# Patient Record
Sex: MALE | Race: WHITE | Employment: OTHER | URBAN - METROPOLITAN AREA
[De-identification: names, ages, dates, MRNs, and addresses within clinical notes are randomized per-mention and may not be internally consistent; named-entity substitution may affect disease eponyms.]

---

## 2017-01-16 ENCOUNTER — ALLIED HEALTH/NURSE VISIT (OUTPATIENT)
Dept: NEUROLOGY | Facility: CLINIC | Age: 42
End: 2017-01-16

## 2017-01-16 VITALS
BODY MASS INDEX: 24.01 KG/M2 | WEIGHT: 153 LBS | HEIGHT: 67 IN | DIASTOLIC BLOOD PRESSURE: 73 MMHG | SYSTOLIC BLOOD PRESSURE: 121 MMHG | HEART RATE: 85 BPM

## 2017-01-16 DIAGNOSIS — G40.219 PARTIAL EPILEPSY WITH LOSS OF CONSCIOUSNESS, INTRACTABLE (H): Primary | ICD-10-CM

## 2017-01-16 NOTE — PROGRESS NOTES
Patient comes in today for follow-up on changes to his VNS and his response to it.    Patient reports that there have been no negative effects from increased duty cycle and he is tolerating it well.  Wife reports that he seems to be more tired lately and that there is a decrease in seizure activity, which none-the-less, continue to cluster every couple of days with 4-6 Seizures clusters on days when he has them.  Wife also reports that once asleep he sleeps deeply and undisturbed, but seem to be more tired.    VNS was interrogated and all settings found to be as set at last encounter. Everything working correctly and there is no indication of battery running down.    VNS setting charted in flow sheet.    Vic Cummnis RN Care Coordinator  Neurology / Epilepsy

## 2017-01-16 NOTE — MR AVS SNAPSHOT
"              After Visit Summary   1/16/2017    Ammon Cronin    MRN: [id number]           Patient Information     Date Of Birth          1975        Visit Information        Provider Department      1/16/2017 11:30 AM Izabela, Michael Urrutia Nurse [facility]         Follow-ups after your visit        Who to contact     Please call your clinic at [phone] to:    Ask questions about your health    Make or cancel appointments    Discuss your medicines    Learn about your test results    Speak to your doctor   If you have compliments or concerns about an experience at your clinic, or if you wish to file a complaint, please contact [facility] Patient Relations at [phone] or email us at [email or web address]         Additional Information About Your Visit        MyChart Information     Clicktivatedt gives you secure access to your electronic health record. If you see a primary care provider, you can also send messages to your care team and make appointments. If you have questions, please call your primary care clinic.  If you do not have a primary care provider, please call [phone] and they will assist you.      Huiyuan is an electronic gateway that provides easy, online access to your medical records. With Huiyuan, you can request a clinic appointment, read your test results, renew a prescription or communicate with your care team.     To access your existing account, please contact your [facility] or call [phone] for assistance.        Care EveryWhere ID     This is your Care EveryWhere ID. This could be used by other organizations to access your [place] medical records  [id number]        Your Vitals Were     Pulse Height BMI (Body Mass Index)             85 5' 7.01\" (170.2 cm) 23.96 kg/m2          Blood Pressure from Last 3 Encounters:   01/16/17 121/73   11/16/16 108/65   10/05/16 124/71    Weight from Last 3 Encounters:   01/16/17 " 153 lb (69.4 kg)   11/16/16 149 lb 6.4 oz (67.767 kg)   10/05/16 150 lb 12.8 oz (68.402 kg)              Today, you had the following     No orders found for display       Primary Care Provider    None Specified       No primary provider on file.        Thank you!     Thank you for choosing [facility]  for your care. Our goal is always to provide you with excellent care. Hearing back from our patients is one way we can continue to improve our services. Please take a few minutes to complete the written survey that you may receive in the mail after your visit with us. Thank you!             Your Updated Medication List - Protect others around you: Learn how to safely use, store and throw away your medicines at www.disposemymeds.org.          This list is accurate as of: 1/16/17  1:37 PM.  Always use your most recent med list.                   Brand Name Dispense Instructions for use    * diazepam 5 MG/ML (HIGH CONC) solution    DIAZEPAM INTENSOL    30 mL    Seizures greater than 5 minutes or cluster of more than 3 seizures in 8 hour period. May repeat dose once, no more than 6 mg in 24 hour period.       * diazepam 5 MG tablet    VALIUM    30 tablet    TAKE 1 TAB AS NEEDED FOR SEIZURES >5MIN OR >3 SEIZURES/8HRS, MAY REPEAT ONCE, MAX 10MG/24HR, CALL 911 IF BREATHING PROBLEMS       eslicarbazepine acetate 600 MG tablet    APTIOM    270 tablet    Titrate as instructed to 600 mg morning and 1200 mg pm       * gabapentin 300 MG capsule    NEURONTIN    360 capsule    Titrate as instructed to 4 tablet (1200 mg) three times a day       * gabapentin 300 MG capsule    NEURONTIN    1080 capsule    Take 4 cap po tid       klonoPIN 0.5 MG tablet   Generic drug:  clonazePAM      Take 0.5 mg by mouth Taking 1 tab in am, taking 2 tabs afternoon       levETIRAcetam 750 MG tablet    KEPPRA    450 tablet    Generic.  Take 1 tablet in am, 2 tablets at 4 pm and  2 tablet at 11 pm.       MULTIVITAMIN & MINERAL PO      Take 1  tablet by mouth daily.       OMEGA-3 FATTY ACIDS PO      Take 1 tablet by mouth daily.       OXcarbazepine 300 MG tablet    TRILEPTAL    810 tablet    TAKE 3 TABLETS THREE TIMES DAILY       PROZAC 40 MG capsule   Generic drug:  FLUoxetine      Take 40 mg by mouth daily Started March 2013       risperiDONE 1 MG tablet    risperDAL     Take 1 mg by mouth 2 times daily       TOPAMAX 50 MG tablet   Generic drug:  topiramate      Take 50 mg by mouth 2 times daily       * Notice:  This list has 4 medication(s) that are the same as other medications prescribed for you. Read the directions carefully, and ask your doctor or other care provider to review them with you.

## 2017-05-04 DIAGNOSIS — G40.219 PARTIAL EPILEPSY WITH LOSS OF CONSCIOUSNESS, INTRACTABLE (H): ICD-10-CM

## 2017-05-04 RX ORDER — OXCARBAZEPINE 300 MG/1
TABLET, FILM COATED ORAL
Qty: 810 TABLET | Refills: 0 | Status: SHIPPED | OUTPATIENT
Start: 2017-05-04 | End: 2017-07-12

## 2017-05-31 ENCOUNTER — OFFICE VISIT (OUTPATIENT)
Dept: NEUROLOGY | Facility: CLINIC | Age: 42
End: 2017-05-31

## 2017-05-31 VITALS
HEIGHT: 67 IN | WEIGHT: 146 LBS | BODY MASS INDEX: 22.91 KG/M2 | HEART RATE: 62 BPM | DIASTOLIC BLOOD PRESSURE: 65 MMHG | SYSTOLIC BLOOD PRESSURE: 109 MMHG

## 2017-05-31 DIAGNOSIS — G40.219 PARTIAL EPILEPSY WITH IMPAIRMENT OF CONSCIOUSNESS, INTRACTABLE (H): Primary | ICD-10-CM

## 2017-05-31 DIAGNOSIS — R25.1 TREMOR: ICD-10-CM

## 2017-05-31 RX ORDER — PHENYTOIN SODIUM 100 MG/1
CAPSULE, EXTENDED RELEASE ORAL
Qty: 60 CAPSULE | Refills: 3 | Status: SHIPPED | OUTPATIENT
Start: 2017-05-31 | End: 2017-08-07

## 2017-05-31 RX ORDER — CLONAZEPAM 2 MG/1
2 TABLET ORAL 2 TIMES DAILY PRN
COMMUNITY
End: 2018-02-20

## 2017-05-31 NOTE — MR AVS SNAPSHOT
After Visit Summary   5/31/2017    Ammon Cronin    MRN: [id number]           Patient Information     Date Of Birth          1975        Visit Information        Provider Department      5/31/2017 11:30 AM Rosenda Claros MD [facility]        Today's Diagnoses     Partial epilepsy with impairment of consciousness, intractable (H)    -  1    Tremor          Care Instructions               Medication Tablet Size         AM  (morning)   Noon  PM (Night)       Week 1   phenytoin   mg    1 tablet       Week 2  phenytoin   mg  1 tablet   1 tablet       Week 3 Check phenytoin  Level         Week 4  See Harlan               CT of head, schedule phone call after CT to review with Harlan     If you are interested in CBD (marijuana)  trial call Dr. Cedeno to determine if you can participate     Think about Responsive Neurostimulation (Neuropace), [employer] ([employer]ena RESPONSIVE NEUROSTIMULATION (NEUROPACE) story)     Talk to psychiatrist about PRN (as needed) agitation medication that are not benzodiazepine/barbituates.     Check with insurance company about Brivaracetam, fycompa, banzel, onfi    See Dr. Bloom for the tremor     Rosenda ADAMS. MD Harlan             Follow-ups after your visit        Additional Services     NEUROLOGY ADULT REFERRAL       Your provider has referred you to:Dr. Bloom for head tremor     Reason for Referral: [facility] Dr. Bloom     Please be aware that coverage of these services is subject to the terms and limitations of your health insurance plan.  Call member services at your health plan with any benefit or coverage questions.      Please bring the following with you to your appointment:    (1) Any X-Rays, CTs or MRIs which have been performed.  Contact the facility where they were done to arrange for  prior to your scheduled appointment.    (2) List of current medications  (3) This referral request   (4) Any documents/labs given to you for this referral                   Follow-up notes from your care team     Return in about 4 weeks (around 6/28/2017).      Your next 10 appointments already scheduled     Jun 28, 2017 10:30 AM CDT   [facility]    [address], [address]  [facility] [address] [phone]              Future tests that were ordered for you today     Open Future Orders        Priority Expected Expires Ordered    Oxcarbazepine level Routine 6/1/2017 7/30/2017 5/31/2017    Comprehensive metabolic panel Routine 6/1/2017 7/30/2017 5/31/2017    CBC with platelets differential Routine 6/1/2017 7/30/2017 5/31/2017    Keppra (Levetiracetam) Level Routine 6/1/2017 7/30/2017 5/31/2017    Gabapentin level Routine 6/1/2017 7/30/2017 5/31/2017    CT Head w/o Contrast Routine  5/31/2018 5/31/2017            Who to contact     Please call your clinic at [phone] to:    Ask questions about your health    Make or cancel appointments    Discuss your medicines    Learn about your test results    Speak to your doctor   If you have compliments or concerns about an experience at your clinic, or if you wish to file a complaint, please contact [facility] Patient Relations at [phone] or email us at [email or web address]         Additional Information About Your Visit        Everypointhart Information     Jobfox gives you secure access to your electronic health record. If you see a primary care provider, you can also send messages to your care team and make appointments. If you have questions, please call your primary care clinic.  If you do not have a primary care provider, please call [phone] and they will assist you.      Jobfox is an electronic gateway that provides easy, online access to your medical records. With Jobfox, you can request a clinic appointment, read your test results, renew a prescription or communicate with your care team.    "  To access your existing account, please contact your [facility] or call [phone] for assistance.        Care EveryWhere ID     This is your Care EveryWhere ID. This could be used by other organizations to access your [place] medical records  [id number]        Your Vitals Were     Pulse Height BMI (Body Mass Index)             62 5' 7.01\" (170.2 cm) 22.86 kg/m2          Blood Pressure from Last 3 Encounters:   05/31/17 109/65   01/16/17 121/73   11/16/16 108/65    Weight from Last 3 Encounters:   05/31/17 146 lb (66.2 kg)   01/16/17 153 lb (69.4 kg)   11/16/16 149 lb 6.4 oz (67.8 kg)              We Performed the Following     CBC with platelets differential     Comprehensive metabolic panel     Gabapentin level     Keppra (Levetiracetam) Level     NEUROLOGY ADULT REFERRAL     Oxcarbazepine level          Today's Medication Changes          These changes are accurate as of: 5/31/17 12:52 PM.  If you have any questions, ask your nurse or doctor.               Start taking these medicines.        Dose/Directions    phenytoin 100 MG CR capsule   Commonly known as:  DILANTIN   Used for:  Partial epilepsy with impairment of consciousness, intractable (H), Tremor   Started by:  Rosenda Claros MD        Titrate to 100 mg twice a day   Quantity:  60 capsule   Refills:  3            Where to get your medicines      These medications were sent to [facility] - [place], [place] - [address]  [address], [facility] [address]     Phone:  [phone]     phenytoin 100 MG CR capsule                Primary Care Provider    None Specified       No primary provider on file.        Thank you!     Thank you for choosing [facility]  for your care. Our goal is always to provide you with excellent care. Hearing back from our patients is one way we can continue to improve our services. Please take a few minutes to complete the written survey that you may " receive in the mail after your visit with us. Thank you!             Your Updated Medication List - Protect others around you: Learn how to safely use, store and throw away your medicines at www.disposemymeds.org.          This list is accurate as of: 5/31/17 12:52 PM.  Always use your most recent med list.                   Brand Name Dispense Instructions for use    clonazePAM 2 MG tablet    klonoPIN     Take 2 mg by mouth 2 times daily as needed for anxiety       * diazepam 5 MG/ML (HIGH CONC) solution    DIAZEPAM INTENSOL    30 mL    Seizures greater than 5 minutes or cluster of more than 3 seizures in 8 hour period. May repeat dose once, no more than 6 mg in 24 hour period.       * diazepam 5 MG tablet    VALIUM    30 tablet    TAKE 1 TAB AS NEEDED FOR SEIZURES >5MIN OR >3 SEIZURES/8HRS, MAY REPEAT ONCE, MAX 10MG/24HR, CALL 911 IF BREATHING PROBLEMS       gabapentin 300 MG capsule    NEURONTIN    1080 capsule    Take 4 cap po tid       levETIRAcetam 750 MG tablet    KEPPRA    450 tablet    Generic.  Take 1 tablet in am, 2 tablets at 4 pm and  2 tablet at 11 pm.       MULTIVITAMIN & MINERAL PO      Take 1 tablet by mouth daily.       OMEGA-3 FATTY ACIDS PO      Take 1 tablet by mouth daily.       OXcarbazepine 300 MG tablet    TRILEPTAL    810 tablet    TAKE 3 TABLETS THREE TIMES DAILY       phenytoin 100 MG CR capsule    DILANTIN    60 capsule    Titrate to 100 mg twice a day       PROZAC 40 MG capsule   Generic drug:  FLUoxetine      Take 40 mg by mouth daily Started March 2013       risperiDONE 1 MG tablet    risperDAL     Take 1 mg by mouth 2 times daily       * Notice:  This list has 2 medication(s) that are the same as other medications prescribed for you. Read the directions carefully, and ask your doctor or other care provider to review them with you.

## 2017-05-31 NOTE — PATIENT INSTRUCTIONS
Medication Tablet Size         AM  (morning)   Noon  PM (Night)       Week 1   phenytoin   mg    1 tablet       Week 2  phenytoin   mg  1 tablet   1 tablet       Week 3 Check phenytoin  Level         Week 4  See Harlan               CT of head, schedule phone call after CT to review with Harlan     If you are interested in CBD (marijuana)  trial call Dr. Cedeno to determine if you can participate     Think about Responsive Neurostimulation (Neuropace), [employer] ([employer]ena RESPONSIVE NEUROSTIMULATION (NEUROPACE) story)     Talk to psychiatrist about PRN (as needed) agitation medication that are not benzodiazepine/barbituates.     Check with insurance company about Brivaracetam, fycompa, banzel, onfi    See Dr. Bloom for the tremor     Rosenda Claros MD

## 2017-05-31 NOTE — LETTER
2017       RE: Ammon Cronin  : 1975   MRN: [id number]      Dear Colleague,    Thank you for referring your patient, Ammon Cronin, to the [facility] EPILEPSY CARE at [facility]. Please see a copy of my visit note below.    [facility] Epilepsy Care Progress Note    Patient:  Ammon Cronin  :  1975   Age:  41 year old   Today's Office Visit:  2017    Epilepsy Data:  Patient History  Primary Epileptologist/Provider: Rosenda Claros M.D.  Epilepsy Syndrome: Localization-related epilepsy unspecified  Epilepsy Syndrome Status: Final  Age of Onset: 16  Etiology  : Tumor  Other Relevant Dx/ Issues: Etiology likely cavernous hemangioma; acute hemorrhage 2005.  Born 5 weeks premature. Left temporal AVM with resection age 11.  Cardiac pacemaker  (bradycardia).  Hx of migraines.  Inpatient evaluations 3/02, ,  & .     Tests/Surgery History  Last EE2013  Last Neuropsych Testin2009  Epilepsy Surgery #1 Date: 11  Epilepsy Related Surgery #1 : Type: VNS implantation on RIGHT.  Seizure Record  Current Visit Date: 17  Previous Visit Date: 17  Months since last visit: 4.44         SEIZURE HISTORY:   Copied forward:  First seizure was at the age of 16 (loss of awarenes). 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   Presurgically workup in  revealed bilateral independent seizure foci in the temporal lobes. Ictal asystole (, he developed chest pain, pallor, diaphoresis and nausea, was found to have a 22 second period of asystole.  Emergent cardiac pacemaker placed). MRI of the brain shows a cavernoma in the right frontal and a second one in the left temporal lobe and encephalomalacia.      INTERVAL HISTORY:  Came with Jasmyne. He continues to have  daily complex partial seizures, per Alejandrina seizure frequency has doubled, he appears more forgetful, increased agitation/agression/mood changes. He continues to average 5 seizure per day and his seizure are more intense per titus. Higher vagus nerve stimulator setting (duty cycle) worsening seizure intensity and slightly more seizures and he had side effects. Currently, on antiepileptic drug there is fatigue, no double vision, no mood changes, no nausea, no vomiting, no abdominal pain, no rashes. No recent ER visits and no hospitalizations since last visit.    Prior to Admission medications    Medication Sig Start Date End Date Taking? Authorizing Provider   clonazePAM (KLONOPIN) 2 MG tablet Take 2 mg by mouth 2 times daily as needed for anxiety   Yes Reported, Patient   OXcarbazepine (TRILEPTAL) 300 MG tablet TAKE 3 TABLETS THREE TIMES DAILY 5/4/17  Yes Rosenda Claros MD   gabapentin (NEURONTIN) 300 MG capsule Take 4 cap po tid 8/2/16  Yes Rosenda Claros MD   levETIRAcetam (KEPPRA) 750 MG tablet Generic.  Take 1 tablet in am, 2 tablets at 4 pm and  2 tablet at 11 pm. 8/2/16  Yes Rosenda Claros MD   diazepam (VALIUM) 5 MG tablet TAKE 1 TAB AS NEEDED FOR SEIZURES >5MIN OR >3 SEIZURES/8HRS, MAY REPEAT ONCE, MAX 10MG/24HR, CALL 911 IF BREATHING PROBLEMS 8/2/16  Yes Rosenda Claros MD   risperiDONE (RISPERDAL) 1 MG tablet Take 1 mg by mouth 2 times daily   Yes Reported, Patient   diazepam (DIAZEPAM INTENSOL) 5 MG/ML solution Seizures greater than 5 minutes or cluster of more than 3 seizures in 8 hour period. May repeat dose once, no more than 6 mg in 24 hour period. 2/3/14  Yes Eric Cruz MD   FLUoxetine (PROZAC) 40 MG capsule Take 40 mg by mouth daily Started March 2013   Yes Reported, Patient   Multiple Vitamins-Minerals (MULTIVITAMIN & MINERAL PO) Take 1 tablet by mouth daily.   Yes Reported, Patient   OMEGA-3 FATTY ACIDS PO Take 1 tablet by mouth daily.   Yes Reported, Patient         AED MEDICATIONS:     1. Levetiracetam, 750 mg in the morning, 1500 mg at 4 p.m., and 1500 mg at 11 p.m.   2. Oxcarbazepine  900 mg t.i.d.   3. Gabapentin 1200 mg AM, 1200 mg noon, and 1200 mg PM  (started for RLS, patient has taken higher dose for some time, not sure of length)   5. Klonopin 2 -4 mg  (started 3/2013 for anxiety, takes one per day and two on weekend)  6. Diazepam PRN for CPS lasting greater than 5 minutes or more then 2 in one hour.     Component      Latest Ref Rng 7/1/2013 11/5/2013 3/25/2014 11/4/2014   Levetiracetam Level       23.2 33.3  37.5   10 Hydroxy Metabolite Level        29.0 35.7 (H) 43.2 (H)   Valproic Acid Level      50 - 100 mg/L  60 82 46 (L)   Gabapentin Level         0.6 (L) 6.7   Sodium      133 - 144 mmol/L    138     Component      Latest Ref Rng 5/19/2015 6/2/2016   Levetiracetam Level       32.6 42.3 (H)   10 Hydroxy Metabolite Level       31.6 24.0   Valproic Acid Level      50 - 100 mg/L 39 (L)    Gabapentin Level       8.6 15.2   Sodium      133 - 144 mmol/L  143         MEDICATION NOTE:   1. Oxcarbazepine: increased oxcarbazepine from 2,400mg -> 2700 mg on 4/2013.   2. Depakote:  depakote was started 3/9/2010. Higher dose of VPA worsened tremor. Depakote stopped 5/2016 and tremors went down.   3. Gabapentin  increased 1200 mg three times a day 5/2016 with no change in seizure, however, RLS symptoms decreased  4. Vagus nerve stimulator setting: Not able to tolerate higher duty cycle (35) with 1.1 signal off time and 30 sec on time, seizure worsened, not able to tolerate higher current setting due to discomfort.     PRIOR AED:   1.Lyrica (ineffective for seizure, max dose 1100mg per day)   2.Topamax: Two trials from 01/2008-05/2008 and the second trial on 05/19/2015.  Max dose was 200 mg per day.  CP max was 3.5 mg/L.  There was no change in seizure frequency.  The patient was compliant.  During the second trial, they tried it for mood stabilization; 50 mg was after 2 months discontinued  because side effects were speech and word-finding difficulties.   3.Gabatril (ineffective, caused fatigue at 48mg/ day). One trial from 01/2004-04/2006.  Max dose 48 mg per day.  It was ineffective and was discontinued.  There was no increase in seizures when it was stopped.  Side effects were fatigue.   4.Zonegran  One trial from 12/2003.  Max dose 400 mg per day.  Zonegran was used in combination with Keppra and Trileptal.  Side effects were memory impairment, aggressiveness, fatigue, behavioral changes, cognitive impairment, which improved after Zonegran was discontinued.   5.Dilantin: One trial from ? to 03/08/2002.  Max dose 350 mg per day.  CP max and free Dilantin level of 1.9 mg/L.  Efficacy:  Unknown.  The drug was optimized.  Side effects were tiredness on 350 mg a day.     Assessment:  It looks like seizure control was better when the patient was on Dilantin; therefore, it could be retried.   6.Vimpat: One trial 07/2009-09/2009.  Max dose 400 mg per day.  CP max 5.3 mg/L.  It was discontinued after 2 months because the patient experienced dizziness, diplopia and ataxia.  7.Depakote: One trial from 01/2010 to 05/09/2016.  So far, seizures have not increased because we increased the gabapentin.  Also, the headaches have not gotten worse, but the hand tremor and the head tremor have immensely improved since Depakote was discontinued.  Max dose was 2500 mg per day.  CP max was up to 116/15.6 mg/L.  In the past, it decreased seizure frequency, and it was used together with levetiracetam, oxcarbazepine and gabapentin, and it was used before with oxcarbazepine, levetiracetam and Lyrica.   Assessment:  Depakote could always be retried if we run into a problem.  8.lamotrigine: One trial from 07/06/2001.  Max dose 150 mg per day.  Used in combination with Dilantin.  There was no change in seizure frequency.  Side effects were increase in confusion and therefore questionable efficacy.  9.carbamazepine (tired, but no  "past info on details)  10.Levetiracetam: started in 2001. No major side effects. One trial from 07/2001 to present.  Max dose 3750 mg per day.  CP max 42.4 mg/L.   11. Oxcarbazepine:  One trial from 03/2002 to present.  Max dose 2700 mg per day.  CP max 43.2 mg/L.  It decreases seizures, and it looks like it is one of the best drugs the patient has tried.    12.  Gabapentin:  One trial from 07/2012 to present.  Max dose is 3600 mg per day.  This drug was used more for pain and restless legs, but when Depakote was tapered, we increased the gabapentin, and it looks like the only problem we have is some weight gain with the gabapentin.  It helps for the restless legs.  Helping for seizures is questionable.    13.  Klonopin:  One trial started 03/2013.  Is used for anxiety, not for seizures.  It was started at 0.5 mg and optimized so far to 3 mg per day.     Assessment:  This drug is used by a psychiatrist.  If we one day want to try ONFI in this patient, maybe then the Klonopin could be decreased again.          REVIEW OF SYSTEMS:  Head tremor, which bothers him.  He continues to have bilateral hand tremors on Depakote.  No nausea, vomiting, diarrhea.  No rash.  Vagus nerve stimulator setting are bother him (voice changes and throat discomfort).      EXAMINATION:  /65  Pulse 62  Ht 5' 7.01\" (170.2 cm)  Wt 146 lb (66.2 kg)  BMI 22.86 kg/m2  Alert, orientated, speech is fluent, face is symmetric, No head/ hand tremor, no focal deficits noted.Gait is stable. He had a seizure in clinic during which he stares off, lip smacking, not able to follow commands, not able to name objects, or remember cue word. He had subtle mouth movements.     ASSESSMENT:    1.     2.  Left-sided cavernous hemangioma 07/2002.   3.  Multilobulated intraaxillary mass involving the right frontal lobe, removed 08/19/2003.     4.  Status post pacemaker placement 12/2003.     5.  Left temporal AVM resection at age 11.     6.  History of " migraines.   7.   and decrease in seizures after implantation.        1. Refractory multifocal epilepsy.  Etiology multiple cavernomas.  The patient is status left temporal resection of AVM in 1985 (age 11), Right frontal hematoma 04/13/2006, treated with surgery.  Left-sided cavernous hemangioma 07/2002. Status post epilepsy surgery 05/29/2012.  Vagus nerve stimulator implanted on right and he has pacemaker on left.  Electrographically, he has multifocal epileptiform discharges and seizures arising from left and right temporal regions.     Antiepileptic drug discussion: Insurance coverage for new antiepileptic drug is poor. We will retry phenytoin . Other antiepileptic drug to consider are banzel, Brivaracetam, fycompa. OR retrial with phenytoin. Patient and wife declined Responsive Neurostimulation (Neuropace) strongly or any brain surgery today.     2. Restless legs syndrome. Stable on gabapentin .       3. Anxiety and depression.  Stable. Managed by psychiatrist (Dr. López)    4. Tremor in head continues to be a problems. His hand tremor stopped once we stopped depakote. I will refer him to neurology, I do not think his current antiepileptic drug will cause head tremor or seizures. Consutt Dr. Bloom for diagnosis/treatment.        PLAN: Start phenytoin               Medication Tablet Size         AM  (morning)   Noon  PM (Night)       Week 1   phenytoin   mg    1 tablet       Week 2  phenytoin   mg  1 tablet   1 tablet       Week 3 Check phenytoin  Level         Week 4  See Harlan               CT of head, schedule phone call after CT to review with Harlan     If you are interested in CBD (marijuana)  trial call Dr. Cedeno to determine if you can participate     Think about Responsive Neurostimulation (Neuropace), REPUCOM (Sena RESPONSIVE NEUROSTIMULATION (NEUROPACE) story)     Talk to psychiatrist about PRN (as needed) agitation medication that are not benzodiazepine/barbituates.      Check with insurance company about Brivaracetam, fycompa, banzel, onfi    See Dr. Bloom for the tremor     Follow up 1 month        I spent 45 minutes with the patient. During this time counseling and coordination of care exceeded 50% of the visit time. I addressed all questions and concerns the patient raised in regards to his care.     REBEKAH CHRISTOPHER MD

## 2017-05-31 NOTE — PROGRESS NOTES
[facility]    Patient:  Ammon Cronin  :  1975   Age:  41 year old   Today's Office Visit:  2017    Epilepsy Data:  Patient History  Primary Epileptologist/Provider: Rosenda Claros M.D.  Epilepsy Syndrome: Localization-related epilepsy unspecified  Epilepsy Syndrome Status: Final  Age of Onset: 16  Etiology  : Tumor  Other Relevant Dx/ Issues: Etiology likely cavernous hemangioma; acute hemorrhage 2005.  Born 5 weeks premature. Left temporal AVM with resection age 11.  Cardiac pacemaker  (bradycardia).  Hx of migraines.  Inpatient evaluations 3/02, ,  & .     Tests/Surgery History  Last EE2013  Last Neuropsych Testin2009  Epilepsy Surgery #1 Date: 11  Epilepsy Related Surgery #1 : Type: VNS implantation on RIGHT.  Seizure Record  Current Visit Date: 17  Previous Visit Date: 17  Months since last visit: 4.44         SEIZURE HISTORY:   Copied forward:  First seizure was at the age of 16 (loss of awarenes). 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   Presurgically workup in  revealed bilateral independent seizure foci in the temporal lobes. Ictal asystole (, he developed chest pain, pallor, diaphoresis and nausea, was found to have a 22 second period of asystole.  Emergent cardiac pacemaker placed). MRI of the brain shows a cavernoma in the right frontal and a second one in the left temporal lobe and encephalomalacia.      INTERVAL HISTORY:  Came with Jasmyne. He continues to have daily complex partial seizures, per Alejandrina seizure frequency has doubled, he appears more forgetful, increased agitation/agression/mood changes. He continues to average 5 seizure per day and his seizure are more intense per jasmyne. Higher vagus nerve stimulator setting (duty  cycle) worsening seizure intensity and slightly more seizures and he had side effects. Currently, on antiepileptic drug there is fatigue, no double vision, no mood changes, no nausea, no vomiting, no abdominal pain, no rashes. No recent ER visits and no hospitalizations since last visit.    Prior to Admission medications    Medication Sig Start Date End Date Taking? Authorizing Provider   clonazePAM (KLONOPIN) 2 MG tablet Take 2 mg by mouth 2 times daily as needed for anxiety   Yes Reported, Patient   OXcarbazepine (TRILEPTAL) 300 MG tablet TAKE 3 TABLETS THREE TIMES DAILY 5/4/17  Yes Rosenda Claros MD   gabapentin (NEURONTIN) 300 MG capsule Take 4 cap po tid 8/2/16  Yes Rosenda Claros MD   levETIRAcetam (KEPPRA) 750 MG tablet Generic.  Take 1 tablet in am, 2 tablets at 4 pm and  2 tablet at 11 pm. 8/2/16  Yes Rosenda Claros MD   diazepam (VALIUM) 5 MG tablet TAKE 1 TAB AS NEEDED FOR SEIZURES >5MIN OR >3 SEIZURES/8HRS, MAY REPEAT ONCE, MAX 10MG/24HR, CALL 911 IF BREATHING PROBLEMS 8/2/16  Yes Rosenda Claros MD   risperiDONE (RISPERDAL) 1 MG tablet Take 1 mg by mouth 2 times daily   Yes Reported, Patient   diazepam (DIAZEPAM INTENSOL) 5 MG/ML solution Seizures greater than 5 minutes or cluster of more than 3 seizures in 8 hour period. May repeat dose once, no more than 6 mg in 24 hour period. 2/3/14  Yes Eric Cruz MD   FLUoxetine (PROZAC) 40 MG capsule Take 40 mg by mouth daily Started March 2013   Yes Reported, Patient   Multiple Vitamins-Minerals (MULTIVITAMIN & MINERAL PO) Take 1 tablet by mouth daily.   Yes Reported, Patient   OMEGA-3 FATTY ACIDS PO Take 1 tablet by mouth daily.   Yes Reported, Patient         AED MEDICATIONS:    1. Levetiracetam, 750 mg in the morning, 1500 mg at 4 p.m., and 1500 mg at 11 p.m.   2. Oxcarbazepine  900 mg t.i.d.   3. Gabapentin 1200 mg AM, 1200 mg noon, and 1200 mg PM  (started for RLS, patient has taken higher dose for some time, not sure of length)   5. Klonopin  2 -4 mg  (started 3/2013 for anxiety, takes one per day and two on weekend)  6. Diazepam PRN for CPS lasting greater than 5 minutes or more then 2 in one hour.     Component      Latest Ref Rng 7/1/2013 11/5/2013 3/25/2014 11/4/2014   Levetiracetam Level       23.2 33.3  37.5   10 Hydroxy Metabolite Level        29.0 35.7 (H) 43.2 (H)   Valproic Acid Level      50 - 100 mg/L  60 82 46 (L)   Gabapentin Level         0.6 (L) 6.7   Sodium      133 - 144 mmol/L    138     Component      Latest Ref Rng 5/19/2015 6/2/2016   Levetiracetam Level       32.6 42.3 (H)   10 Hydroxy Metabolite Level       31.6 24.0   Valproic Acid Level      50 - 100 mg/L 39 (L)    Gabapentin Level       8.6 15.2   Sodium      133 - 144 mmol/L  143         MEDICATION NOTE:   1. Oxcarbazepine: increased oxcarbazepine from 2,400mg -> 2700 mg on 4/2013.   2. Depakote:  depakote was started 3/9/2010. Higher dose of VPA worsened tremor. Depakote stopped 5/2016 and tremors went down.   3. Gabapentin  increased 1200 mg three times a day 5/2016 with no change in seizure, however, RLS symptoms decreased  4. Vagus nerve stimulator setting: Not able to tolerate higher duty cycle (35) with 1.1 signal off time and 30 sec on time, seizure worsened, not able to tolerate higher current setting due to discomfort.     PRIOR AED:   1.Lyrica (ineffective for seizure, max dose 1100mg per day)   2.Topamax: Two trials from 01/2008-05/2008 and the second trial on 05/19/2015.  Max dose was 200 mg per day.  CP max was 3.5 mg/L.  There was no change in seizure frequency.  The patient was compliant.  During the second trial, they tried it for mood stabilization; 50 mg was after 2 months discontinued because side effects were speech and word-finding difficulties.   3.Gabatril (ineffective, caused fatigue at 48mg/ day). One trial from 01/2004-04/2006.  Max dose 48 mg per day.  It was ineffective and was discontinued.  There was no increase in seizures when it was stopped.   Side effects were fatigue.   4.Zonegran  One trial from 12/2003.  Max dose 400 mg per day.  Zonegran was used in combination with Keppra and Trileptal.  Side effects were memory impairment, aggressiveness, fatigue, behavioral changes, cognitive impairment, which improved after Zonegran was discontinued.   5.Dilantin: One trial from ? to 03/08/2002.  Max dose 350 mg per day.  CP max and free Dilantin level of 1.9 mg/L.  Efficacy:  Unknown.  The drug was optimized.  Side effects were tiredness on 350 mg a day.     Assessment:  It looks like seizure control was better when the patient was on Dilantin; therefore, it could be retried.   6.Vimpat: One trial 07/2009-09/2009.  Max dose 400 mg per day.  CP max 5.3 mg/L.  It was discontinued after 2 months because the patient experienced dizziness, diplopia and ataxia.  7.Depakote: One trial from 01/2010 to 05/09/2016.  So far, seizures have not increased because we increased the gabapentin.  Also, the headaches have not gotten worse, but the hand tremor and the head tremor have immensely improved since Depakote was discontinued.  Max dose was 2500 mg per day.  CP max was up to 116/15.6 mg/L.  In the past, it decreased seizure frequency, and it was used together with levetiracetam, oxcarbazepine and gabapentin, and it was used before with oxcarbazepine, levetiracetam and Lyrica.   Assessment:  Depakote could always be retried if we run into a problem.  8.lamotrigine: One trial from 07/06/2001.  Max dose 150 mg per day.  Used in combination with Dilantin.  There was no change in seizure frequency.  Side effects were increase in confusion and therefore questionable efficacy.  9.carbamazepine (tired, but no past info on details)  10.Levetiracetam: started in 2001. No major side effects. One trial from 07/2001 to present.  Max dose 3750 mg per day.  CP max 42.4 mg/L.   11. Oxcarbazepine:  One trial from 03/2002 to present.  Max dose 2700 mg per day.  CP max 43.2 mg/L.  It  "decreases seizures, and it looks like it is one of the best drugs the patient has tried.    12.  Gabapentin:  One trial from 07/2012 to present.  Max dose is 3600 mg per day.  This drug was used more for pain and restless legs, but when Depakote was tapered, we increased the gabapentin, and it looks like the only problem we have is some weight gain with the gabapentin.  It helps for the restless legs.  Helping for seizures is questionable.    13.  Klonopin:  One trial started 03/2013.  Is used for anxiety, not for seizures.  It was started at 0.5 mg and optimized so far to 3 mg per day.     Assessment:  This drug is used by a psychiatrist.  If we one day want to try ONFI in this patient, maybe then the Klonopin could be decreased again.          REVIEW OF SYSTEMS:  Head tremor, which bothers him.  He continues to have bilateral hand tremors on Depakote.  No nausea, vomiting, diarrhea.  No rash.  Vagus nerve stimulator setting are bother him (voice changes and throat discomfort).      EXAMINATION:  /65  Pulse 62  Ht 5' 7.01\" (170.2 cm)  Wt 146 lb (66.2 kg)  BMI 22.86 kg/m2  Alert, orientated, speech is fluent, face is symmetric, No head/ hand tremor, no focal deficits noted.Gait is stable. He had a seizure in clinic during which he stares off, lip smacking, not able to follow commands, not able to name objects, or remember cue word. He had subtle mouth movements.     ASSESSMENT:    1.     2.  Left-sided cavernous hemangioma 07/2002.   3.  Multilobulated intraaxillary mass involving the right frontal lobe, removed 08/19/2003.     4.  Status post pacemaker placement 12/2003.     5.  Left temporal AVM resection at age 11.     6.  History of migraines.   7.   and decrease in seizures after implantation.        1. Refractory multifocal epilepsy.  Etiology multiple cavernomas.  The patient is status left temporal resection of AVM in 1985 (age 11), Right frontal hematoma 04/13/2006, treated with surgery.  " Left-sided cavernous hemangioma 07/2002. Status post epilepsy surgery 05/29/2012.  Vagus nerve stimulator implanted on right and he has pacemaker on left.  Electrographically, he has multifocal epileptiform discharges and seizures arising from left and right temporal regions.     Antiepileptic drug discussion: Insurance coverage for new antiepileptic drug is poor. We will retry phenytoin . Other antiepileptic drug to consider are banzel, Brivaracetam, fycompa. OR retrial with phenytoin. Patient and wife declined Responsive Neurostimulation (Neuropace) strongly or any brain surgery today.     2. Restless legs syndrome. Stable on gabapentin .       3. Anxiety and depression.  Stable. Managed by psychiatrist (Dr. López)    4. Tremor in head continues to be a problems. His hand tremor stopped once we stopped depakote. I will refer him to neurology, I do not think his current antiepileptic drug will cause head tremor or seizures. Consutt Dr. Bloom for diagnosis/treatment.        PLAN: Start phenytoin               Medication Tablet Size         AM  (morning)   Noon  PM (Night)       Week 1   phenytoin   mg    1 tablet       Week 2  phenytoin   mg  1 tablet   1 tablet       Week 3 Check phenytoin  Level         Week 4  See Harlan               CT of head, schedule phone call after CT to review with Harlan     If you are interested in CBD (marijuana)  trial call Dr. Cedeno to determine if you can participate     Think about Responsive Neurostimulation (Neuropace), Astech (Sena RESPONSIVE NEUROSTIMULATION (NEUROPACE) story)     Talk to psychiatrist about PRN (as needed) agitation medication that are not benzodiazepine/barbituates.     Check with insurance company about Brivaracetam, fycompa, banzel, onfi    See Dr. Bloom for the tremor     Follow up 1 month        I spent 45 minutes with the patient. During this time counseling and coordination of care exceeded 50% of the visit time. I addressed all  questions and concerns the patient raised in regards to his care.     REBEKAH CHRISTOPHER MD

## 2017-06-01 ENCOUNTER — TRANSFERRED RECORDS (OUTPATIENT)
Dept: HEALTH INFORMATION MANAGEMENT | Facility: CLINIC | Age: 42
End: 2017-06-01

## 2017-06-04 LAB
ALBUMIN SERPL-MCNC: 3.9 G/DL (ref 3.4–5)
ALP SERPL-CCNC: 76 U/L (ref 40–150)
ALT SERPL-CCNC: 12 U/L (ref 9–55)
AST SERPL-CCNC: 21 U/L (ref 10–40)
BASOPHILS # BLD AUTO: 0 K/CMM (ref 0–0.2)
BILIRUB SERPL-MCNC: 0.4 MG/DL (ref 0.2–1.2)
BUN SERPL-MCNC: 13 MG/DL (ref 9–26)
CALCIUM SERPL-MCNC: 9.1 MG/DL (ref 8.4–10.2)
CHLORIDE SERPLBLD-SCNC: 104 MMOL/L (ref 98–109)
CO2 SERPL-SCNC: 29 MMOL/L (ref 22–31)
CREAT SERPL-MCNC: 0.8 MG/DL (ref 0.73–1.18)
EOSINOPHIL # BLD AUTO: 0.1 K/CMM (ref 0–0.5)
ERYTHROCYTE [DISTWIDTH] IN BLOOD BY AUTOMATED COUNT: 12.9 % (ref 11–15)
GABAPENTIN: 6.7 UG/ML (ref 2–20)
GLUCOSE SERPL-MCNC: 100 MG/DL (ref 70–100)
HCT VFR BLD AUTO: 45.3 % (ref 39–51)
HEMOGLOBIN: 16.1 G/DL (ref 13.4–17.5)
IMMATURE GRANULOCYTES: 0 % (ref 0–0.5)
KEPPRA (LEVETIRACETAM) LEVEL: 29.4 MCG/ML (ref 6–46)
LYMPHOCYTES # BLD AUTO: 1.2 K/CMM (ref 1.1–4)
MCV RBC AUTO: 88.6 FL (ref 80–100)
MONOCYTES # BLD AUTO: 0.4 K/CMM (ref 0.2–0.8)
NEUTROPHILS # BLD AUTO: 2.5 K/CMM (ref 1.8–8)
OXCARBAZEPINE METABOLITE (MHC) S: 30.6 UG/ML (ref 10–35)
PLATELET # BLD AUTO: 239 K/CMM (ref 140–450)
POTASSIUM SERPL-SCNC: 4.1 MMOL/L (ref 3.5–5.2)
PROT SERPL-MCNC: 7 G/DL (ref 6.4–8.3)
RBC # BLD AUTO: 5.11 M/CMM (ref 4.2–5.9)
SODIUM SERPL-SCNC: 141 MMOL/L (ref 136–145)
WBC # BLD AUTO: 4.2 K/CMM (ref 3.8–11)

## 2017-06-19 DIAGNOSIS — R25.1 TREMOR: ICD-10-CM

## 2017-06-19 DIAGNOSIS — Q28.2 AVM (ARTERIOVENOUS MALFORMATION) BRAIN: Primary | ICD-10-CM

## 2017-06-19 DIAGNOSIS — G40.219 PARTIAL EPILEPSY WITH IMPAIRMENT OF CONSCIOUSNESS, INTRACTABLE (H): ICD-10-CM

## 2017-06-21 ENCOUNTER — TELEPHONE (OUTPATIENT)
Dept: NEUROLOGY | Facility: CLINIC | Age: 42
End: 2017-06-21

## 2017-06-21 NOTE — TELEPHONE ENCOUNTER
As discussed with Dr. Claros and conveyed to Luz (spouse), a calcification was found on imaging/no intracranial hemorrage.  No further action is needed.

## 2017-06-28 ENCOUNTER — OFFICE VISIT (OUTPATIENT)
Dept: NEUROLOGY | Facility: CLINIC | Age: 42
End: 2017-06-28

## 2017-06-28 DIAGNOSIS — G40.219 PARTIAL EPILEPSY WITH IMPAIRMENT OF CONSCIOUSNESS, INTRACTABLE (H): Primary | ICD-10-CM

## 2017-06-28 DIAGNOSIS — G25.81 RESTLESS LEG SYNDROME: ICD-10-CM

## 2017-06-28 LAB — PHENYTOIN SERPL-MCNC: 8.4 MG/L (ref 10–20)

## 2017-06-28 RX ORDER — DIAZEPAM 5 MG
TABLET ORAL
Qty: 30 TABLET | Refills: 5 | Status: SHIPPED | OUTPATIENT
Start: 2017-06-28 | End: 2017-10-23

## 2017-06-28 NOTE — LETTER
2017       RE: Ammon Cronin  : 1975   MRN: [id number]      Dear Colleague,    Thank you for referring your patient, Ammon Cronin, to the [facility] EPILE[facility]ARE at [facility]. Please see a copy of my visit note below.    [facility]psy [facility] Progress Note    Patient:  Ammon Cronin  :  1975   Age:  41 year old   Today's Office Visit:  2017    SEIZURE HISTORY:   Copied forward:  First seizure was at the age of 16 (loss of awarenes). 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   Presurgically workup in  revealed bilateral independent seizure foci in the temporal lobes. Ictal asystole (, he developed chest pain, pallor, diaphoresis and nausea, was found to have a 22 second period of asystole.  Emergent cardiac pacemaker placed). MRI of the brain shows a cavernoma in the right frontal and a second one in the left temporal lobe and encephalomalacia.      INTERVAL HISTORY:  Came with Jasmyne. He continues to have daily complex partial seizures, per Jasmyne seizure frequency is the same. He had CT of head which showed calification and no new bleed. Overall, he continues to have high seizure burden, poor memory, agitation/agression/mood changes. He continues to average 5 seizure per day and his seizure.  Currently, on antiepileptic drug there is fatigue, no double vision, no mood changes, no nausea, no vomiting, no abdominal pain, no rashes. No recent ER visits and no hospitalizations since last visit. He is tolerating phenytoin with no major side effects.     Prior to Admission medications    Medication Sig Start Date End Date Taking? Authorizing Provider   diazepam (VALIUM) 5 MG tablet TAKE 1 TAB AS NEEDED FOR SEIZURES >5MIN OR >3 SEIZURES/8HRS, MAY REPEAT ONCE, MAX 10MG/24HR,  CALL 911 IF BREATHING PROBLEMS 6/28/17  Yes Rosenda Claros MD           clonazePAM (KLONOPIN) 2 MG tablet Take 2 mg by mouth 2 times daily as needed for anxiety    Reported, Patient   phenytoin (DILANTIN) 100 MG CR capsule Titrate to 100 mg twice a day 5/31/17   Rosenda Claros MD   OXcarbazepine (TRILEPTAL) 300 MG tablet TAKE 3 TABLETS THREE TIMES DAILY 5/4/17   Rosenda Claros MD   gabapentin (NEURONTIN) 300 MG capsule Take 4 cap po tid 8/2/16   Rosenda Claros MD   levETIRAcetam (KEPPRA) 750 MG tablet Generic.  Take 1 tablet in am, 2 tablets at 4 pm and  2 tablet at 11 pm. 8/2/16   Rosenda Claros MD   risperiDONE (RISPERDAL) 1 MG tablet Take 1 mg by mouth 2 times daily    Reported, Patient   diazepam (DIAZEPAM INTENSOL) 5 MG/ML solution Seizures greater than 5 minutes or cluster of more than 3 seizures in 8 hour period. May repeat dose once, no more than 6 mg in 24 hour period. 2/3/14   Eric Cruz MD   FLUoxetine (PROZAC) 40 MG capsule Take 40 mg by mouth daily Started March 2013    Reported, Patient   Multiple Vitamins-Minerals (MULTIVITAMIN & MINERAL PO) Take 1 tablet by mouth daily.    Reported, Patient   OMEGA-3 FATTY ACIDS PO Take 1 tablet by mouth daily.    Reported, Patient         AED MEDICATIONS:    1. Levetiracetam, 750 mg in the morning, 1500 mg at 4 p.m., and 1500 mg at 11 p.m.   2. Oxcarbazepine  900 mg t.i.d.   3. Gabapentin 1200 mg AM, 1200 mg noon, and 1200 mg PM  (started for RLS, patient has taken higher dose for some time, not sure of length)   5. Klonopin 2 -4 mg  (started 3/2013 for anxiety, takes one per day and two on weekend)  6. Diazepam PRN for CPS lasting greater than 5 minutes or more then 2 in one hour.   7. Phenytoin ER  100-100      MEDICATION NOTES/PRIOR ANTIEPILEPTIC DRUGS:    1.Lyrica (ineffective for seizure, max dose 1100mg per day)   2.Topamax: Two trials from 01/2008-05/2008 and the second trial on 05/19/2015.  Max dose was 200 mg per day.  CP max was 3.5 mg/L.   There was no change in seizure frequency.  The patient was compliant.  During the second trial, they tried it for mood stabilization; 50 mg was after 2 months discontinued because side effects were speech and word-finding difficulties.   3.Gabatril (ineffective, caused fatigue at 48mg/ day). One trial from 01/2004-04/2006.  Max dose 48 mg per day.  It was ineffective and was discontinued.  There was no increase in seizures when it was stopped.  Side effects were fatigue.   4.Zonegran  One trial from 12/2003.  Max dose 400 mg per day.  Zonegran was used in combination with Keppra and Trileptal.  Side effects were memory impairment, aggressiveness, fatigue, behavioral changes, cognitive impairment, which improved after Zonegran was discontinued.   5.Dilantin: One trial from ? to 03/08/2002.  Max dose 350 mg per day.  CP max and free Dilantin level of 1.9 mg/L.  Efficacy:  Unknown.  The drug was optimized.  Side effects were tiredness on 350 mg a day.     Assessment:  It looks like seizure control was better when the patient was on Dilantin; therefore, it could be retried.   6.Vimpat: One trial 07/2009-09/2009.  Max dose 400 mg per day.  CP max 5.3 mg/L.  It was discontinued after 2 months because the patient experienced dizziness, diplopia and ataxia.  7.Depakote: One trial from 01/2010 to 05/09/2016.  So far, seizures have not increased because we increased the gabapentin.  Also, the headaches have not gotten worse, but the hand tremor and the head tremor have immensely improved since Depakote was discontinued.  Max dose was 2500 mg per day.  CP max was up to 116/15.6 mg/L.  In the past, it decreased seizure frequency, and it was used together with levetiracetam, oxcarbazepine and gabapentin, and it was used before with oxcarbazepine, levetiracetam and Lyrica.   Assessment:  Depakote could always be retried if we run into a problem.  8.lamotrigine: One trial from 07/06/2001.  Max dose 150 mg per day.  Used in  combination with Dilantin.  There was no change in seizure frequency.  Side effects were increase in confusion and therefore questionable efficacy.  9.carbamazepine (tired, but no past info on details)  10.Levetiracetam: started in 2001. No major side effects. One trial from 07/2001 to present.  Max dose 3750 mg per day.  CP max 42.4 mg/L.   11. Oxcarbazepine:  One trial from 03/2002 to present.  Max dose 2700 mg per day.  CP max 43.2 mg/L.  It decreases seizures, and it looks like it is one of the best drugs the patient has tried.  increased oxcarbazepine from 2,400mg -> 2700 mg on 4/2013.   12.  Gabapentin:  One trial from 07/2012 to present.  Max dose is 3600 mg per day.  This drug was used more for pain and restless legs, but when Depakote was tapered, we increased the gabapentin, and it looks like the only problem we have is some weight gain with the gabapentin.  Gabapentin  increased 1200 mg three times a day 5/2016 with no change in seizure, however, RLS symptoms decreased  13.  Klonopin:  One trial started 03/2013.  Is used for anxiety, not for seizures.  It was started at 0.5 mg and optimized so far to 3 mg per day.     Assessment:  This drug is used by a psychiatrist.  If we one day want to try ONFI in this patient, maybe then the Klonopin could be decreased again.   14. Vagus nerve stimulator setting: Not able to tolerate higher duty cycle (35) with 1.1 signal off time and 30 sec on time, seizure worsened, not able to tolerate higher current setting due to discomfort.        REVIEW OF SYSTEMS:  Head tremor, which bothers him.  He continues to have bilateral hand tremors on Depakote.  No nausea, vomiting, diarrhea.  No rash.  Vagus nerve stimulator setting are bother him (voice changes and throat discomfort).      EXAMINATION:  There were no vitals taken for this visit.  Alert, orientated, speech is fluent, face is symmetric, No head/ hand tremor, no focal deficits noted.Gait is stable.     ASSESSMENT:     1.    2.  Left-sided cavernous hemangioma 07/2002.   3.  Multilobulated intraaxillary mass involving the right frontal lobe, removed 08/19/2003.     4.  Status post pacemaker placement 12/2003.     5.  Left temporal AVM resection at age 11.     6.  History of migraines.   7.   and decrease in seizures after implantation.        1. Refractory multifocal epilepsy.  Etiology multiple cavernomas.  The patient is status left temporal resection of AVM in 1985 (age 11), Right frontal hematoma 04/13/2006, treated with surgery.  Left-sided cavernous hemangioma 07/2002. Status post epilepsy surgery 05/29/2012.  Vagus nerve stimulator implanted on right and he has pacemaker on left.  Electrographically, he has multifocal epileptiform discharges and seizures arising from left and right temporal regions.     Antiepileptic drug discussion: Insurance coverage for new antiepileptic drug is poor. We started phenytoin and will optimize it . Other antiepileptic drug to consider are banzel, Brivaracetam, fycompa. OR retrial with phenytoin. Patient and wife declined Responsive Neurostimulation (Neuropace) or any brain surgery today.     2. Restless legs syndrome. Stable on gabapentin .       3. Anxiety and depression.  Stable. Managed by psychiatrist (Dr. López)    4. Tremor in head continues to be a problems. His hand tremor stopped once we stopped depakote. I do not think his current antiepileptic drug will cause head tremor or seizures. Consutt Dr. Bloom for diagnosis/treatment.        PLAN:              Medication Tablet Size         AM  (morning)   Noon  PM (Night)       Week 1   phenytoin   mg  1 tablet   1 tablet       Week 1  phenytoin  ER 30 mg    1 tablet                   Medication Tablet Size         AM  (morning)   Noon  PM (Night)       Week 2   phenytoin   mg  1 tablet   1 tablet       Week 2  phenytoin  ER 30 mg  1 tablet   1 tablet          CBD (marijuana) study declined.   Think about Responsive  Neurostimulation (Neuropace) - declined   Check with insurance company about Brivaracetam, fycompa, banzel, onfi will check on this in 10/2017 with open enrollment   See Dr. Bloom for the tremor pending  Follow up 3 month      I spent 25 minutes with the patient. During this time counseling and coordination of care exceeded 50% of the visit time. I addressed all questions and concerns the patient raised in regards to his care.   REBEKAH CHRISTOPHER MD

## 2017-06-28 NOTE — LETTER
Patient:  Ammon Cronin  :   1975  MRN:     [id number]        Mr.Chad STEFANIE Cronin  [address] [place] [address]        2017    Dear ,    We are writing to inform you of your test results.    Your test results fall within the expected range(s) or remain unchanged from previous results.  Your phenytoin is low and the increase in phenytoin we reviewed in clinic will help. Please continue with current treatment plan.    Resulted Orders   PHENYTOIN LEVEL   Result Value Ref Range    Phenytoin Level 8.4 (L) 10 - 20 mg/L   PHENYTOIN FREE   Result Value Ref Range    Phenytoin Free 0.57 (L)       Comment:      Analysis performed by [employer] [place] [address]  Reference range: 1.00  to  2.00  Unit: ug/ml         Enjoy the summer, Rosenda Claros MD              [id number]  1975

## 2017-06-28 NOTE — MR AVS SNAPSHOT
After Visit Summary   6/28/2017    Ammon Cronin    MRN: [id number]           Patient Information     Date Of Birth          1975        Visit Information        Provider Department      6/28/2017 10:30 AM Rosenda [facility]        Today's Diagnoses     Partial epilepsy with impairment of consciousness, intractable (H)    -  1    Restless leg syndrome          Care Instructions               Medication Tablet Size         AM  (morning)   Noon  PM (Night)       Week 1   phenytoin   mg  1 tablet   1 tablet       Week 1  phenytoin  ER 30 mg    1 tablet                   Medication Tablet Size         AM  (morning)   Noon  PM (Night)       Week 2   phenytoin   mg  1 tablet   1 tablet       Week 2  phenytoin  ER 30 mg  1 tablet   1 tablet          See how you feel, if you have increase unstable gait, falls, dizziness go back down to prior week.     Call [facility] to send out valium tablet     Check with insurance company about Brivaracetam, fycompa, banzel, onfi will check on this in 10/2017 with open enrollment     See Dr. Bloom for the tremor pending          Rosenda ADAMS. MD [place]           Follow-ups after your visit        Follow-up notes from your care team     Return in about 3 months (around 9/28/2017).      Your next 10 appointments already scheduled     Oct 18, 2017 11:30 AM CDT   Return Visit with MD [facility] ([facility])    [address] [place] [address], [address]  [facility] [address] [phone]              Who to contact     Please call your clinic at [phone] to:    Ask questions about your health    Make or cancel appointments    Discuss your medicines    Learn about your test results    Speak to your doctor   If you have compliments or concerns about an experience at your clinic, or if you wish to file a complaint, please contact [facility] Patient Relations at [phone] or email us at  [email or web address]         Additional Information About Your Visit        [employer] Information     [employer] gives you secure access to your electronic health record. If you see a primary care provider, you can also send messages to your care team and make appointments. If you have questions, please call your primary care clinic.  If you do not have a primary care provider, please call [phone] and they will assist you.      [employer] is an electronic gateway that provides easy, online access to your medical records. With [employer], you can request a clinic appointment, read your test results, renew a prescription or communicate with your care team.     To access your existing account, please contact your [facility] or call [phone] for assistance.        Care EveryWhere ID     This is your Care EveryWhere ID. This could be used by other organizations to access your [place] medical records  [id number]         Blood Pressure from Last 3 Encounters:   05/31/17 109/65   01/16/17 121/73   11/16/16 108/65    Weight from Last 3 Encounters:   05/31/17 146 lb (66.2 kg)   01/16/17 153 lb (69.4 kg)   11/16/16 149 lb 6.4 oz (67.8 kg)              We Performed the Following     PHENYTOIN FREE     PHENYTOIN LEVEL          Today's Medication Changes          These changes are accurate as of: 6/28/17 11:12 AM.  If you have any questions, ask your nurse or doctor.               These medicines have changed or have updated prescriptions.        Dose/Directions    * phenytoin 100 MG CR capsule   Commonly known as:  DILANTIN   This may have changed:  Another medication with the same name was added. Make sure you understand how and when to take each.   Used for:  Partial epilepsy with impairment of consciousness, intractable (H), Tremor        Titrate to 100 mg twice a day   Quantity:  60 capsule   Refills:  3       * phenytoin 30 MG CR capsule   Commonly known as:  DILANTIN   This may  have changed:  You were already taking a medication with the same name, and this prescription was added. Make sure you understand how and when to take each.   Used for:  Restless leg syndrome, Partial epilepsy with impairment of consciousness, intractable (H)        Titrate to 30 mg twice a day (take along with 100 mg twice a day)   Quantity:  60 capsule   Refills:  11       * Notice:  This list has 2 medication(s) that are the same as other medications prescribed for you. Read the directions carefully, and ask your doctor or other care provider to review them with you.         Where to get your medicines      These medications were sent to [facility] - [place], [place] - [address]  [address], [facility] [address]     Phone:  [phone]     phenytoin 30 MG CR capsule         Some of these will need a paper prescription and others can be bought over the counter.  Ask your nurse if you have questions.     Bring a paper prescription for each of these medications     diazepam 5 MG tablet                Primary Care Provider    None Specified       No primary provider on file.        Equal Access to Services     EMPERATRIZ LITTLE : Madina Noriega, wajuan josé laureano, qaybnathaniel kaalterri serrano, terra palacios . So [facility] [phone].    ATENCIÓN: Si habla español, tiene a correa disposición servicios gratuitos de asistencia lingüística. Llame al [phone].    We comply with applicable federal civil rights laws and [place] laws. We do not discriminate on the basis of race, color, national origin, age, disability sex, sexual orientation or gender identity.            Thank you!     Thank you for choosing [facility]  for your care. Our goal is always to provide you with excellent care. Hearing back from our patients is one way we can continue to improve our services. Please take a few minutes to complete the written survey that you may receive in  the mail after your visit with us. Thank you!             Your Updated Medication List - Protect others around you: Learn how to safely use, store and throw away your medicines at www.disposemymeds.org.          This list is accurate as of: 6/28/17 11:12 AM.  Always use your most recent med list.                   Brand Name Dispense Instructions for use Diagnosis    clonazePAM 2 MG tablet    klonoPIN     Take 2 mg by mouth 2 times daily as needed for anxiety    Partial epilepsy with impairment of consciousness, intractable (H), Tremor       * diazepam 5 MG/ML (HIGH CONC) solution    DIAZEPAM INTENSOL    30 mL    Seizures greater than 5 minutes or cluster of more than 3 seizures in 8 hour period. May repeat dose once, no more than 6 mg in 24 hour period.    Unspecified epilepsy with intractable epilepsy       * diazepam 5 MG tablet    VALIUM    30 tablet    TAKE 1 TAB AS NEEDED FOR SEIZURES >5MIN OR >3 SEIZURES/8HRS, MAY REPEAT ONCE, MAX 10MG/24HR, CALL 911 IF BREATHING PROBLEMS    Restless leg syndrome, Partial epilepsy with impairment of consciousness, intractable (H)       gabapentin 300 MG capsule    NEURONTIN    1080 capsule    Take 4 cap po tid    Restless legs syndrome, Partial epilepsy with impairment of consciousness, intractable (H), Long term use of drug, Restless leg syndrome       levETIRAcetam 750 MG tablet    KEPPRA    450 tablet    Generic.  Take 1 tablet in am, 2 tablets at 4 pm and  2 tablet at 11 pm.    Restless legs syndrome, Partial epilepsy with impairment of consciousness, intractable (H), Long term use of drug, Restless leg syndrome       MULTIVITAMIN & MINERAL PO      Take 1 tablet by mouth daily.        OMEGA-3 FATTY ACIDS PO      Take 1 tablet by mouth daily.        OXcarbazepine 300 MG tablet    TRILEPTAL    810 tablet    TAKE 3 TABLETS THREE TIMES DAILY    Partial epilepsy with loss of consciousness, intractable (H)       * phenytoin 100 MG CR capsule    DILANTIN    60 capsule    Titrate  to 100 mg twice a day    Partial epilepsy with impairment of consciousness, intractable (H), Tremor       * phenytoin 30 MG CR capsule    DILANTIN    60 capsule    Titrate to 30 mg twice a day (take along with 100 mg twice a day)    Restless leg syndrome, Partial epilepsy with impairment of consciousness, intractable (H)       PROZAC 40 MG capsule   Generic drug:  FLUoxetine      Take 40 mg by mouth daily Started March 2013        risperiDONE 1 MG tablet    risperDAL     Take 1 mg by mouth 2 times daily    Partial epilepsy with impairment of consciousness, intractable (H), Restless legs syndrome, Long term use of drug       * Notice:  This list has 4 medication(s) that are the same as other medications prescribed for you. Read the directions carefully, and ask your doctor or other care provider to review them with you.

## 2017-06-28 NOTE — PROGRESS NOTES
[facility]    Patient:  Ammon Cronin  :  1975   Age:  41 year old   Today's Office Visit:  2017    SEIZURE HISTORY:   Copied forward:  First seizure was at the age of 16 (loss of awarenes). 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   Presurgically workup in  revealed bilateral independent seizure foci in the temporal lobes. Ictal asystole (, he developed chest pain, pallor, diaphoresis and nausea, was found to have a 22 second period of asystole.  Emergent cardiac pacemaker placed). MRI of the brain shows a cavernoma in the right frontal and a second one in the left temporal lobe and encephalomalacia.      INTERVAL HISTORY:  Came with Jasmyne. He continues to have daily complex partial seizures, per Jasmyne seizure frequency is the same. He had CT of head which showed calification and no new bleed. Overall, he continues to have high seizure burden, poor memory, agitation/agression/mood changes. He continues to average 5 seizure per day and his seizure.  Currently, on antiepileptic drug there is fatigue, no double vision, no mood changes, no nausea, no vomiting, no abdominal pain, no rashes. No recent ER visits and no hospitalizations since last visit. He is tolerating phenytoin with no major side effects.     Prior to Admission medications    Medication Sig Start Date End Date Taking? Authorizing Provider   diazepam (VALIUM) 5 MG tablet TAKE 1 TAB AS NEEDED FOR SEIZURES >5MIN OR >3 SEIZURES/8HRS, MAY REPEAT ONCE, MAX 10MG/24HR, CALL 911 IF BREATHING PROBLEMS 16  Yes Rosenda Claros MD           clonazePAM (KLONOPIN) 2 MG tablet Take 2 mg by mouth 2 times daily as needed for anxiety    Reported, Patient   phenytoin (DILANTIN) 100 MG CR capsule Titrate to 100 mg twice a day 16   Rosenda Claros  MD BRYAN   OXcarbazepine (TRILEPTAL) 300 MG tablet TAKE 3 TABLETS THREE TIMES DAILY 5/4/17   Rosenda Claros MD   gabapentin (NEURONTIN) 300 MG capsule Take 4 cap po tid 8/2/16   Rosenda Claros MD   levETIRAcetam (KEPPRA) 750 MG tablet Generic.  Take 1 tablet in am, 2 tablets at 4 pm and  2 tablet at 11 pm. 8/2/16   Rosenda Claros MD   risperiDONE (RISPERDAL) 1 MG tablet Take 1 mg by mouth 2 times daily    Reported, Patient   diazepam (DIAZEPAM INTENSOL) 5 MG/ML solution Seizures greater than 5 minutes or cluster of more than 3 seizures in 8 hour period. May repeat dose once, no more than 6 mg in 24 hour period. 2/3/14   Eric Cruz MD   FLUoxetine (PROZAC) 40 MG capsule Take 40 mg by mouth daily Started March 2013    Reported, Patient   Multiple Vitamins-Minerals (MULTIVITAMIN & MINERAL PO) Take 1 tablet by mouth daily.    Reported, Patient   OMEGA-3 FATTY ACIDS PO Take 1 tablet by mouth daily.    Reported, Patient         AED MEDICATIONS:    1. Levetiracetam, 750 mg in the morning, 1500 mg at 4 p.m., and 1500 mg at 11 p.m.   2. Oxcarbazepine  900 mg t.i.d.   3. Gabapentin 1200 mg AM, 1200 mg noon, and 1200 mg PM  (started for RLS, patient has taken higher dose for some time, not sure of length)   5. Klonopin 2 -4 mg  (started 3/2013 for anxiety, takes one per day and two on weekend)  6. Diazepam PRN for CPS lasting greater than 5 minutes or more then 2 in one hour.   7. Phenytoin ER  100-100      MEDICATION NOTES/PRIOR ANTIEPILEPTIC DRUGS:    1.Lyrica (ineffective for seizure, max dose 1100mg per day)   2.Topamax: Two trials from 01/2008-05/2008 and the second trial on 05/19/2015.  Max dose was 200 mg per day.  CP max was 3.5 mg/L.  There was no change in seizure frequency.  The patient was compliant.  During the second trial, they tried it for mood stabilization; 50 mg was after 2 months discontinued because side effects were speech and word-finding difficulties.   3.Gabatril (ineffective, caused  fatigue at 48mg/ day). One trial from 01/2004-04/2006.  Max dose 48 mg per day.  It was ineffective and was discontinued.  There was no increase in seizures when it was stopped.  Side effects were fatigue.   4.Zonegran  One trial from 12/2003.  Max dose 400 mg per day.  Zonegran was used in combination with Keppra and Trileptal.  Side effects were memory impairment, aggressiveness, fatigue, behavioral changes, cognitive impairment, which improved after Zonegran was discontinued.   5.Dilantin: One trial from ? to 03/08/2002.  Max dose 350 mg per day.  CP max and free Dilantin level of 1.9 mg/L.  Efficacy:  Unknown.  The drug was optimized.  Side effects were tiredness on 350 mg a day.     Assessment:  It looks like seizure control was better when the patient was on Dilantin; therefore, it could be retried.   6.Vimpat: One trial 07/2009-09/2009.  Max dose 400 mg per day.  CP max 5.3 mg/L.  It was discontinued after 2 months because the patient experienced dizziness, diplopia and ataxia.  7.Depakote: One trial from 01/2010 to 05/09/2016.  So far, seizures have not increased because we increased the gabapentin.  Also, the headaches have not gotten worse, but the hand tremor and the head tremor have immensely improved since Depakote was discontinued.  Max dose was 2500 mg per day.  CP max was up to 116/15.6 mg/L.  In the past, it decreased seizure frequency, and it was used together with levetiracetam, oxcarbazepine and gabapentin, and it was used before with oxcarbazepine, levetiracetam and Lyrica.   Assessment:  Depakote could always be retried if we run into a problem.  8.lamotrigine: One trial from 07/06/2001.  Max dose 150 mg per day.  Used in combination with Dilantin.  There was no change in seizure frequency.  Side effects were increase in confusion and therefore questionable efficacy.  9.carbamazepine (tired, but no past info on details)  10.Levetiracetam: started in 2001. No major side effects. One trial from  07/2001 to present.  Max dose 3750 mg per day.  CP max 42.4 mg/L.   11. Oxcarbazepine:  One trial from 03/2002 to present.  Max dose 2700 mg per day.  CP max 43.2 mg/L.  It decreases seizures, and it looks like it is one of the best drugs the patient has tried.  increased oxcarbazepine from 2,400mg -> 2700 mg on 4/2013.   12.  Gabapentin:  One trial from 07/2012 to present.  Max dose is 3600 mg per day.  This drug was used more for pain and restless legs, but when Depakote was tapered, we increased the gabapentin, and it looks like the only problem we have is some weight gain with the gabapentin.  Gabapentin  increased 1200 mg three times a day 5/2016 with no change in seizure, however, RLS symptoms decreased  13.  Klonopin:  One trial started 03/2013.  Is used for anxiety, not for seizures.  It was started at 0.5 mg and optimized so far to 3 mg per day.     Assessment:  This drug is used by a psychiatrist.  If we one day want to try ONFI in this patient, maybe then the Klonopin could be decreased again.   14. Vagus nerve stimulator setting: Not able to tolerate higher duty cycle (35) with 1.1 signal off time and 30 sec on time, seizure worsened, not able to tolerate higher current setting due to discomfort.        REVIEW OF SYSTEMS:  Head tremor, which bothers him.  He continues to have bilateral hand tremors on Depakote.  No nausea, vomiting, diarrhea.  No rash.  Vagus nerve stimulator setting are bother him (voice changes and throat discomfort).      EXAMINATION:  There were no vitals taken for this visit.  Alert, orientated, speech is fluent, face is symmetric, No head/ hand tremor, no focal deficits noted.Gait is stable.     ASSESSMENT:    1.    2.  Left-sided cavernous hemangioma 07/2002.   3.  Multilobulated intraaxillary mass involving the right frontal lobe, removed 08/19/2003.     4.  Status post pacemaker placement 12/2003.     5.  Left temporal AVM resection at age 11.     6.  History of migraines.   7.    and decrease in seizures after implantation.        1. Refractory multifocal epilepsy.  Etiology multiple cavernomas.  The patient is status left temporal resection of AVM in 1985 (age 11), Right frontal hematoma 04/13/2006, treated with surgery.  Left-sided cavernous hemangioma 07/2002. Status post epilepsy surgery 05/29/2012.  Vagus nerve stimulator implanted on right and he has pacemaker on left.  Electrographically, he has multifocal epileptiform discharges and seizures arising from left and right temporal regions.     Antiepileptic drug discussion: Insurance coverage for new antiepileptic drug is poor. We started phenytoin and will optimize it . Other antiepileptic drug to consider are banzel, Brivaracetam, fycompa. OR retrial with phenytoin. Patient and wife declined Responsive Neurostimulation (Neuropace) or any brain surgery today.     2. Restless legs syndrome. Stable on gabapentin .       3. Anxiety and depression.  Stable. Managed by psychiatrist (Dr. López)    4. Tremor in head continues to be a problems. His hand tremor stopped once we stopped depakote. I do not think his current antiepileptic drug will cause head tremor or seizures. Consutt Dr. Bloom for diagnosis/treatment.        PLAN:              Medication Tablet Size         AM  (morning)   Noon  PM (Night)       Week 1   phenytoin   mg  1 tablet   1 tablet       Week 1  phenytoin  ER 30 mg    1 tablet                   Medication Tablet Size         AM  (morning)   Noon  PM (Night)       Week 2   phenytoin   mg  1 tablet   1 tablet       Week 2  phenytoin  ER 30 mg  1 tablet   1 tablet          CBD (marijuana) study declined.   Think about Responsive Neurostimulation (Neuropace) - declined   Check with insurance company about Brivaracetam, fycompa, banzel, onfi will check on this in 10/2017 with open enrollment   See Dr. Bloom for the tremor pending  Follow up 3 month        I spent 25 minutes with the patient. During this time  counseling and coordination of care exceeded 50% of the visit time. I addressed all questions and concerns the patient raised in regards to his care.     REBEKAH CHRISTOPHER MD

## 2017-06-28 NOTE — PATIENT INSTRUCTIONS
Medication Tablet Size         AM  (morning)   Noon  PM (Night)       Week 1   phenytoin   mg  1 tablet   1 tablet       Week 1  phenytoin  ER 30 mg    1 tablet                   Medication Tablet Size         AM  (morning)   Noon  PM (Night)       Week 2   phenytoin   mg  1 tablet   1 tablet       Week 2  phenytoin  ER 30 mg  1 tablet   1 tablet          See how you feel, if you have increase unstable gait, falls, dizziness go back down to prior week.     Call [facility] to send out valium tablet     Check with insurance company about Brivaracetam, fycompa, banzel, onfi will check on this in 10/2017 with open enrollment     See Dr. Bloom for the tremor pending          Rosenda Claros MD

## 2017-06-30 LAB — PHENYTOIN FREE SERPL-MCNC: 0.57 UG/ML

## 2017-07-12 DIAGNOSIS — G40.219 PARTIAL EPILEPSY WITH LOSS OF CONSCIOUSNESS, INTRACTABLE (H): ICD-10-CM

## 2017-07-12 RX ORDER — OXCARBAZEPINE 300 MG/1
TABLET, FILM COATED ORAL
Qty: 810 TABLET | Refills: 1 | Status: SHIPPED | OUTPATIENT
Start: 2017-07-12 | End: 2018-02-20

## 2017-08-07 DIAGNOSIS — G40.219 PARTIAL EPILEPSY WITH IMPAIRMENT OF CONSCIOUSNESS, INTRACTABLE (H): ICD-10-CM

## 2017-08-07 DIAGNOSIS — R25.1 TREMOR: ICD-10-CM

## 2017-08-08 RX ORDER — PHENYTOIN SODIUM 100 MG/1
CAPSULE, EXTENDED RELEASE ORAL
Qty: 180 CAPSULE | Refills: 1 | Status: SHIPPED | OUTPATIENT
Start: 2017-08-08 | End: 2017-12-20

## 2017-09-27 ENCOUNTER — PRE VISIT (OUTPATIENT)
Dept: NEUROLOGY | Facility: CLINIC | Age: 42
End: 2017-09-27

## 2017-09-27 NOTE — TELEPHONE ENCOUNTER
1.  Date/reason for appt:  10/06/17   Head Tremor    2.  Referring provider:  Dr Claros, Internal    3.  Call to patient (Yes / No - short description):  No referred    Records reviewed.  All records are in [facility] and imaging is in PACS.

## 2017-10-02 NOTE — PROGRESS NOTES
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   Vagal nerve stimulator  Prn valium  Keppra/levetiracetam  Oxcarbazepine/trileptal  Phenytoin/dilantin    Restless legs syndrome. On gabapentin .        Anxiety and depression.  Managed by psychiatrist (Dr. López)  clonazepam  Fluoxetine    Consideration for genetics consultation   Cavernoma/avM  Epilepsy - presumably from above  Incomplete extension of the arms  History of hearing loss  Cardiac abnormality - has pacemaker  Daughter has elbow joint deformities  Consider connective tissue disorder vs mt disorders, etc.     Review of his risperidone with psychiatry as this medication may be playing a role in his parkinsonian features, albeit subtle with reduced right arm swing but he also has tremor that may be aggravated by this medication. Psychiatry could consider other medications such as lamotrigine or topiramate which are not likely to cause tremor and may help mood but may want to get epilepsy's input on this. His head tremor may precede the use of risperidone    At this time not sure botox will be covered or/and help his head tremor.    I dont have an easy answer for tremor medication at this time, ie not sure would go down the propranolol, primidone pathway unless indicated by epilepsy service and if psychiatry is on board for these medications and their associated mood related side effects. topiramate could be considered but carries a risk of cognitive changes and renal stones    Return as needed.       Ruben Bloom MD  _____________________________________________________________________  Cc: 42 year old male   Consult requested by Dr. Claros    Outside  records reviewed and revealed  - inserted below      History obtained from patient      History of Present Illness  41 yo man with partial epilepsy/RLS etc  Here for tremor evaluation  Medication regimen includes  Clonazepam  Diazepam  Fluoxetine  Gabapentin  keppra  mvi  Fish oil  Oxcarbazepine  Phenytoin  Risperidone.    Head > hand tremor - present for 2.5 yrs  Initially thought due to depakote which was stopped but still on risperidone  He has a no no head movement.  There are no clear aggravating or ameliorative factor  He has had periods of time that make it worse  On review he admits that he may be able to settle it down at times  He denies anxiety or nervousness  He wears glasses  Head tremor can affect his vision at times  Hearing is okay - per report but his chart states he has hearing loss   He is on disability  He sleeps at night =- he stays up too late  Has a 8.5 yr old son and 13 yr old daughter  Skin - denies  Denies endo problems  Heme: negative  Cat allergies  Id: negative  Neuro: has rls and is on gabapentin and this is managed with medication. There is no family history of RLS  He had a cardiac pacemaker placed in 2003 - he had collapsed when he was getting an eeg hooked up.   He has had two brain surgeries  He has had a vagal nerve stimulator   Denies lung problems - nonsmoker  Bladder function is okay  Gi: denies constipation. Denies swallowing problems.  Psych: he is on geodon - but denies anxiety. Sees a psychiatrist Dr. López at [facility]   almost 17 yrs. Wife Jasmyne Cronin - she is head of a  in [facility] - 4 blocks away from their house.       14 Review of systems  are negative except for   Patient Active Problem List   Diagnosis     Partial epilepsy with impairment of consciousness, intractable (H)     Restless legs syndrome     Long term use of drug     Tremor     AVM (arteriovenous malformation) brain     Asystole (H)     Congenital anomaly of  cerebrovascular system     Seizure (H)     Hemangioma of intracranial structure (H)     Presence of cardiac pacemaker     Sensorineural hearing loss (SNHL)     Syncope        Allergies   Allergen Reactions     Cats      Eyes get itchy and watery     Past Surgical History:   Procedure Laterality Date     CARDIAC SURGERY  12/03    pacemaker     IMPLANT STIMULATOR VAGUS NERVE  5/29/12    implanted on RIGHT side.     Past Medical History:   Diagnosis Date     AVM (arteriovenous malformation) brain     left temporal, with resection at age 11     Cavernous hemangioma of cerebellum (H)      Localization-related epilepsy (H)      Migraines      Social History     Social History     Marital status:      Spouse name: N/A     Number of children: N/A     Years of education: N/A     Occupational History     Not on file.     Social History Main Topics     Smoking status: Never Smoker     Smokeless tobacco: Never Used     Alcohol use Not on file     Drug use: Not on file     Sexual activity: Not on file     Other Topics Concern     Not on file     Social History Narrative     No family history on file.  Current Outpatient Prescriptions   Medication Sig Dispense Refill     phenytoin (DILANTIN) 100 MG CR capsule TITRATE TO 1 CAPSULE TWICE A  capsule 1     OXcarbazepine (TRILEPTAL) 300 MG tablet TAKE 3 TABLETS THREE TIMES DAILY (KEEP APPT 5/31/17) 810 tablet 1     diazepam (VALIUM) 5 MG tablet TAKE 1 TAB AS NEEDED FOR SEIZURES >5MIN OR >3 SEIZURES/8HRS, MAY REPEAT ONCE, MAX 10MG/24HR, CALL 911 IF BREATHING PROBLEMS 30 tablet 5     phenytoin (DILANTIN) 30 MG CR capsule Titrate to 30 mg twice a day (take along with 100 mg twice a day) 60 capsule 11     clonazePAM (KLONOPIN) 2 MG tablet Take 2 mg by mouth 2 times daily as needed for anxiety       gabapentin (NEURONTIN) 300 MG capsule Take 4 cap po tid 1080 capsule 3     levETIRAcetam (KEPPRA) 750 MG tablet Generic.  Take 1 tablet in am, 2 tablets at 4 pm and  2 tablet at  11 pm. 450 tablet 3     risperiDONE (RISPERDAL) 1 MG tablet Take 1 mg by mouth 2 times daily       diazepam (DIAZEPAM INTENSOL) 5 MG/ML solution Seizures greater than 5 minutes or cluster of more than 3 seizures in 8 hour period. May repeat dose once, no more than 6 mg in 24 hour period. 30 mL 0     FLUoxetine (PROZAC) 40 MG capsule Take 40 mg by mouth daily Started March 2013       Multiple Vitamins-Minerals (MULTIVITAMIN & MINERAL PO) Take 1 tablet by mouth daily.       OMEGA-3 FATTY ACIDS PO Take 1 tablet by mouth daily.          .MDSPDTABLE    Examination  B/P: Data Unavailable, T: Data Unavailable, P: Data Unavailable, R: Data Unavailable 0 lbs 0 oz  There were no vitals taken for this visit., There is no height or weight on file to calculate BMI.    General examination: well developed, nourished and normal affect  Carotid: No bruits. Chest CTA, Heart regular without gallops or murmurs. Abdomen soft nontender, no masses, bowel sounds intact. Periphery: normal pulses without edema. No skin lesions. MENTAL STATUS:  Alert, oriented x3.  Speech fluent with normal naming, repetition, comprehension.  Good right-left orientation, Can remember 3/3 objects. Spells world backwards: DLROW 5/5   CRANIAL NERVES:  Disks flat. Pupils are equal, round, reactive to light.  Normal vascularity and fields. Extraocular movements full.  Facial sensation and movement normal.  Hearing intact. Palate moves symmetrically.  Tongue midline.  Sternocleidomastoid and trapezius strength intact.  Neck strength was normal.  NEUROLOGIC:  Tone: slight increase tone and slight slowness of his finger apping. Motor in upper and lower extremities. 5/5.  Reflexes 2/4.  Toe signs downgoing.  Good finger-nose-finger, fine finger movement, heel-shin maneuver, sensation to light touch, position sense and vibration and temperature was normal. Gait normal except for reduced right arm swing. Romberg and postural stability intact. Tremor present  Has mild  postural and mild =mod action tremor of UEs. He had no prominent head tremor when seen today.     He also has joint deformities of the UE with incomplete extension of the arms  He has a bit of a facial stare/gaping mouth.   He has reportedly hearing loss.     [facility] Progress Note     Patient:  Ammon Cronin  :  1975   Age:  41 year old   Today's Office Visit:  2017     SEIZURE HISTORY:   Copied forward:  First seizure was at the age of 16 (loss of awarenes). 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   Presurgically workup in  revealed bilateral independent seizure foci in the temporal lobes. Ictal asystole (, he developed chest pain, pallor, diaphoresis and nausea, was found to have a 22 second period of asystole.  Emergent cardiac pacemaker placed). MRI of the brain shows a cavernoma in the right frontal and a second one in the left temporal lobe and encephalomalacia.       INTERVAL HISTORY:  Came with Jasmyne. He continues to have daily complex partial seizures, per Jasmyne seizure frequency is the same. He had CT of head which showed calification and no new bleed. Overall, he continues to have high seizure burden, poor memory, agitation/agression/mood changes. He continues to average 5 seizure per day and his seizure.  Currently, on antiepileptic drug there is fatigue, no double vision, no mood changes, no nausea, no vomiting, no abdominal pain, no rashes. No recent ER visits and no hospitalizations since last visit. He is tolerating phenytoin with no major side effects.              Prior to Admission medications    Medication Sig Start Date End Date Taking? Authorizing Provider   diazepam (VALIUM) 5 MG tablet TAKE 1 TAB AS NEEDED FOR SEIZURES >5MIN OR >3 SEIZURES/8HRS, MAY REPEAT ONCE, MAX 10MG/24HR,  CALL 911 IF BREATHING PROBLEMS 6/28/17   Yes Rosenda Claros MD                 clonazePAM (KLONOPIN) 2 MG tablet Take 2 mg by mouth 2 times daily as needed for anxiety       Reported, Patient   phenytoin (DILANTIN) 100 MG CR capsule Titrate to 100 mg twice a day 5/31/17     Rosenda Claros MD   OXcarbazepine (TRILEPTAL) 300 MG tablet TAKE 3 TABLETS THREE TIMES DAILY 5/4/17     Rosenda Claros MD   gabapentin (NEURONTIN) 300 MG capsule Take 4 cap po tid 8/2/16     Rosenda Claros MD   levETIRAcetam (KEPPRA) 750 MG tablet Generic.  Take 1 tablet in am, 2 tablets at 4 pm and  2 tablet at 11 pm. 8/2/16     Rosenda Claros MD   risperiDONE (RISPERDAL) 1 MG tablet Take 1 mg by mouth 2 times daily       Reported, Patient   diazepam (DIAZEPAM INTENSOL) 5 MG/ML solution Seizures greater than 5 minutes or cluster of more than 3 seizures in 8 hour period. May repeat dose once, no more than 6 mg in 24 hour period. 2/3/14     Eric Cruz MD   FLUoxetine (PROZAC) 40 MG capsule Take 40 mg by mouth daily Started March 2013       Reported, Patient   Multiple Vitamins-Minerals (MULTIVITAMIN & MINERAL PO) Take 1 tablet by mouth daily.       Reported, Patient   OMEGA-3 FATTY ACIDS PO Take 1 tablet by mouth daily.       Reported, Patient            AED MEDICATIONS:    1. Levetiracetam, 750 mg in the morning, 1500 mg at 4 p.m., and 1500 mg at 11 p.m.   2. Oxcarbazepine  900 mg t.i.d.   3. Gabapentin 1200 mg AM, 1200 mg noon, and 1200 mg PM  (started for RLS, patient has taken higher dose for some time, not sure of length)   5. Klonopin 2 -4 mg  (started 3/2013 for anxiety, takes one per day and two on weekend)  6. Diazepam PRN for CPS lasting greater than 5 minutes or more then 2 in one hour.   7. Phenytoin ER  100-100        MEDICATION NOTES/PRIOR ANTIEPILEPTIC DRUGS:    1.Lyrica (ineffective for seizure, max dose 1100mg per day)   2.Topamax: Two trials from 01/2008-05/2008 and the second trial on 05/19/2015.  Max dose was  200 mg per day.  CP max was 3.5 mg/L.  There was no change in seizure frequency.  The patient was compliant.  During the second trial, they tried it for mood stabilization; 50 mg was after 2 months discontinued because side effects were speech and word-finding difficulties.   3.Gabatril (ineffective, caused fatigue at 48mg/ day). One trial from 01/2004-04/2006.  Max dose 48 mg per day.  It was ineffective and was discontinued.  There was no increase in seizures when it was stopped.  Side effects were fatigue.   4.Zonegran  One trial from 12/2003.  Max dose 400 mg per day.  Zonegran was used in combination with Keppra and Trileptal.  Side effects were memory impairment, aggressiveness, fatigue, behavioral changes, cognitive impairment, which improved after Zonegran was discontinued.   5.Dilantin: One trial from ? to 03/08/2002.  Max dose 350 mg per day.  CP max and free Dilantin level of 1.9 mg/L.  Efficacy:  Unknown.  The drug was optimized.  Side effects were tiredness on 350 mg a day.     Assessment:  It looks like seizure control was better when the patient was on Dilantin; therefore, it could be retried.   6.Vimpat: One trial 07/2009-09/2009.  Max dose 400 mg per day.  CP max 5.3 mg/L.  It was discontinued after 2 months because the patient experienced dizziness, diplopia and ataxia.  7.Depakote: One trial from 01/2010 to 05/09/2016.  So far, seizures have not increased because we increased the gabapentin.  Also, the headaches have not gotten worse, but the hand tremor and the head tremor have immensely improved since Depakote was discontinued.  Max dose was 2500 mg per day.  CP max was up to 116/15.6 mg/L.  In the past, it decreased seizure frequency, and it was used together with levetiracetam, oxcarbazepine and gabapentin, and it was used before with oxcarbazepine, levetiracetam and Lyrica.   Assessment:  Depakote could always be retried if we run into a problem.  8.lamotrigine: One trial from 07/06/2001.   Max dose 150 mg per day.  Used in combination with Dilantin.  There was no change in seizure frequency.  Side effects were increase in confusion and therefore questionable efficacy.  9.carbamazepine (tired, but no past info on details)  10.Levetiracetam: started in 2001. No major side effects. One trial from 07/2001 to present.  Max dose 3750 mg per day.  CP max 42.4 mg/L.   11. Oxcarbazepine:  One trial from 03/2002 to present.  Max dose 2700 mg per day.  CP max 43.2 mg/L.  It decreases seizures, and it looks like it is one of the best drugs the patient has tried.  increased oxcarbazepine from 2,400mg -> 2700 mg on 4/2013.   12.  Gabapentin:  One trial from 07/2012 to present.  Max dose is 3600 mg per day.  This drug was used more for pain and restless legs, but when Depakote was tapered, we increased the gabapentin, and it looks like the only problem we have is some weight gain with the gabapentin.  Gabapentin  increased 1200 mg three times a day 5/2016 with no change in seizure, however, RLS symptoms decreased  13.  Klonopin:  One trial started 03/2013.  Is used for anxiety, not for seizures.  It was started at 0.5 mg and optimized so far to 3 mg per day.     Assessment:  This drug is used by a psychiatrist.  If we one day want to try ONFI in this patient, maybe then the Klonopin could be decreased again.   14. Vagus nerve stimulator setting: Not able to tolerate higher duty cycle (35) with 1.1 signal off time and 30 sec on time, seizure worsened, not able to tolerate higher current setting due to discomfort.          REVIEW OF SYSTEMS:  Head tremor, which bothers him.  He continues to have bilateral hand tremors on Depakote.  No nausea, vomiting, diarrhea.  No rash.  Vagus nerve stimulator setting are bother him (voice changes and throat discomfort).       EXAMINATION:  There were no vitals taken for this visit.  Alert, orientated, speech is fluent, face is symmetric, No head/ hand tremor, no focal deficits  noted.Gait is stable.      ASSESSMENT:    1.    2.  Left-sided cavernous hemangioma 07/2002.   3.  Multilobulated intraaxillary mass involving the right frontal lobe, removed 08/19/2003.     4.  Status post pacemaker placement 12/2003.     5.  Left temporal AVM resection at age 11.     6.  History of migraines.   7.   and decrease in seizures after implantation.         1. Refractory multifocal epilepsy.  Etiology multiple cavernomas.  The patient is status left temporal resection of AVM in 1985 (age 11), Right frontal hematoma 04/13/2006, treated with surgery.  Left-sided cavernous hemangioma 07/2002. Status post epilepsy surgery 05/29/2012.  Vagus nerve stimulator implanted on right and he has pacemaker on left.  Electrographically, he has multifocal epileptiform discharges and seizures arising from left and right temporal regions.      Antiepileptic drug discussion: Insurance coverage for new antiepileptic drug is poor. We started phenytoin and will optimize it . Other antiepileptic drug to consider are banzel, Brivaracetam, fycompa. OR retrial with phenytoin. Patient and wife declined Responsive Neurostimulation (Neuropace) or any brain surgery today.      2. Restless legs syndrome. Stable on gabapentin .        3. Anxiety and depression.  Stable. Managed by psychiatrist (Dr. López)     4. Tremor in head continues to be a problems. His hand tremor stopped once we stopped depakote. I do not think his current antiepileptic drug will cause head tremor or seizures. Consutt Dr. Bloom for diagnosis/treatment.         PLAN:                   Medication Tablet Size             AM  (morning)   Noon  PM (Night)       Week 1   phenytoin   mg  1 tablet    1 tablet       Week 1  phenytoin  ER 30 mg      1 tablet                         Medication Tablet Size             AM  (morning)   Noon  PM (Night)       Week 2   phenytoin   mg  1 tablet    1 tablet       Week 2  phenytoin  ER 30 mg  1 tablet    1 tablet             CBD (marijuana) study declined.   Think about Responsive Neurostimulation (Neuropace) - declined   Check with insurance company about Brivaracetam, fycompa, banzel, onfi will check on this in 10/2017 with open enrollment   See Dr. Bloom for the tremor pending  Follow up 3 month           I spent 25 minutes with the patient. During this time counseling and coordination of care exceeded 50% of the visit time. I addressed all questions and concerns the patient raised in regards to his care.      REBEKAH CHRISTOPHER MD      [facility]     Patient:  Ammon Cronin  :  1975   Age:  37 year old      Patient was accompanied by his wife today.       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  First seizure was at the age of 16.  It was thought to be related to left temporal AVM which was resected at age 11.  He was born 5 weeks premature and has a right frontal cavernous hemangioma.  He has had multiple inpatient evaluations.  During his  admission, he developed chest pain, pallor, diaphoresis and nausea, was found to have a 22 second period of asystole.  Cardiac pacemaker was implanted the next day.  In retrospect, he had at least 2 other episodes of symptomatic cardiac arrhythmia prior to one that was recorded and now these symptoms have resolved with a pacemaker.  He had a VNS implanted on 2012 by Dr. Nate Carolina.  His EEGs have been notable for multifocal sharp waves in the left temporal bifrontal and right frontal regions.  He has had seizures arising from the right hemisphere and seizures arising from the left temporal lobe also, so he does have multifocal abnormalities.  His MRI of the brain shows a cavernoma in the right frontal and a second one in the left temporal lobe and encephalomalacia.       INTERVAL HISTORY:  On 2013, I started increasing his Trileptal because we have room to optimize it.  His  seizure frequency, he states, has gone down.  He has a couple days in a week where he has no seizures and this is pretty good for him.  He typically averages anywhere from  1-8 seizures per day.  In the past, I've tried to increase his Depakote, but he would have worsening tremor, so that is not a possibility.  He has been doing well on the higher dose of the Trileptal and he is tolerating this well with no significant side effects.  He has brief complex partial seizures which are described as follows.  Type 1 are complex partial seizures.  He usually mumbles and he is not responsive at that time.  These usually last 5-10 seconds.  They may last sometimes more than 5 minutes.  Sometimes he may have clusters up to 8-10 per day.  Type 2 are where he is unresponsive, he stares, he is confused, he has lip smacking movements and he may pick at objects or sort things out.  These are repetitive behaviors and these usually last minutes.  Type 3 are generalized tonic-clonic seizures.  His last one was in 3/2012.         MEDICATIONS:       1. Levetiracetam, 750 mg in the morning, 1500 mg at 4 p.m., and 1500 mg at 11 p.m.   2. Trileptal, 900 mg t.i.d.   3. Depakote 500 mg b.i.d.   4. Gabapentin 300 mg at night    5. Depakote, Trileptal 900 mg 3 times a day.   6. Prozac 40 mg per day.       MEDICATION NOTES:   Since starting Prozac, he has been less irritable, according to his wife, and he is doing significantly better in regards to his mood.  He is more active and more alert.       REVIEW OF SYSTEMS:  Does not have any side effects that they are aware of currently to his medications aside from the tremor from his Depakote.  He continues to have bilateral hand tremors on Depakote.  A dose above 4000 mg of Depakote does exacerbate his tremors and he is not able to tolerate this, so this is the optimal dose of Depakote.  No nausea, vomiting, diarrhea.  No rash.         Vagus nerve stimulator:  Model 103/serial #[id number].  It on the  right side.  The VNS was not interrogated today.  It was interrogated in 02/2013.       EXAMINATION:  /64  Pulse 67  Wt 125 lb 9.6 oz (56.972 kg)  Cardiovascular regular rate and rhythm.  Positive S1 S2.  Lungs clear to auscultation bilaterally.  Abdomen nondistended and nontender.  Normal active bowel sounds.         Mental Status and Higher Cortical Functions:  Alert and oriented to person, place, and time.  Speech fluent, with intact naming and repetition.         Cranial Nerves (II-XII):  Pupils equal, round, and reactive to light.  Extraocular movements full with no nystagmus.  Visual fields full to confrontation.  Facial sensation intact to light touch, temperature, and pin prick.  Face symmetric at rest and with activation.  Hearing intact to finger rub bilaterally.  Tongue midline and palate elevation symmetric.  Sternocleidomastoid and trapezius 5/5 bilaterally.  No dysarthria.       Motor:  Normal tone, normal bulk, and no pronator drift.  No tremors or fasciculations.  Motor strength 5/5 in upper and lower extremities.       Sensation:  Intact to light touch and temperature.  Did not extinguish on double simultaneous stimulation.       Coordination:  Normal finger-nose-finger, fine finger movements, and rapid alternating movements.  No ataxia or dysmetria.         Reflexes:  Deep tendon reflexes 2+ and symmetric throughout.  Plantar responses flexor bilaterally.         Gait:  Casual gait and stance normal.        ASSESSMENT:        1. Intractable epilepsy.  Etiology are multiple cavernomas.  The patient is status post right frontal lobe resection in 1985.  He does have a VNS.  Electrographically, he has multifocal epilepsy and seizures arising from left and right temporal regions.  The patient continues to have brief seizures and has responded favorably to an increased dose and Trileptal and is tolerating this medication.  At this point we will optimize his Trileptal and then consider a gradual  increase in gabapentin to see if this helps him.   Depakote cannot be increased anymore because he cannot tolerate it.  Higher doses have caused increased tremors. We will check his AED levels today as well as his sodium.       2. Restless legs syndrome.  He is on gabapentin for this, 300 mg at night, and we may optimize this medication if need be for additive benefits of anti-seizure effects.       3. Anxiety and depression.  He is on fluoxetine and clonazepam for this and he is doing significantly better with his fluoxetine.         4. Rescue protocol.  Patient was given a prescription for valium for seizures lasting greater than 5 minutes, or clusters of more than 3 seizures in an 8-hour period.  May repeat dose once with no more than 6 mg in a 24-hour period.       PLAN:   1.  Continue AEDs as noted above.       2.  Rescue protocol. Valium for seizure clusters.        3. Return to clinic in 2 months.         4.  Check AED levels and comprehensive metabolic panel.           I spent 40 minutes with the patient and family. During this time counseling and coordination of care exceeded 50% of the visit time. I addressed all questions and concerns the family raised in regards to the patients care.      REBEKAH CHRISTOPHER MD                D: 2013 20:00   T: 2013 08:49   MT: PIERCE       Name:     YUE OLSEN   MRN:      [id number]        Account:      [id number]   :      1975           Service Date: 2013

## 2017-10-06 ENCOUNTER — OFFICE VISIT (OUTPATIENT)
Dept: NEUROLOGY | Facility: CLINIC | Age: 42
End: 2017-10-06

## 2017-10-06 VITALS
HEART RATE: 65 BPM | BODY MASS INDEX: 23.17 KG/M2 | OXYGEN SATURATION: 96 % | DIASTOLIC BLOOD PRESSURE: 73 MMHG | WEIGHT: 147.6 LBS | HEIGHT: 67 IN | SYSTOLIC BLOOD PRESSURE: 119 MMHG

## 2017-10-06 VITALS
HEIGHT: 67 IN | SYSTOLIC BLOOD PRESSURE: 119 MMHG | OXYGEN SATURATION: 96 % | WEIGHT: 147 LBS | HEART RATE: 65 BPM | DIASTOLIC BLOOD PRESSURE: 73 MMHG | BODY MASS INDEX: 23.07 KG/M2

## 2017-10-06 DIAGNOSIS — R56.9 SEIZURES (H): ICD-10-CM

## 2017-10-06 DIAGNOSIS — R25.1 TREMOR: Primary | ICD-10-CM

## 2017-10-06 DIAGNOSIS — R29.91 MUSCULOSKELETAL SYMPTOMS REFERABLE TO LIMBS: ICD-10-CM

## 2017-10-06 DIAGNOSIS — D18.00 CAVERNOMA: ICD-10-CM

## 2017-10-06 DIAGNOSIS — H91.90 HEARING LOSS, UNSPECIFIED HEARING LOSS TYPE, UNSPECIFIED LATERALITY: ICD-10-CM

## 2017-10-06 DIAGNOSIS — R25.1 TREMOR: ICD-10-CM

## 2017-10-06 PROBLEM — R29.898: Status: ACTIVE | Noted: 2017-10-06

## 2017-10-06 PROBLEM — Z82.69 FAMILY HISTORY OF MUSCULOSKELETAL DISORDER: Status: ACTIVE | Noted: 2017-10-06

## 2017-10-06 ASSESSMENT — PAIN SCALES - GENERAL
PAINLEVEL: NO PAIN (0)
PAINLEVEL: NO PAIN (0)

## 2017-10-06 NOTE — LETTER
10/6/2017       RE: Ammon Cronin  [address]  [place] [address]     Dear Colleague,    Thank you for referring your patient, Ammon Cronin, to the [facility] at [facility]. Please see a copy of my visit note below.    GENETIC COUNSELING-Neurology  Dr. Bloom asked that I meet with Ammon to review family history and coordinate possible referral to genetics. 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  Dr. Bloom noted some unusual flexure of the elbows and was questioning the possibility of an underlying connective tissue disorder. At the time I met with Ammon, he was confused as to how this was related to his neurologic diagnosis and he got his wife on the phone and she explained that she had similar confusion. I explained that if there was any reason that they wanted to pursue a diagnosis of an underlying vascular or connective tissue abnormality, I would likely recommend that they see one of our pediatric geneticists. I gave my card with my contact information.    Fabio Freitas MS, [facility]  Certified Genetic Counselor

## 2017-10-06 NOTE — LETTER
10/6/2017        RE: Ammon Cronin  [address]  [place] [address]     Dear Colleague,    Thank you for referring your patient, Ammon Cronin, to the [facility] NEUROLOGY at [facility]. Please see a copy of my visit note below.      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   Vagal nerve stimulator  Prn valium  Keppra/levetiracetam  Oxcarbazepine/trileptal  Phenytoin/dilantin    Restless legs syndrome. On gabapentin .        Anxiety and depression.  Managed by psychiatrist (Dr. López)  clonazepam  Fluoxetine    Consideration for genetics consultation   Cavernoma/avM  Epilepsy - presumably from above  Incomplete extension of the arms  History of hearing loss  Cardiac abnormality - has pacemaker  Daughter has elbow joint deformities  Consider connective tissue disorder vs mt disorders, etc.     Review of his risperidone with psychiatry as this medication may be playing a role in his parkinsonian features, albeit subtle with reduced right arm swing but he also has tremor that may be aggravated by this medication. Psychiatry could consider other medications such as lamotrigine or topiramate which are not likely to cause tremor and may help mood but may want to get epilepsy's input on this. His head tremor may precede the use of risperidone    At this time not sure botox will be covered or/and help his head tremor.    I dont have an easy answer for tremor medication at this time, ie not sure would go down the propranolol, primidone pathway unless indicated by epilepsy service and if psychiatry is on board for these medications and their associated mood related  side effects. topiramate could be considered but carries a risk of cognitive changes and renal stones    Return as needed.       Ruben Bloom MD  _____________________________________________________________________  Cc: 42 year old male   Consult requested by Dr. Claros    Outside records reviewed and revealed  - inserted below      History obtained from patient      History of Present Illness  41 yo man with partial epilepsy/RLS etc  Here for tremor evaluation  Medication regimen includes  Clonazepam  Diazepam  Fluoxetine  Gabapentin  keppra  mvi  Fish oil  Oxcarbazepine  Phenytoin  Risperidone.    Head > hand tremor - present for 2.5 yrs  Initially thought due to depakote which was stopped but still on risperidone  He has a no no head movement.  There are no clear aggravating or ameliorative factor  He has had periods of time that make it worse  On review he admits that he may be able to settle it down at times  He denies anxiety or nervousness  He wears glasses  Head tremor can affect his vision at times  Hearing is okay - per report but his chart states he has hearing loss   He is on disability  He sleeps at night =- he stays up too late  Has a 8.5 yr old son and 13 yr old daughter  Skin - denies  Denies endo problems  Heme: negative  Cat allergies  Id: negative  Neuro: has rls and is on gabapentin and this is managed with medication. There is no family history of RLS  He had a cardiac pacemaker placed in 2003 - he had collapsed when he was getting an eeg hooked up.   He has had two brain surgeries  He has had a vagal nerve stimulator   Denies lung problems - nonsmoker  Bladder function is okay  Gi: denies constipation. Denies swallowing problems.  Psych: he is on geodon - but denies anxiety. Sees a psychiatrist Dr. López at [facility]   almost 17 yrs. Wife Jasmyne Cronin - she is head of a  in [employer] - 4 blocks away from their house.       14 Review of systems  are negative  except for   Patient Active Problem List   Diagnosis     Partial epilepsy with impairment of consciousness, intractable (H)     Restless legs syndrome     Long term use of drug     Tremor     AVM (arteriovenous malformation) brain     Asystole (H)     Congenital anomaly of cerebrovascular system     Seizure (H)     Hemangioma of intracranial structure (H)     Presence of cardiac pacemaker     Sensorineural hearing loss (SNHL)     Syncope        Allergies   Allergen Reactions     Cats      Eyes get itchy and watery     Past Surgical History:   Procedure Laterality Date     CARDIAC SURGERY  12/03    pacemaker     IMPLANT STIMULATOR VAGUS NERVE  5/29/12    implanted on RIGHT side.     Past Medical History:   Diagnosis Date     AVM (arteriovenous malformation) brain     left temporal, with resection at age 11     Cavernous hemangioma of cerebellum (H)      Localization-related epilepsy (H)      Migraines      Social History     Social History     Marital status:      Spouse name: N/A     Number of children: N/A     Years of education: N/A     Occupational History     Not on file.     Social History Main Topics     Smoking status: Never Smoker     Smokeless tobacco: Never Used     Alcohol use Not on file     Drug use: Not on file     Sexual activity: Not on file     Other Topics Concern     Not on file     Social History Narrative     No family history on file.  Current Outpatient Prescriptions   Medication Sig Dispense Refill     phenytoin (DILANTIN) 100 MG CR capsule TITRATE TO 1 CAPSULE TWICE A  capsule 1     OXcarbazepine (TRILEPTAL) 300 MG tablet TAKE 3 TABLETS THREE TIMES DAILY (KEEP APPT 5/31/17) 810 tablet 1     diazepam (VALIUM) 5 MG tablet TAKE 1 TAB AS NEEDED FOR SEIZURES >5MIN OR >3 SEIZURES/8HRS, MAY REPEAT ONCE, MAX 10MG/24HR, CALL 911 IF BREATHING PROBLEMS 30 tablet 5     phenytoin (DILANTIN) 30 MG CR capsule Titrate to 30 mg twice a day (take along with 100 mg twice a day) 60 capsule 11      clonazePAM (KLONOPIN) 2 MG tablet Take 2 mg by mouth 2 times daily as needed for anxiety       gabapentin (NEURONTIN) 300 MG capsule Take 4 cap po tid 1080 capsule 3     levETIRAcetam (KEPPRA) 750 MG tablet Generic.  Take 1 tablet in am, 2 tablets at 4 pm and  2 tablet at 11 pm. 450 tablet 3     risperiDONE (RISPERDAL) 1 MG tablet Take 1 mg by mouth 2 times daily       diazepam (DIAZEPAM INTENSOL) 5 MG/ML solution Seizures greater than 5 minutes or cluster of more than 3 seizures in 8 hour period. May repeat dose once, no more than 6 mg in 24 hour period. 30 mL 0     FLUoxetine (PROZAC) 40 MG capsule Take 40 mg by mouth daily Started March 2013       Multiple Vitamins-Minerals (MULTIVITAMIN & MINERAL PO) Take 1 tablet by mouth daily.       OMEGA-3 FATTY ACIDS PO Take 1 tablet by mouth daily.          .MDSPDTABLE    Examination  B/P: Data Unavailable, T: Data Unavailable, P: Data Unavailable, R: Data Unavailable 0 lbs 0 oz  There were no vitals taken for this visit., There is no height or weight on file to calculate BMI.    General examination: well developed, nourished and normal affect  Carotid: No bruits. Chest CTA, Heart regular without gallops or murmurs. Abdomen soft nontender, no masses, bowel sounds intact. Periphery: normal pulses without edema. No skin lesions. MENTAL STATUS:  Alert, oriented x3.  Speech fluent with normal naming, repetition, comprehension.  Good right-left orientation, Can remember 3/3 objects. Spells world backwards: DLROW 5/5   CRANIAL NERVES:  Disks flat. Pupils are equal, round, reactive to light.  Normal vascularity and fields. Extraocular movements full.  Facial sensation and movement normal.  Hearing intact. Palate moves symmetrically.  Tongue midline.  Sternocleidomastoid and trapezius strength intact.  Neck strength was normal.  NEUROLOGIC:  Tone: slight increase tone and slight slowness of his finger apping. Motor in upper and lower extremities. 5/5.  Reflexes 2/4.  Toe signs  downgoing.  Good finger-nose-finger, fine finger movement, heel-shin maneuver, sensation to light touch, position sense and vibration and temperature was normal. Gait normal except for reduced right arm swing. Romberg and postural stability intact. Tremor present  Has mild postural and mild =mod action tremor of UEs. He had no prominent head tremor when seen today.     He also has joint deformities of the UE with incomplete extension of the arms  He has a bit of a facial stare/gaping mouth.   He has reportedly hearing loss.     [facility] Epilepsy Care Progress Note     Patient:  Ammon Cronin  :  1975   Age:  41 year old   Today's Office Visit:  2017     SEIZURE HISTORY:   Copied forward:  First seizure was at the age of 16 (loss of awarenes). 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   Presurgically workup in  revealed bilateral independent seizure foci in the temporal lobes. Ictal asystole (, he developed chest pain, pallor, diaphoresis and nausea, was found to have a 22 second period of asystole.  Emergent cardiac pacemaker placed). MRI of the brain shows a cavernoma in the right frontal and a second one in the left temporal lobe and encephalomalacia.       INTERVAL HISTORY:  Came with Jasmyne. He continues to have daily complex partial seizures, per Jasmyne seizure frequency is the same. He had CT of head which showed calification and no new bleed. Overall, he continues to have high seizure burden, poor memory, agitation/agression/mood changes. He continues to average 5 seizure per day and his seizure.  Currently, on antiepileptic drug there is fatigue, no double vision, no mood changes, no nausea, no vomiting, no abdominal pain, no rashes. No recent ER visits and no hospitalizations since last visit. He is tolerating  phenytoin with no major side effects.              Prior to Admission medications    Medication Sig Start Date End Date Taking? Authorizing Provider   diazepam (VALIUM) 5 MG tablet TAKE 1 TAB AS NEEDED FOR SEIZURES >5MIN OR >3 SEIZURES/8HRS, MAY REPEAT ONCE, MAX 10MG/24HR, CALL 911 IF BREATHING PROBLEMS 6/28/17   Yes Rosenda Claros MD                 clonazePAM (KLONOPIN) 2 MG tablet Take 2 mg by mouth 2 times daily as needed for anxiety       Reported, Patient   phenytoin (DILANTIN) 100 MG CR capsule Titrate to 100 mg twice a day 5/31/17     Rosenda Claros MD   OXcarbazepine (TRILEPTAL) 300 MG tablet TAKE 3 TABLETS THREE TIMES DAILY 5/4/17     Rosenda Claros MD   gabapentin (NEURONTIN) 300 MG capsule Take 4 cap po tid 8/2/16     Rosenda Claros MD   levETIRAcetam (KEPPRA) 750 MG tablet Generic.  Take 1 tablet in am, 2 tablets at 4 pm and  2 tablet at 11 pm. 8/2/16     Rosenda Claros MD   risperiDONE (RISPERDAL) 1 MG tablet Take 1 mg by mouth 2 times daily       Reported, Patient   diazepam (DIAZEPAM INTENSOL) 5 MG/ML solution Seizures greater than 5 minutes or cluster of more than 3 seizures in 8 hour period. May repeat dose once, no more than 6 mg in 24 hour period. 2/3/14     Eric Cruz MD   FLUoxetine (PROZAC) 40 MG capsule Take 40 mg by mouth daily Started March 2013       Reported, Patient   Multiple Vitamins-Minerals (MULTIVITAMIN & MINERAL PO) Take 1 tablet by mouth daily.       Reported, Patient   OMEGA-3 FATTY ACIDS PO Take 1 tablet by mouth daily.       Reported, Patient            AED MEDICATIONS:    1. Levetiracetam, 750 mg in the morning, 1500 mg at 4 p.m., and 1500 mg at 11 p.m.   2. Oxcarbazepine  900 mg t.i.d.   3. Gabapentin 1200 mg AM, 1200 mg noon, and 1200 mg PM  (started for RLS, patient has taken higher dose for some time, not sure of length)   5. Klonopin 2 -4 mg  (started 3/2013 for anxiety, takes one per day and two on weekend)  6. Diazepam PRN for CPS lasting greater than 5  minutes or more then 2 in one hour.   7. Phenytoin ER  100-100        MEDICATION NOTES/PRIOR ANTIEPILEPTIC DRUGS:    1.Lyrica (ineffective for seizure, max dose 1100mg per day)   2.Topamax: Two trials from 01/2008-05/2008 and the second trial on 05/19/2015.  Max dose was 200 mg per day.  CP max was 3.5 mg/L.  There was no change in seizure frequency.  The patient was compliant.  During the second trial, they tried it for mood stabilization; 50 mg was after 2 months discontinued because side effects were speech and word-finding difficulties.   3.Gabatril (ineffective, caused fatigue at 48mg/ day). One trial from 01/2004-04/2006.  Max dose 48 mg per day.  It was ineffective and was discontinued.  There was no increase in seizures when it was stopped.  Side effects were fatigue.   4.Zonegran  One trial from 12/2003.  Max dose 400 mg per day.  Zonegran was used in combination with Keppra and Trileptal.  Side effects were memory impairment, aggressiveness, fatigue, behavioral changes, cognitive impairment, which improved after Zonegran was discontinued.   5.Dilantin: One trial from ? to 03/08/2002.  Max dose 350 mg per day.  CP max and free Dilantin level of 1.9 mg/L.  Efficacy:  Unknown.  The drug was optimized.  Side effects were tiredness on 350 mg a day.     Assessment:  It looks like seizure control was better when the patient was on Dilantin; therefore, it could be retried.   6.Vimpat: One trial 07/2009-09/2009.  Max dose 400 mg per day.  CP max 5.3 mg/L.  It was discontinued after 2 months because the patient experienced dizziness, diplopia and ataxia.  7.Depakote: One trial from 01/2010 to 05/09/2016.  So far, seizures have not increased because we increased the gabapentin.  Also, the headaches have not gotten worse, but the hand tremor and the head tremor have immensely improved since Depakote was discontinued.  Max dose was 2500 mg per day.  CP max was up to 116/15.6 mg/L.  In the past, it decreased seizure  frequency, and it was used together with levetiracetam, oxcarbazepine and gabapentin, and it was used before with oxcarbazepine, levetiracetam and Lyrica.   Assessment:  Depakote could always be retried if we run into a problem.  8.lamotrigine: One trial from 07/06/2001.  Max dose 150 mg per day.  Used in combination with Dilantin.  There was no change in seizure frequency.  Side effects were increase in confusion and therefore questionable efficacy.  9.carbamazepine (tired, but no past info on details)  10.Levetiracetam: started in 2001. No major side effects. One trial from 07/2001 to present.  Max dose 3750 mg per day.  CP max 42.4 mg/L.   11. Oxcarbazepine:  One trial from 03/2002 to present.  Max dose 2700 mg per day.  CP max 43.2 mg/L.  It decreases seizures, and it looks like it is one of the best drugs the patient has tried.  increased oxcarbazepine from 2,400mg -> 2700 mg on 4/2013.   12.  Gabapentin:  One trial from 07/2012 to present.  Max dose is 3600 mg per day.  This drug was used more for pain and restless legs, but when Depakote was tapered, we increased the gabapentin, and it looks like the only problem we have is some weight gain with the gabapentin.  Gabapentin  increased 1200 mg three times a day 5/2016 with no change in seizure, however, RLS symptoms decreased  13.  Klonopin:  One trial started 03/2013.  Is used for anxiety, not for seizures.  It was started at 0.5 mg and optimized so far to 3 mg per day.     Assessment:  This drug is used by a psychiatrist.  If we one day want to try ONFI in this patient, maybe then the Klonopin could be decreased again.   14. Vagus nerve stimulator setting: Not able to tolerate higher duty cycle (35) with 1.1 signal off time and 30 sec on time, seizure worsened, not able to tolerate higher current setting due to discomfort.          REVIEW OF SYSTEMS:  Head tremor, which bothers him.  He continues to have bilateral hand tremors on Depakote.  No nausea,  vomiting, diarrhea.  No rash.  Vagus nerve stimulator setting are bother him (voice changes and throat discomfort).       EXAMINATION:  There were no vitals taken for this visit.  Alert, orientated, speech is fluent, face is symmetric, No head/ hand tremor, no focal deficits noted.Gait is stable.      ASSESSMENT:    1.    2.  Left-sided cavernous hemangioma 07/2002.   3.  Multilobulated intraaxillary mass involving the right frontal lobe, removed 08/19/2003.     4.  Status post pacemaker placement 12/2003.     5.  Left temporal AVM resection at age 11.     6.  History of migraines.   7.   and decrease in seizures after implantation.         1. Refractory multifocal epilepsy.  Etiology multiple cavernomas.  The patient is status left temporal resection of AVM in 1985 (age 11), Right frontal hematoma 04/13/2006, treated with surgery.  Left-sided cavernous hemangioma 07/2002. Status post epilepsy surgery 05/29/2012.  Vagus nerve stimulator implanted on right and he has pacemaker on left.  Electrographically, he has multifocal epileptiform discharges and seizures arising from left and right temporal regions.      Antiepileptic drug discussion: Insurance coverage for new antiepileptic drug is poor. We started phenytoin and will optimize it . Other antiepileptic drug to consider are banzel, Brivaracetam, fycompa. OR retrial with phenytoin. Patient and wife declined Responsive Neurostimulation (Neuropace) or any brain surgery today.      2. Restless legs syndrome. Stable on gabapentin .        3. Anxiety and depression.  Stable. Managed by psychiatrist (Dr. López)     4. Tremor in head continues to be a problems. His hand tremor stopped once we stopped depakote. I do not think his current antiepileptic drug will cause head tremor or seizures. Consutt Dr. Bloom for diagnosis/treatment.         PLAN:                   Medication Tablet Size             AM  (morning)   Noon  PM (Night)       Week 1   phenytoin   mg   1 tablet    1 tablet       Week 1  phenytoin  ER 30 mg      1 tablet                         Medication Tablet Size             AM  (morning)   Noon  PM (Night)       Week 2   phenytoin   mg  1 tablet    1 tablet       Week 2  phenytoin  ER 30 mg  1 tablet    1 tablet            CBD (marijuana) study declined.   Think about Responsive Neurostimulation (Neuropace) - declined   Check with insurance company about Brivaracetam, fycompa, banzel, onfi will check on this in 10/2017 with open enrollment   See Dr. Bloom for the tremor pending  Follow up 3 month           I spent 25 minutes with the patient. During this time counseling and coordination of care exceeded 50% of the visit time. I addressed all questions and concerns the patient raised in regards to his care.      REBEKAH CHRISTOPHER MD      [facility]     Patient:  Ammon Cronin  :  1975   Age:  37 year old      Patient was accompanied by his wife today.       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  First seizure was at the age of 16.  It was thought to be related to left temporal AVM which was resected at age 11.  He was born 5 weeks premature and has a right frontal cavernous hemangioma.  He has had multiple inpatient evaluations.  During his  admission, he developed chest pain, pallor, diaphoresis and nausea, was found to have a 22 second period of asystole.  Cardiac pacemaker was implanted the next day.  In retrospect, he had at least 2 other episodes of symptomatic cardiac arrhythmia prior to one that was recorded and now these symptoms have resolved with a pacemaker.  He had a VNS implanted on 2012 by Dr. Nate Carolina.  His EEGs have been notable for multifocal sharp waves in the left temporal bifrontal and right frontal regions.  He has had seizures arising from the right hemisphere and seizures arising from the left temporal lobe also, so he  does have multifocal abnormalities.  His MRI of the brain shows a cavernoma in the right frontal and a second one in the left temporal lobe and encephalomalacia.       INTERVAL HISTORY:  On 4/25/2013, I started increasing his Trileptal because we have room to optimize it.  His seizure frequency, he states, has gone down.  He has a couple days in a week where he has no seizures and this is pretty good for him.  He typically averages anywhere from  1-8 seizures per day.  In the past, I've tried to increase his Depakote, but he would have worsening tremor, so that is not a possibility.  He has been doing well on the higher dose of the Trileptal and he is tolerating this well with no significant side effects.  He has brief complex partial seizures which are described as follows.  Type 1 are complex partial seizures.  He usually mumbles and he is not responsive at that time.  These usually last 5-10 seconds.  They may last sometimes more than 5 minutes.  Sometimes he may have clusters up to 8-10 per day.  Type 2 are where he is unresponsive, he stares, he is confused, he has lip smacking movements and he may pick at objects or sort things out.  These are repetitive behaviors and these usually last minutes.  Type 3 are generalized tonic-clonic seizures.  His last one was in 3/2012.         MEDICATIONS:       1. Levetiracetam, 750 mg in the morning, 1500 mg at 4 p.m., and 1500 mg at 11 p.m.   2. Trileptal, 900 mg t.i.d.   3. Depakote 500 mg b.i.d.   4. Gabapentin 300 mg at night    5. Depakote, Trileptal 900 mg 3 times a day.   6. Prozac 40 mg per day.       MEDICATION NOTES:   Since starting Prozac, he has been less irritable, according to his wife, and he is doing significantly better in regards to his mood.  He is more active and more alert.       REVIEW OF SYSTEMS:  Does not have any side effects that they are aware of currently to his medications aside from the tremor from his Depakote.  He continues to have bilateral  hand tremors on Depakote.  A dose above 4000 mg of Depakote does exacerbate his tremors and he is not able to tolerate this, so this is the optimal dose of Depakote.  No nausea, vomiting, diarrhea.  No rash.         Vagus nerve stimulator:  Model 103/serial #[id number].  It on the right side.  The VNS was not interrogated today.  It was interrogated in 02/2013.       EXAMINATION:  /64  Pulse 67  Wt 125 lb 9.6 oz (56.972 kg)  Cardiovascular regular rate and rhythm.  Positive S1 S2.  Lungs clear to auscultation bilaterally.  Abdomen nondistended and nontender.  Normal active bowel sounds.         Mental Status and Higher Cortical Functions:  Alert and oriented to person, place, and time.  Speech fluent, with intact naming and repetition.         Cranial Nerves (II-XII):  Pupils equal, round, and reactive to light.  Extraocular movements full with no nystagmus.  Visual fields full to confrontation.  Facial sensation intact to light touch, temperature, and pin prick.  Face symmetric at rest and with activation.  Hearing intact to finger rub bilaterally.  Tongue midline and palate elevation symmetric.  Sternocleidomastoid and trapezius 5/5 bilaterally.  No dysarthria.       Motor:  Normal tone, normal bulk, and no pronator drift.  No tremors or fasciculations.  Motor strength 5/5 in upper and lower extremities.       Sensation:  Intact to light touch and temperature.  Did not extinguish on double simultaneous stimulation.       Coordination:  Normal finger-nose-finger, fine finger movements, and rapid alternating movements.  No ataxia or dysmetria.         Reflexes:  Deep tendon reflexes 2+ and symmetric throughout.  Plantar responses flexor bilaterally.         Gait:  Casual gait and stance normal.        ASSESSMENT:        1. Intractable epilepsy.  Etiology are multiple cavernomas.  The patient is status post right frontal lobe resection in 1985.  He does have a VNS.  Electrographically, he has multifocal epilepsy  and seizures arising from left and right temporal regions.  The patient continues to have brief seizures and has responded favorably to an increased dose and Trileptal and is tolerating this medication.  At this point we will optimize his Trileptal and then consider a gradual increase in gabapentin to see if this helps him.   Depakote cannot be increased anymore because he cannot tolerate it.  Higher doses have caused increased tremors. We will check his AED levels today as well as his sodium.       2. Restless legs syndrome.  He is on gabapentin for this, 300 mg at night, and we may optimize this medication if need be for additive benefits of anti-seizure effects.       3. Anxiety and depression.  He is on fluoxetine and clonazepam for this and he is doing significantly better with his fluoxetine.         4. Rescue protocol.  Patient was given a prescription for valium for seizures lasting greater than 5 minutes, or clusters of more than 3 seizures in an 8-hour period.  May repeat dose once with no more than 6 mg in a 24-hour period.       PLAN:   1.  Continue AEDs as noted above.       2.  Rescue protocol. Valium for seizure clusters.        3. Return to clinic in 2 months.         4.  Check AED levels and comprehensive metabolic panel.           I spent 40 minutes with the patient and family. During this time counseling and coordination of care exceeded 50% of the visit time. I addressed all questions and concerns the family raised in regards to the patients care.      REBEKAH CHRISTOPHER MD                D: 2013 20:00   T: 2013 08:49   MT: PIERCE       Name:     YUE OLSEN   MRN:      [id number]        Account:      [id number]   :      1975           Service Date: 2013

## 2017-10-06 NOTE — NURSING NOTE
Chief Complaint   Patient presents with     Consult     Referral for head tremor     Vitals were taken and medications were reconciled.    Babs Hickman CMA     10:26 AM

## 2017-10-06 NOTE — MR AVS SNAPSHOT
After Visit Summary   10/6/2017    Ammon Cronin    MRN: [id number]           Patient Information     Date Of Birth          1975        Visit Information        Provider Department      10/6/2017 10:30 AM Ruben Bloom MD [facility] Neurology        Today's Diagnoses     Tremor    -  1    Hearing loss, unspecified hearing loss type, unspecified laterality        Cavernoma        Musculoskeletal symptoms referable to limbs        Seizures (H)          Care Instructions    Summary and Recommendations:     Tremor - hand tremor improved with stopping depakote  Head tremor  Taking other medications that can cause tremor, eg risperidone 1mg 2/day    Left temporal AVM that was resected at age 11 and 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   Vagal nerve stimulator  Prn valium  Keppra/levetiracetam  Oxcarbazepine/trileptal  Phenytoin/dilantin    Restless legs syndrome. On gabapentin .        Anxiety and depression.  Managed by psychiatrist (Dr. López)  clonazepam  Fluoxetine    Consideration for genetics consultation   Cavernoma/avM  Epilepsy - presumably from above  Incomplete extension of the arms  History of hearing loss  Cardiac abnormality - has pacemaker      Review of his risperidone with psychiatry as this medication may be playing a role in his parkinsonian features, albeit subtle with reduced right arm swing but he also has tremor that may be aggravated by this medication. Psychiatry could consider other medications such as lamotrigine or topiramate which are not likely to cause tremor and may help mood but may want to get epilepsy's input on this.     At this time not sure botox will be covered or/and help his head tremor.    I dont have an easy answer for tremor medication at this time, ie not sure would go down the propranolol, primidone pathway unless indicated by epilepsy service and if psychiatry  is on board for these medications and their associated mood related side effects. topiramate could be considered but carries a risk of cognitive changes and renal stones    Return as needed.           Follow-ups after your visit        Additional Services     GENETICS REFERRAL       Your provider has referred you to: medical Genetics    Epilepsy. Cardiac issue. Incomplete extension of arm. Has history of AVM/cavernoma  Consider connective tissue disorder or jerri ? Disorder  Hearing loss    Please be aware that coverage of these services is subject to the terms and limitations of your health insurance plan.  Call member services at your health plan with any benefit or coverage questions.      Please bring the following with you to your appointment:    (1) Any X-Rays, CTs or MRIs which have been performed.  Contact the facility where they were done to arrange for  prior to your scheduled appointment.   (2) List of current medications   (3) This referral request   (4) Any documents/labs given to you for this referral                  Follow-up notes from your care team     Return if symptoms worsen or fail to improve.      Your next 10 appointments already scheduled     Oct 18, 2017 11:30 AM CDT   Return Visit with Rosenda Claros MD   [facility] ([facility])    [address] [place] [address], [address]  [facility] [address] [phone]              Who to contact     Please call your clinic at [phone] to:    Ask questions about your health    Make or cancel appointments    Discuss your medicines    Learn about your test results    Speak to your doctor   If you have compliments or concerns about an experience at your clinic, or if you wish to file a complaint, please contact [facility] Patient Relations at [phone] or email us at [email or web address]         Additional Information About Your Visit        MyChart Information     [employer] gives you  "secure access to your electronic health record. If you see a primary care provider, you can also send messages to your care team and make appointments. If you have questions, please call your primary care clinic.  If you do not have a primary care provider, please call [phone] and they will assist you.      [employer] is an electronic gateway that provides easy, online access to your medical records. With [employer], you can request a clinic appointment, read your test results, renew a prescription or communicate with your care team.     To access your existing account, please contact your [facility] or call [phone] for assistance.        Care EveryWhere ID     This is your Care EveryWhere ID. This could be used by other organizations to access your [place] medical records  [id number]        Your Vitals Were     Pulse Height Pulse Oximetry BMI (Body Mass Index)          65 1.69 m (5' 6.53\") 96% 23.44 kg/m2         Blood Pressure from Last 3 Encounters:   10/06/17 119/73   05/31/17 109/65   01/16/17 121/73    Weight from Last 3 Encounters:   10/06/17 67 kg (147 lb 9.6 oz)   05/31/17 66.2 kg (146 lb)   01/16/17 69.4 kg (153 lb)              We Performed the Following     GENETICS REFERRAL        Primary Care Provider    None Specified       No primary provider on file.        Equal Access to Services     [facility]: Hadii yaa Noriega, waaxda luqadaha, qaybta kaalmada adeegyada, terra palacios . So [facility] [phone].    ATENCIÓN: Si habla español, tiene a correa disposición servicios gratuitos de asistencia lingüística. Llame al [phone].    We comply with applicable federal civil rights laws and [place] laws. We do not discriminate on the basis of race, color, national origin, age, disability, sex, sexual orientation, or gender identity.            Thank you!     Thank you for choosing [facility]  for your care. Our goal is always " to provide you with excellent care. Hearing back from our patients is one way we can continue to improve our services. Please take a few minutes to complete the written survey that you may receive in the mail after your visit with us. Thank you!             Your Updated Medication List - Protect others around you: Learn how to safely use, store and throw away your medicines at www.disposemymeds.org.          This list is accurate as of: 10/6/17 11:00 AM.  Always use your most recent med list.                   Brand Name Dispense Instructions for use Diagnosis    clonazePAM 2 MG tablet    klonoPIN     Take 2 mg by mouth 2 times daily as needed for anxiety    Partial epilepsy with impairment of consciousness, intractable (H), Tremor       * diazepam 5 MG/ML (HIGH CONC) solution    DIAZEPAM INTENSOL    30 mL    Seizures greater than 5 minutes or cluster of more than 3 seizures in 8 hour period. May repeat dose once, no more than 6 mg in 24 hour period.    Unspecified epilepsy with intractable epilepsy       * diazepam 5 MG tablet    VALIUM    30 tablet    TAKE 1 TAB AS NEEDED FOR SEIZURES >5MIN OR >3 SEIZURES/8HRS, MAY REPEAT ONCE, MAX 10MG/24HR, CALL 911 IF BREATHING PROBLEMS    Restless leg syndrome, Partial epilepsy with impairment of consciousness, intractable (H)       gabapentin 300 MG capsule    NEURONTIN    1080 capsule    Take 4 cap po tid    Restless legs syndrome, Partial epilepsy with impairment of consciousness, intractable (H), Long term use of drug, Restless leg syndrome       levETIRAcetam 750 MG tablet    KEPPRA    450 tablet    Generic.  Take 1 tablet in am, 2 tablets at 4 pm and  2 tablet at 11 pm.    Restless legs syndrome, Partial epilepsy with impairment of consciousness, intractable (H), Long term use of drug, Restless leg syndrome       MULTIVITAMIN & MINERAL PO      Take 1 tablet by mouth daily.        OMEGA-3 FATTY ACIDS PO      Take 1 tablet by mouth daily.        OXcarbazepine 300 MG tablet     TRILEPTAL    810 tablet    TAKE 3 TABLETS THREE TIMES DAILY (KEEP APPT 5/31/17)    Partial epilepsy with loss of consciousness, intractable (H)       * phenytoin 30 MG CR capsule    DILANTIN    60 capsule    Titrate to 30 mg twice a day (take along with 100 mg twice a day)    Restless leg syndrome, Partial epilepsy with impairment of consciousness, intractable (H)       * phenytoin 100 MG CR capsule    DILANTIN    180 capsule    TITRATE TO 1 CAPSULE TWICE A DAY    Partial epilepsy with impairment of consciousness, intractable (H), Tremor       PROZAC 40 MG capsule   Generic drug:  FLUoxetine      Take 40 mg by mouth daily Started March 2013        risperiDONE 1 MG tablet    risperDAL     Take 1 mg by mouth 2 times daily    Partial epilepsy with impairment of consciousness, intractable (H), Restless legs syndrome, Long term use of drug       * Notice:  This list has 4 medication(s) that are the same as other medications prescribed for you. Read the directions carefully, and ask your doctor or other care provider to review them with you.

## 2017-10-06 NOTE — PROGRESS NOTES
GENETIC COUNSELING-Neurology  Dr. Bloom asked that I meet with Ammon to review family history and coordinate possible referral to genetics. 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  Dr. Bloom noted some unusual flexure of the elbows and was questioning the possibility of an underlying connective tissue disorder. At the time I met with Ammon, he was confused as to how this was related to his neurologic diagnosis and he got his wife on the phone and she explained that she had similar confusion. I explained that if there was any reason that they wanted to pursue a diagnosis of an underlying vascular or connective tissue abnormality, I would likely recommend that they see one of our pediatric geneticists. I gave my card with my contact information.    Fabio Freitas MS, [facility]  Certified Genetic Counselor

## 2017-10-06 NOTE — LETTER
10/6/2017       RE: Ammon Cronin  [address]  [place] [address]     Dear Colleague,    Thank you for referring your patient, Ammon Cronin, to the [facility] NEUROLOGY at [facility]. Please see a copy of my visit note below.      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   Vagal nerve stimulator  Prn valium  Keppra/levetiracetam  Oxcarbazepine/trileptal  Phenytoin/dilantin    Restless legs syndrome. On gabapentin .        Anxiety and depression.  Managed by psychiatrist (Dr. López)  clonazepam  Fluoxetine    Consideration for genetics consultation   Cavernoma/avM  Epilepsy - presumably from above  Incomplete extension of the arms  History of hearing loss  Cardiac abnormality - has pacemaker      Review of his risperidone with psychiatry as this medication may be playing a role in his parkinsonian features, albeit subtle with reduced right arm swing but he also has tremor that may be aggravated by this medication. Psychiatry could consider other medications such as lamotrigine or topiramate which are not likely to cause tremor and may help mood but may want to get epilepsy's input on this.     At this time not sure botox will be covered or/and help his head tremor.    I dont have an easy answer for tremor medication at this time, ie not sure would go down the propranolol, primidone pathway unless indicated by epilepsy service and if psychiatry is on board for these medications and their associated mood related side effects. topiramate could be considered but carries a risk of cognitive changes and renal stones    Return as needed.       Ruben Bloom  MD  _____________________________________________________________________  Cc: 42 year old male   Consult requested by Dr. Claros    Outside records reviewed and revealed  - inserted below      History obtained from patient      History of Present Illness  43 yo man with partial epilepsy/RLS etc  Here for tremor evaluation  Medication regimen includes  Clonazepam  Diazepam  Fluoxetine  Gabapentin  keppra  mvi  Fish oil  Oxcarbazepine  Phenytoin  Risperidone.    Head > hand tremor - present for 2.5 yrs  Initially thought due to depakote which was stopped but still on risperidone  He has a no no head movement.  There are no clear aggravating or ameliorative factor  He has had periods of time that make it worse  On review he admits that he may be able to settle it down at times  He denies anxiety or nervousness  He wears glasses  Head tremor can affect his vision at times  Hearing is okay - per report but his chart states he has hearing loss   He is on disability  He sleeps at night =- he stays up too late  Has a 8.5 yr old son and 13 yr old daughter  Skin - denies  Denies endo problems  Heme: negative  Cat allergies  Id: negative  Neuro: has rls and is on gabapentin and this is managed with medication. There is no family history of RLS  He had a cardiac pacemaker placed in 2003 - he had collapsed when he was getting an eeg hooked up.   He has had two brain surgeries  He has had a vagal nerve stimulator   Denies lung problems - nonsmoker  Bladder function is okay  Gi: denies constipation. Denies swallowing problems.  Psych: he is on geodon - but denies anxiety. Sees a psychiatrist Dr. López at [facility]   almost 17 yrs. Wife Jasmyne Cronin - she is head of a  in [facility] - 4 blocks away from their house.       14 Review of systems  are negative except for   Patient Active Problem List   Diagnosis     Partial epilepsy with impairment of consciousness, intractable (H)     Restless legs  syndrome     Long term use of drug     Tremor     AVM (arteriovenous malformation) brain     Asystole (H)     Congenital anomaly of cerebrovascular system     Seizure (H)     Hemangioma of intracranial structure (H)     Presence of cardiac pacemaker     Sensorineural hearing loss (SNHL)     Syncope        Allergies   Allergen Reactions     Cats      Eyes get itchy and watery     Past Surgical History:   Procedure Laterality Date     CARDIAC SURGERY  12/03    pacemaker     IMPLANT STIMULATOR VAGUS NERVE  5/29/12    implanted on RIGHT side.     Past Medical History:   Diagnosis Date     AVM (arteriovenous malformation) brain     left temporal, with resection at age 11     Cavernous hemangioma of cerebellum (H)      Localization-related epilepsy (H)      Migraines      Social History     Social History     Marital status:      Spouse name: N/A     Number of children: N/A     Years of education: N/A     Occupational History     Not on file.     Social History Main Topics     Smoking status: Never Smoker     Smokeless tobacco: Never Used     Alcohol use Not on file     Drug use: Not on file     Sexual activity: Not on file     Other Topics Concern     Not on file     Social History Narrative     No family history on file.  Current Outpatient Prescriptions   Medication Sig Dispense Refill     phenytoin (DILANTIN) 100 MG CR capsule TITRATE TO 1 CAPSULE TWICE A  capsule 1     OXcarbazepine (TRILEPTAL) 300 MG tablet TAKE 3 TABLETS THREE TIMES DAILY (KEEP APPT 5/31/17) 810 tablet 1     diazepam (VALIUM) 5 MG tablet TAKE 1 TAB AS NEEDED FOR SEIZURES >5MIN OR >3 SEIZURES/8HRS, MAY REPEAT ONCE, MAX 10MG/24HR, CALL 911 IF BREATHING PROBLEMS 30 tablet 5     phenytoin (DILANTIN) 30 MG CR capsule Titrate to 30 mg twice a day (take along with 100 mg twice a day) 60 capsule 11     clonazePAM (KLONOPIN) 2 MG tablet Take 2 mg by mouth 2 times daily as needed for anxiety       gabapentin (NEURONTIN) 300 MG capsule Take 4  cap po tid 1080 capsule 3     levETIRAcetam (KEPPRA) 750 MG tablet Generic.  Take 1 tablet in am, 2 tablets at 4 pm and  2 tablet at 11 pm. 450 tablet 3     risperiDONE (RISPERDAL) 1 MG tablet Take 1 mg by mouth 2 times daily       diazepam (DIAZEPAM INTENSOL) 5 MG/ML solution Seizures greater than 5 minutes or cluster of more than 3 seizures in 8 hour period. May repeat dose once, no more than 6 mg in 24 hour period. 30 mL 0     FLUoxetine (PROZAC) 40 MG capsule Take 40 mg by mouth daily Started March 2013       Multiple Vitamins-Minerals (MULTIVITAMIN & MINERAL PO) Take 1 tablet by mouth daily.       OMEGA-3 FATTY ACIDS PO Take 1 tablet by mouth daily.          .MDSPDTABLE    Examination  B/P: Data Unavailable, T: Data Unavailable, P: Data Unavailable, R: Data Unavailable 0 lbs 0 oz  There were no vitals taken for this visit., There is no height or weight on file to calculate BMI.    General examination: well developed, nourished and normal affect  Carotid: No bruits. Chest CTA, Heart regular without gallops or murmurs. Abdomen soft nontender, no masses, bowel sounds intact. Periphery: normal pulses without edema. No skin lesions. MENTAL STATUS:  Alert, oriented x3.  Speech fluent with normal naming, repetition, comprehension.  Good right-left orientation, Can remember 3/3 objects. Spells world backwards: DLROW 5/5   CRANIAL NERVES:  Disks flat. Pupils are equal, round, reactive to light.  Normal vascularity and fields. Extraocular movements full.  Facial sensation and movement normal.  Hearing intact. Palate moves symmetrically.  Tongue midline.  Sternocleidomastoid and trapezius strength intact.  Neck strength was normal.  NEUROLOGIC:  Tone: slight increase tone and slight slowness of his finger apping. Motor in upper and lower extremities. 5/5.  Reflexes 2/4.  Toe signs downgoing.  Good finger-nose-finger, fine finger movement, heel-shin maneuver, sensation to light touch, position sense and vibration and  temperature was normal. Gait normal except for reduced right arm swing. Romberg and postural stability intact. Tremor present  Has mild postural and mild =mod action tremor of UEs. He had no prominent head tremor when seen today.     He also has joint deformities of the UE with incomplete extension of the arms  He has a bit of a facial stare/gaping mouth.   He has reportedly hearing loss.     [facility] Epilepsy Care Progress Note     Patient:  Ammon Cronin  :  1975   Age:  41 year old   Today's Office Visit:  2017     SEIZURE HISTORY:   Copied forward:  First seizure was at the age of 16 (loss of awarenes). 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   Presurgically workup in  revealed bilateral independent seizure foci in the temporal lobes. Ictal asystole (, he developed chest pain, pallor, diaphoresis and nausea, was found to have a 22 second period of asystole.  Emergent cardiac pacemaker placed). MRI of the brain shows a cavernoma in the right frontal and a second one in the left temporal lobe and encephalomalacia.       INTERVAL HISTORY:  Came with Jasmyne. He continues to have daily complex partial seizures, per Jasmyne seizure frequency is the same. He had CT of head which showed calification and no new bleed. Overall, he continues to have high seizure burden, poor memory, agitation/agression/mood changes. He continues to average 5 seizure per day and his seizure.  Currently, on antiepileptic drug there is fatigue, no double vision, no mood changes, no nausea, no vomiting, no abdominal pain, no rashes. No recent ER visits and no hospitalizations since last visit. He is tolerating phenytoin with no major side effects.              Prior to Admission medications    Medication Sig Start Date End Date Taking? Authorizing  Provider   diazepam (VALIUM) 5 MG tablet TAKE 1 TAB AS NEEDED FOR SEIZURES >5MIN OR >3 SEIZURES/8HRS, MAY REPEAT ONCE, MAX 10MG/24HR, CALL 911 IF BREATHING PROBLEMS 6/28/17   Yes Rosenda Claros MD                 clonazePAM (KLONOPIN) 2 MG tablet Take 2 mg by mouth 2 times daily as needed for anxiety       Reported, Patient   phenytoin (DILANTIN) 100 MG CR capsule Titrate to 100 mg twice a day 5/31/17     Rosenda Claros MD   OXcarbazepine (TRILEPTAL) 300 MG tablet TAKE 3 TABLETS THREE TIMES DAILY 5/4/17     Rosenda Claros MD   gabapentin (NEURONTIN) 300 MG capsule Take 4 cap po tid 8/2/16     Rosenda Claros MD   levETIRAcetam (KEPPRA) 750 MG tablet Generic.  Take 1 tablet in am, 2 tablets at 4 pm and  2 tablet at 11 pm. 8/2/16     Rosenda Claros MD   risperiDONE (RISPERDAL) 1 MG tablet Take 1 mg by mouth 2 times daily       Reported, Patient   diazepam (DIAZEPAM INTENSOL) 5 MG/ML solution Seizures greater than 5 minutes or cluster of more than 3 seizures in 8 hour period. May repeat dose once, no more than 6 mg in 24 hour period. 2/3/14     Eric Cruz MD   FLUoxetine (PROZAC) 40 MG capsule Take 40 mg by mouth daily Started March 2013       Reported, Patient   Multiple Vitamins-Minerals (MULTIVITAMIN & MINERAL PO) Take 1 tablet by mouth daily.       Reported, Patient   OMEGA-3 FATTY ACIDS PO Take 1 tablet by mouth daily.       Reported, Patient            AED MEDICATIONS:    1. Levetiracetam, 750 mg in the morning, 1500 mg at 4 p.m., and 1500 mg at 11 p.m.   2. Oxcarbazepine  900 mg t.i.d.   3. Gabapentin 1200 mg AM, 1200 mg noon, and 1200 mg PM  (started for RLS, patient has taken higher dose for some time, not sure of length)   5. Klonopin 2 -4 mg  (started 3/2013 for anxiety, takes one per day and two on weekend)  6. Diazepam PRN for CPS lasting greater than 5 minutes or more then 2 in one hour.   7. Phenytoin ER  100-100        MEDICATION NOTES/PRIOR ANTIEPILEPTIC DRUGS:    1.Lyrica (ineffective  for seizure, max dose 1100mg per day)   2.Topamax: Two trials from 01/2008-05/2008 and the second trial on 05/19/2015.  Max dose was 200 mg per day.  CP max was 3.5 mg/L.  There was no change in seizure frequency.  The patient was compliant.  During the second trial, they tried it for mood stabilization; 50 mg was after 2 months discontinued because side effects were speech and word-finding difficulties.   3.Gabatril (ineffective, caused fatigue at 48mg/ day). One trial from 01/2004-04/2006.  Max dose 48 mg per day.  It was ineffective and was discontinued.  There was no increase in seizures when it was stopped.  Side effects were fatigue.   4.Zonegran  One trial from 12/2003.  Max dose 400 mg per day.  Zonegran was used in combination with Keppra and Trileptal.  Side effects were memory impairment, aggressiveness, fatigue, behavioral changes, cognitive impairment, which improved after Zonegran was discontinued.   5.Dilantin: One trial from ? to 03/08/2002.  Max dose 350 mg per day.  CP max and free Dilantin level of 1.9 mg/L.  Efficacy:  Unknown.  The drug was optimized.  Side effects were tiredness on 350 mg a day.     Assessment:  It looks like seizure control was better when the patient was on Dilantin; therefore, it could be retried.   6.Vimpat: One trial 07/2009-09/2009.  Max dose 400 mg per day.  CP max 5.3 mg/L.  It was discontinued after 2 months because the patient experienced dizziness, diplopia and ataxia.  7.Depakote: One trial from 01/2010 to 05/09/2016.  So far, seizures have not increased because we increased the gabapentin.  Also, the headaches have not gotten worse, but the hand tremor and the head tremor have immensely improved since Depakote was discontinued.  Max dose was 2500 mg per day.  CP max was up to 116/15.6 mg/L.  In the past, it decreased seizure frequency, and it was used together with levetiracetam, oxcarbazepine and gabapentin, and it was used before with oxcarbazepine, levetiracetam  and Lyrica.   Assessment:  Depakote could always be retried if we run into a problem.  8.lamotrigine: One trial from 07/06/2001.  Max dose 150 mg per day.  Used in combination with Dilantin.  There was no change in seizure frequency.  Side effects were increase in confusion and therefore questionable efficacy.  9.carbamazepine (tired, but no past info on details)  10.Levetiracetam: started in 2001. No major side effects. One trial from 07/2001 to present.  Max dose 3750 mg per day.  CP max 42.4 mg/L.   11. Oxcarbazepine:  One trial from 03/2002 to present.  Max dose 2700 mg per day.  CP max 43.2 mg/L.  It decreases seizures, and it looks like it is one of the best drugs the patient has tried.  increased oxcarbazepine from 2,400mg -> 2700 mg on 4/2013.   12.  Gabapentin:  One trial from 07/2012 to present.  Max dose is 3600 mg per day.  This drug was used more for pain and restless legs, but when Depakote was tapered, we increased the gabapentin, and it looks like the only problem we have is some weight gain with the gabapentin.  Gabapentin  increased 1200 mg three times a day 5/2016 with no change in seizure, however, RLS symptoms decreased  13.  Klonopin:  One trial started 03/2013.  Is used for anxiety, not for seizures.  It was started at 0.5 mg and optimized so far to 3 mg per day.     Assessment:  This drug is used by a psychiatrist.  If we one day want to try ONFI in this patient, maybe then the Klonopin could be decreased again.   14. Vagus nerve stimulator setting: Not able to tolerate higher duty cycle (35) with 1.1 signal off time and 30 sec on time, seizure worsened, not able to tolerate higher current setting due to discomfort.          REVIEW OF SYSTEMS:  Head tremor, which bothers him.  He continues to have bilateral hand tremors on Depakote.  No nausea, vomiting, diarrhea.  No rash.  Vagus nerve stimulator setting are bother him (voice changes and throat discomfort).       EXAMINATION:  There were no  vitals taken for this visit.  Alert, orientated, speech is fluent, face is symmetric, No head/ hand tremor, no focal deficits noted.Gait is stable.      ASSESSMENT:    1.    2.  Left-sided cavernous hemangioma 07/2002.   3.  Multilobulated intraaxillary mass involving the right frontal lobe, removed 08/19/2003.     4.  Status post pacemaker placement 12/2003.     5.  Left temporal AVM resection at age 11.     6.  History of migraines.   7.   and decrease in seizures after implantation.         1. Refractory multifocal epilepsy.  Etiology multiple cavernomas.  The patient is status left temporal resection of AVM in 1985 (age 11), Right frontal hematoma 04/13/2006, treated with surgery.  Left-sided cavernous hemangioma 07/2002. Status post epilepsy surgery 05/29/2012.  Vagus nerve stimulator implanted on right and he has pacemaker on left.  Electrographically, he has multifocal epileptiform discharges and seizures arising from left and right temporal regions.      Antiepileptic drug discussion: Insurance coverage for new antiepileptic drug is poor. We started phenytoin and will optimize it . Other antiepileptic drug to consider are banzel, Brivaracetam, fycompa. OR retrial with phenytoin. Patient and wife declined Responsive Neurostimulation (Neuropace) or any brain surgery today.      2. Restless legs syndrome. Stable on gabapentin .        3. Anxiety and depression.  Stable. Managed by psychiatrist (Dr. López)     4. Tremor in head continues to be a problems. His hand tremor stopped once we stopped depakote. I do not think his current antiepileptic drug will cause head tremor or seizures. Consutt Dr. Bloom for diagnosis/treatment.         PLAN:                   Medication Tablet Size             AM  (morning)   Noon  PM (Night)       Week 1   phenytoin   mg  1 tablet    1 tablet       Week 1  phenytoin  ER 30 mg      1 tablet                         Medication Tablet Size             AM  (morning)   Noon   PM (Night)       Week 2   phenytoin   mg  1 tablet    1 tablet       Week 2  phenytoin  ER 30 mg  1 tablet    1 tablet            CBD (marijuana) study declined.   Think about Responsive Neurostimulation (Neuropace) - declined   Check with insurance company about Brivaracetam, fycompa, banzel, onfi will check on this in 10/2017 with open enrollment   See Dr. Bloom for the tremor pending  Follow up 3 month           I spent 25 minutes with the patient. During this time counseling and coordination of care exceeded 50% of the visit time. I addressed all questions and concerns the patient raised in regards to his care.      REBEKAH CHRISTOPHER MD      [facility]     Patient:  Ammon Cronin  :  1975   Age:  37 year old      Patient was accompanied by his wife today.       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  First seizure was at the age of 16.  It was thought to be related to left temporal AVM which was resected at age 11.  He was born 5 weeks premature and has a right frontal cavernous hemangioma.  He has had multiple inpatient evaluations.  During his  admission, he developed chest pain, pallor, diaphoresis and nausea, was found to have a 22 second period of asystole.  Cardiac pacemaker was implanted the next day.  In retrospect, he had at least 2 other episodes of symptomatic cardiac arrhythmia prior to one that was recorded and now these symptoms have resolved with a pacemaker.  He had a VNS implanted on 2012 by Dr. Nate Carolina.  His EEGs have been notable for multifocal sharp waves in the left temporal bifrontal and right frontal regions.  He has had seizures arising from the right hemisphere and seizures arising from the left temporal lobe also, so he does have multifocal abnormalities.  His MRI of the brain shows a cavernoma in the right frontal and a second one in the left temporal lobe and  encephalomalacia.       INTERVAL HISTORY:  On 4/25/2013, I started increasing his Trileptal because we have room to optimize it.  His seizure frequency, he states, has gone down.  He has a couple days in a week where he has no seizures and this is pretty good for him.  He typically averages anywhere from  1-8 seizures per day.  In the past, I've tried to increase his Depakote, but he would have worsening tremor, so that is not a possibility.  He has been doing well on the higher dose of the Trileptal and he is tolerating this well with no significant side effects.  He has brief complex partial seizures which are described as follows.  Type 1 are complex partial seizures.  He usually mumbles and he is not responsive at that time.  These usually last 5-10 seconds.  They may last sometimes more than 5 minutes.  Sometimes he may have clusters up to 8-10 per day.  Type 2 are where he is unresponsive, he stares, he is confused, he has lip smacking movements and he may pick at objects or sort things out.  These are repetitive behaviors and these usually last minutes.  Type 3 are generalized tonic-clonic seizures.  His last one was in 3/2012.         MEDICATIONS:       1. Levetiracetam, 750 mg in the morning, 1500 mg at 4 p.m., and 1500 mg at 11 p.m.   2. Trileptal, 900 mg t.i.d.   3. Depakote 500 mg b.i.d.   4. Gabapentin 300 mg at night    5. Depakote, Trileptal 900 mg 3 times a day.   6. Prozac 40 mg per day.       MEDICATION NOTES:   Since starting Prozac, he has been less irritable, according to his wife, and he is doing significantly better in regards to his mood.  He is more active and more alert.       REVIEW OF SYSTEMS:  Does not have any side effects that they are aware of currently to his medications aside from the tremor from his Depakote.  He continues to have bilateral hand tremors on Depakote.  A dose above 4000 mg of Depakote does exacerbate his tremors and he is not able to tolerate this, so this is the  optimal dose of Depakote.  No nausea, vomiting, diarrhea.  No rash.         Vagus nerve stimulator:  Model 103/serial #[id number].  It on the right side.  The VNS was not interrogated today.  It was interrogated in 02/2013.       EXAMINATION:  /64  Pulse 67  Wt 125 lb 9.6 oz (56.972 kg)  Cardiovascular regular rate and rhythm.  Positive S1 S2.  Lungs clear to auscultation bilaterally.  Abdomen nondistended and nontender.  Normal active bowel sounds.         Mental Status and Higher Cortical Functions:  Alert and oriented to person, place, and time.  Speech fluent, with intact naming and repetition.         Cranial Nerves (II-XII):  Pupils equal, round, and reactive to light.  Extraocular movements full with no nystagmus.  Visual fields full to confrontation.  Facial sensation intact to light touch, temperature, and pin prick.  Face symmetric at rest and with activation.  Hearing intact to finger rub bilaterally.  Tongue midline and palate elevation symmetric.  Sternocleidomastoid and trapezius 5/5 bilaterally.  No dysarthria.       Motor:  Normal tone, normal bulk, and no pronator drift.  No tremors or fasciculations.  Motor strength 5/5 in upper and lower extremities.       Sensation:  Intact to light touch and temperature.  Did not extinguish on double simultaneous stimulation.       Coordination:  Normal finger-nose-finger, fine finger movements, and rapid alternating movements.  No ataxia or dysmetria.         Reflexes:  Deep tendon reflexes 2+ and symmetric throughout.  Plantar responses flexor bilaterally.         Gait:  Casual gait and stance normal.        ASSESSMENT:        1. Intractable epilepsy.  Etiology are multiple cavernomas.  The patient is status post right frontal lobe resection in 1985.  He does have a VNS.  Electrographically, he has multifocal epilepsy and seizures arising from left and right temporal regions.  The patient continues to have brief seizures and has responded favorably to an  increased dose and Trileptal and is tolerating this medication.  At this point we will optimize his Trileptal and then consider a gradual increase in gabapentin to see if this helps him.   Depakote cannot be increased anymore because he cannot tolerate it.  Higher doses have caused increased tremors. We will check his AED levels today as well as his sodium.       2. Restless legs syndrome.  He is on gabapentin for this, 300 mg at night, and we may optimize this medication if need be for additive benefits of anti-seizure effects.       3. Anxiety and depression.  He is on fluoxetine and clonazepam for this and he is doing significantly better with his fluoxetine.         4. Rescue protocol.  Patient was given a prescription for valium for seizures lasting greater than 5 minutes, or clusters of more than 3 seizures in an 8-hour period.  May repeat dose once with no more than 6 mg in a 24-hour period.       PLAN:   1.  Continue AEDs as noted above.       2.  Rescue protocol. Valium for seizure clusters.        3. Return to clinic in 2 months.         4.  Check AED levels and comprehensive metabolic panel.           I spent 40 minutes with the patient and family. During this time counseling and coordination of care exceeded 50% of the visit time. I addressed all questions and concerns the family raised in regards to the patients care.      REBEKAH CHRISTOPHER MD                D: 2013 20:00   T: 2013 08:49   MT: PIERCE       Name:     YUE OLSEN   MRN:      [id number]        Account:      [id number]   :      1975           Service Date: 2013     Again, thank you for allowing me to participate in the care of your patient.      Sincerely,    Ruben Bloom MD

## 2017-10-06 NOTE — MR AVS SNAPSHOT
After Visit Summary   10/6/2017    Ammon Cronin    MRN: [id number]           Patient Information     Date Of Birth          1975        Visit Information        Provider Department      10/6/2017 11:30 AM Robert Freitas GC [facility] Neurology        Today's Diagnoses     Tremor        Hearing loss, unspecified hearing loss type, unspecified laterality        Cavernoma        Musculoskeletal symptoms referable to limbs        Seizures (H)           Follow-ups after your visit        Your next 10 appointments already scheduled     Oct 18, 2017 11:30 AM CDT   Return Visit with Rosenda Claros MD   [facility] ([facility])    [address], [address]  [facility] [address] [phone]              Who to contact     Please call your clinic at [phone] to:    Ask questions about your health    Make or cancel appointments    Discuss your medicines    Learn about your test results    Speak to your doctor   If you have compliments or concerns about an experience at your clinic, or if you wish to file a complaint, please contact [facility] Patient Relations at [phone] or email us at [email or web address]         Additional Information About Your Visit        MyChart Information     AdexLinkt gives you secure access to your electronic health record. If you see a primary care provider, you can also send messages to your care team and make appointments. If you have questions, please call your primary care clinic.  If you do not have a primary care provider, please call [phone] and they will assist you.      Konokopia is an electronic gateway that provides easy, online access to your medical records. With Konokopia, you can request a clinic appointment, read your test results, renew a prescription or communicate with your care team.     To access your existing account, please contact your [facility]  "or call [phone] for assistance.        Care EveryWhere ID     This is your Care EveryWhere ID. This could be used by other organizations to access your [place] medical records  [id number]        Your Vitals Were     Pulse Height Pulse Oximetry BMI (Body Mass Index)          65 1.689 m (5' 6.5\") 96% 23.37 kg/m2         Blood Pressure from Last 3 Encounters:   10/06/17 119/73   10/06/17 119/73   05/31/17 109/65    Weight from Last 3 Encounters:   10/06/17 66.7 kg (147 lb)   10/06/17 67 kg (147 lb 9.6 oz)   05/31/17 66.2 kg (146 lb)              Today, you had the following     No orders found for display       Primary Care Provider    None Specified       No primary provider on file.        Equal Access to Services     [facility]: Hadii yaa Noriega, sandro laureano, feliberto colemanalterri serrano, terra palacios . So [facility] [phone].    ATENCIÓN: Si habla español, tiene a correa disposición servicios gratuitos de asistencia lingüística. Yolanda al [phone].    We comply with applicable federal civil rights laws and [place] laws. We do not discriminate on the basis of race, color, national origin, age, disability, sex, sexual orientation, or gender identity.            Thank you!     Thank you for choosing [facility]UROLOG[facility]  for your care. Our goal is always to provide you with excellent care. Hearing back from our patients is one way we can continue to improve our services. Please take a few minutes to complete the written survey that you may receive in the mail after your visit with us. Thank you!             Your Updated Medication List - Protect others around you: Learn how to safely use, store and throw away your medicines at www.disposemymeds.org.          This list is accurate as of: 10/6/17 12:40 PM.  Always use your most recent med list.                   Brand Name Dispense Instructions for use Diagnosis    clonazePAM 2 MG tablet    klonoPIN     Take 2 mg by " mouth 2 times daily as needed for anxiety    Partial epilepsy with impairment of consciousness, intractable (H), Tremor       * diazepam 5 MG/ML (HIGH CONC) solution    DIAZEPAM INTENSOL    30 mL    Seizures greater than 5 minutes or cluster of more than 3 seizures in 8 hour period. May repeat dose once, no more than 6 mg in 24 hour period.    Unspecified epilepsy with intractable epilepsy       * diazepam 5 MG tablet    VALIUM    30 tablet    TAKE 1 TAB AS NEEDED FOR SEIZURES >5MIN OR >3 SEIZURES/8HRS, MAY REPEAT ONCE, MAX 10MG/24HR, CALL 911 IF BREATHING PROBLEMS    Restless leg syndrome, Partial epilepsy with impairment of consciousness, intractable (H)       gabapentin 300 MG capsule    NEURONTIN    1080 capsule    Take 4 cap po tid    Restless legs syndrome, Partial epilepsy with impairment of consciousness, intractable (H), Long term use of drug, Restless leg syndrome       levETIRAcetam 750 MG tablet    KEPPRA    450 tablet    Generic.  Take 1 tablet in am, 2 tablets at 4 pm and  2 tablet at 11 pm.    Restless legs syndrome, Partial epilepsy with impairment of consciousness, intractable (H), Long term use of drug, Restless leg syndrome       MULTIVITAMIN & MINERAL PO      Take 1 tablet by mouth daily.        OMEGA-3 FATTY ACIDS PO      Take 1 tablet by mouth daily.        OXcarbazepine 300 MG tablet    TRILEPTAL    810 tablet    TAKE 3 TABLETS THREE TIMES DAILY (KEEP APPT 5/31/17)    Partial epilepsy with loss of consciousness, intractable (H)       * phenytoin 30 MG CR capsule    DILANTIN    60 capsule    Titrate to 30 mg twice a day (take along with 100 mg twice a day)    Restless leg syndrome, Partial epilepsy with impairment of consciousness, intractable (H)       * phenytoin 100 MG CR capsule    DILANTIN    180 capsule    TITRATE TO 1 CAPSULE TWICE A DAY    Partial epilepsy with impairment of consciousness, intractable (H), Tremor       PROZAC 40 MG capsule   Generic drug:  FLUoxetine      Take 40 mg by  mouth daily Started March 2013        risperiDONE 1 MG tablet    risperDAL     Take 1 mg by mouth 2 times daily    Partial epilepsy with impairment of consciousness, intractable (H), Restless legs syndrome, Long term use of drug       * Notice:  This list has 4 medication(s) that are the same as other medications prescribed for you. Read the directions carefully, and ask your doctor or other care provider to review them with you.

## 2017-10-06 NOTE — PATIENT INSTRUCTIONS
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   Vagal nerve stimulator  Prn valium  Keppra/levetiracetam  Oxcarbazepine/trileptal  Phenytoin/dilantin    Restless legs syndrome. On gabapentin .        Anxiety and depression.  Managed by psychiatrist (Dr. López)  clonazepam  Fluoxetine    Consideration for genetics consultation   Cavernoma/avM  Epilepsy - presumably from above  Incomplete extension of the arms  History of hearing loss  Cardiac abnormality - has pacemaker  Daughter has elbow joint deformities  Consider connective tissue disorder vs mt disorders, etc.     Review of his risperidone with psychiatry as this medication may be playing a role in his parkinsonian features, albeit subtle with reduced right arm swing but he also has tremor that may be aggravated by this medication. Psychiatry could consider other medications such as lamotrigine or topiramate which are not likely to cause tremor and may help mood but may want to get epilepsy's input on this. His head tremor may precede the use of risperidone    At this time not sure botox will be covered or/and help his head tremor.    I dont have an easy answer for tremor medication at this time, ie not sure would go down the propranolol, primidone pathway unless indicated by epilepsy service and if psychiatry is on board for these medications and their associated mood related side effects. topiramate could be considered but carries a risk of cognitive changes and renal stones    Return as needed.       Ruben Bloom MD

## 2017-10-13 ENCOUNTER — TELEPHONE (OUTPATIENT)
Dept: NEUROLOGY | Facility: CLINIC | Age: 42
End: 2017-10-13

## 2017-10-13 NOTE — TELEPHONE ENCOUNTER
Pt's wife calls to the clinic requesting historical information regarding patient's use of lamotrigine and topiramate. She states that another provider is looking to replace risperidone with a new medication and asked that she find out why pt did not tolerate lamictal and topiramate. Writer referenced Dr. Claros's note on 6/28/17 for information. Lamotrigine was discontinued due to speech difficulties and topiramate was discontinued due to confusion. Pt wife states she has no further questions.

## 2017-10-18 ENCOUNTER — OFFICE VISIT (OUTPATIENT)
Dept: NEUROLOGY | Facility: CLINIC | Age: 42
End: 2017-10-18

## 2017-10-18 VITALS
BODY MASS INDEX: 22.91 KG/M2 | SYSTOLIC BLOOD PRESSURE: 129 MMHG | WEIGHT: 146 LBS | DIASTOLIC BLOOD PRESSURE: 70 MMHG | HEART RATE: 62 BPM | HEIGHT: 67 IN

## 2017-10-18 DIAGNOSIS — G40.219 PARTIAL EPILEPSY WITH IMPAIRMENT OF CONSCIOUSNESS, INTRACTABLE (H): Primary | ICD-10-CM

## 2017-10-18 NOTE — MR AVS SNAPSHOT
After Visit Summary   10/18/2017    Ammon Cronin    MRN: [id number]           Patient Information     Date Of Birth          1975        Visit Information        Provider Department      10/18/2017 11:30 AM Rosenda Claros MD [facility]        Care Instructions        Evaluation needed for epilepsy brain surgery :  1. These will be separate hospital admission. First hospital admission is scalp EEG electrodes to localize seizure onset. Second hospital admission in the intensive care unit with intracranial EEG (brain surgery is completed to place EEG electrodes inside the brain). Possible third hospital admission for actual brain surgery or device implantation.   2. CT of Head as needed   3. PET of brain scan with video EEG (outpatient procedure)   4. Neuro psychiatry test (outpatient procedure)   5. WADA test (outpatient procedure)   6. Nurse education to review hospitalization, surgery process, and answer questions about the process  7. Doctor Case Management Conference to determine plan of care for epilepsy surgery   5. If we decide to proceed with invasive brain EEG monitoring, patient will need epilepsy surgery nurse education and visit with Dr. Mathis (neurosurgeon)    6. Schedule hospital admission with invasive EEG monitoring in the intensive care unit. At this time we need to record multiple seizure to define where your seizure onset zone.   7. A second Doctor Case Management Conference to determine plan of care for epilepsy surgery   8. Medical clearance and psychiatry clearance          You may review this website to learn about a patient's experience with Responsive Neurostimulation (Neuropace). Http://www.pacingmybrain.com and neuropace.com      CBD (marijuana) study will talk to Dr. Cedeno   Think about Responsive Neurostimulation (Neuropace) - declined at this time  Check with [facility] patient saving program for Brivaracetam    Rosenda ADAMS. MD [place]               Follow-ups after your  "visit        Follow-up notes from your care team     Return in about 4 months (around 2018).      Your next 10 appointments already scheduled     2018 10:30 AM CST   Return Visit with Rosenda Claros MD   [facility] ([facility])    [address], [address]  [facility] [id number] [phone]              Who to contact     Please call your clinic at [phone] to:    Ask questions about your health    Make or cancel appointments    Discuss your medicines    Learn about your test results    Speak to your doctor   If you have compliments or concerns about an experience at your clinic, or if you wish to file a complaint, please contact [facility] Patient Relations at [phone] or email us at [email or web address]         Additional Information About Your Visit        MyChart Information     [employer] is an electronic gateway that provides easy, online access to your medical records. With [employer], you can request a clinic appointment, read your test results, renew a prescription or communicate with your care team.     To sign up for Nez[employer] visit the website at [email or web address]   You will be asked to enter the access code listed below, as well as some personal information. Please follow the directions to create your username and password.     Your access code is: [email or web address]  Expires: 2018 12:22 PM     Your access code will  in 90 days. If you need help or a new code, please contact your [facility] or call [phone] for assistance.        Care EveryWhere ID     This is your Care EveryWhere ID. This could be used by other organizations to access your [place] medical records  [id number]        Your Vitals Were     Pulse Height BMI (Body Mass Index)             62 5' 6.5\" (168.9 cm) 23.21 kg/m2          Blood Pressure from Last 3 Encounters:   10/18/17 129/70   10/06/17 119/73 "   10/06/17 119/73    Weight from Last 3 Encounters:   10/18/17 146 lb (66.2 kg)   10/06/17 147 lb (66.7 kg)   10/06/17 147 lb 9.6 oz (67 kg)              Today, you had the following     No orders found for display       Primary Care Provider    None Specified       No primary provider on file.        Equal Access to Services     [facility]: Hadii yaa ku hadasho Soomaali, waaxda luqadaha, qaybta kaalmada nikkida, terra mcgeebrettsachin palacios . So [facility] [phone].    ATENCIÓN: Si habla español, tiene a correa disposición servicios gratuitos de asistencia lingüística. Llame al [phone].    We comply with applicable federal civil rights laws and [place] laws. We do not discriminate on the basis of race, color, national origin, age, disability, sex, sexual orientation, or gender identity.            Thank you!     Thank you for choosing [facility]  for your care. Our goal is always to provide you with excellent care. Hearing back from our patients is one way we can continue to improve our services. Please take a few minutes to complete the written survey that you may receive in the mail after your visit with us. Thank you!             Your Updated Medication List - Protect others around you: Learn how to safely use, store and throw away your medicines at www.disposemymeds.org.          This list is accurate as of: 10/18/17 12:22 PM.  Always use your most recent med list.                   Brand Name Dispense Instructions for use Diagnosis    clonazePAM 2 MG tablet    klonoPIN     Take 2 mg by mouth 2 times daily as needed for anxiety    Partial epilepsy with impairment of consciousness, intractable (H), Tremor       * diazepam 5 MG/ML (HIGH CONC) solution    DIAZEPAM INTENSOL    30 mL    Seizures greater than 5 minutes or cluster of more than 3 seizures in 8 hour period. May repeat dose once, no more than 6 mg in 24 hour period.    Unspecified epilepsy with intractable epilepsy       *  diazepam 5 MG tablet    VALIUM    30 tablet    TAKE 1 TAB AS NEEDED FOR SEIZURES >5MIN OR >3 SEIZURES/8HRS, MAY REPEAT ONCE, MAX 10MG/24HR, CALL 911 IF BREATHING PROBLEMS    Restless leg syndrome, Partial epilepsy with impairment of consciousness, intractable (H)       gabapentin 300 MG capsule    NEURONTIN    1080 capsule    Take 4 cap po tid    Restless legs syndrome, Partial epilepsy with impairment of consciousness, intractable (H), Long term use of drug, Restless leg syndrome       levETIRAcetam 750 MG tablet    KEPPRA    450 tablet    Generic.  Take 1 tablet in am, 2 tablets at 4 pm and  2 tablet at 11 pm.    Restless legs syndrome, Partial epilepsy with impairment of consciousness, intractable (H), Long term use of drug, Restless leg syndrome       MULTIVITAMIN & MINERAL PO      Take 1 tablet by mouth daily.        OMEGA-3 FATTY ACIDS PO      Take 1 tablet by mouth daily.        OXcarbazepine 300 MG tablet    TRILEPTAL    810 tablet    TAKE 3 TABLETS THREE TIMES DAILY (KEEP APPT 5/31/17)    Partial epilepsy with loss of consciousness, intractable (H)       phenytoin 100 MG CR capsule    DILANTIN    180 capsule    TITRATE TO 1 CAPSULE TWICE A DAY    Partial epilepsy with impairment of consciousness, intractable (H), Tremor       PROZAC 40 MG capsule   Generic drug:  FLUoxetine      Take 40 mg by mouth daily Started March 2013        risperiDONE 1 MG tablet    risperDAL     Take 1 mg by mouth 2 times daily Pt wife says they are Tapering off    Partial epilepsy with impairment of consciousness, intractable (H), Restless legs syndrome, Long term use of drug       * Notice:  This list has 2 medication(s) that are the same as other medications prescribed for you. Read the directions carefully, and ask your doctor or other care provider to review them with you.

## 2017-10-18 NOTE — LETTER
"10/18/2017       RE: Ammon Cronin  : 1975   MRN: [id number]      Dear Colleague,    Thank you for referring your patient, Ammon Cronin, to the [facility] EPILEPSY CARE at [facility]. Please see a copy of my visit note below.    [facility] Note    Patient:  Ammon Cronin  :  1975   Age:  42 year old   Today's Office Visit:  10/18/2017    Epilepsy Data:  Patient History  Primary Epileptologist/Provider: Rosenda Claros M.D.  Epilepsy Syndrome: Localization-related epilepsy unspecified  Epilepsy Syndrome Status: Final  Age of Onset: 16  Etiology  : Tumor  Other Relevant Dx/ Issues: Etiology likely cavernous hemangioma; acute hemorrhage 2005.  Born 5 weeks premature. Left temporal AVM with resection age 11.  Cardiac pacemaker  (bradycardia).  Hx of migraines.  Inpatient evaluations 3/02, ,  & .     Tests/Surgery History  Last EE2013  Last Neuropsych Testin2009  Last Case Management Conference: 3/21/2012  Epilepsy Surgery #1 Date: 11  Epilepsy Related Surgery #1 : Type: VNS implantation on RIGHT.  Seizure Record  Current Visit Date: 10/18/17  Previous Visit Date: 17  Months since last visit: 4.6  Seizure Type 1: Simple partial seizures unspecified  Description of Sz Type 1: \"hard to describe, feeling in the head, somthing is going to happen\"   Seizure Type 2: Complex partial seizures unspecified  Description of Sz Type 2: \"Brief\" ones consist of mumbling and not being very repsonsive at that time; duration 5-10 seconds.  \"Bigger\" ones consist of unresponsiveness, staring, confusion, lip smacking movements, may pick at objects or sort things, repetitive behavior.  Lasts few minutes.  # of Type 2 Seizure since last visit: 128  Freq. Type 2 / Month: 27.83  Seizure Type 3: Partial seizures with secondary generalization  -  with complex partial seizures evolving to generalized seizures  Description of Sz " "Type 3: 0    SEIZURE HISTORY:   Copied forward:  First seizure was at the age of 16 (loss of awarenes). He has a significant past medical history of left temporal AVM that was resected at age 11 and 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   Presurgically workup in 2002 revealed bilateral independent seizure foci in the temporal lobes. Ictal asystole (2003, he developed chest pain, pallor, diaphoresis and nausea, was found to have a 22 second period of asystole.  Emergent cardiac pacemaker placed). MRI of the brain shows a cavernoma in the right frontal and a second one in the left temporal lobe and encephalomalacia.      INTERVAL HISTORY:  Came with Jasmyne. Seizure frequency has worsened to 5-7 complex partial seizure per day with seizure lasting up to 15 minutes despite getting rescue medication. On our last visit we tired to increase phenytoin from 200 mg per day to 230 mg per day, he was not able to tolerate this. He had dizziness, unstable gait, and \"tripped on his feet\". They saw Dr. Bloom and were advised to decrease Risperdal because it is worsening tremor. Checked with insurance company about Brivaracetam, fycompa, banzel, onfi and these medication are too expensive.  Since seizure worsened a lot I asked them to get CT of head to rule out bleed and they declined. He has no headache, no new neuro deficit, no nausea.     Overall, he continues to have high seizure burden, poor memory, agitation/agression/mood changes. He continues to average 5 seizure per day and his seizure.  Currently, on antiepileptic drug there is fatigue, no double vision, no mood changes, no nausea, no vomiting, no abdominal pain, no rashes. No recent ER visits and no hospitalizations since last visit.     Prior to Admission medications    Medication Sig Start Date End Date Taking? Authorizing Provider   diazepam (VALIUM) 5 MG tablet TAKE 1 TAB AS NEEDED FOR " SEIZURES >5MIN OR >3 SEIZURES/8HRS, MAY REPEAT ONCE, MAX 10MG/24HR, CALL 911 IF BREATHING PROBLEMS 6/28/17  Yes Rosenda Claros MD           clonazePAM (KLONOPIN) 2 MG tablet Take 2 mg by mouth 2 times daily as needed for anxiety    Reported, Patient   phenytoin (DILANTIN) 100 MG CR capsule Titrate to 100 mg twice a day 5/31/17   Rosenda Claros MD   OXcarbazepine (TRILEPTAL) 300 MG tablet TAKE 3 TABLETS THREE TIMES DAILY 5/4/17   Rosenda Claros MD   gabapentin (NEURONTIN) 300 MG capsule Take 4 cap po tid 8/2/16   Rosenda Claros MD   levETIRAcetam (KEPPRA) 750 MG tablet Generic.  Take 1 tablet in am, 2 tablets at 4 pm and  2 tablet at 11 pm. 8/2/16   Rosenda Claros MD   risperiDONE (RISPERDAL) 1 MG tablet Take 1 mg by mouth 2 times daily    Reported, Patient   diazepam (DIAZEPAM INTENSOL) 5 MG/ML solution Seizures greater than 5 minutes or cluster of more than 3 seizures in 8 hour period. May repeat dose once, no more than 6 mg in 24 hour period. 2/3/14   Eric Cruz MD   FLUoxetine (PROZAC) 40 MG capsule Take 40 mg by mouth daily Started March 2013    Reported, Patient   Multiple Vitamins-Minerals (MULTIVITAMIN & MINERAL PO) Take 1 tablet by mouth daily.    Reported, Patient   OMEGA-3 FATTY ACIDS PO Take 1 tablet by mouth daily.    Reported, Patient         AED MEDICATIONS:    1. Levetiracetam, 750 mg in the morning, 1500 mg at 4 p.m., and 1500 mg at 11 p.m.   2. Oxcarbazepine  900 mg t.i.d.   3. Gabapentin 1200 mg AM, 1200 mg noon, and 1200 mg PM  (started for RLS, patient has taken higher dose for some time, not sure of length)   5. Klonopin 2 -4 mg  (started 3/2013 for anxiety, takes one per day and two on weekend)  6. Diazepam PRN for CPS lasting greater than 5 minutes or more then 2 in one hour.   7. Phenytoin ER  100-100      MEDICATION NOTES/PRIOR ANTIEPILEPTIC DRUGS:    1.Lyrica (ineffective for seizure, max dose 1100mg per day)   2.Topamax: Two trials from 01/2008-05/2008 and the second trial  on 05/19/2015.  Max dose was 200 mg per day.  CP max was 3.5 mg/L.  There was no change in seizure frequency.  The patient was compliant.  During the second trial, they tried it for mood stabilization; 50 mg was after 2 months discontinued because side effects were speech and word-finding difficulties.   3.Gabatril (ineffective, caused fatigue at 48mg/ day). One trial from 01/2004-04/2006.  Max dose 48 mg per day.  It was ineffective and was discontinued.  There was no increase in seizures when it was stopped.  Side effects were fatigue.   4.Zonegran  One trial from 12/2003.  Max dose 400 mg per day.  Zonegran was used in combination with Keppra and Trileptal.  Side effects were memory impairment, aggressiveness, fatigue, behavioral changes, cognitive impairment, which improved after Zonegran was discontinued.   5.Dilantin: One trial from ? to 03/08/2002.  Max dose 350 mg per day.  CP max and free Dilantin level of 1.9 mg/L.  Efficacy:  Unknown.  The drug was optimized.  Side effects were tiredness on 350 mg a day.     Assessment:  It looks like seizure control was better when the patient was on Dilantin; therefore, it could be retried.   6.Vimpat: One trial 07/2009-09/2009.  Max dose 400 mg per day.  CP max 5.3 mg/L.  It was discontinued after 2 months because the patient experienced dizziness, diplopia and ataxia.  7.Depakote: One trial from 01/2010 to 05/09/2016.  So far, seizures have not increased because we increased the gabapentin.  Also, the headaches have not gotten worse, but the hand tremor and the head tremor have immensely improved since Depakote was discontinued.  Max dose was 2500 mg per day.  CP max was up to 116/15.6 mg/L.  In the past, it decreased seizure frequency, and it was used together with levetiracetam, oxcarbazepine and gabapentin, and it was used before with oxcarbazepine, levetiracetam and Lyrica.   Assessment:  Depakote could always be retried if we run into a problem.  8.lamotrigine:  "One trial from 07/06/2001.  Max dose 150 mg per day.  Used in combination with Dilantin.  There was no change in seizure frequency.  Side effects were increase in confusion and therefore questionable efficacy.  9.carbamazepine (tired, but no past info on details)  10.Levetiracetam: started in 2001. No major side effects. One trial from 07/2001 to present.  Max dose 3750 mg per day.  CP max 42.4 mg/L.   11. Oxcarbazepine:  One trial from 03/2002 to present.  Max dose 2700 mg per day.  CP max 43.2 mg/L.  It decreases seizures, and it looks like it is one of the best drugs the patient has tried.  increased oxcarbazepine from 2,400mg -> 2700 mg on 4/2013.   12.  Gabapentin:  One trial from 07/2012 to present.  Max dose is 3600 mg per day.  This drug was used more for pain and restless legs, but when Depakote was tapered, we increased the gabapentin, and it looks like the only problem we have is some weight gain with the gabapentin.  Gabapentin  increased 1200 mg three times a day 5/2016 with no change in seizure, however, RLS symptoms decreased  13.  Klonopin:  One trial started 03/2013.  Is used for anxiety, not for seizures.  It was started at 0.5 mg and optimized so far to 3 mg per day.     Assessment:  This drug is used by a psychiatrist.  If we one day want to try ONFI in this patient, maybe then the Klonopin could be decreased again.   14. Vagus nerve stimulator setting: Not able to tolerate higher duty cycle (35) with 1.1 signal off time and 30 sec on time, seizure worsened, not able to tolerate higher current setting due to discomfort.        REVIEW OF SYSTEMS:  Head tremor, which bothers him.  He continues to have bilateral hand tremors on Depakote.  No nausea, vomiting, diarrhea.  No rash.  Vagus nerve stimulator setting are bother him (voice changes and throat discomfort).      EXAMINATION:  /70 (BP Location: Right arm, Patient Position: Chair, Cuff Size: Adult Regular)  Pulse 62  Ht 5' 6.5\" (168.9 " cm)  Wt 146 lb (66.2 kg)  BMI 23.21 kg/m2  Alert, orientated, speech is fluent, face is symmetric, No head/ hand tremor, no focal deficits noted.Gait is stable.     ASSESSMENT:    1. 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 Cavernoma intracranial   3. Memory deficits secondary to seizures and antiepileptic drugs    4. Anxiety/Depression  5.  Restless legs syndrome.  6. History of Migraines  7. Status post pacemaker placement 12/2003.       1. Refractory multifocal epilepsy.  Etiology multiple cavernomas.  The patient is status left temporal resection of AVM in 1985 (age 11), Right frontal hematoma 04/13/2006, treated with surgery.  Left-sided cavernous hemangioma 07/2002. Status post epilepsy surgery 05/29/2012.  Vagus nerve stimulator implanted on right and he has pacemaker on left.  Electrographically, he has multifocal epileptiform discharges and seizures arising from left and right temporal regions.     Antiepileptic drug discussion: Insurance coverage for new antiepileptic drug is not adequate. He is not able to tolerate higher doses of oxcarbazepine or phenytoin.  Levetiracetam is optimized. Other antiepileptic drug to consider are banzel, Brivaracetam, fycompa, but, insurance coverage is not good and they are not able to afford these medications. Retrial with lamotrigine (tremos will worsen), Patient and wife declined Responsive Neurostimulation (Neuropace) or any brain surgery today. At this point, we have unfortunately exhausted our treatment options.     2. Restless legs syndrome. Stable on gabapentin .       3. Anxiety and depression.  Managed by psychiatrist (Dr. López). Risperdal was decreased to decrease tremors. They will work with psychiatrist to consider new agents.     4. Tremor in head improved with lower dose of risperdal. His hand tremor stopped once we stopped depakote. I do not think his current antiepileptic drug will cause head tremor or seizures. They saw Dr. Bloom for  diagnosis/treatment.            PLAN:   CBD (marijuana) study will talk to Dr. Cedeno   Think about Responsive Neurostimulation (Neuropace) - declined at this time  Check with [employer] patient saving program for Brivaracetam  CT of Head recommended, but, they declined at this time  Follow up 3 month        I spent 35 minutes with the patient. During this time counseling and coordination of care exceeded 50% of the visit time. I addressed all questions and concerns the patient raised in regards to his care.     REBEKAH CHRISTOPHER MD

## 2017-10-18 NOTE — PATIENT INSTRUCTIONS
Evaluation needed for epilepsy brain surgery :  1. These will be separate hospital admission. First hospital admission is scalp EEG electrodes to localize seizure onset. Second hospital admission in the intensive care unit with intracranial EEG (brain surgery is completed to place EEG electrodes inside the brain). Possible third hospital admission for actual brain surgery or device implantation.   2. CT of Head as needed   3. PET of brain scan with video EEG (outpatient procedure)   4. Neuro psychiatry test (outpatient procedure)   5. WADA test (outpatient procedure)   6. Nurse education to review hospitalization, surgery process, and answer questions about the process  7. Doctor Case Management Conference to determine plan of care for epilepsy surgery   5. If we decide to proceed with invasive brain EEG monitoring, patient will need epilepsy surgery nurse education and visit with Dr. Mathis (neurosurgeon)    6. Schedule hospital admission with invasive EEG monitoring in the intensive care unit. At this time we need to record multiple seizure to define where your seizure onset zone.   7. A second Doctor Case Management Conference to determine plan of care for epilepsy surgery   8. Medical clearance and psychiatry clearance          You may review this website to learn about a patient's experience with Responsive Neurostimulation (Neuropace). [email or web address] and neuropace.com      CBD (marijuana) study will talk to Dr. Cedeno   Think about Responsive Neurostimulation (Neuropace) - declined at this time  Check with [employer] patient saving program for Brivaracetam    Rosenda Claros MD

## 2017-10-18 NOTE — PROGRESS NOTES
"[facility] Progress Note    Patient:  Ammon Cronin  :  1975   Age:  42 year old   Today's Office Visit:  10/18/2017    Epilepsy Data:  Patient History  Primary Epileptologist/Provider: Rosenda Claros M.D.  Epilepsy Syndrome: Localization-related epilepsy unspecified  Epilepsy Syndrome Status: Final  Age of Onset: 16  Etiology  : Tumor  Other Relevant Dx/ Issues: Etiology likely cavernous hemangioma; acute hemorrhage 2005.  Born 5 weeks premature. Left temporal AVM with resection age 11.  Cardiac pacemaker  (bradycardia).  Hx of migraines.  Inpatient evaluations 3/02, ,  & .     Tests/Surgery History  Last EE2013  Last Neuropsych Testin2009  Last Case Management Conference: 3/21/2012  Epilepsy Surgery #1 Date: 11  Epilepsy Related Surgery #1 : Type: VNS implantation on RIGHT.  Seizure Record  Current Visit Date: 10/18/17  Previous Visit Date: 17  Months since last visit: 4.6  Seizure Type 1: Simple partial seizures unspecified  Description of Sz Type 1: \"hard to describe, feeling in the head, somthing is going to happen\"   Seizure Type 2: Complex partial seizures unspecified  Description of Sz Type 2: \"Brief\" ones consist of mumbling and not being very repsonsive at that time; duration 5-10 seconds.  \"Bigger\" ones consist of unresponsiveness, staring, confusion, lip smacking movements, may pick at objects or sort things, repetitive behavior.  Lasts few minutes.  # of Type 2 Seizure since last visit: 128  Freq. Type 2 / Month: 27.83  Seizure Type 3: Partial seizures with secondary generalization  -  with complex partial seizures evolving to generalized seizures  Description of Sz Type 3: 0    SEIZURE HISTORY:   Copied forward:  First seizure was at the age of 16 (loss of awarenes). 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 Patient has intractable " "localization-related epilepsy with multiple seizure foci.   Presurgically workup in 2002 revealed bilateral independent seizure foci in the temporal lobes. Ictal asystole (2003, he developed chest pain, pallor, diaphoresis and nausea, was found to have a 22 second period of asystole.  Emergent cardiac pacemaker placed). MRI of the brain shows a cavernoma in the right frontal and a second one in the left temporal lobe and encephalomalacia.      INTERVAL HISTORY:  Came with Jasmyne. Seizure frequency has worsened to 5-7 complex partial seizure per day with seizure lasting up to 15 minutes despite getting rescue medication. On our last visit we tired to increase phenytoin from 200 mg per day to 230 mg per day, he was not able to tolerate this. He had dizziness, unstable gait, and \"tripped on his feet\". They saw Dr. Bloom and were advised to decrease Risperdal because it is worsening tremor. Checked with insurance company about Brivaracetam, fycompa, banzel, onfi and these medication are too expensive.  Since seizure worsened a lot I asked them to get CT of head to rule out bleed and they declined. He has no headache, no new neuro deficit, no nausea.     Overall, he continues to have high seizure burden, poor memory, agitation/agression/mood changes. He continues to average 5 seizure per day and his seizure.  Currently, on antiepileptic drug there is fatigue, no double vision, no mood changes, no nausea, no vomiting, no abdominal pain, no rashes. No recent ER visits and no hospitalizations since last visit.     Prior to Admission medications    Medication Sig Start Date End Date Taking? Authorizing Provider   diazepam (VALIUM) 5 MG tablet TAKE 1 TAB AS NEEDED FOR SEIZURES >5MIN OR >3 SEIZURES/8HRS, MAY REPEAT ONCE, MAX 10MG/24HR, CALL 911 IF BREATHING PROBLEMS 6/28/17  Yes Rosenda Claros MD           clonazePAM (KLONOPIN) 2 MG tablet Take 2 mg by mouth 2 times daily as needed for anxiety    Reported, Patient   phenytoin " (DILANTIN) 100 MG CR capsule Titrate to 100 mg twice a day 5/31/17   Rosenda Claros MD   OXcarbazepine (TRILEPTAL) 300 MG tablet TAKE 3 TABLETS THREE TIMES DAILY 5/4/17   Rosenda Claros MD   gabapentin (NEURONTIN) 300 MG capsule Take 4 cap po tid 8/2/16   Rosenda Claros MD   levETIRAcetam (KEPPRA) 750 MG tablet Generic.  Take 1 tablet in am, 2 tablets at 4 pm and  2 tablet at 11 pm. 8/2/16   Rosenda Claros MD   risperiDONE (RISPERDAL) 1 MG tablet Take 1 mg by mouth 2 times daily    Reported, Patient   diazepam (DIAZEPAM INTENSOL) 5 MG/ML solution Seizures greater than 5 minutes or cluster of more than 3 seizures in 8 hour period. May repeat dose once, no more than 6 mg in 24 hour period. 2/3/14   Eric Cruz MD   FLUoxetine (PROZAC) 40 MG capsule Take 40 mg by mouth daily Started March 2013    Reported, Patient   Multiple Vitamins-Minerals (MULTIVITAMIN & MINERAL PO) Take 1 tablet by mouth daily.    Reported, Patient   OMEGA-3 FATTY ACIDS PO Take 1 tablet by mouth daily.    Reported, Patient         AED MEDICATIONS:    1. Levetiracetam, 750 mg in the morning, 1500 mg at 4 p.m., and 1500 mg at 11 p.m.   2. Oxcarbazepine  900 mg t.i.d.   3. Gabapentin 1200 mg AM, 1200 mg noon, and 1200 mg PM  (started for RLS, patient has taken higher dose for some time, not sure of length)   5. Klonopin 2 -4 mg  (started 3/2013 for anxiety, takes one per day and two on weekend)  6. Diazepam PRN for CPS lasting greater than 5 minutes or more then 2 in one hour.   7. Phenytoin ER  100-100      MEDICATION NOTES/PRIOR ANTIEPILEPTIC DRUGS:    1.Lyrica (ineffective for seizure, max dose 1100mg per day)   2.Topamax: Two trials from 01/2008-05/2008 and the second trial on 05/19/2015.  Max dose was 200 mg per day.  CP max was 3.5 mg/L.  There was no change in seizure frequency.  The patient was compliant.  During the second trial, they tried it for mood stabilization; 50 mg was after 2 months discontinued because side effects  were speech and word-finding difficulties.   3.Gabatril (ineffective, caused fatigue at 48mg/ day). One trial from 01/2004-04/2006.  Max dose 48 mg per day.  It was ineffective and was discontinued.  There was no increase in seizures when it was stopped.  Side effects were fatigue.   4.Zonegran  One trial from 12/2003.  Max dose 400 mg per day.  Zonegran was used in combination with Keppra and Trileptal.  Side effects were memory impairment, aggressiveness, fatigue, behavioral changes, cognitive impairment, which improved after Zonegran was discontinued.   5.Dilantin: One trial from ? to 03/08/2002.  Max dose 350 mg per day.  CP max and free Dilantin level of 1.9 mg/L.  Efficacy:  Unknown.  The drug was optimized.  Side effects were tiredness on 350 mg a day.     Assessment:  It looks like seizure control was better when the patient was on Dilantin; therefore, it could be retried.   6.Vimpat: One trial 07/2009-09/2009.  Max dose 400 mg per day.  CP max 5.3 mg/L.  It was discontinued after 2 months because the patient experienced dizziness, diplopia and ataxia.  7.Depakote: One trial from 01/2010 to 05/09/2016.  So far, seizures have not increased because we increased the gabapentin.  Also, the headaches have not gotten worse, but the hand tremor and the head tremor have immensely improved since Depakote was discontinued.  Max dose was 2500 mg per day.  CP max was up to 116/15.6 mg/L.  In the past, it decreased seizure frequency, and it was used together with levetiracetam, oxcarbazepine and gabapentin, and it was used before with oxcarbazepine, levetiracetam and Lyrica.   Assessment:  Depakote could always be retried if we run into a problem.  8.lamotrigine: One trial from 07/06/2001.  Max dose 150 mg per day.  Used in combination with Dilantin.  There was no change in seizure frequency.  Side effects were increase in confusion and therefore questionable efficacy.  9.carbamazepine (tired, but no past info on  "details)  10.Levetiracetam: started in 2001. No major side effects. One trial from 07/2001 to present.  Max dose 3750 mg per day.  CP max 42.4 mg/L.   11. Oxcarbazepine:  One trial from 03/2002 to present.  Max dose 2700 mg per day.  CP max 43.2 mg/L.  It decreases seizures, and it looks like it is one of the best drugs the patient has tried.  increased oxcarbazepine from 2,400mg -> 2700 mg on 4/2013.   12.  Gabapentin:  One trial from 07/2012 to present.  Max dose is 3600 mg per day.  This drug was used more for pain and restless legs, but when Depakote was tapered, we increased the gabapentin, and it looks like the only problem we have is some weight gain with the gabapentin.  Gabapentin  increased 1200 mg three times a day 5/2016 with no change in seizure, however, RLS symptoms decreased  13.  Klonopin:  One trial started 03/2013.  Is used for anxiety, not for seizures.  It was started at 0.5 mg and optimized so far to 3 mg per day.     Assessment:  This drug is used by a psychiatrist.  If we one day want to try ONFI in this patient, maybe then the Klonopin could be decreased again.   14. Vagus nerve stimulator setting: Not able to tolerate higher duty cycle (35) with 1.1 signal off time and 30 sec on time, seizure worsened, not able to tolerate higher current setting due to discomfort.        REVIEW OF SYSTEMS:  Head tremor, which bothers him.  He continues to have bilateral hand tremors on Depakote.  No nausea, vomiting, diarrhea.  No rash.  Vagus nerve stimulator setting are bother him (voice changes and throat discomfort).      EXAMINATION:  /70 (BP Location: Right arm, Patient Position: Chair, Cuff Size: Adult Regular)  Pulse 62  Ht 5' 6.5\" (168.9 cm)  Wt 146 lb (66.2 kg)  BMI 23.21 kg/m2  Alert, orientated, speech is fluent, face is symmetric, No head/ hand tremor, no focal deficits noted.Gait is stable.     ASSESSMENT:    1. 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 Cavernoma intracranial   3. " Memory deficits secondary to seizures and antiepileptic drugs    4. Anxiety/Depression  5.  Restless legs syndrome.  6. History of Migraines  7. Status post pacemaker placement 12/2003.       1. Refractory multifocal epilepsy.  Etiology multiple cavernomas.  The patient is status left temporal resection of AVM in 1985 (age 11), Right frontal hematoma 04/13/2006, treated with surgery.  Left-sided cavernous hemangioma 07/2002. Status post epilepsy surgery 05/29/2012.  Vagus nerve stimulator implanted on right and he has pacemaker on left.  Electrographically, he has multifocal epileptiform discharges and seizures arising from left and right temporal regions.     Antiepileptic drug discussion: Insurance coverage for new antiepileptic drug is not adequate. He is not able to tolerate higher doses of oxcarbazepine or phenytoin.  Levetiracetam is optimized. Other antiepileptic drug to consider are banzel, Brivaracetam, fycompa, but, insurance coverage is not good and they are not able to afford these medications. Retrial with lamotrigine (tremos will worsen), Patient and wife declined Responsive Neurostimulation (Neuropace) or any brain surgery today. At this point, we have unfortunately exhausted our treatment options.     2. Restless legs syndrome. Stable on gabapentin .       3. Anxiety and depression.  Managed by psychiatrist (Dr. López). Risperdal was decreased to decrease tremors. They will work with psychiatrist to consider new agents.     4. Tremor in head improved with lower dose of risperdal. His hand tremor stopped once we stopped depakote. I do not think his current antiepileptic drug will cause head tremor or seizures. They saw Dr. Bloom for diagnosis/treatment.            PLAN:   CBD (marijuana) study will talk to Dr. Cedeno   Think about Responsive Neurostimulation (Neuropace) - declined at this time  Check with [facility] patient saving program for Brivaracetam  CT of Head recommended, but, they declined at  this time  Follow up 3 month        I spent 35 minutes with the patient. During this time counseling and coordination of care exceeded 50% of the visit time. I addressed all questions and concerns the patient raised in regards to his care.     REBEKAH CHRISTOPHER MD

## 2017-10-23 ENCOUNTER — TELEPHONE (OUTPATIENT)
Dept: NEUROLOGY | Facility: CLINIC | Age: 42
End: 2017-10-23

## 2017-10-23 DIAGNOSIS — G40.219 PARTIAL EPILEPSY WITH IMPAIRMENT OF CONSCIOUSNESS, INTRACTABLE (H): ICD-10-CM

## 2017-10-23 DIAGNOSIS — G25.81 RESTLESS LEG SYNDROME: ICD-10-CM

## 2017-10-23 RX ORDER — DIAZEPAM 5 MG
TABLET ORAL
Qty: 30 TABLET | Refills: 3 | Status: SHIPPED | OUTPATIENT
Start: 2017-10-23 | End: 2018-09-06

## 2017-10-23 NOTE — TELEPHONE ENCOUNTER
----- Message from [employer] sent at 10/23/2017  1:14 PM CDT -----  Regarding: Refill  Patient needs a refill on diazepam (VALIUM) 5 MG tablets, taking 1 TAB AS NEEDED FOR SEIZURES >5MIN OR >3 SEIZURES/8HRS, MAY REPEAT ONCE, MAX 10MG/24HR, CALL 911 IF BREATHING PROBLEMS. Needs 30 days supply with refills.    Pharmacy:    [facility] - [facility] [address]    RTC date: 2/20/17    Patient stated that [employer] called him telling him that he was out of refills and to have us call to authorize more. The number that they gave him to call back was [phone]. Thanks.    [employer] CMA

## 2017-11-14 ENCOUNTER — OFFICE VISIT (OUTPATIENT)
Dept: NEUROLOGY | Facility: CLINIC | Age: 42
End: 2017-11-14

## 2017-11-14 VITALS
HEIGHT: 67 IN | BODY MASS INDEX: 23.73 KG/M2 | DIASTOLIC BLOOD PRESSURE: 76 MMHG | HEART RATE: 54 BPM | WEIGHT: 151.2 LBS | SYSTOLIC BLOOD PRESSURE: 114 MMHG

## 2017-11-14 DIAGNOSIS — G40.219 PARTIAL EPILEPSY WITH IMPAIRMENT OF CONSCIOUSNESS, INTRACTABLE (H): Primary | ICD-10-CM

## 2017-11-14 NOTE — PROGRESS NOTES
Visit today to review [place] [facility]. He is eager to try it, but thet do not have resources to pay for it. He is elgible for our evaluation of food effect in a clinical setting. This has richelle IRB approved as it involves extra blood tests and visits.   He signed informed consent today.   Neruro exam shows memory issues, but CN, Cerebellar & motor and sensory ok.  Plan:  Certified for [place] [facility]

## 2017-11-14 NOTE — LETTER
2017       RE: Ammon Cronin  : 1975   MRN: [id number]      Dear Colleague,    Thank you for referring your patient, Ammon Cronin, to the [facility]PILE[facility]ARE at [facility]. Please see a copy of my visit note below.    Visit today to review [place] [facility]. He is eager to try it, but thet do not have resources to pay for it. He is elgible for our evaluation of food effect in a clinical setting. This has richelle IRB approved as it involves extra blood tests and visits.   He signed informed consent today.   Neruro exam shows memory issues, but CN, Cerebellar & motor and sensory ok.    Plan:  Certified for [place] [facility]      Sincerely,    Prema Cedeno MD

## 2017-11-14 NOTE — MR AVS SNAPSHOT
After Visit Summary   2017    Ammon Cronin    MRN: [id number]           Patient Information     Date Of Birth          1975        Visit Information        Provider Department      2017 12:30 PM Prema Cedeno MD [facility]         Follow-ups after your visit        Your next 10 appointments already scheduled     2018 10:30 AM CST   Return Visit with MD [facility]    [address], [address]  [facility] [address] [phone]              Who to contact     Please call your clinic at [phone] to:    Ask questions about your health    Make or cancel appointments    Discuss your medicines    Learn about your test results    Speak to your doctor   If you have compliments or concerns about an experience at your clinic, or if you wish to file a complaint, please contact [facility] Patient Relations at [phone] or email us at [email or web address]         Additional Information About Your Visit        MyChart Information     STARFACE is an electronic gateway that provides easy, online access to your medical records. With STARFACE, you can request a clinic appointment, read your test results, renew a prescription or communicate with your care team.     To sign up for STARFACE visit the website at [email or web address]   You will be asked to enter the access code listed below, as well as some personal information. Please follow the directions to create your username and password.     Your access code is: [email or web address]  Expires: 2018 11:22 AM     Your access code will  in 90 days. If you need help or a new code, please contact your [facility] Clinic or call [phone] for assistance.        Care EveryWhere ID     This is your Care EveryWhere ID. This could be used by other organizations to access your [facility]  "records  [id number]        Your Vitals Were     Pulse Height BMI (Body Mass Index)             54 5' 6.5\" (168.9 cm) 24.04 kg/m2          Blood Pressure from Last 3 Encounters:   11/14/17 114/76   10/18/17 129/70   10/06/17 119/73    Weight from Last 3 Encounters:   11/14/17 151 lb 3.2 oz (68.6 kg)   10/18/17 146 lb (66.2 kg)   10/06/17 147 lb (66.7 kg)              Today, you had the following     No orders found for display       Primary Care Provider    None Specified       No primary provider on file.        Equal Access to Services     [facility]: Hadmemo Noriega, sandro laureano, feliberto serrano, terra palacios . So [facility] [phone].    ATENCIÓN: Si habla español, tiene a correa disposición servicios gratuitos de asistencia lingüística. Llame al [phone].    We comply with applicable federal civil rights laws and [place] laws. We do not discriminate on the basis of race, color, national origin, age, disability, sex, sexual orientation, or gender identity.            Thank you!     Thank you for choosing [facility]  for your care. Our goal is always to provide you with excellent care. Hearing back from our patients is one way we can continue to improve our services. Please take a few minutes to complete the written survey that you may receive in the mail after your visit with us. Thank you!             Your Updated Medication List - Protect others around you: Learn how to safely use, store and throw away your medicines at www.disposemymeds.org.          This list is accurate as of: 11/14/17  1:25 PM.  Always use your most recent med list.                   Brand Name Dispense Instructions for use Diagnosis    clonazePAM 2 MG tablet    klonoPIN     Take 2 mg by mouth 2 times daily as needed for anxiety    Partial epilepsy with impairment of consciousness, intractable (H), Tremor       * diazepam 5 MG/ML (HIGH CONC) solution    DIAZEPAM INTENSOL "    30 mL    Seizures greater than 5 minutes or cluster of more than 3 seizures in 8 hour period. May repeat dose once, no more than 6 mg in 24 hour period.    Unspecified epilepsy with intractable epilepsy       * diazepam 5 MG tablet    VALIUM    30 tablet    TAKE 1 TAB AS NEEDED FOR SEIZURES >5MIN OR >3 SEIZURES/8HRS, MAY REPEAT ONCE, MAX 10MG/24HR, CALL 911 IF BREATHING PROBLEMS    Restless leg syndrome, Partial epilepsy with impairment of consciousness, intractable (H)       gabapentin 300 MG capsule    NEURONTIN    1080 capsule    Take 4 cap po tid    Restless legs syndrome, Partial epilepsy with impairment of consciousness, intractable (H), Long term use of drug, Restless leg syndrome       levETIRAcetam 750 MG tablet    KEPPRA    450 tablet    Generic.  Take 1 tablet in am, 2 tablets at 4 pm and  2 tablet at 11 pm.    Restless legs syndrome, Partial epilepsy with impairment of consciousness, intractable (H), Long term use of drug, Restless leg syndrome       MULTIVITAMIN & MINERAL PO      Take 1 tablet by mouth daily.        OXcarbazepine 300 MG tablet    TRILEPTAL    810 tablet    TAKE 3 TABLETS THREE TIMES DAILY (KEEP APPT 5/31/17)    Partial epilepsy with loss of consciousness, intractable (H)       phenytoin 100 MG CR capsule    DILANTIN    180 capsule    TITRATE TO 1 CAPSULE TWICE A DAY    Partial epilepsy with impairment of consciousness, intractable (H), Tremor       PROZAC 40 MG capsule   Generic drug:  FLUoxetine      Take 40 mg by mouth daily Started March 2013        risperiDONE 1 MG tablet    risperDAL     Take 1 mg by mouth 2 times daily Pt wife says they are Tapering off    Partial epilepsy with impairment of consciousness, intractable (H), Restless legs syndrome, Long term use of drug       * Notice:  This list has 2 medication(s) that are the same as other medications prescribed for you. Read the directions carefully, and ask your doctor or other care provider to review them with you.

## 2017-11-20 DIAGNOSIS — G25.81 RESTLESS LEGS SYNDROME: ICD-10-CM

## 2017-11-20 DIAGNOSIS — Z79.899 LONG TERM USE OF DRUG: ICD-10-CM

## 2017-11-20 DIAGNOSIS — G40.219 PARTIAL EPILEPSY WITH IMPAIRMENT OF CONSCIOUSNESS, INTRACTABLE (H): ICD-10-CM

## 2017-11-20 DIAGNOSIS — G25.81 RESTLESS LEG SYNDROME: ICD-10-CM

## 2017-11-21 RX ORDER — GABAPENTIN 300 MG/1
CAPSULE ORAL
Qty: 1080 CAPSULE | Refills: 3 | Status: SHIPPED | OUTPATIENT
Start: 2017-11-21 | End: 2018-02-20

## 2017-12-11 DIAGNOSIS — G25.81 RESTLESS LEGS SYNDROME: ICD-10-CM

## 2017-12-11 DIAGNOSIS — Z79.899 LONG TERM USE OF DRUG: ICD-10-CM

## 2017-12-11 DIAGNOSIS — G40.219 PARTIAL EPILEPSY WITH IMPAIRMENT OF CONSCIOUSNESS, INTRACTABLE (H): ICD-10-CM

## 2017-12-11 DIAGNOSIS — G25.81 RESTLESS LEG SYNDROME: ICD-10-CM

## 2017-12-11 RX ORDER — LEVETIRACETAM 750 MG/1
TABLET ORAL
Qty: 450 TABLET | Refills: 3 | Status: SHIPPED | OUTPATIENT
Start: 2017-12-11 | End: 2018-02-20

## 2017-12-20 DIAGNOSIS — R25.1 TREMOR: ICD-10-CM

## 2017-12-20 DIAGNOSIS — G40.219 PARTIAL EPILEPSY WITH IMPAIRMENT OF CONSCIOUSNESS, INTRACTABLE (H): ICD-10-CM

## 2017-12-21 RX ORDER — PHENYTOIN SODIUM 100 MG/1
CAPSULE, EXTENDED RELEASE ORAL
Qty: 180 CAPSULE | Refills: 1 | Status: SHIPPED | OUTPATIENT
Start: 2017-12-21 | End: 2018-02-20

## 2018-02-02 ENCOUNTER — OFFICE VISIT (OUTPATIENT)
Dept: NEUROLOGY | Facility: CLINIC | Age: 43
End: 2018-02-02
Payer: COMMERCIAL

## 2018-02-02 DIAGNOSIS — G40.219 PARTIAL EPILEPSY WITH IMPAIRMENT OF CONSCIOUSNESS, INTRACTABLE (H): Primary | ICD-10-CM

## 2018-02-02 NOTE — MR AVS SNAPSHOT
After Visit Summary   2018    Ammon Cronin    MRN: [id number]           Patient Information     Date Of Birth          1975        Visit Information        Provider Department      2018 11:30 AM Prema Cedeno MD [facility]         Follow-ups after your visit        Your next 10 appointments already scheduled     2018 10:30 AM CST   Return Visit with MD [facility] ([facility]    [address], [address]  [facility] [address] [phone]              Who to contact     Please call your clinic at [phone] to:    Ask questions about your health    Make or cancel appointments    Discuss your medicines    Learn about your test results    Speak to your doctor   If you have compliments or concerns about an experience at your clinic, or if you wish to file a complaint, please contact [facility] Patient Relations at [phone] or email us at [email or web address]         Additional Information About Your Visit        MyChart Information     [employer] is an electronic gateway that provides easy, online access to your medical records. With [employer], you can request a clinic appointment, read your test results, renew a prescription or communicate with your care team.     To sign up for [employer] visit the website at [email or web address]   You will be asked to enter the access code listed below, as well as some personal information. Please follow the directions to create your username and password.     Your access code is: [email or web address]  Expires: 5/3/2018  1:06 PM     Your access code will  in 90 days. If you need help or a new code, please contact your [facility] or call [phone] for assistance.        Care EveryWhere ID     This is your Care EveryWhere ID. This could be used by other organizations to access your [facility]  records  [id number]         Blood Pressure from Last 3 Encounters:   11/14/17 114/76   10/18/17 129/70   10/06/17 119/73    Weight from Last 3 Encounters:   11/14/17 151 lb 3.2 oz (68.6 kg)   10/18/17 146 lb (66.2 kg)   10/06/17 147 lb (66.7 kg)              Today, you had the following     No orders found for display       Primary Care Provider    None Specified       No primary provider on file.        Equal Access to Services     ESTER LITTLE : Hadii aad ku hadasho Soomaali, waaxda luqadaha, qaybta kaalmada adeegyada, waxay adityain jeysonn jimmy palacios . So [facility] [phone].    ATENCIÓN: Si habla español, tiene a correa disposición servicios gratuitos de asistencia lingüística. Llame al [phone].    We comply with applicable federal civil rights laws and [place] laws. We do not discriminate on the basis of race, color, national origin, age, disability, sex, sexual orientation, or gender identity.            Thank you!     Thank you for choosing [facility]  for your care. Our goal is always to provide you with excellent care. Hearing back from our patients is one way we can continue to improve our services. Please take a few minutes to complete the written survey that you may receive in the mail after your visit with us. Thank you!             Your Updated Medication List - Protect others around you: Learn how to safely use, store and throw away your medicines at www.disposemymeds.org.          This list is accurate as of 2/2/18  1:06 PM.  Always use your most recent med list.                   Brand Name Dispense Instructions for use Diagnosis    clonazePAM 2 MG tablet    klonoPIN     Take 2 mg by mouth 2 times daily as needed for anxiety    Partial epilepsy with impairment of consciousness, intractable (H), Tremor       * diazepam 5 MG/ML (HIGH CONC) solution    DIAZEPAM INTENSOL    30 mL    Seizures greater than 5 minutes or cluster of more than 3 seizures in 8 hour period. May repeat dose  once, no more than 6 mg in 24 hour period.    Unspecified epilepsy with intractable epilepsy       * diazepam 5 MG tablet    VALIUM    30 tablet    TAKE 1 TAB AS NEEDED FOR SEIZURES >5MIN OR >3 SEIZURES/8HRS, MAY REPEAT ONCE, MAX 10MG/24HR, CALL 911 IF BREATHING PROBLEMS    Restless leg syndrome, Partial epilepsy with impairment of consciousness, intractable (H)       gabapentin 300 MG capsule    NEURONTIN    1080 capsule    TAKE 4 CAPSULES THREE TIMES DAILY    Restless legs syndrome, Partial epilepsy with impairment of consciousness, intractable (H), Long term use of drug, Restless leg syndrome       levETIRAcetam 750 MG tablet    KEPPRA    450 tablet    TAKE 1 TABLET IN THE MORNING, 2 TABLETS AT 4PM, AND 2 TABLETS AT 11PM    Restless legs syndrome, Partial epilepsy with impairment of consciousness, intractable (H), Long term use of drug, Restless leg syndrome       MULTIVITAMIN & MINERAL PO      Take 1 tablet by mouth daily.        OXcarbazepine 300 MG tablet    TRILEPTAL    810 tablet    TAKE 3 TABLETS THREE TIMES DAILY (KEEP APPT 5/31/17)    Partial epilepsy with loss of consciousness, intractable (H)       phenytoin 100 MG CR capsule    DILANTIN    180 capsule    TITRATE TO 1 CAPSULE TWICE A DAY    Partial epilepsy with impairment of consciousness, intractable (H), Tremor       PROZAC 40 MG capsule   Generic drug:  FLUoxetine      Take 40 mg by mouth daily Started March 2013        risperiDONE 1 MG tablet    risperDAL     Take 1 mg by mouth 2 times daily Pt wife says they are Tapering off    Partial epilepsy with impairment of consciousness, intractable (H), Restless legs syndrome, Long term use of drug       * Notice:  This list has 2 medication(s) that are the same as other medications prescribed for you. Read the directions carefully, and ask your doctor or other care provider to review them with you.

## 2018-02-02 NOTE — PROGRESS NOTES
Visit today to  Conclude [place] CBD study.  He completed both fasting and fed areas under the curve.   Last week he was to start the 18 days of 100 mg tid. He has had  approximately 2 seizures per  day. He took 300mg tid and ran out of CBD today, He had no reduction in seizures and no side effects. His seizure frequency was high enough that if CBD had been effective, there should have been a noticabkle [id number]  ROS neg    Neruro exam shows memory issues, but CN, Cerebellar & motor and sensory ok.  Plan:  Continue all other ASDs  Return to Dr Claros for follow up care.  Blood for CBD level.  Prema Cedeno

## 2018-02-02 NOTE — LETTER
2018       RE: Ammon Cronin  : 1975   MRN: [id number]      Dear Colleague,    Thank you for referring your patient, Ammon Cronin, to the [facility]LE[facility] CARE at [facility]. Please see a copy of my visit note below.    Visit today to  Conclude [place] CBD study.  He completed both fasting and fed areas under the curve.  Last week he was to start the 18 days of 100 mg tid. He has had  approximately 2 seizures per  day. He took 300mg tid and ran out of CBD today, He had no reduction in seizures and no side effects. His seizure frequency was high enough that if CBD had been effective, there should have been a noticabkle [id number]    ROS neg     Neruro exam shows memory issues, but CN, Cerebellar & motor and sensory ok.    Plan:  Continue all other ASDs  Return to Dr Claros for follow up care.  Blood for CBD level.    Prema Cedeno

## 2018-02-17 DIAGNOSIS — G40.219 PARTIAL EPILEPSY WITH LOSS OF CONSCIOUSNESS, INTRACTABLE (H): ICD-10-CM

## 2018-02-17 RX ORDER — OXCARBAZEPINE 300 MG/1
TABLET, FILM COATED ORAL
Qty: 810 TABLET | Refills: 1 | Status: CANCELLED | OUTPATIENT
Start: 2018-02-17

## 2018-02-20 ENCOUNTER — OFFICE VISIT (OUTPATIENT)
Dept: NEUROLOGY | Facility: CLINIC | Age: 43
End: 2018-02-20
Payer: COMMERCIAL

## 2018-02-20 VITALS
TEMPERATURE: 97.2 F | DIASTOLIC BLOOD PRESSURE: 84 MMHG | HEIGHT: 67 IN | WEIGHT: 137 LBS | HEART RATE: 63 BPM | SYSTOLIC BLOOD PRESSURE: 154 MMHG | BODY MASS INDEX: 21.5 KG/M2

## 2018-02-20 DIAGNOSIS — Z79.899 LONG TERM USE OF DRUG: ICD-10-CM

## 2018-02-20 DIAGNOSIS — G40.219 PARTIAL EPILEPSY WITH IMPAIRMENT OF CONSCIOUSNESS, INTRACTABLE (H): ICD-10-CM

## 2018-02-20 DIAGNOSIS — R25.1 TREMOR: ICD-10-CM

## 2018-02-20 DIAGNOSIS — G25.81 RESTLESS LEG SYNDROME: ICD-10-CM

## 2018-02-20 DIAGNOSIS — G25.81 RESTLESS LEGS SYNDROME: ICD-10-CM

## 2018-02-20 DIAGNOSIS — G40.219 PARTIAL EPILEPSY WITH LOSS OF CONSCIOUSNESS, INTRACTABLE (H): ICD-10-CM

## 2018-02-20 RX ORDER — CLONAZEPAM 2 MG/1
2 TABLET ORAL 2 TIMES DAILY PRN
Qty: 90 TABLET | Refills: 1 | Status: SHIPPED | OUTPATIENT
Start: 2018-02-20 | End: 2018-06-04

## 2018-02-20 RX ORDER — PHENYTOIN SODIUM 100 MG/1
CAPSULE, EXTENDED RELEASE ORAL
Qty: 180 CAPSULE | Refills: 3 | Status: SHIPPED | OUTPATIENT
Start: 2018-02-20 | End: 2018-09-05

## 2018-02-20 RX ORDER — GABAPENTIN 300 MG/1
CAPSULE ORAL
Qty: 1080 CAPSULE | Refills: 3 | Status: SHIPPED | OUTPATIENT
Start: 2018-02-20 | End: 2018-06-26 | Stop reason: ALTCHOICE

## 2018-02-20 RX ORDER — OXCARBAZEPINE 300 MG/1
TABLET, FILM COATED ORAL
Qty: 810 TABLET | Refills: 3 | Status: SHIPPED | OUTPATIENT
Start: 2018-02-20 | End: 2018-09-05

## 2018-02-20 RX ORDER — LEVETIRACETAM 750 MG/1
TABLET ORAL
Qty: 450 TABLET | Refills: 3 | Status: SHIPPED | OUTPATIENT
Start: 2018-02-20 | End: 2018-05-30

## 2018-02-20 RX ORDER — LEVETIRACETAM 250 MG/1
TABLET ORAL
Qty: 90 TABLET | Refills: 3 | Status: SHIPPED | OUTPATIENT
Start: 2018-02-20 | End: 2018-09-05

## 2018-02-20 NOTE — LETTER
"2018       RE: Ammon Cronin  : 1975   MRN: [id number]      Dear Colleague,    Thank you for referring your patient, Ammon Cronin, to the [facility]LEPS[facility] CARE at [facility]. Please see a copy of my visit note below.    [facility]    Patient:  Ammon Cronin  :  1975   Age:  42 year old   Today's Office Visit:  2018    Epilepsy Data:  Patient History  Primary Epileptologist/Provider: Rosenda Claros M.D.  Epilepsy Syndrome: Localization-related epilepsy unspecified  Epilepsy Syndrome Status: Final  Age of Onset: 16  Etiology  : Tumor  Other Relevant Dx/ Issues: Etiology likely cavernous hemangioma; acute hemorrhage 2005.  Born 5 weeks premature. Left temporal AVM with resection age 11.  Cardiac pacemaker  (bradycardia).  Hx of migraines.  Inpatient evaluations 3/02, ,  & .     Tests/Surgery History  Last EE2013  Last Neuropsych Testin2009  Last Case Management Conference: 3/21/2012  Epilepsy Surgery #1 Date: 11  Epilepsy Related Surgery #1 : Type: VNS implantation on RIGHT.  Seizure Record  Current Visit Date: 18  Previous Visit Date: 10/18/17  Months since last visit: 4.11  Seizure Type 1: Simple partial seizures unspecified  Description of Sz Type 1: \"hard to describe, feeling in the head, somthing is going to happen\"   Seizure Type 2: Complex partial seizures unspecified  Description of Sz Type 2: \"Brief\" ones consist of mumbling and not being very repsonsive at that time; duration 5-10 seconds.  \"Bigger\" ones consist of unresponsiveness, staring, confusion, lip smacking movements, may pick at objects or sort things, repetitive behavior.  Lasts few minutes.  # of Type 2 Seizure since last visit: 200  Freq. Type 2 / Month: 48.66  Seizure Type 3: Partial seizures with secondary generalization  -  with complex partial seizures evolving to generalized seizures  Description of Sz " Type 3: 0    SEIZURE HISTORY:   Copied forward:  First seizure was at the age of 16 (loss of awarenes). He has a significant past medical history of left temporal AVM that was resected at age 11 and 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   Presurgically workup in 2002 revealed bilateral independent seizure foci in the temporal lobes. Ictal asystole (2003, he developed chest pain, pallor, diaphoresis and nausea, was found to have a 22 second period of asystole.  Emergent cardiac pacemaker placed). MRI of the brain shows a cavernoma in the right frontal and a second one in the left temporal lobe and encephalomalacia.      INTERVAL HISTORY:  Came with Jasmyne. Seizure frequency has decreased 1-2 per day, increased on weekends to 5-6 complex partial seizure per day, this happens on some weekends. CBD was not helpful, he participated in the study. We talked about increasing levetiracetam and trying felbamate. He has not tired felbamate per my notes and Dr. Leos. Checked with insurance company about Brivaracetam, fycompa, banzel, onfi and these medication are too expensive.      Overall, he continues to have high seizure burden, poor memory, agitation/agression/mood changes. He continues to average 1-3 seizure per day and his seizure.  Currently, on antiepileptic drug there is fatigue, no double vision, no mood changes, no nausea, no vomiting, no abdominal pain, no rashes. No recent ER visits and no hospitalizations since last visit.     Prior to Admission medications    Medication Sig Start Date End Date Taking? Authorizing Provider   diazepam (VALIUM) 5 MG tablet TAKE 1 TAB AS NEEDED FOR SEIZURES >5MIN OR >3 SEIZURES/8HRS, MAY REPEAT ONCE, MAX 10MG/24HR, CALL 911 IF BREATHING PROBLEMS 6/28/17  Yes Rosenda Claros MD           clonazePAM (KLONOPIN) 2 MG tablet Take 2 mg by mouth 2 times daily as needed for anxiety    Reported, Patient    phenytoin (DILANTIN) 100 MG CR capsule Titrate to 100 mg twice a day 5/31/17   Rosenda Claros MD   OXcarbazepine (TRILEPTAL) 300 MG tablet TAKE 3 TABLETS THREE TIMES DAILY 5/4/17   Rosenda Claros MD   gabapentin (NEURONTIN) 300 MG capsule Take 4 cap po tid 8/2/16   Rosenda Claros MD   levETIRAcetam (KEPPRA) 750 MG tablet Generic.  Take 1 tablet in am, 2 tablets at 4 pm and  2 tablet at 11 pm. 8/2/16   Rosenda Claros MD   risperiDONE (RISPERDAL) 1 MG tablet Take 1 mg by mouth 2 times daily    Reported, Patient   diazepam (DIAZEPAM INTENSOL) 5 MG/ML solution Seizures greater than 5 minutes or cluster of more than 3 seizures in 8 hour period. May repeat dose once, no more than 6 mg in 24 hour period. 2/3/14   Eric Cruz MD   FLUoxetine (PROZAC) 40 MG capsule Take 40 mg by mouth daily Started March 2013    Reported, Patient   Multiple Vitamins-Minerals (MULTIVITAMIN & MINERAL PO) Take 1 tablet by mouth daily.    Reported, Patient   OMEGA-3 FATTY ACIDS PO Take 1 tablet by mouth daily.    Reported, Patient         AED MEDICATIONS:    1. Levetiracetam, 750 mg in the morning, 1500 mg at 4 p.m., and 1500 mg at 11 p.m.   2. Oxcarbazepine  900 mg t.i.d.   3. Gabapentin 1200 mg AM, 1200 mg noon, and 1200 mg PM  (started for RLS, patient has taken higher dose for some time, not sure of length)   5. Klonopin 2 -4 mg  (started 3/2013 for anxiety, takes one per day and two on weekend)  6. Diazepam PRN for CPS lasting greater than 5 minutes or more then 2 in one hour.   7. Phenytoin ER  100-100      MEDICATION NOTES/PRIOR ANTIEPILEPTIC DRUGS:    1.Lyrica (ineffective for seizure, max dose 1100mg per day)   2.Topamax: Two trials from 01/2008-05/2008 and the second trial on 05/19/2015.  Max dose was 200 mg per day.  CP max was 3.5 mg/L.  There was no change in seizure frequency.  The patient was compliant.  During the second trial, they tried it for mood stabilization; 50 mg was after 2 months discontinued because  side effects were speech and word-finding difficulties.   3.Gabatril (ineffective, caused fatigue at 48mg/ day). One trial from 01/2004-04/2006.  Max dose 48 mg per day.  It was ineffective and was discontinued.  There was no increase in seizures when it was stopped.  Side effects were fatigue.   4.Zonegran  One trial from 12/2003.  Max dose 400 mg per day.  Zonegran was used in combination with Keppra and Trileptal.  Side effects were memory impairment, aggressiveness, fatigue, behavioral changes, cognitive impairment, which improved after Zonegran was discontinued.   5.Dilantin: One trial from ? to 03/08/2002.  Max dose 350 mg per day.  CP max and free Dilantin level of 1.9 mg/L.  Efficacy:  Unknown.  The drug was optimized.  Side effects were tiredness on 350 mg a day.     Assessment:  It looks like seizure control was better when the patient was on Dilantin; therefore, it could be retried.   6.Vimpat: One trial 07/2009-09/2009.  Max dose 400 mg per day.  CP max 5.3 mg/L.  It was discontinued after 2 months because the patient experienced dizziness, diplopia and ataxia.  7.Depakote: One trial from 01/2010 to 05/09/2016.  So far, seizures have not increased because we increased the gabapentin.  Also, the headaches have not gotten worse, but the hand tremor and the head tremor have immensely improved since Depakote was discontinued.  Max dose was 2500 mg per day.  CP max was up to 116/15.6 mg/L.  In the past, it decreased seizure frequency, and it was used together with levetiracetam, oxcarbazepine and gabapentin, and it was used before with oxcarbazepine, levetiracetam and Lyrica.   Assessment:  Depakote could always be retried if we run into a problem.  8.lamotrigine: One trial from 07/06/2001.  Max dose 150 mg per day.  Used in combination with Dilantin.  There was no change in seizure frequency.  Side effects were increase in confusion and therefore questionable efficacy.  9.carbamazepine (tired, but no past  "info on details)  10.Levetiracetam: started in 2001. No major side effects. One trial from 07/2001 to present.  Max dose 3750 mg per day.  CP max 42.4 mg/L.   11. Oxcarbazepine:  One trial from 03/2002 to present.  Max dose 2700 mg per day.  CP max 43.2 mg/L.  It decreases seizures, and it looks like it is one of the best drugs the patient has tried.  increased oxcarbazepine from 2,400mg -> 2700 mg on 4/2013.   12.  Gabapentin:  One trial from 07/2012 to present.  Max dose is 3600 mg per day.  This drug was used more for pain and restless legs, but when Depakote was tapered, we increased the gabapentin, and it looks like the only problem we have is some weight gain with the gabapentin.  Gabapentin  increased 1200 mg three times a day 5/2016 with no change in seizure, however, RLS symptoms decreased  13.  Klonopin:  One trial started 03/2013.  Is used for anxiety, not for seizures.  It was started at 0.5 mg and optimized so far to 3 mg per day.     Assessment:  This drug is used by a psychiatrist.  If we one day want to try ONFI in this patient, maybe then the Klonopin could be decreased again.   14. Vagus nerve stimulator setting: Not able to tolerate higher duty cycle (35) with 1.1 signal off time and 30 sec on time, seizure worsened, not able to tolerate higher current setting due to discomfort.        REVIEW OF SYSTEMS:  Head tremor, which bothers him.  He continues to have bilateral hand tremors on Depakote.  No nausea, vomiting, diarrhea.  No rash.  Vagus nerve stimulator setting are bother him (voice changes and throat discomfort).      EXAMINATION:  /84 (BP Location: Right arm, Patient Position: Chair, Cuff Size: Adult Regular)  Pulse 63  Temp 97.2  F (36.2  C) (Tympanic)  Ht 5' 6.5\" (168.9 cm)  Wt 137 lb (62.1 kg)  BMI 21.78 kg/m2  Alert, orientated, speech is fluent, face is symmetric, No head/ hand tremor, no focal deficits noted.Gait is stable.     ASSESSMENT:    1. Refractory Epilepsy " secondary to AVM  2. Cavernoma intracranial   3. Memory deficits secondary to seizures and antiepileptic drugs    4. Anxiety/Depression  5.  Restless legs syndrome.  6. History of Migraines  7. Status post pacemaker placement 12/2003.       1. Refractory multifocal epilepsy.  Etiology multiple cavernomas.  The patient is status left temporal resection of AVM in 1985 (age 11), Right frontal hematoma 04/13/2006, treated with surgery.  Left-sided cavernous hemangioma 07/2002. Status post epilepsy surgery 05/29/2012.  Vagus nerve stimulator implanted on right and he has pacemaker on left.  Electrographically, he has multifocal epileptiform discharges and seizures arising from left and right temporal regions.     Antiepileptic drug discussion: Insurance coverage for new antiepileptic drug is not adequate. He is not able to tolerate higher doses of oxcarbazepine or phenytoin. We will try to increase Levetiracetam. Then consider felbamate. Other antiepileptic drug to consider are banzel, Brivaracetam, fycompa, but, insurance coverage is not good and they are not able to afford these medications. Retrial with lamotrigine (tremos will worsen), Patient and wife declined Responsive Neurostimulation (Neuropace) or any brain surgery today.    2. Restless legs syndrome. Stable on gabapentin .       3. Anxiety and depression.  Managed by psychiatrist (Dr. López). Risperdal was decreased to decrease tremors. They will work with psychiatrist to consider new agents.     4. Tremor in head improved with lower dose of risperdal. His hand tremor stopped once we stopped depakote. I do not think his current antiepileptic drug will cause head tremor or seizures. They saw Dr. Bloom for diagnosis/treatment.            PLAN:   1. Increase levetiracetam. Then consider felbamate, felbamate may increase phenytoin  Levels.                Medication Name   Tablet Size       Week 1   8 AM  (morning)   4 pm 11 PM (Night)   Notes   Generic   Levetiracetam   1000 mg  1500 mg  1500 mg     Generic  Oxcarbazepine   900 mg  900 mg  900 mg     Generic  Gabapentin    1200 mg  1200 mg  1200 mg     Generic  Phenytoin   ER  100 mg    100 mg     Generic  Klonopin 2-4 mg per day     2 mg at 2:30pm and PRN                Medication Name   Tablet Size       Week 2   8 AM  (morning)   4 pm 11 PM (Night)   Notes   Generic  Levetiracetam   1250 mg  1500 mg  1500 mg     Generic  Oxcarbazepine   900 mg  900 mg  900 mg     Generic  Gabapentin    1200 mg  1200 mg  1200 mg     Generic  Phenytoin   ER  100 mg    100 mg     Generic  Klonopin 2-4 mg per day     2 mg at 2:30pm and PRN                Medication Name   Tablet Size       Week 3   8 AM  (morning)   4 pm 11 PM (Night)   Notes   Generic  Levetiracetam   1500 mg  1500 mg  1500 mg     Generic  Oxcarbazepine   900 mg  900 mg  900 mg     Generic  Gabapentin    1200 mg  1200 mg  1200 mg     Generic  Phenytoin   ER  100 mg    100 mg     Generic  Klonopin 2-4 mg per day     2 mg at 2:30pm and PRN     2. Follow up 3 month        I spent 25 minutes with the patient. Total time with patient was 45 minutes. Patient was seen by me and Dr. Schneider. During this time counseling and coordination of care exceeded 50% of the visit time. I addressed all questions and concerns the patient raised in regards to his care.     REBEKAH CHRISTOPHER MD

## 2018-02-20 NOTE — PATIENT INSTRUCTIONS
Medication Name   Tablet Size       Week 1   8 AM  (morning)   4 pm 11 PM (Night)   Notes   Generic  Levetiracetam   1000 mg  1500 mg  1500 mg     Generic  Oxcarbazepine   900 mg  900 mg  900 mg     Generic  Gabapentin    1200 mg  1200 mg  1200 mg     Generic  Phenytoin   ER  100 mg    100 mg     Generic  Klonopin 2-4 mg per day     2 mg at 2:30pm and PRN                Medication Name   Tablet Size       Week 2   8 AM  (morning)   4 pm 11 PM (Night)   Notes   Generic  Levetiracetam   1250 mg  1500 mg  1500 mg     Generic  Oxcarbazepine   900 mg  900 mg  900 mg     Generic  Gabapentin    1200 mg  1200 mg  1200 mg     Generic  Phenytoin   ER  100 mg    100 mg     Generic  Klonopin 2-4 mg per day     2 mg at 2:30pm and PRN                Medication Name   Tablet Size       Week 3   8 AM  (morning)   4 pm 11 PM (Night)   Notes   Generic  Levetiracetam   1500 mg  1500 mg  1500 mg     Generic  Oxcarbazepine   900 mg  900 mg  900 mg     Generic  Gabapentin    1200 mg  1200 mg  1200 mg     Generic  Phenytoin   ER  100 mg    100 mg     Generic  Klonopin 2-4 mg per day     2 mg at 2:30pm and PRN         CONTINUE TAKING YOUR OTHER MEDICATIONS AS PREVIOUSLY DIRECTED.  IF YOU  HAVE ANY SIDE EFFECTS OR CONCERNS ABOUT YOUR ANTIEPILEPTIC DRUG CALL [facility] OFFICE [phone]. PLEASE FOLLOW MEDICATION CHANGES AS ADVISED.     This titration schedule was revive wed with patient or caregiver. They expressed understanding of these antiepileptic drug changes.     REBEKAH CHRISTOPHER MD

## 2018-02-20 NOTE — PROGRESS NOTES
"[facility] Progress Note    Patient:  Ammon Cronin  :  1975   Age:  42 year old   Today's Office Visit:  2018    Epilepsy Data:  Patient History  Primary Epileptologist/Provider: Rosenda Claros M.D.  Epilepsy Syndrome: Localization-related epilepsy unspecified  Epilepsy Syndrome Status: Final  Age of Onset: 16  Etiology  : Tumor  Other Relevant Dx/ Issues: Etiology likely cavernous hemangioma; acute hemorrhage 2005.  Born 5 weeks premature. Left temporal AVM with resection age 11.  Cardiac pacemaker  (bradycardia).  Hx of migraines.  Inpatient evaluations 3/02, ,  & .     Tests/Surgery History  Last EE2013  Last Neuropsych Testin2009  Last Case Management Conference: 3/21/2012  Epilepsy Surgery #1 Date: 11  Epilepsy Related Surgery #1 : Type: VNS implantation on RIGHT.  Seizure Record  Current Visit Date: 18  Previous Visit Date: 10/18/17  Months since last visit: 4.11  Seizure Type 1: Simple partial seizures unspecified  Description of Sz Type 1: \"hard to describe, feeling in the head, somthing is going to happen\"   Seizure Type 2: Complex partial seizures unspecified  Description of Sz Type 2: \"Brief\" ones consist of mumbling and not being very repsonsive at that time; duration 5-10 seconds.  \"Bigger\" ones consist of unresponsiveness, staring, confusion, lip smacking movements, may pick at objects or sort things, repetitive behavior.  Lasts few minutes.  # of Type 2 Seizure since last visit: 200  Freq. Type 2 / Month: 48.66  Seizure Type 3: Partial seizures with secondary generalization  -  with complex partial seizures evolving to generalized seizures  Description of Sz Type 3: 0    SEIZURE HISTORY:   Copied forward:  First seizure was at the age of 16 (loss of awarenes). 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 Patient has intractable " localization-related epilepsy with multiple seizure foci.   Presurgically workup in 2002 revealed bilateral independent seizure foci in the temporal lobes. Ictal asystole (2003, he developed chest pain, pallor, diaphoresis and nausea, was found to have a 22 second period of asystole.  Emergent cardiac pacemaker placed). MRI of the brain shows a cavernoma in the right frontal and a second one in the left temporal lobe and encephalomalacia.      INTERVAL HISTORY:  Came with Jasmyne. Seizure frequency has decreased 1-2 per day, increased on weekends to 5-6 complex partial seizure per day, this happens on some weekends. CBD was not helpful, he participated in the study. We talked about increasing levetiracetam and trying felbamate. He has not tired felbamate per my notes and Dr. Leos. Checked with insurance company about Brivaracetam, fycompa, banzel, onfi and these medication are too expensive.      Overall, he continues to have high seizure burden, poor memory, agitation/agression/mood changes. He continues to average 1-3 seizure per day and his seizure.  Currently, on antiepileptic drug there is fatigue, no double vision, no mood changes, no nausea, no vomiting, no abdominal pain, no rashes. No recent ER visits and no hospitalizations since last visit.     Prior to Admission medications    Medication Sig Start Date End Date Taking? Authorizing Provider   diazepam (VALIUM) 5 MG tablet TAKE 1 TAB AS NEEDED FOR SEIZURES >5MIN OR >3 SEIZURES/8HRS, MAY REPEAT ONCE, MAX 10MG/24HR, CALL 911 IF BREATHING PROBLEMS 6/28/17  Yes Rosenda Claros MD           clonazePAM (KLONOPIN) 2 MG tablet Take 2 mg by mouth 2 times daily as needed for anxiety    Reported, Patient   phenytoin (DILANTIN) 100 MG CR capsule Titrate to 100 mg twice a day 5/31/17   Rosenda Claros MD   OXcarbazepine (TRILEPTAL) 300 MG tablet TAKE 3 TABLETS THREE TIMES DAILY 5/4/17   Rosenda Claros MD   gabapentin (NEURONTIN) 300 MG capsule Take 4 cap po tid 8/2/16    Rosenda Claros MD   levETIRAcetam (KEPPRA) 750 MG tablet Generic.  Take 1 tablet in am, 2 tablets at 4 pm and  2 tablet at 11 pm. 8/2/16   Rosenda Claros MD   risperiDONE (RISPERDAL) 1 MG tablet Take 1 mg by mouth 2 times daily    Reported, Patient   diazepam (DIAZEPAM INTENSOL) 5 MG/ML solution Seizures greater than 5 minutes or cluster of more than 3 seizures in 8 hour period. May repeat dose once, no more than 6 mg in 24 hour period. 2/3/14   Eric Cruz MD   FLUoxetine (PROZAC) 40 MG capsule Take 40 mg by mouth daily Started March 2013    Reported, Patient   Multiple Vitamins-Minerals (MULTIVITAMIN & MINERAL PO) Take 1 tablet by mouth daily.    Reported, Patient   OMEGA-3 FATTY ACIDS PO Take 1 tablet by mouth daily.    Reported, Patient         AED MEDICATIONS:    1. Levetiracetam, 750 mg in the morning, 1500 mg at 4 p.m., and 1500 mg at 11 p.m.   2. Oxcarbazepine  900 mg t.i.d.   3. Gabapentin 1200 mg AM, 1200 mg noon, and 1200 mg PM  (started for RLS, patient has taken higher dose for some time, not sure of length)   5. Klonopin 2 -4 mg  (started 3/2013 for anxiety, takes one per day and two on weekend)  6. Diazepam PRN for CPS lasting greater than 5 minutes or more then 2 in one hour.   7. Phenytoin ER  100-100      MEDICATION NOTES/PRIOR ANTIEPILEPTIC DRUGS:    1.Lyrica (ineffective for seizure, max dose 1100mg per day)   2.Topamax: Two trials from 01/2008-05/2008 and the second trial on 05/19/2015.  Max dose was 200 mg per day.  CP max was 3.5 mg/L.  There was no change in seizure frequency.  The patient was compliant.  During the second trial, they tried it for mood stabilization; 50 mg was after 2 months discontinued because side effects were speech and word-finding difficulties.   3.Gabatril (ineffective, caused fatigue at 48mg/ day). One trial from 01/2004-04/2006.  Max dose 48 mg per day.  It was ineffective and was discontinued.  There was no increase in seizures when it was stopped.   Side effects were fatigue.   4.Zonegran  One trial from 12/2003.  Max dose 400 mg per day.  Zonegran was used in combination with Keppra and Trileptal.  Side effects were memory impairment, aggressiveness, fatigue, behavioral changes, cognitive impairment, which improved after Zonegran was discontinued.   5.Dilantin: One trial from ? to 03/08/2002.  Max dose 350 mg per day.  CP max and free Dilantin level of 1.9 mg/L.  Efficacy:  Unknown.  The drug was optimized.  Side effects were tiredness on 350 mg a day.     Assessment:  It looks like seizure control was better when the patient was on Dilantin; therefore, it could be retried.   6.Vimpat: One trial 07/2009-09/2009.  Max dose 400 mg per day.  CP max 5.3 mg/L.  It was discontinued after 2 months because the patient experienced dizziness, diplopia and ataxia.  7.Depakote: One trial from 01/2010 to 05/09/2016.  So far, seizures have not increased because we increased the gabapentin.  Also, the headaches have not gotten worse, but the hand tremor and the head tremor have immensely improved since Depakote was discontinued.  Max dose was 2500 mg per day.  CP max was up to 116/15.6 mg/L.  In the past, it decreased seizure frequency, and it was used together with levetiracetam, oxcarbazepine and gabapentin, and it was used before with oxcarbazepine, levetiracetam and Lyrica.   Assessment:  Depakote could always be retried if we run into a problem.  8.lamotrigine: One trial from 07/06/2001.  Max dose 150 mg per day.  Used in combination with Dilantin.  There was no change in seizure frequency.  Side effects were increase in confusion and therefore questionable efficacy.  9.carbamazepine (tired, but no past info on details)  10.Levetiracetam: started in 2001. No major side effects. One trial from 07/2001 to present.  Max dose 3750 mg per day.  CP max 42.4 mg/L.   11. Oxcarbazepine:  One trial from 03/2002 to present.  Max dose 2700 mg per day.  CP max 43.2 mg/L.  It  "decreases seizures, and it looks like it is one of the best drugs the patient has tried.  increased oxcarbazepine from 2,400mg -> 2700 mg on 4/2013.   12.  Gabapentin:  One trial from 07/2012 to present.  Max dose is 3600 mg per day.  This drug was used more for pain and restless legs, but when Depakote was tapered, we increased the gabapentin, and it looks like the only problem we have is some weight gain with the gabapentin.  Gabapentin  increased 1200 mg three times a day 5/2016 with no change in seizure, however, RLS symptoms decreased  13.  Klonopin:  One trial started 03/2013.  Is used for anxiety, not for seizures.  It was started at 0.5 mg and optimized so far to 3 mg per day.     Assessment:  This drug is used by a psychiatrist.  If we one day want to try ONFI in this patient, maybe then the Klonopin could be decreased again.   14. Vagus nerve stimulator setting: Not able to tolerate higher duty cycle (35) with 1.1 signal off time and 30 sec on time, seizure worsened, not able to tolerate higher current setting due to discomfort.        REVIEW OF SYSTEMS:  Head tremor, which bothers him.  He continues to have bilateral hand tremors on Depakote.  No nausea, vomiting, diarrhea.  No rash.  Vagus nerve stimulator setting are bother him (voice changes and throat discomfort).      EXAMINATION:  /84 (BP Location: Right arm, Patient Position: Chair, Cuff Size: Adult Regular)  Pulse 63  Temp 97.2  F (36.2  C) (Tympanic)  Ht 5' 6.5\" (168.9 cm)  Wt 137 lb (62.1 kg)  BMI 21.78 kg/m2  Alert, orientated, speech is fluent, face is symmetric, No head/ hand tremor, no focal deficits noted.Gait is stable.     ASSESSMENT:    1. 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 Cavernoma intracranial   3. Memory deficits secondary to seizures and antiepileptic drugs    4. Anxiety/Depression  5.  Restless legs syndrome.  6. History of Migraines  7. Status post pacemaker placement 12/2003.       1. Refractory multifocal " epilepsy.  Etiology multiple cavernomas.  The patient is status left temporal resection of AVM in 1985 (age 11), Right frontal hematoma 04/13/2006, treated with surgery.  Left-sided cavernous hemangioma 07/2002. Status post epilepsy surgery 05/29/2012.  Vagus nerve stimulator implanted on right and he has pacemaker on left.  Electrographically, he has multifocal epileptiform discharges and seizures arising from left and right temporal regions.     Antiepileptic drug discussion: Insurance coverage for new antiepileptic drug is not adequate. He is not able to tolerate higher doses of oxcarbazepine or phenytoin. We will try to increase Levetiracetam. Then consider felbamate. Other antiepileptic drug to consider are banzel, Brivaracetam, fycompa, but, insurance coverage is not good and they are not able to afford these medications. Retrial with lamotrigine (tremos will worsen), Patient and wife declined Responsive Neurostimulation (Neuropace) or any brain surgery today.    2. Restless legs syndrome. Stable on gabapentin .       3. Anxiety and depression.  Managed by psychiatrist (Dr. López). Risperdal was decreased to decrease tremors. They will work with psychiatrist to consider new agents.     4. Tremor in head improved with lower dose of risperdal. His hand tremor stopped once we stopped depakote. I do not think his current antiepileptic drug will cause head tremor or seizures. They saw Dr. Bloom for diagnosis/treatment.            PLAN:   1. Increase levetiracetam. Then consider felbamate, felbamate may increase phenytoin  Levels.                Medication Name   Tablet Size       Week 1   8 AM  (morning)   4 pm 11 PM (Night)   Notes   Generic  Levetiracetam   1000 mg  1500 mg  1500 mg     Generic  Oxcarbazepine   900 mg  900 mg  900 mg     Generic  Gabapentin    1200 mg  1200 mg  1200 mg     Generic  Phenytoin   ER  100 mg    100 mg     Generic  Klonopin 2-4 mg per day     2 mg at 2:30pm and PRN                 Medication Name   Tablet Size       Week 2   8 AM  (morning)   4 pm 11 PM (Night)   Notes   Generic  Levetiracetam   1250 mg  1500 mg  1500 mg     Generic  Oxcarbazepine   900 mg  900 mg  900 mg     Generic  Gabapentin    1200 mg  1200 mg  1200 mg     Generic  Phenytoin   ER  100 mg    100 mg     Generic  Klonopin 2-4 mg per day     2 mg at 2:30pm and PRN                Medication Name   Tablet Size       Week 3   8 AM  (morning)   4 pm 11 PM (Night)   Notes   Generic  Levetiracetam   1500 mg  1500 mg  1500 mg     Generic  Oxcarbazepine   900 mg  900 mg  900 mg     Generic  Gabapentin    1200 mg  1200 mg  1200 mg     Generic  Phenytoin   ER  100 mg    100 mg     Generic  Klonopin 2-4 mg per day     2 mg at 2:30pm and PRN     2. Follow up 3 month        I spent 25 minutes with the patient. Total time with patient was 45 minutes. Patient was seen by me and Dr. Schneider. During this time counseling and coordination of care exceeded 50% of the visit time. I addressed all questions and concerns the patient raised in regards to his care.     REBEKAH CHRISTOPHER MD

## 2018-02-20 NOTE — MR AVS SNAPSHOT
After Visit Summary   2/20/2018    Ammon Cronin    MRN: [id number]           Patient Information     Date Of Birth          1975        Visit Information        Provider Department      2/20/2018 10:30 AM Rosenda Claros MD [facility]        Today's Diagnoses     Restless legs syndrome        Partial epilepsy with impairment of consciousness, intractable (H)        Long term use of drug        Restless leg syndrome        Partial epilepsy with loss of consciousness, intractable (H)        Tremor          Care Instructions               Medication Name   Tablet Size       Week 1   8 AM  (morning)   4 pm 11 PM (Night)   Notes   Generic  Levetiracetam   1000 mg  1500 mg  1500 mg     Generic  Oxcarbazepine   900 mg  900 mg  900 mg     Generic  Gabapentin    1200 mg  1200 mg  1200 mg     Generic  Phenytoin   ER  100 mg    100 mg     Generic  Klonopin 2-4 mg per day     2 mg at 2:30pm and PRN                Medication Name   Tablet Size       Week 2   8 AM  (morning)   4 pm 11 PM (Night)   Notes   Generic  Levetiracetam   1250 mg  1500 mg  1500 mg     Generic  Oxcarbazepine   900 mg  900 mg  900 mg     Generic  Gabapentin    1200 mg  1200 mg  1200 mg     Generic  Phenytoin   ER  100 mg    100 mg     Generic  Klonopin 2-4 mg per day     2 mg at 2:30pm and PRN                Medication Name   Tablet Size       Week 3   8 AM  (morning)   4 pm 11 PM (Night)   Notes   Generic  Levetiracetam   1500 mg  1500 mg  1500 mg     Generic  Oxcarbazepine   900 mg  900 mg  900 mg     Generic  Gabapentin    1200 mg  1200 mg  1200 mg     Generic  Phenytoin   ER  100 mg    100 mg     Generic  Klonopin 2-4 mg per day     2 mg at 2:30pm and PRN         CONTINUE TAKING YOUR OTHER MEDICATIONS AS PREVIOUSLY DIRECTED.  IF YOU  HAVE ANY SIDE EFFECTS OR CONCERNS ABOUT YOUR ANTIEPILEPTIC DRUG CALL [facility] OFFICE [phone]. PLEASE FOLLOW MEDICATION CHANGES AS ADVISED.     This titration schedule was revive wed with  "patient or caregiver. They expressed understanding of these antiepileptic drug changes.     ROSENDA CHRISTOPHER MD                     Follow-ups after your visit        Your next 10 appointments already scheduled     May 22, 2018 10:00 AM CDT   Return Visit with Rosenda Christopher MD   [facility] ([facility])    [address], [address]  [facility] [address] [phone]              Who to contact     Please call your clinic at [phone] to:    Ask questions about your health    Make or cancel appointments    Discuss your medicines    Learn about your test results    Speak to your doctor            Additional Information About Your Visit        MyChart Information     [employer] is an electronic gateway that provides easy, online access to your medical records. With "[employer] you can request a clinic appointment, read your test results, renew a prescription or communicate with your care team.     To sign up for [employer] visit the website at [email or web address]   You will be asked to enter the access code listed below, as well as some personal information. Please follow the directions to create your username and password.     Your access code is: [email or web address]  Expires: 5/3/2018  1:06 PM     Your access code will  in 90 days. If you need help or a new code, please contact your [facility] or call [phone] for assistance.        Care EveryWhere ID     This is your Care EveryWhere ID. This could be used by other organizations to access your [facility] medical records  [id number]        Your Vitals Were     Pulse Temperature Height BMI (Body Mass Index)          63 97.2  F (36.2  C) (Tympanic) 5' 6.5\" (168.9 cm) 21.78 kg/m2         Blood Pressure from Last 3 Encounters:   18 154/84   17 114/76   10/18/17 129/70    Weight from Last 3 Encounters:   18 137 lb (62.1 kg)   17 151 lb 3.2 oz (68.6 kg)   10/18/17 146 lb (66.2 kg) "              Today, you had the following     No orders found for display         Today's Medication Changes          These changes are accurate as of 2/20/18 11:30 AM.  If you have any questions, ask your nurse or doctor.               These medicines have changed or have updated prescriptions.        Dose/Directions    gabapentin 300 MG capsule   Commonly known as:  NEURONTIN   This may have changed:  See the new instructions.   Used for:  Restless legs syndrome, Partial epilepsy with impairment of consciousness, intractable (H), Long term use of drug, Restless leg syndrome, Partial epilepsy with loss of consciousness, intractable (H), Tremor   Changed by:  Rosenda Claros MD        TAKE 4 CAPSULES THREE TIMES DAILY   Quantity:  1080 capsule   Refills:  3       * levETIRAcetam 750 MG tablet   Commonly known as:  KEPPRA   This may have changed:  See the new instructions.   Used for:  Restless legs syndrome, Partial epilepsy with impairment of consciousness, intractable (H), Long term use of drug, Restless leg syndrome, Partial epilepsy with loss of consciousness, intractable (H), Tremor   Changed by:  Rosenda Claros MD        TAKE 1 TABLET IN THE MORNING, 2 TABLETS AT 4PM, AND 2 TABLETS AT 11PM   Quantity:  450 tablet   Refills:  3       * levETIRAcetam 250 MG tablet   Commonly known as:  KEPPRA   This may have changed:  You were already taking a medication with the same name, and this prescription was added. Make sure you understand how and when to take each.   Used for:  Restless legs syndrome, Partial epilepsy with impairment of consciousness, intractable (H), Long term use of drug, Restless leg syndrome, Partial epilepsy with loss of consciousness, intractable (H), Tremor   Changed by:  Rosenda Claros MD        Titrate as instructed to 1500 mg am   Quantity:  90 tablet   Refills:  3       OXcarbazepine 300 MG tablet   Commonly known as:  TRILEPTAL   This may have changed:  See the new instructions.   Used for:   Partial epilepsy with loss of consciousness, intractable (H), Restless legs syndrome, Partial epilepsy with impairment of consciousness, intractable (H), Long term use of drug, Restless leg syndrome, Tremor   Changed by:  Rosenda Claros MD        TAKE 3 TABLETS THREE TIMES DAILY   Quantity:  810 tablet   Refills:  3       phenytoin 100 MG CR capsule   Commonly known as:  DILANTIN   This may have changed:  See the new instructions.   Used for:  Partial epilepsy with impairment of consciousness, intractable (H), Tremor, Restless legs syndrome, Long term use of drug, Restless leg syndrome, Partial epilepsy with loss of consciousness, intractable (H)   Changed by:  Rosenda Claros MD        1 CAPSULE TWICE A DAY   Quantity:  180 capsule   Refills:  3       * Notice:  This list has 2 medication(s) that are the same as other medications prescribed for you. Read the directions carefully, and ask your doctor or other care provider to review them with you.         Where to get your medicines      These medications were sent to [facility] - [place], [place] - [address]  [address], [facility] [address]     Phone:  [phone]     gabapentin 300 MG capsule    levETIRAcetam 250 MG tablet    levETIRAcetam 750 MG tablet    OXcarbazepine 300 MG tablet    phenytoin 100 MG CR capsule         Some of these will need a paper prescription and others can be bought over the counter.  Ask your nurse if you have questions.     Bring a paper prescription for each of these medications     clonazePAM 2 MG tablet                Primary Care Provider    None Specified       No primary provider on file.        Equal Access to Services     [facility]: Madina Noriega, sandro laureano, qasalina kaalmada [address]. [facility] [phone].    ATENCIÓN: Si habla español, tiene a correa disposición servicios gratuitos de asistencia lingüística. Llame al  [phone].    We comply with applicable federal civil rights laws and [place] laws. We do not discriminate on the basis of race, color, national origin, age, disability, sex, sexual orientation, or gender identity.            Thank you!     Thank you for choosing [facility]  for your care. Our goal is always to provide you with excellent care. Hearing back from our patients is one way we can continue to improve our services. Please take a few minutes to complete the written survey that you may receive in the mail after your visit with us. Thank you!             Your Updated Medication List - Protect others around you: Learn how to safely use, store and throw away your medicines at www.disposemymeds.org.          This list is accurate as of 2/20/18 11:30 AM.  Always use your most recent med list.                   Brand Name Dispense Instructions for use Diagnosis    clonazePAM 2 MG tablet    klonoPIN    90 tablet    Take 1 tablet (2 mg) by mouth 2 times daily as needed for anxiety    Partial epilepsy with impairment of consciousness, intractable (H), Tremor, Restless legs syndrome, Long term use of drug, Restless leg syndrome, Partial epilepsy with loss of consciousness, intractable (H)       * diazepam 5 MG/ML (HIGH CONC) solution    DIAZEPAM INTENSOL    30 mL    Seizures greater than 5 minutes or cluster of more than 3 seizures in 8 hour period. May repeat dose once, no more than 6 mg in 24 hour period.    Unspecified epilepsy with intractable epilepsy       * diazepam 5 MG tablet    VALIUM    30 tablet    TAKE 1 TAB AS NEEDED FOR SEIZURES >5MIN OR >3 SEIZURES/8HRS, MAY REPEAT ONCE, MAX 10MG/24HR, CALL 911 IF BREATHING PROBLEMS    Restless leg syndrome, Partial epilepsy with impairment of consciousness, intractable (H)       gabapentin 300 MG capsule    NEURONTIN    1080 capsule    TAKE 4 CAPSULES THREE TIMES DAILY    Restless legs syndrome, Partial epilepsy with impairment of consciousness,  intractable (H), Long term use of drug, Restless leg syndrome, Partial epilepsy with loss of consciousness, intractable (H), Tremor       * levETIRAcetam 750 MG tablet    KEPPRA    450 tablet    TAKE 1 TABLET IN THE MORNING, 2 TABLETS AT 4PM, AND 2 TABLETS AT 11PM    Restless legs syndrome, Partial epilepsy with impairment of consciousness, intractable (H), Long term use of drug, Restless leg syndrome, Partial epilepsy with loss of consciousness, intractable (H), Tremor       * levETIRAcetam 250 MG tablet    KEPPRA    90 tablet    Titrate as instructed to 1500 mg am    Restless legs syndrome, Partial epilepsy with impairment of consciousness, intractable (H), Long term use of drug, Restless leg syndrome, Partial epilepsy with loss of consciousness, intractable (H), Tremor       MULTIVITAMIN & MINERAL PO      Take 1 tablet by mouth daily.        OXcarbazepine 300 MG tablet    TRILEPTAL    810 tablet    TAKE 3 TABLETS THREE TIMES DAILY    Partial epilepsy with loss of consciousness, intractable (H), Restless legs syndrome, Partial epilepsy with impairment of consciousness, intractable (H), Long term use of drug, Restless leg syndrome, Tremor       phenytoin 100 MG CR capsule    DILANTIN    180 capsule    1 CAPSULE TWICE A DAY    Partial epilepsy with impairment of consciousness, intractable (H), Tremor, Restless legs syndrome, Long term use of drug, Restless leg syndrome, Partial epilepsy with loss of consciousness, intractable (H)       PROZAC 40 MG capsule   Generic drug:  FLUoxetine      Take 60 mg by mouth daily Started March 2013        risperiDONE 1 MG tablet    risperDAL     Take 1 mg by mouth 2 times daily Pt wife says they are Tapering off    Partial epilepsy with impairment of consciousness, intractable (H), Restless legs syndrome, Long term use of drug       * Notice:  This list has 4 medication(s) that are the same as other medications prescribed for you. Read the directions carefully, and ask your doctor  or other care provider to review them with you.

## 2018-02-28 ENCOUNTER — TELEPHONE (OUTPATIENT)
Dept: NEUROLOGY | Facility: CLINIC | Age: 43
End: 2018-02-28

## 2018-02-28 NOTE — TELEPHONE ENCOUNTER
----- Message from Artis Bejarano CMA sent at 2/28/2018 11:50 AM CST -----  Regarding: med clarification  [facility] calling, needs a dose clarification on the levETIRAcetam (KEPPRA) 750 MG tablet.     Call [phone]

## 2018-02-28 NOTE — TELEPHONE ENCOUNTER
Patient Instructions  Encounter Date: 2/20/2018  Rosenda Claros MD   Neurology                      Medication Name   Tablet Size           Week 1   8 AM  (morning)   4 pm 11 PM (Night)   Notes   Generic  Levetiracetam   1000 mg  1500 mg  1500 mg     Generic  Oxcarbazepine   900 mg  900 mg  900 mg     Generic  Gabapentin    1200 mg  1200 mg  1200 mg     Generic  Phenytoin   ER  100 mg     100 mg     Generic  Klonopin 2-4 mg per day        2 mg at 2:30pm and PRN                      Medication Name   Tablet Size           Week 2   8 AM  (morning)   4 pm 11 PM (Night)   Notes   Generic  Levetiracetam   1250 mg  1500 mg  1500 mg     Generic  Oxcarbazepine   900 mg  900 mg  900 mg     Generic  Gabapentin    1200 mg  1200 mg  1200 mg     Generic  Phenytoin   ER  100 mg     100 mg     Generic  Klonopin 2-4 mg per day        2 mg at 2:30pm and PRN                      Medication Name   Tablet Size           Week 3   8 AM  (morning)   4 pm 11 PM (Night)   Notes   Generic  Levetiracetam   1500 mg  1500 mg  1500 mg     Generic  Oxcarbazepine   900 mg  900 mg  900 mg     Generic  Gabapentin    1200 mg  1200 mg  1200 mg     Generic  Phenytoin   ER  100 mg     100 mg     Generic  Klonopin 2-4 mg per day        2 mg at 2:30pm and PRN            CONTINUE TAKING YOUR OTHER MEDICATIONS AS PREVIOUSLY DIRECTED.  IF YOU  HAVE ANY SIDE EFFECTS OR CONCERNS ABOUT YOUR ANTIEPILEPTIC DRUG CALL [facility] OFFICE [phone]. PLEASE FOLLOW MEDICATION CHANGES AS ADVISED.      This titration schedule was revive wed with patient or         The 750 mg levetiracetam was already shipped 450 mg = 90 days and the 250 mg levetiracetam is in progress to be shipped.

## 2018-05-22 ENCOUNTER — OFFICE VISIT (OUTPATIENT)
Dept: NEUROLOGY | Facility: CLINIC | Age: 43
End: 2018-05-22
Payer: COMMERCIAL

## 2018-05-22 VITALS
RESPIRATION RATE: 16 BRPM | HEART RATE: 83 BPM | SYSTOLIC BLOOD PRESSURE: 121 MMHG | TEMPERATURE: 99 F | WEIGHT: 136 LBS | BODY MASS INDEX: 21.62 KG/M2 | DIASTOLIC BLOOD PRESSURE: 73 MMHG

## 2018-05-22 DIAGNOSIS — G40.219 PARTIAL EPILEPSY WITH IMPAIRMENT OF CONSCIOUSNESS, INTRACTABLE (H): Primary | ICD-10-CM

## 2018-05-22 ASSESSMENT — PAIN SCALES - GENERAL: PAINLEVEL: NO PAIN (0)

## 2018-05-22 NOTE — LETTER
"2018       RE: Ammon Cronin  : 1975   MRN: [id number]      Dear Colleague,    Thank you for referring your patient, Ammon Cronin, to the [facility] at [facility]. Please see a copy of my visit note below.    [facility]    Patient:  Ammon Cronin  :  1975   Age:  42 year old   Today's Office Visit:  2018    Epilepsy Data:  Patient History  Primary Epileptologist/Provider: Rosenda Claros M.D.  Epilepsy Syndrome: Localization-related epilepsy unspecified  Epilepsy Syndrome Status: Final  Age of Onset: 16  Etiology  : Tumor  Other Relevant Dx/ Issues: Etiology likely cavernous hemangioma; acute hemorrhage 2005.  Born 5 weeks premature. Left temporal AVM with resection age 11.  Cardiac pacemaker  (bradycardia).  Hx of migraines.  Inpatient evaluations 3/02, ,  & .     Tests/Surgery History  Last EE2013  Last Neuropsych Testin2009  Last Case Management Conference: 3/21/2012  Epilepsy Surgery #1 Date: 11  Epilepsy Related Surgery #1 : Type: VNS implantation on RIGHT.  Seizure Record  Current Visit Date: 18  Previous Visit Date: 18  Months since last visit: 2.99  Seizure Type 1: Simple partial seizures unspecified  Description of Sz Type 1: \"hard to describe, feeling in the head, somthing is going to happen\"   Seizure Type 2: Complex partial seizures unspecified  Description of Sz Type 2: \"Brief\" ones consist of mumbling and not being very repsonsive at that time; duration 5-10 seconds.  \"Bigger\" ones consist of unresponsiveness, staring, confusion, lip smacking movements, may pick at objects or sort things, repetitive behavior.  Lasts few minutes.  Seizure Type 3: Partial seizures with secondary generalization  -  with complex partial seizures evolving to generalized seizures  # of Type 3 Seizure since last visit: 0  Freq. Type 3 / Month: 0    SEIZURE HISTORY:   Copied " forward:  First seizure was at the age of 16 (loss of awarenes). He has a significant past medical history of left temporal AVM that was resected at age 11 and 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   Presurgically workup in 2002 revealed bilateral independent seizure foci in the temporal lobes. Ictal asystole (2003, he developed chest pain, pallor, diaphoresis and nausea, was found to have a 22 second period of asystole.  Emergent cardiac pacemaker placed). MRI of the brain shows a cavernoma in the right frontal and a second one in the left temporal lobe and encephalomalacia.      INTERVAL HISTORY:  Came with Jasmyne. Last two weeks he had less seizure, but, 2 weeks prior he had several seizure, 18 seizure per day. On average his seizure frequency has decreased 1-2 per day, increased on weekends to 5-6 complex partial seizure per day, this happens on some weekends. He did not tolerate levetiracetam higher dose, it caused slurred speech, double vision, unstable gait, severe side effects. He went back to previous dose. Today we spoke about Deep Brain Stimulation device.  I did tell family, we have used most antiepileptic drug and brand name medications are not affordable for them, therefore, we have limited options. They are concerned that vagus nerve stimulator is not helpful. They would like to decrease current and determine if seizure worsen. If seizure do not worsen then they do not want to change  Vagus nerve stimulator battery.   Prior to Admission medications    Medication Sig Start Date End Date Taking? Authorizing Provider   diazepam (VALIUM) 5 MG tablet TAKE 1 TAB AS NEEDED FOR SEIZURES >5MIN OR >3 SEIZURES/8HRS, MAY REPEAT ONCE, MAX 10MG/24HR, CALL 911 IF BREATHING PROBLEMS 6/28/17  Yes Rosenda Claros MD           clonazePAM (KLONOPIN) 2 MG tablet Take 2 mg by mouth 2 times daily as needed for anxiety    Reported, Patient    phenytoin (DILANTIN) 100 MG CR capsule Titrate to 100 mg twice a day 5/31/17   Rosenda Claros MD   OXcarbazepine (TRILEPTAL) 300 MG tablet TAKE 3 TABLETS THREE TIMES DAILY 5/4/17   Rosenda Claros MD   gabapentin (NEURONTIN) 300 MG capsule Take 4 cap po tid 8/2/16   Rosenda Claros MD   levETIRAcetam (KEPPRA) 750 MG tablet Generic.  Take 1 tablet in am, 2 tablets at 4 pm and  2 tablet at 11 pm. 8/2/16   Rosenda Claros MD   risperiDONE (RISPERDAL) 1 MG tablet Take 1 mg by mouth 2 times daily    Reported, Patient   diazepam (DIAZEPAM INTENSOL) 5 MG/ML solution Seizures greater than 5 minutes or cluster of more than 3 seizures in 8 hour period. May repeat dose once, no more than 6 mg in 24 hour period. 2/3/14   Eric Cruz MD   FLUoxetine (PROZAC) 40 MG capsule Take 40 mg by mouth daily Started March 2013    Reported, Patient   Multiple Vitamins-Minerals (MULTIVITAMIN & MINERAL PO) Take 1 tablet by mouth daily.    Reported, Patient   OMEGA-3 FATTY ACIDS PO Take 1 tablet by mouth daily.    Reported, Patient     AED MEDICATIONS:    1. Levetiracetam, 750 mg in the morning, 1500 mg at 4 p.m., and 1500 mg at 11 p.m.   2. Oxcarbazepine  900 mg t.i.d.   3. Gabapentin 1200 mg AM, 1200 mg noon, and 1200 mg PM  (started for RLS, patient has taken higher dose for some time, not sure of length)   5. Klonopin 2 -4 mg  (started 3/2013 for anxiety, takes one per day and two on weekend)  6. Phenytoin ER  100-100  7. Diazepam PRN for CPS lasting greater than 5 minutes or more then 2 in one hour.     MEDICATION NOTES/PRIOR ANTIEPILEPTIC DRUGS:    1.Lyrica (ineffective for seizure, max dose 1100mg per day)   2.Topamax: Two trials from 01/2008-05/2008 and the second trial on 05/19/2015.  Max dose was 200 mg per day.  CP max was 3.5 mg/L.  There was no change in seizure frequency.  The patient was compliant.  During the second trial, they tried it for mood stabilization; 50 mg was after 2 months discontinued because side  effects were speech and word-finding difficulties.   3.Gabatril (ineffective, caused fatigue at 48mg/ day). One trial from 01/2004-04/2006.  Max dose 48 mg per day.  It was ineffective and was discontinued.  There was no increase in seizures when it was stopped.  Side effects were fatigue.   4.Zonegran  One trial from 12/2003.  Max dose 400 mg per day.  Zonegran was used in combination with Keppra and Trileptal.  Side effects were memory impairment, aggressiveness, fatigue, behavioral changes, cognitive impairment, which improved after Zonegran was discontinued.   5.Dilantin: One trial from ? to 03/08/2002.  Max dose 350 mg per day.  CP max and free Dilantin level of 1.9 mg/L.  Efficacy:  Unknown.  The drug was optimized.  Side effects were tiredness on 350 mg a day.     Assessment:  It looks like seizure control was better when the patient was on Dilantin; therefore, it could be retried.   6.Vimpat: One trial 07/2009-09/2009.  Max dose 400 mg per day.  CP max 5.3 mg/L.  It was discontinued after 2 months because the patient experienced dizziness, diplopia and ataxia.  7.Depakote: One trial from 01/2010 to 05/09/2016.  So far, seizures have not increased because we increased the gabapentin.  Also, the headaches have not gotten worse, but the hand tremor and the head tremor have immensely improved since Depakote was discontinued.  Max dose was 2500 mg per day.  CP max was up to 116/15.6 mg/L.  In the past, it decreased seizure frequency, and it was used together with levetiracetam, oxcarbazepine and gabapentin, and it was used before with oxcarbazepine, levetiracetam and Lyrica.   Assessment:  Depakote could always be retried if we run into a problem.  8.lamotrigine: One trial from 07/06/2001.  Max dose 150 mg per day.  Used in combination with Dilantin.  There was no change in seizure frequency.  Side effects were increase in confusion and therefore questionable efficacy.  9.carbamazepine (tired, but no past info on  details)  10.Levetiracetam: started in 2001. No major side effects. One trial from 07/2001 to present.  Max dose 3750 mg per day.  CP max 42.4 mg/L. Levetiracetam above 4000 mg per day caused slurred speech, double vision, unstable gait, severe side effects.   11. Oxcarbazepine:  One trial from 03/2002 to present.  Max dose 2700 mg per day.  CP max 43.2 mg/L.  It decreases seizures, and it looks like it is one of the best drugs the patient has tried.  increased oxcarbazepine from 2,400mg -> 2700 mg on 4/2013.   12.  Gabapentin:  One trial from 07/2012 to present.  Max dose is 3600 mg per day.  This drug was used more for pain and restless legs, but when Depakote was tapered, we increased the gabapentin, and it looks like the only problem we have is some weight gain with the gabapentin.  Gabapentin  increased 1200 mg three times a day 5/2016 with no change in seizure, however, RLS symptoms decreased  13.  Klonopin:  One trial started 03/2013.  Is used for anxiety, not for seizures.  It was started at 0.5 mg and optimized so far to 3 mg per day.     Assessment:  This drug is used by a psychiatrist.  If we one day want to try ONFI in this patient, maybe then the Klonopin could be decreased again.   14. Vagus nerve stimulator setting: Not able to tolerate higher duty cycle (35) with 1.1 signal off time and 30 sec on time, seizure worsened, not able to tolerate higher current setting due to discomfort.     REVIEW OF SYSTEMS:  Head tremor, which bothers him.  He continues to have bilateral hand tremors on Depakote.  No nausea, vomiting, diarrhea.  No rash.  Vagus nerve stimulator setting are bother him (voice changes and throat discomfort).      EXAMINATION:  /73 (BP Location: Right arm, Patient Position: Chair, Cuff Size: Adult Regular)  Pulse 83  Temp 99  F (37.2  C)  Resp 16  Wt 136 lb (61.7 kg)  BMI 21.62 kg/m2  Alert, orientated, speech is fluent, face is symmetric, No head/ hand tremor, no focal deficits  noted.Gait is stable.     ASSESSMENT:    1. 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 Cavernoma intracranial   3. Memory deficits secondary to seizures and antiepileptic drugs    4. Anxiety/Depression  5.  Restless legs syndrome.  6. History of Migraines  7. Status post pacemaker placement 12/2003.       1. Refractory multifocal epilepsy.  Etiology multiple cavernomas.  The patient is status left temporal resection of AVM in 1985 (age 11), Right frontal hematoma 04/13/2006, treated with surgery.  Left-sided cavernous hemangioma 07/2002. Status post epilepsy surgery 05/29/2012.  Vagus nerve stimulator implanted on right and he has pacemaker on left.  Electrographically, he has multifocal epileptiform discharges and seizures arising from left and right temporal regions.     Antiepileptic drug discussion: Insurance coverage for new antiepileptic drug is not adequate. He is not able to tolerate higher doses of oxcarbazepine or phenytoin. We will try to increase Levetiracetam. Then consider felbamate. Other antiepileptic drug to consider are banzel, Brivaracetam, fycompa, but, insurance coverage is not good and they are not able to afford these medications. Retrial with lamotrigine (tremos will worsen), Patient and wife declined Responsive Neurostimulation (Neuropace) and DBS or any brain surgery today.     Vagus nerve stimulator: Interogatted today and decreased current to 1.0mA (from 2.0mA) to determine if it is helpful. He will need a vagus nerve stimulator battery replacement.     2. Restless legs syndrome. Stable on gabapentin .       3. Anxiety and depression.  Managed by psychiatrist (Dr. López). Risperdal was decreased to decrease tremors. They will work with psychiatrist to consider new agents.     4. Tremor in head improved with lower dose of risperdal. His hand tremor stopped once we stopped depakote. They saw Dr. Bloom for diagnosis/treatment.            PLAN:   1. Continue same antiepileptic drug. Then  consider felbamate, felbamate may increase phenytoin  Levels.     Levetiracetam, 750 mg in the morning, 1500 mg at 4 p.m., and 1500 mg at 11 p.m.   Oxcarbazepine  900 mg t.i.d.   Gabapentin 1200 mg AM, 1200 mg noon, and 1200 mg PM  (started for RLS, patient has taken higher dose for some time, not sure of length)   Klonopin 2 -4 mg  (started 3/2013 for anxiety, takes one per day and two on weekend)  Phenytoin ER  100-100  Diazepam PRN for CPS lasting greater than 5 minutes or more then 2 in one hour.      2. Decreased vagus nerve stimulator to 1.0 mA to determine is seizure worsen, if they do then he should come in and have vagus nerve stimulator increase back to 2.0 mA  3. Follow up 3 month    I spent 40 minutes with the patient. Total time with patient was 45 minutes. Patient was seen by me and Dr. Schneider. During this time counseling and coordination of care exceeded 50% of the visit time. I addressed all questions and concerns the patient raised in regards to his care.     REBEKAH CHRISTOPHER MD

## 2018-05-22 NOTE — PROGRESS NOTES
"[facility] Progress Note    Patient:  Ammon Cronin  :  1975   Age:  42 year old   Today's Office Visit:  2018    Epilepsy Data:  Patient History  Primary Epileptologist/Provider: Rosenda Claros M.D.  Epilepsy Syndrome: Localization-related epilepsy unspecified  Epilepsy Syndrome Status: Final  Age of Onset: 16  Etiology  : Tumor  Other Relevant Dx/ Issues: Etiology likely cavernous hemangioma; acute hemorrhage 2005.  Born 5 weeks premature. Left temporal AVM with resection age 11.  Cardiac pacemaker  (bradycardia).  Hx of migraines.  Inpatient evaluations 3/02, ,  & .     Tests/Surgery History  Last EE2013  Last Neuropsych Testin2009  Last Case Management Conference: 3/21/2012  Epilepsy Surgery #1 Date: 11  Epilepsy Related Surgery #1 : Type: VNS implantation on RIGHT.  Seizure Record  Current Visit Date: 18  Previous Visit Date: 18  Months since last visit: 2.99  Seizure Type 1: Simple partial seizures unspecified  Description of Sz Type 1: \"hard to describe, feeling in the head, somthing is going to happen\"   Seizure Type 2: Complex partial seizures unspecified  Description of Sz Type 2: \"Brief\" ones consist of mumbling and not being very repsonsive at that time; duration 5-10 seconds.  \"Bigger\" ones consist of unresponsiveness, staring, confusion, lip smacking movements, may pick at objects or sort things, repetitive behavior.  Lasts few minutes.  Seizure Type 3: Partial seizures with secondary generalization  -  with complex partial seizures evolving to generalized seizures  # of Type 3 Seizure since last visit: 0  Freq. Type 3 / Month: 0    SEIZURE HISTORY:   Copied forward:  First seizure was at the age of 16 (loss of awarenes). 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 Patient has intractable localization-related epilepsy with multiple " seizure foci.   Presurgically workup in 2002 revealed bilateral independent seizure foci in the temporal lobes. Ictal asystole (2003, he developed chest pain, pallor, diaphoresis and nausea, was found to have a 22 second period of asystole.  Emergent cardiac pacemaker placed). MRI of the brain shows a cavernoma in the right frontal and a second one in the left temporal lobe and encephalomalacia.      INTERVAL HISTORY:  Came with Jasmyne. Last two weeks he had less seizure, but, 2 weeks prior he had several seizure, 18 seizure per day. On average his seizure frequency has decreased 1-2 per day, increased on weekends to 5-6 complex partial seizure per day, this happens on some weekends. He did not tolerate levetiracetam higher dose, it caused slurred speech, double vision, unstable gait, severe side effects. He went back to previous dose. Today we spoke about Deep Brain Stimulation device.  I did tell family, we have used most antiepileptic drug and brand name medications are not affordable for them, therefore, we have limited options. They are concerned that vagus nerve stimulator is not helpful. They would like to decrease current and determine if seizure worsen. If seizure do not worsen then they do not want to change  Vagus nerve stimulator battery.       Prior to Admission medications    Medication Sig Start Date End Date Taking? Authorizing Provider   diazepam (VALIUM) 5 MG tablet TAKE 1 TAB AS NEEDED FOR SEIZURES >5MIN OR >3 SEIZURES/8HRS, MAY REPEAT ONCE, MAX 10MG/24HR, CALL 911 IF BREATHING PROBLEMS 6/28/17  Yes Rosenda Claros MD           clonazePAM (KLONOPIN) 2 MG tablet Take 2 mg by mouth 2 times daily as needed for anxiety    Reported, Patient   phenytoin (DILANTIN) 100 MG CR capsule Titrate to 100 mg twice a day 5/31/17   Rosenda Claros MD   OXcarbazepine (TRILEPTAL) 300 MG tablet TAKE 3 TABLETS THREE TIMES DAILY 5/4/17   Rosenda Claros MD   gabapentin (NEURONTIN) 300 MG capsule Take 4 cap po tid 8/2/16    Rosenda Claros MD   levETIRAcetam (KEPPRA) 750 MG tablet Generic.  Take 1 tablet in am, 2 tablets at 4 pm and  2 tablet at 11 pm. 8/2/16   Rosenda Claros MD   risperiDONE (RISPERDAL) 1 MG tablet Take 1 mg by mouth 2 times daily    Reported, Patient   diazepam (DIAZEPAM INTENSOL) 5 MG/ML solution Seizures greater than 5 minutes or cluster of more than 3 seizures in 8 hour period. May repeat dose once, no more than 6 mg in 24 hour period. 2/3/14   Eric Cruz MD   FLUoxetine (PROZAC) 40 MG capsule Take 40 mg by mouth daily Started March 2013    Reported, Patient   Multiple Vitamins-Minerals (MULTIVITAMIN & MINERAL PO) Take 1 tablet by mouth daily.    Reported, Patient   OMEGA-3 FATTY ACIDS PO Take 1 tablet by mouth daily.    Reported, Patient         AED MEDICATIONS:    1. Levetiracetam, 750 mg in the morning, 1500 mg at 4 p.m., and 1500 mg at 11 p.m.   2. Oxcarbazepine  900 mg t.i.d.   3. Gabapentin 1200 mg AM, 1200 mg noon, and 1200 mg PM  (started for RLS, patient has taken higher dose for some time, not sure of length)   5. Klonopin 2 -4 mg  (started 3/2013 for anxiety, takes one per day and two on weekend)  6. Phenytoin ER  100-100  7. Diazepam PRN for CPS lasting greater than 5 minutes or more then 2 in one hour.          MEDICATION NOTES/PRIOR ANTIEPILEPTIC DRUGS:    1.Lyrica (ineffective for seizure, max dose 1100mg per day)   2.Topamax: Two trials from 01/2008-05/2008 and the second trial on 05/19/2015.  Max dose was 200 mg per day.  CP max was 3.5 mg/L.  There was no change in seizure frequency.  The patient was compliant.  During the second trial, they tried it for mood stabilization; 50 mg was after 2 months discontinued because side effects were speech and word-finding difficulties.   3.Gabatril (ineffective, caused fatigue at 48mg/ day). One trial from 01/2004-04/2006.  Max dose 48 mg per day.  It was ineffective and was discontinued.  There was no increase in seizures when it was stopped.   Side effects were fatigue.   4.Zonegran  One trial from 12/2003.  Max dose 400 mg per day.  Zonegran was used in combination with Keppra and Trileptal.  Side effects were memory impairment, aggressiveness, fatigue, behavioral changes, cognitive impairment, which improved after Zonegran was discontinued.   5.Dilantin: One trial from ? to 03/08/2002.  Max dose 350 mg per day.  CP max and free Dilantin level of 1.9 mg/L.  Efficacy:  Unknown.  The drug was optimized.  Side effects were tiredness on 350 mg a day.     Assessment:  It looks like seizure control was better when the patient was on Dilantin; therefore, it could be retried.   6.Vimpat: One trial 07/2009-09/2009.  Max dose 400 mg per day.  CP max 5.3 mg/L.  It was discontinued after 2 months because the patient experienced dizziness, diplopia and ataxia.  7.Depakote: One trial from 01/2010 to 05/09/2016.  So far, seizures have not increased because we increased the gabapentin.  Also, the headaches have not gotten worse, but the hand tremor and the head tremor have immensely improved since Depakote was discontinued.  Max dose was 2500 mg per day.  CP max was up to 116/15.6 mg/L.  In the past, it decreased seizure frequency, and it was used together with levetiracetam, oxcarbazepine and gabapentin, and it was used before with oxcarbazepine, levetiracetam and Lyrica.   Assessment:  Depakote could always be retried if we run into a problem.  8.lamotrigine: One trial from 07/06/2001.  Max dose 150 mg per day.  Used in combination with Dilantin.  There was no change in seizure frequency.  Side effects were increase in confusion and therefore questionable efficacy.  9.carbamazepine (tired, but no past info on details)  10.Levetiracetam: started in 2001. No major side effects. One trial from 07/2001 to present.  Max dose 3750 mg per day.  CP max 42.4 mg/L. Levetiracetam above 4000 mg per day caused slurred speech, double vision, unstable gait, severe side effects.    11. Oxcarbazepine:  One trial from 03/2002 to present.  Max dose 2700 mg per day.  CP max 43.2 mg/L.  It decreases seizures, and it looks like it is one of the best drugs the patient has tried.  increased oxcarbazepine from 2,400mg -> 2700 mg on 4/2013.   12.  Gabapentin:  One trial from 07/2012 to present.  Max dose is 3600 mg per day.  This drug was used more for pain and restless legs, but when Depakote was tapered, we increased the gabapentin, and it looks like the only problem we have is some weight gain with the gabapentin.  Gabapentin  increased 1200 mg three times a day 5/2016 with no change in seizure, however, RLS symptoms decreased  13.  Klonopin:  One trial started 03/2013.  Is used for anxiety, not for seizures.  It was started at 0.5 mg and optimized so far to 3 mg per day.     Assessment:  This drug is used by a psychiatrist.  If we one day want to try ONFI in this patient, maybe then the Klonopin could be decreased again.   14. Vagus nerve stimulator setting: Not able to tolerate higher duty cycle (35) with 1.1 signal off time and 30 sec on time, seizure worsened, not able to tolerate higher current setting due to discomfort.        REVIEW OF SYSTEMS:  Head tremor, which bothers him.  He continues to have bilateral hand tremors on Depakote.  No nausea, vomiting, diarrhea.  No rash.  Vagus nerve stimulator setting are bother him (voice changes and throat discomfort).      EXAMINATION:  /73 (BP Location: Right arm, Patient Position: Chair, Cuff Size: Adult Regular)  Pulse 83  Temp 99  F (37.2  C)  Resp 16  Wt 136 lb (61.7 kg)  BMI 21.62 kg/m2  Alert, orientated, speech is fluent, face is symmetric, No head/ hand tremor, no focal deficits noted.Gait is stable.     ASSESSMENT:    1. 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 Cavernoma intracranial   3. Memory deficits secondary to seizures and antiepileptic drugs    4. Anxiety/Depression  5.  Restless legs syndrome.  6. History of  Migraines  7. Status post pacemaker placement 12/2003.       1. Refractory multifocal epilepsy.  Etiology multiple cavernomas.  The patient is status left temporal resection of AVM in 1985 (age 11), Right frontal hematoma 04/13/2006, treated with surgery.  Left-sided cavernous hemangioma 07/2002. Status post epilepsy surgery 05/29/2012.  Vagus nerve stimulator implanted on right and he has pacemaker on left.  Electrographically, he has multifocal epileptiform discharges and seizures arising from left and right temporal regions.     Antiepileptic drug discussion: Insurance coverage for new antiepileptic drug is not adequate. He is not able to tolerate higher doses of oxcarbazepine or phenytoin. We will try to increase Levetiracetam. Then consider felbamate. Other antiepileptic drug to consider are banzel, Brivaracetam, fycompa, but, insurance coverage is not good and they are not able to afford these medications. Retrial with lamotrigine (tremos will worsen), Patient and wife declined Responsive Neurostimulation (Neuropace) and DBS or any brain surgery today.     Vagus nerve stimulator: Interogatted today and decreased current to 1.0mA (from 2.0mA) to determine if it is helpful. He will need a vagus nerve stimulator battery replacement.     2. Restless legs syndrome. Stable on gabapentin .       3. Anxiety and depression.  Managed by psychiatrist (Dr. López). Risperdal was decreased to decrease tremors. They will work with psychiatrist to consider new agents.     4. Tremor in head improved with lower dose of risperdal. His hand tremor stopped once we stopped depakote. They saw Dr. Bloom for diagnosis/treatment.            PLAN:   1. Continue same antiepileptic drug. Then consider felbamate, felbamate may increase phenytoin  Levels.     Levetiracetam, 750 mg in the morning, 1500 mg at 4 p.m., and 1500 mg at 11 p.m.   Oxcarbazepine  900 mg t.i.d.   Gabapentin 1200 mg AM, 1200 mg noon, and 1200 mg PM  (started for  RLS, patient has taken higher dose for some time, not sure of length)   Klonopin 2 -4 mg  (started 3/2013 for anxiety, takes one per day and two on weekend)  Phenytoin ER  100-100  Diazepam PRN for CPS lasting greater than 5 minutes or more then 2 in one hour.      2. Decreased vagus nerve stimulator to 1.0 mA to determine is seizure worsen, if they do then he should come in and have vagus nerve stimulator increase back to 2.0 mA  3. Follow up 3 month        I spent 40 minutes with the patient. Total time with patient was 45 minutes. Patient was seen by me and Dr. Schneider. During this time counseling and coordination of care exceeded 50% of the visit time. I addressed all questions and concerns the patient raised in regards to his care.     REBEKAH CHRISTOPHER MD

## 2018-05-22 NOTE — MR AVS SNAPSHOT
After Visit Summary   2018    Ammon Cronin    MRN: [id number]           Patient Information     Date Of Birth          1975        Visit Information        Provider Department      2018 10:00 AM Rosenda Claros MD [facility]         Follow-ups after your visit        Follow-up notes from your care team     Return in about 3 months (around 2018).      Your next 10 appointments already scheduled     Sep 05, 2018 11:00 AM CDT   Vagus Nerve Stimulation with Rosenda Claros MD, [facility] Nurse 2   [facility] ([facility])    [address], [address]  [place] [facility] [id number] [phone]              Who to contact     Please call your clinic at [phone] to:    Ask questions about your health    Make or cancel appointments    Discuss your medicines    Learn about your test results    Speak to your doctor            Additional Information About Your Visit        MyChart Information     Zent[employer]at is an electronic gateway that provides easy, online access to your medical records. With [employer], you can request a clinic appointment, read your test results, renew a prescription or communicate with your care team.     To sign up for [employer]ent[employer] visit the website at [email or web address]   You will be asked to enter the access code listed below, as well as some personal information. Please follow the directions to create your username and password.     Your access code is: [email or web address]  Expires: 2018 11:01 AM     Your access code will  in 90 days. If you need help or a new code, please contact your [facility] or call [phone] for assistance.        Care EveryWhere ID     This is your Care EveryWhere ID. This could be used by other organizations to access your [facility] medical records  [id number]        Your Vitals Were     Pulse Temperature Respirations BMI (Body Mass Index)          83 99  F (37.2  C) 16  21.62 kg/m2         Blood Pressure from Last 3 Encounters:   05/22/18 121/73   02/20/18 154/84   11/14/17 114/76    Weight from Last 3 Encounters:   05/22/18 136 lb (61.7 kg)   02/20/18 137 lb (62.1 kg)   11/14/17 151 lb 3.2 oz (68.6 kg)              Today, you had the following     No orders found for display       Primary Care Provider    None Specified       No primary provider on file.        Equal Access to Services     ESTER LITTLE : Hadii aad ku hadasho Soomaali, waaxda luqadaha, qaybta kaalmada adeegyada, waxay adityain fadia palacios . So [facility] [phone].    ATENCIÓN: Si habla español, tiene a correa disposición servicios gratuitos de asistencia lingüística. Ll[facility] [phone].    We comply with applicable federal civil rights laws and [place] laws. We do not discriminate on the basis of race, color, national origin, age, disability, sex, sexual orientation, or gender identity.            Thank you!     Thank you for choosing [facility]  for your care. Our goal is always to provide you with excellent care. Hearing back from our patients is one way we can continue to improve our services. Please take a few minutes to complete the written survey that you may receive in the mail after your visit with us. Thank you!             Your Updated Medication List - Protect others around you: Learn how to safely use, store and throw away your medicines at www.disposemymeds.org.          This list is accurate as of 5/22/18 11:01 AM.  Always use your most recent med list.                   Brand Name Dispense Instructions for use Diagnosis    clonazePAM 2 MG tablet    klonoPIN    90 tablet    Take 1 tablet (2 mg) by mouth 2 times daily as needed for anxiety    Partial epilepsy with impairment of consciousness, intractable (H), Tremor, Restless legs syndrome, Long term use of drug, Restless leg syndrome, Partial epilepsy with loss of consciousness, intractable (H)       * diazepam 5 MG/ML (HIGH  CONC) solution    DIAZEPAM INTENSOL    30 mL    Seizures greater than 5 minutes or cluster of more than 3 seizures in 8 hour period. May repeat dose once, no more than 6 mg in 24 hour period.    Unspecified epilepsy with intractable epilepsy       * diazepam 5 MG tablet    VALIUM    30 tablet    TAKE 1 TAB AS NEEDED FOR SEIZURES >5MIN OR >3 SEIZURES/8HRS, MAY REPEAT ONCE, MAX 10MG/24HR, CALL 911 IF BREATHING PROBLEMS    Restless leg syndrome, Partial epilepsy with impairment of consciousness, intractable (H)       gabapentin 300 MG capsule    NEURONTIN    1080 capsule    TAKE 4 CAPSULES THREE TIMES DAILY    Restless legs syndrome, Partial epilepsy with impairment of consciousness, intractable (H), Long term use of drug, Restless leg syndrome, Partial epilepsy with loss of consciousness, intractable (H), Tremor       * levETIRAcetam 750 MG tablet    KEPPRA    450 tablet    TAKE 1 TABLET IN THE MORNING, 2 TABLETS AT 4PM, AND 2 TABLETS AT 11PM    Restless legs syndrome, Partial epilepsy with impairment of consciousness, intractable (H), Long term use of drug, Restless leg syndrome, Partial epilepsy with loss of consciousness, intractable (H), Tremor       * levETIRAcetam 250 MG tablet    KEPPRA    90 tablet    Titrate as instructed to 1500 mg am    Restless legs syndrome, Partial epilepsy with impairment of consciousness, intractable (H), Long term use of drug, Restless leg syndrome, Partial epilepsy with loss of consciousness, intractable (H), Tremor       MULTIVITAMIN & MINERAL PO      Take 1 tablet by mouth daily.        OXcarbazepine 300 MG tablet    TRILEPTAL    810 tablet    TAKE 3 TABLETS THREE TIMES DAILY    Partial epilepsy with loss of consciousness, intractable (H), Restless legs syndrome, Partial epilepsy with impairment of consciousness, intractable (H), Long term use of drug, Restless leg syndrome, Tremor       phenytoin 100 MG CR capsule    DILANTIN    180 capsule    1 CAPSULE TWICE A DAY    Partial  epilepsy with impairment of consciousness, intractable (H), Tremor, Restless legs syndrome, Long term use of drug, Restless leg syndrome, Partial epilepsy with loss of consciousness, intractable (H)       PROZAC 40 MG capsule   Generic drug:  FLUoxetine      Take 60 mg by mouth daily Started March 2013        risperiDONE 1 MG tablet    risperDAL     Take 1 mg by mouth 2 times daily Pt wife says they are Tapering off    Partial epilepsy with impairment of consciousness, intractable (H), Restless legs syndrome, Long term use of drug       * Notice:  This list has 4 medication(s) that are the same as other medications prescribed for you. Read the directions carefully, and ask your doctor or other care provider to review them with you.

## 2018-05-30 ENCOUNTER — TELEPHONE (OUTPATIENT)
Dept: NEUROLOGY | Facility: CLINIC | Age: 43
End: 2018-05-30

## 2018-05-30 DIAGNOSIS — G25.81 RESTLESS LEG SYNDROME: ICD-10-CM

## 2018-05-30 DIAGNOSIS — G40.219 PARTIAL EPILEPSY WITH IMPAIRMENT OF CONSCIOUSNESS, INTRACTABLE (H): ICD-10-CM

## 2018-05-30 DIAGNOSIS — G40.219 PARTIAL EPILEPSY WITH LOSS OF CONSCIOUSNESS, INTRACTABLE (H): ICD-10-CM

## 2018-05-30 DIAGNOSIS — R25.1 TREMOR: ICD-10-CM

## 2018-05-30 DIAGNOSIS — Z79.899 LONG TERM USE OF DRUG: ICD-10-CM

## 2018-05-30 DIAGNOSIS — G25.81 RESTLESS LEGS SYNDROME: ICD-10-CM

## 2018-05-30 RX ORDER — LEVETIRACETAM 750 MG/1
TABLET ORAL
Qty: 450 TABLET | Refills: 3 | Status: SHIPPED | OUTPATIENT
Start: 2018-05-30 | End: 2018-09-05

## 2018-05-30 NOTE — TELEPHONE ENCOUNTER
----- Message from Renu Licona LPN sent at 5/30/2018  9:33 AM CDT -----  Regarding: refill  RE: Levetiracetam 750mg    SIG: TAKE 1 TABLET IN THE MORNING, 2 TABLETS AT 4PM, AND 2 TABLETS AT 11PM    # of days supply: 90    Pharmacy:    [facility] - [facility] [address]    RTC date: 09/05/18    Request from: Patient calling

## 2018-06-04 ENCOUNTER — TELEPHONE (OUTPATIENT)
Dept: NEUROLOGY | Facility: CLINIC | Age: 43
End: 2018-06-04

## 2018-06-04 NOTE — TELEPHONE ENCOUNTER
Nurse received In-Basket message as follows:  RE: Oxcarbazepine     Pt's phone number: [phone]     # of days supply: 90     Pharmacy:     [facility] - [address]       RT date:     Request from: Patient calling, he states the pharmacy needs a refill of this but it appears a year supply was sent in February. Would someone please check with [employer] and call the patient to confirm. Thank you     Nurse placed call to pharmacy where they indicate that Oxcarbazepine has refills; however they were just calling about his clonazepam.  Per pharmacy tech, Patient has two orders for clonazepam on by Dr. Claros, and one by Dr. Lockhart which differe from one another.  Nurse placed call to Patient's wife to inform her of Oxcarbazepine refills being available and to inquire about clonazepam.  Per wife the Dr. Lockhart order is correct and she is uncertain why there is a order from Dr. Claros as that therapy was under her.  Nurse explained that it probably came to use as a refill request and that it is sometime ordered by our ., nurse indicated that he would d/c incorrect order.  She expressed understanding and agreement.

## 2018-06-05 ENCOUNTER — TELEPHONE (OUTPATIENT)
Dept: NEUROLOGY | Facility: CLINIC | Age: 43
End: 2018-06-05

## 2018-06-05 DIAGNOSIS — G40.219 PARTIAL EPILEPSY WITH LOSS OF CONSCIOUSNESS, INTRACTABLE (H): ICD-10-CM

## 2018-06-05 DIAGNOSIS — R25.1 TREMOR: ICD-10-CM

## 2018-06-05 DIAGNOSIS — G40.219 PARTIAL EPILEPSY WITH IMPAIRMENT OF CONSCIOUSNESS, INTRACTABLE (H): ICD-10-CM

## 2018-06-05 DIAGNOSIS — Z79.899 LONG TERM USE OF DRUG: ICD-10-CM

## 2018-06-05 DIAGNOSIS — G25.81 RESTLESS LEG SYNDROME: ICD-10-CM

## 2018-06-05 DIAGNOSIS — G25.81 RESTLESS LEGS SYNDROME: ICD-10-CM

## 2018-06-05 RX ORDER — GABAPENTIN 600 MG/1
TABLET ORAL
Qty: 180 TABLET | Refills: 0 | Status: SHIPPED | OUTPATIENT
Start: 2018-06-05 | End: 2018-09-05

## 2018-06-05 NOTE — TELEPHONE ENCOUNTER
"Renu Licona, LPN  [facility] Refill Pool; [facility] Rn Pool                   Patient states he needs an Urgent refill of Gabapentin or samples. It looks like this was filled for a year supply in Feb. Please call the patient to discuss. Thank you      Ammon calls today requesting refill of Gabapentin. He was provided with 1080 tabs (x 3 month supply) on 2/20/18. He reports that he \"is not sure what he did with is medication\".  He is going to [place] on 6/16/18 for one week.    This call was taken by Dr. Leos. Will close encounter.  "

## 2018-06-05 NOTE — TELEPHONE ENCOUNTER
Patient is out of medication and they do not think, they made a mistake. Patient can get refills of Gabapentin 300 mg cap only on 6/17/18. Will try to call in gabapentin 600 mg 2 tab po tid x 1 months [facility].

## 2018-06-05 NOTE — TELEPHONE ENCOUNTER
----- Message from Heidy Wilde MA sent at 6/5/2018  8:55 AM CDT -----  Regarding: Refill ASAP  Contact: [phone]  Pt needs refill ASAP, [place] leaving for [place] and the mailorder service cannot send out until 06/17/2018.  Seeing if he can speak with someone, and to see if we have any samples. Please call Pt and advise    RE: gabapentin (NEURONTIN) 300 MG capsule    SIG: TAKE 4 CAPSULES THREE TIMES DAILY    # of days supply: 30    Ph[facility]:    [facility] [place], [place] - [address]    RTC date: 09/05/2018    Request from: Patient calling

## 2018-06-26 ENCOUNTER — TELEPHONE (OUTPATIENT)
Dept: NEUROLOGY | Facility: CLINIC | Age: 43
End: 2018-06-26

## 2018-06-26 DIAGNOSIS — Z79.899 LONG TERM USE OF DRUG: ICD-10-CM

## 2018-06-26 DIAGNOSIS — R25.1 TREMOR: ICD-10-CM

## 2018-06-26 DIAGNOSIS — G25.81 RESTLESS LEG SYNDROME: ICD-10-CM

## 2018-06-26 DIAGNOSIS — G40.219 PARTIAL EPILEPSY WITH LOSS OF CONSCIOUSNESS, INTRACTABLE (H): ICD-10-CM

## 2018-06-26 DIAGNOSIS — G25.81 RESTLESS LEGS SYNDROME: ICD-10-CM

## 2018-06-26 DIAGNOSIS — G40.219 PARTIAL EPILEPSY WITH IMPAIRMENT OF CONSCIOUSNESS, INTRACTABLE (H): ICD-10-CM

## 2018-06-26 RX ORDER — GABAPENTIN 600 MG/1
TABLET ORAL
Qty: 186 TABLET | Refills: 3 | Status: SHIPPED | OUTPATIENT
Start: 2018-06-26 | End: 2018-09-05

## 2018-06-26 NOTE — TELEPHONE ENCOUNTER
Nurse received In-Basket message as follows:  Patient would like to change his Gabapentin from 300mg caps to 600mg caps. He takes them TID. Please call him to discuss     Nurse returned call to patient left detailed message indicating this was already done - but appear to be a temporary order to bridge while waiting for mail order.  Similar change made to mail order script.

## 2018-09-05 ENCOUNTER — OFFICE VISIT (OUTPATIENT)
Dept: NEUROLOGY | Facility: CLINIC | Age: 43
End: 2018-09-05
Payer: COMMERCIAL

## 2018-09-05 VITALS
SYSTOLIC BLOOD PRESSURE: 115 MMHG | BODY MASS INDEX: 22.45 KG/M2 | HEART RATE: 76 BPM | WEIGHT: 141.2 LBS | DIASTOLIC BLOOD PRESSURE: 71 MMHG | TEMPERATURE: 98.7 F

## 2018-09-05 DIAGNOSIS — G40.219 PARTIAL EPILEPSY WITH LOSS OF CONSCIOUSNESS, INTRACTABLE (H): ICD-10-CM

## 2018-09-05 DIAGNOSIS — R25.1 TREMOR: ICD-10-CM

## 2018-09-05 DIAGNOSIS — G25.81 RESTLESS LEGS SYNDROME: ICD-10-CM

## 2018-09-05 DIAGNOSIS — Z79.899 LONG TERM USE OF DRUG: ICD-10-CM

## 2018-09-05 DIAGNOSIS — G40.219 PARTIAL EPILEPSY WITH IMPAIRMENT OF CONSCIOUSNESS, INTRACTABLE (H): ICD-10-CM

## 2018-09-05 DIAGNOSIS — G25.81 RESTLESS LEG SYNDROME: ICD-10-CM

## 2018-09-05 RX ORDER — LEVETIRACETAM 750 MG/1
TABLET ORAL
Qty: 450 TABLET | Refills: 3 | Status: SHIPPED | OUTPATIENT
Start: 2018-09-05 | End: 2019-07-30

## 2018-09-05 RX ORDER — CLONIDINE HYDROCHLORIDE 0.1 MG/1
0.1 TABLET ORAL 2 TIMES DAILY
COMMUNITY
Start: 2018-07-10 | End: 2019-05-14

## 2018-09-05 RX ORDER — GABAPENTIN 600 MG/1
TABLET ORAL
Qty: 540 TABLET | Refills: 3 | Status: SHIPPED | OUTPATIENT
Start: 2018-09-05 | End: 2019-03-12

## 2018-09-05 RX ORDER — OXCARBAZEPINE 300 MG/1
TABLET, FILM COATED ORAL
Qty: 810 TABLET | Refills: 3 | Status: SHIPPED | OUTPATIENT
Start: 2018-09-05 | End: 2019-07-30

## 2018-09-05 RX ORDER — PHENYTOIN SODIUM 100 MG/1
CAPSULE, EXTENDED RELEASE ORAL
Qty: 180 CAPSULE | Refills: 3 | Status: SHIPPED | OUTPATIENT
Start: 2018-09-05 | End: 2019-03-12

## 2018-09-05 ASSESSMENT — PAIN SCALES - GENERAL: PAINLEVEL: NO PAIN (0)

## 2018-09-05 NOTE — PROGRESS NOTES
"[facility] Epilepsy Care Progress Note    Patient:  Ammon Cronin  :  1975   Age:  43 year old   Today's Office Visit:  2018    Epilepsy Data:  Patient History  Primary Epileptologist/Provider: Rosenda Claros M.D.  Epilepsy Syndrome: Localization-related epilepsy unspecified  Epilepsy Syndrome Status: Final  Age of Onset: 16  Etiology  : Tumor  Other Relevant Dx/ Issues: Etiology likely cavernous hemangioma; acute hemorrhage 2005.  Born 5 weeks premature. Left temporal AVM with resection age 11.  Cardiac pacemaker  (bradycardia).  Hx of migraines.  Inpatient evaluations 3/02, ,  & .     Tests/Surgery History  Last EE2013  Last Neuropsych Testin2009  Last Case Management Conference: 3/21/2012  Epilepsy Surgery #1 Date: 11  Epilepsy Related Surgery #1 : Type: VNS implantation on RIGHT.  Seizure Record  Current Visit Date: 18  Previous Visit Date: 18  Months since last visit: 3.48  Seizure Type 1: Simple partial seizures unspecified  Description of Sz Type 1: \"hard to describe, feeling in the head, somthing is going to happen\"   Seizure Type 2: Complex partial seizures unspecified  Description of Sz Type 2: \"Brief\" ones consist of mumbling and not being very repsonsive at that time; duration 5-10 seconds.  \"Bigger\" ones consist of unresponsiveness, staring, confusion, lip smacking movements, may pick at objects or sort things, repetitive behavior.  Lasts few minutes.  # of Type 2 Seizure since last visit: 210  Freq. Type 2 / Month: 60.34  Seizure Type 3: Partial seizures with secondary generalization  -  with complex partial seizures evolving to generalized seizures  Description of Sz Type 3: 0       SEIZURE HISTORY:   Copied forward:  First seizure was at the age of 16 (loss of awarenes). 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 Patient has intractable " localization-related epilepsy with multiple seizure foci.   Presurgically workup in 2002 revealed bilateral independent seizure foci in the temporal lobes. Ictal asystole (2003, he developed chest pain, pallor, diaphoresis and nausea, was found to have a 22 second period of asystole.  Emergent cardiac pacemaker placed). MRI of the brain shows a cavernoma in the right frontal and a second one in the left temporal lobe and encephalomalacia.      INTERVAL HISTORY:  Came without Jasmyne. We called her about seizure frequency. Continues to have on average 1-2 per day, increased on weekends to 5-6 complex partial seizure per day, this happens on some weekends. Started Clonidine for mood. He is seeing psychiatrist Dr. Sawyer.     Patient did not fall , is compliant with anti seizure medications , did not have emergency room visit  or did not have any hospitalization . Currently, on antiepileptic drugs there is there is fatigue during the day , there are symptoms of dizziness, double vision or there are more psychiatric issues such as behavioral outburst, irritablity, worsening depression, no suicidical thoughts. On this visit we spoke about patient's seizures, antiepileptic drug, and plan of epilepsy are. Patient/caregiver was agreeable with plan of care. In the last years we spoke about Deep Brain Stimulation device. They would like to set vagus nerve stimulator current to zero to determine if seizure worsen.   .  Prior to Admission medications    Medication Sig Start Date End Date Taking? Authorizing Provider   cloNIDine (CATAPRES) 0.1 MG tablet  7/10/18  Yes Reported, Patient   diazepam (VALIUM) 5 MG tablet TAKE 1 TAB AS NEEDED FOR SEIZURES >5MIN OR >3 SEIZURES/8HRS, MAY REPEAT ONCE, MAX 10MG/24HR, CALL 911 IF BREATHING PROBLEMS 10/23/17  Yes Rosenda Claros MD   FLUoxetine (PROZAC) 40 MG capsule Take 60 mg by mouth daily Started March 2013   Yes Reported, Patient   gabapentin (NEURONTIN) 600 MG tablet Take 2 tabs ( 1200  mg ) three times daily 6/26/18  Yes Rosenda Claros MD   gabapentin (NEURONTIN) 600 MG tablet Take 2 tab po TID 6/5/18  Yes Rosenda Claros MD   levETIRAcetam (KEPPRA) 750 MG tablet TAKE 1 TABLET IN THE MORNING, 2 TABLETS AT 4PM, AND 2 TABLETS AT 11PM 5/30/18  Yes Rosenda Claros MD   Multiple Vitamins-Minerals (MULTIVITAMIN & MINERAL PO) Take 1 tablet by mouth daily.   Yes Reported, Patient   OXcarbazepine (TRILEPTAL) 300 MG tablet TAKE 3 TABLETS THREE TIMES DAILY 2/20/18  Yes Rosenda Claros MD   phenytoin (DILANTIN) 100 MG CR capsule 1 CAPSULE TWICE A DAY 2/20/18  Yes Rosenda Claros MD   risperiDONE (RISPERDAL) 1 MG tablet Take 1 mg by mouth 2 times daily Pt wife says they are Tapering off   Yes Reported, Patient   diazepam (DIAZEPAM INTENSOL) 5 MG/ML solution Seizures greater than 5 minutes or cluster of more than 3 seizures in 8 hour period. May repeat dose once, no more than 6 mg in 24 hour period.  Patient not taking: Reported on 5/22/2018 2/3/14   Eric Cruz MD   levETIRAcetam (KEPPRA) 250 MG tablet Titrate as instructed to 1500 mg am  Patient not taking: Reported on 5/22/2018 2/20/18   Rosenda Claros MD       AED MEDICATIONS:    1. Levetiracetam, 750 mg in the morning, 1500 mg at 4 p.m., and 1500 mg at 11 p.m.   2. Oxcarbazepine  900 mg t.i.d.   3. Gabapentin 1200 mg AM, 1200 mg noon, and 1200 mg PM  (started for RLS, patient has taken higher dose for some time, not sure of length)   5. Klonopin 2 -4 mg  (started 3/2013 for anxiety, takes one per day and two on weekend)  6. Phenytoin ER  100-100  7. Diazepam PRN for CPS lasting greater than 5 minutes or more then 2 in one hour.          MEDICATION NOTES/PRIOR ANTIEPILEPTIC DRUGS:    1.Lyrica (ineffective for seizure, max dose 1100mg per day)   2.Topamax: Two trials from 01/2008-05/2008 and the second trial on 05/19/2015.  Max dose was 200 mg per day.  CP max was 3.5 mg/L.  There was no change in seizure frequency.  The patient was compliant.   During the second trial, they tried it for mood stabilization; 50 mg was after 2 months discontinued because side effects were speech and word-finding difficulties.   3.Gabatril (ineffective, caused fatigue at 48mg/ day). One trial from 01/2004-04/2006.  Max dose 48 mg per day.  It was ineffective and was discontinued.  There was no increase in seizures when it was stopped.  Side effects were fatigue.   4.Zonegran  One trial from 12/2003.  Max dose 400 mg per day.  Zonegran was used in combination with Keppra and Trileptal.  Side effects were memory impairment, aggressiveness, fatigue, behavioral changes, cognitive impairment, which improved after Zonegran was discontinued.   5.Dilantin: One trial from ? to 03/08/2002.  Max dose 350 mg per day.  CP max and free Dilantin level of 1.9 mg/L.  Efficacy:  Unknown.  The drug was optimized.  Side effects were tiredness on 350 mg a day.     Assessment:  It looks like seizure control was better when the patient was on Dilantin; therefore, it could be retried.   6.Vimpat: One trial 07/2009-09/2009.  Max dose 400 mg per day.  CP max 5.3 mg/L.  It was discontinued after 2 months because the patient experienced dizziness, diplopia and ataxia.  7.Depakote: One trial from 01/2010 to 05/09/2016.  So far, seizures have not increased because we increased the gabapentin.  Also, the headaches have not gotten worse, but the hand tremor and the head tremor have immensely improved since Depakote was discontinued.  Max dose was 2500 mg per day.  CP max was up to 116/15.6 mg/L.  In the past, it decreased seizure frequency, and it was used together with levetiracetam, oxcarbazepine and gabapentin, and it was used before with oxcarbazepine, levetiracetam and Lyrica.   Assessment:  Depakote could always be retried if we run into a problem.  8.lamotrigine: One trial from 07/06/2001.  Max dose 150 mg per day.  Used in combination with Dilantin.  There was no change in seizure frequency.  Side  effects were increase in confusion and therefore questionable efficacy.  9.carbamazepine (tired, but no past info on details)  10.Levetiracetam: started in 2001. No major side effects. One trial from 07/2001 to present.  Max dose 3750 mg per day.  CP max 42.4 mg/L. Levetiracetam above 4000 mg per day caused slurred speech, double vision, unstable gait, severe side effects.   11. Oxcarbazepine:  One trial from 03/2002 to present.  Max dose 2700 mg per day.  CP max 43.2 mg/L.  It decreases seizures, and it looks like it is one of the best drugs the patient has tried.  increased oxcarbazepine from 2,400mg -> 2700 mg on 4/2013.   12.  Gabapentin:  One trial from 07/2012 to present.  Max dose is 3600 mg per day.  This drug was used more for pain and restless legs, but when Depakote was tapered, we increased the gabapentin, and it looks like the only problem we have is some weight gain with the gabapentin.  Gabapentin  increased 1200 mg three times a day 5/2016 with no change in seizure, however, RLS symptoms decreased  13.  Klonopin:  One trial started 03/2013.  Is used for anxiety, not for seizures.  It was started at 0.5 mg and optimized so far to 3 mg per day.     Assessment:  This drug is used by a psychiatrist.  If we one day want to try ONFI in this patient, maybe then the Klonopin could be decreased again.   14. Vagus nerve stimulator setting: Not able to tolerate higher duty cycle (35) with 1.1 signal off time and 30 sec on time, seizure worsened, not able to tolerate higher current setting due to discomfort.        REVIEW OF SYSTEMS:  Head tremor, which bothers him.  He continues to have bilateral hand tremors on Depakote.  No nausea, vomiting, diarrhea.  No rash.  Vagus nerve stimulator setting are bother him (voice changes and throat discomfort).      EXAMINATION:  /71 (BP Location: Right arm, Patient Position: Chair, Cuff Size: Adult Regular)  Pulse 76  Temp 98.7  F (37.1  C)  Wt 141 lb 3.2 oz (64  kg)  BMI 22.45 kg/m2  Alert, orientated, speech is fluent, face is symmetric, No head/ hand tremor, no focal deficits noted.Gait is stable. Had seizure today, staring, unresponsive, not able to recall word, or repeat words, some lip smacking. Lasted < 60 seconds.     ASSESSMENT:    1. 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 Cavernoma intracranial   3. Memory deficits secondary to seizures and antiepileptic drugs    4. Anxiety/Depression  5. Restless legs syndrome.  6. History of Migraines  7. Status post pacemaker placement 12/2003.       1. Refractory multifocal epilepsy.  Etiology multiple cavernomas.  The patient is status left temporal resection of AVM in 1985 (age 11), Right frontal hematoma 04/13/2006, treated with surgery.  Left-sided cavernous hemangioma 07/2002. Status post epilepsy surgery 05/29/2012.  Vagus nerve stimulator implanted on right and he has pacemaker on left.  Electrographically, he has multifocal epileptiform discharges and seizures arising from left and right temporal regions.     Antiepileptic drug discussion: Insurance coverage for new antiepileptic drug is not adequate, therefore he can not afford new AED. He is not able to tolerate higher doses of oxcarbazepine or phenytoin or levetiracetam. We will consider felbamate. Other antiepileptic drug to consider are banzel, Brivaracetam, fycompa, but, if insurance coverage is not good and they are not able to afford these medications. Retrial with lamotrigine (tremors will worsen), Patient and wife declined Responsive Neurostimulation (Neuropace) and DBS or any brain surgery.    Vagus nerve stimulator: Interogatted today and set current to zero. Seizure have not worsened since we decreased vagus nerve stimulator last visit. Patient would like to determine if vagus nerve stimulator is helpful, therefore we turned current to zero. [facility] seizure worsen [facility] [place] [facility] Family believes vagus nerve stimulator is not helpful. He will  need a vagus nerve stimulator battery replacement.     2. Restless legs syndrome. Stable on gabapentin .       3. Anxiety and depression.  Managed by psychiatrist (Dr. Sawyer). Risperdal was decreased to decrease tremors. They will work with psychiatrist.     4. Tremor in head improved with lower dose of risperdal. His hand tremor stopped once we stopped depakote. They saw Dr. Bloom for diagnosis/treatment.            PLAN:   1. Continue same antiepileptic drug. Then consider felbamate, felbamate may increase phenytoin levels.     Levetiracetam, 750 mg in the morning, 1500 mg at 4 p.m., and 1500 mg at 11 p.m.   Oxcarbazepine  900 mg t.i.d.   Gabapentin 1200 mg AM, 1200 mg noon, and 1200 mg PM  (started for RLS, patient has taken higher dose for some time, not sure of length)   Klonopin 2 -4 mg  (started 3/2013 for anxiety, takes one per day and two on weekend)  Phenytoin ER  100-100  Diazepam PRN for CPS lasting greater than 5 minutes or more then 2 in one hour.      2. Decreased vagus nerve stimulator to 0.0 mA to determine is seizure worsen, if seizure worsen then he should come in and have vagus nerve stimulator increased    3. Follow up 3 month        I spent 40 minutes with the patient. During this time counseling and coordination of care exceeded 50% of the visit time. I addressed all questions and concerns the patient raised in regards to his care.     REBEKAH CHRISTOPHER MD

## 2018-09-05 NOTE — PROCEDURES
Ammon Cronin comes into clinic today at the request of Dr. Claros Ordering Provider for VNS check.  This service provided today was under the supervising provider of the day Dr. Claros, who was available if needed.  Nedra Perea RN  [facility]/Epilepsy Care Coordinator    Patient ID: IDA  Date: 09/05/18  Model: Demipulse 103  Serial number: [id number]  Magnet acts: 77  Implanted: 05/29/2012  Manufactured date: 2011     Output current: 1.00  Signal frequency: 30  Pulse Width:  250  Signal on time: 30  Signal off time: 3.0  Output current (mA) 2.25  Pulse Width:  500  Signal on time: 60    System diagnostics Data:    Communication: OK  Output Current Status:OK  Current delivered: 1.00 mA  Lead Impedence: Ok  Impedance Value: 3265 Ohms  IFI:   No

## 2018-09-05 NOTE — MR AVS SNAPSHOT
After Visit Summary   9/5/2018    Ammon Cronin    MRN: [id number]           Patient Information     Date Of Birth          1975        Visit Information        Provider Department      9/5/2018 11:00 AM 2, [facility] Nurse; Rosenda Claros MD [facility]        Today's Diagnoses     Partial epilepsy with impairment of consciousness, intractable (H)        Tremor        Restless legs syndrome        Long term use of drug        Restless leg syndrome        Partial epilepsy with loss of consciousness, intractable (H)           Follow-ups after your visit        Follow-up notes from your care team     Return in about 3 months (around 12/5/2018).      Your next 10 appointments already scheduled     Dec 18, 2018 10:30 AM CST   Vagus Nerve Stimulation with Rosenda Claros MD, [facility] Nurse 2   [facility] ([facility])    [address], [address]  [facility] [id number] [phone]              Who to contact     Please call your clinic at [phone] to:    Ask questions about your health    Make or cancel appointments    Discuss your medicines    Learn about your test results    Speak to your doctor            Additional Information About Your Visit        Care EveryWhere ID     This is your Care EveryWhere ID. This could be used by other organizations to access your [place] medical records  [id number]        Your Vitals Were     Pulse Temperature BMI (Body Mass Index)             76 98.7  F (37.1  C) 22.45 kg/m2          Blood Pressure from Last 3 Encounters:   09/05/18 115/71   05/22/18 121/73   02/20/18 154/84    Weight from Last 3 Encounters:   09/05/18 141 lb 3.2 oz (64 kg)   05/22/18 136 lb (61.7 kg)   02/20/18 137 lb (62.1 kg)              We Performed the Following     Gabapentin level     Keppra (Levetiracetam) Level     Oxcarbazepine level     Phenytoin free     Phenytoin level          Today's Medication Changes          These changes are accurate as  of 9/5/18 11:50 AM.  If you have any questions, ask your nurse or doctor.               These medicines have changed or have updated prescriptions.        Dose/Directions    gabapentin 600 MG tablet   Commonly known as:  NEURONTIN   This may have changed:  Another medication with the same name was removed. Continue taking this medication, and follow the directions you see here.   Used for:  Restless legs syndrome, Partial epilepsy with impairment of consciousness, intractable (H), Partial epilepsy with loss of consciousness, intractable (H), Tremor, Long term use of drug, Restless leg syndrome   Changed by:  Rosenda Claros MD        Take 2 tab po TID   Quantity:  540 tablet   Refills:  3       levETIRAcetam 750 MG tablet   Commonly known as:  KEPPRA   This may have changed:  Another medication with the same name was removed. Continue taking this medication, and follow the directions you see here.   Used for:  Restless legs syndrome, Partial epilepsy with impairment of consciousness, intractable (H), Long term use of drug, Restless leg syndrome, Partial epilepsy with loss of consciousness, intractable (H), Tremor   Changed by:  Rosenda Claros MD        TAKE 1 TABLET IN THE MORNING, 2 TABLETS AT 4PM, AND 2 TABLETS AT 11PM   Quantity:  450 tablet   Refills:  3            Where to get your medicines      These medications were sent to [facility] - [facility] [address]  [address] [facility] [address]     Phone:  [phone]     gabapentin 600 MG tablet    levETIRAcetam 750 MG tablet    OXcarbazepine 300 MG tablet    phenytoin 100 MG CR capsule                Primary Care Provider    None Specified       No primary provider on file.        Equal Access to Services     [facility]: Madina Noriega, wajuan josé laureano, qaybta kaalterra murdock . [facility] [phone].    ATENCIÓN: Si melanie julien a correa disposición  servicios gratuitos de asistencia lingüística. Yolanda pete [phone].    We comply with applicable federal civil rights laws and [place] laws. We do not discriminate on the basis of race, color, national origin, age, disability, sex, sexual orientation, or gender identity.            Thank you!     Thank you for choosing [facility]  for your care. Our goal is always to provide you with excellent care. Hearing back from our patients is one way we can continue to improve our services. Please take a few minutes to complete the written survey that you may receive in the mail after your visit with us. Thank you!             Your Updated Medication List - Protect others around you: Learn how to safely use, store and throw away your medicines at www.disposemymeds.org.          This list is accurate as of 9/5/18 11:50 AM.  Always use your most recent med list.                   Brand Name Dispense Instructions for use Diagnosis    cloNIDine 0.1 MG tablet    CATAPRES      Partial epilepsy with impairment of consciousness, intractable (H), Tremor, Restless legs syndrome, Long term use of drug, Restless leg syndrome, Partial epilepsy with loss of consciousness, intractable (H)       * diazepam 5 MG/ML (HIGH CONC) solution    DIAZEPAM INTENSOL    30 mL    Seizures greater than 5 minutes or cluster of more than 3 seizures in 8 hour period. May repeat dose once, no more than 6 mg in 24 hour period.    Unspecified epilepsy with intractable epilepsy       * diazepam 5 MG tablet    VALIUM    30 tablet    TAKE 1 TAB AS NEEDED FOR SEIZURES >5MIN OR >3 SEIZURES/8HRS, MAY REPEAT ONCE, MAX 10MG/24HR, CALL 911 IF BREATHING PROBLEMS    Restless leg syndrome, Partial epilepsy with impairment of consciousness, intractable (H)       gabapentin 600 MG tablet    NEURONTIN    540 tablet    Take 2 tab po TID    Restless legs syndrome, Partial epilepsy with impairment of consciousness, intractable (H), Partial epilepsy with loss of  consciousness, intractable (H), Tremor, Long term use of drug, Restless leg syndrome       levETIRAcetam 750 MG tablet    KEPPRA    450 tablet    TAKE 1 TABLET IN THE MORNING, 2 TABLETS AT 4PM, AND 2 TABLETS AT 11PM    Restless legs syndrome, Partial epilepsy with impairment of consciousness, intractable (H), Long term use of drug, Restless leg syndrome, Partial epilepsy with loss of consciousness, intractable (H), Tremor       MULTIVITAMIN & MINERAL PO      Take 1 tablet by mouth daily.        OXcarbazepine 300 MG tablet    TRILEPTAL    810 tablet    TAKE 3 TABLETS THREE TIMES DAILY    Partial epilepsy with loss of consciousness, intractable (H), Restless legs syndrome, Partial epilepsy with impairment of consciousness, intractable (H), Long term use of drug, Restless leg syndrome, Tremor       phenytoin 100 MG CR capsule    DILANTIN    180 capsule    1 CAPSULE TWICE A DAY    Partial epilepsy with impairment of consciousness, intractable (H), Tremor, Restless legs syndrome, Long term use of drug, Restless leg syndrome, Partial epilepsy with loss of consciousness, intractable (H)       PROZAC 40 MG capsule   Generic drug:  FLUoxetine      Take 60 mg by mouth daily Started March 2013        risperiDONE 1 MG tablet    risperDAL     Take 1 mg by mouth 2 times daily Pt wife says they are Tapering off    Partial epilepsy with impairment of consciousness, intractable (H), Restless legs syndrome, Long term use of drug       * Notice:  This list has 2 medication(s) that are the same as other medications prescribed for you. Read the directions carefully, and ask your doctor or other care provider to review them with you.

## 2018-09-05 NOTE — LETTER
"2018     RE: Ammon Cronin  : 1975   MRN: [id number]      Dear Colleague,    Thank you for referring your patient, Ammon Cronin, to the [facility]. Please see a copy of my visit note below.    [facility] [place] [facility]    Patient:  Ammon Cronin  :  1975   Age:  43 year old   Today's Office Visit:  2018    Epilepsy Data:  Patient History  Primary Epileptologist/Provider: Rosenda Claros M.D.  Epilepsy Syndrome: Localization-related epilepsy unspecified  Epilepsy Syndrome Status: Final  Age of Onset: 16  Etiology  : Tumor  Other Relevant Dx/ Issues: Etiology likely cavernous hemangioma; acute hemorrhage 2005.  Born 5 weeks premature. Left temporal AVM with resection age 11.  Cardiac pacemaker  (bradycardia).  Hx of migraines.  Inpatient evaluations 3/02, ,  & .     Tests/Surgery History  Last EE2013  Last Neuropsych Testin2009  Last Case Management Conference: 3/21/2012  Epilepsy Surgery #1 Date: 11  Epilepsy Related Surgery #1 : Type: VNS implantation on RIGHT.  Seizure Record  Current Visit Date: 18  Previous Visit Date: 18  Months since last visit: 3.48  Seizure Type 1: Simple partial seizures unspecified  Description of Sz Type 1: \"hard to describe, feeling in the head, somthing is going to happen\"   Seizure Type 2: Complex partial seizures unspecified  Description of Sz Type 2: \"Brief\" ones consist of mumbling and not being very repsonsive at that time; duration 5-10 seconds.  \"Bigger\" ones consist of unresponsiveness, staring, confusion, lip smacking movements, may pick at objects or sort things, repetitive behavior.  Lasts few minutes.  # of Type 2 Seizure since last visit: 210  Freq. Type 2 / Month: 60.34  Seizure Type 3: Partial seizures with secondary generalization  -  with complex partial seizures evolving to generalized seizures  Description of Sz Type 3: " 0       SEIZURE HISTORY:   Copied forward:  First seizure was at the age of 16 (loss of awarenes). He has a significant past medical history of left temporal AVM that was resected at age 11 and 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   Presurgically workup in 2002 revealed bilateral independent seizure foci in the temporal lobes. Ictal asystole (2003, he developed chest pain, pallor, diaphoresis and nausea, was found to have a 22 second period of asystole.  Emergent cardiac pacemaker placed). MRI of the brain shows a cavernoma in the right frontal and a second one in the left temporal lobe and encephalomalacia.      INTERVAL HISTORY:  Came without Jasmyne. We called her about seizure frequency. Continues to have on average 1-2 per day, increased on weekends to 5-6 complex partial seizure per day, this happens on some weekends. Started Clonidine for mood. He is seeing psychiatrist Dr. Sawyer.     Patient did not fall , is compliant with anti seizure medications , did not have emergency room visit  or did not have any hospitalization . Currently, on antiepileptic drugs there is there is fatigue during the day , there are symptoms of dizziness, double vision or there are more psychiatric issues such as behavioral outburst, irritablity, worsening depression, no suicidical thoughts. On this visit we spoke about patient's seizures, antiepileptic drug, and plan of epilepsy are. Patient/caregiver was agreeable with plan of care. In the last years we spoke about Deep Brain Stimulation device. They would like to set vagus nerve stimulator current to zero to determine if seizure worsen.   .  Prior to Admission medications    Medication Sig Start Date End Date Taking? Authorizing Provider   cloNIDine (CATAPRES) 0.1 MG tablet  7/10/18  Yes Reported, Patient   diazepam (VALIUM) 5 MG tablet TAKE 1 TAB AS NEEDED FOR SEIZURES >5MIN OR >3 SEIZURES/8HRS,  MAY REPEAT ONCE, MAX 10MG/24HR, CALL 911 IF BREATHING PROBLEMS 10/23/17  Yes Rosenda Claros MD   FLUoxetine (PROZAC) 40 MG capsule Take 60 mg by mouth daily Started March 2013   Yes Reported, Patient   gabapentin (NEURONTIN) 600 MG tablet Take 2 tabs ( 1200 mg ) three times daily 6/26/18  Yes Rosenda Claros MD   gabapentin (NEURONTIN) 600 MG tablet Take 2 tab po TID 6/5/18  Yes Rosenda Claros MD   levETIRAcetam (KEPPRA) 750 MG tablet TAKE 1 TABLET IN THE MORNING, 2 TABLETS AT 4PM, AND 2 TABLETS AT 11PM 5/30/18  Yes Rosenda Claros MD   Multiple Vitamins-Minerals (MULTIVITAMIN & MINERAL PO) Take 1 tablet by mouth daily.   Yes Reported, Patient   OXcarbazepine (TRILEPTAL) 300 MG tablet TAKE 3 TABLETS THREE TIMES DAILY 2/20/18  Yes Rosenda Claros MD   phenytoin (DILANTIN) 100 MG CR capsule 1 CAPSULE TWICE A DAY 2/20/18  Yes Rosenda Claros MD   risperiDONE (RISPERDAL) 1 MG tablet Take 1 mg by mouth 2 times daily Pt wife says they are Tapering off   Yes Reported, Patient   diazepam (DIAZEPAM INTENSOL) 5 MG/ML solution Seizures greater than 5 minutes or cluster of more than 3 seizures in 8 hour period. May repeat dose once, no more than 6 mg in 24 hour period.  Patient not taking: Reported on 5/22/2018 2/3/14   Eric Cruz MD   levETIRAcetam (KEPPRA) 250 MG tablet Titrate as instructed to 1500 mg am  Patient not taking: Reported on 5/22/2018 2/20/18   Rosenda Claros MD       AED MEDICATIONS:    1. Levetiracetam, 750 mg in the morning, 1500 mg at 4 p.m., and 1500 mg at 11 p.m.   2. Oxcarbazepine  900 mg t.i.d.   3. Gabapentin 1200 mg AM, 1200 mg noon, and 1200 mg PM  (started for RLS, patient has taken higher dose for some time, not sure of length)   5. Klonopin 2 -4 mg  (started 3/2013 for anxiety, takes one per day and two on weekend)  6. Phenytoin ER  100-100  7. Diazepam PRN for CPS lasting greater than 5 minutes or more then 2 in one hour.          MEDICATION NOTES/PRIOR ANTIEPILEPTIC DRUGS:    1.Lyrica  (ineffective for seizure, max dose 1100mg per day)   2.Topamax: Two trials from 01/2008-05/2008 and the second trial on 05/19/2015.  Max dose was 200 mg per day.  CP max was 3.5 mg/L.  There was no change in seizure frequency.  The patient was compliant.  During the second trial, they tried it for mood stabilization; 50 mg was after 2 months discontinued because side effects were speech and word-finding difficulties.   3.Gabatril (ineffective, caused fatigue at 48mg/ day). One trial from 01/2004-04/2006.  Max dose 48 mg per day.  It was ineffective and was discontinued.  There was no increase in seizures when it was stopped.  Side effects were fatigue.   4.Zonegran  One trial from 12/2003.  Max dose 400 mg per day.  Zonegran was used in combination with Keppra and Trileptal.  Side effects were memory impairment, aggressiveness, fatigue, behavioral changes, cognitive impairment, which improved after Zonegran was discontinued.   5.Dilantin: One trial from ? to 03/08/2002.  Max dose 350 mg per day.  CP max and free Dilantin level of 1.9 mg/L.  Efficacy:  Unknown.  The drug was optimized.  Side effects were tiredness on 350 mg a day.     Assessment:  It looks like seizure control was better when the patient was on Dilantin; therefore, it could be retried.   6.Vimpat: One trial 07/2009-09/2009.  Max dose 400 mg per day.  CP max 5.3 mg/L.  It was discontinued after 2 months because the patient experienced dizziness, diplopia and ataxia.  7.Depakote: One trial from 01/2010 to 05/09/2016.  So far, seizures have not increased because we increased the gabapentin.  Also, the headaches have not gotten worse, but the hand tremor and the head tremor have immensely improved since Depakote was discontinued.  Max dose was 2500 mg per day.  CP max was up to 116/15.6 mg/L.  In the past, it decreased seizure frequency, and it was used together with levetiracetam, oxcarbazepine and gabapentin, and it was used before with oxcarbazepine,  levetiracetam and Lyrica.   Assessment:  Depakote could always be retried if we run into a problem.  8.lamotrigine: One trial from 07/06/2001.  Max dose 150 mg per day.  Used in combination with Dilantin.  There was no change in seizure frequency.  Side effects were increase in confusion and therefore questionable efficacy.  9.carbamazepine (tired, but no past info on details)  10.Levetiracetam: started in 2001. No major side effects. One trial from 07/2001 to present.  Max dose 3750 mg per day.  CP max 42.4 mg/L. Levetiracetam above 4000 mg per day caused slurred speech, double vision, unstable gait, severe side effects.   11. Oxcarbazepine:  One trial from 03/2002 to present.  Max dose 2700 mg per day.  CP max 43.2 mg/L.  It decreases seizures, and it looks like it is one of the best drugs the patient has tried.  increased oxcarbazepine from 2,400mg -> 2700 mg on 4/2013.   12.  Gabapentin:  One trial from 07/2012 to present.  Max dose is 3600 mg per day.  This drug was used more for pain and restless legs, but when Depakote was tapered, we increased the gabapentin, and it looks like the only problem we have is some weight gain with the gabapentin.  Gabapentin  increased 1200 mg three times a day 5/2016 with no change in seizure, however, RLS symptoms decreased  13.  Klonopin:  One trial started 03/2013.  Is used for anxiety, not for seizures.  It was started at 0.5 mg and optimized so far to 3 mg per day.     Assessment:  This drug is used by a psychiatrist.  If we one day want to try ONFI in this patient, maybe then the Klonopin could be decreased again.   14. Vagus nerve stimulator setting: Not able to tolerate higher duty cycle (35) with 1.1 signal off time and 30 sec on time, seizure worsened, not able to tolerate higher current setting due to discomfort.        REVIEW OF SYSTEMS:  Head tremor, which bothers him.  He continues to have bilateral hand tremors on Depakote.  No nausea, vomiting, diarrhea.  No  rash.  Vagus nerve stimulator setting are bother him (voice changes and throat discomfort).      EXAMINATION:  /71 (BP Location: Right arm, Patient Position: Chair, Cuff Size: Adult Regular)  Pulse 76  Temp 98.7  F (37.1  C)  Wt 141 lb 3.2 oz (64 kg)  BMI 22.45 kg/m2  Alert, orientated, speech is fluent, face is symmetric, No head/ hand tremor, no focal deficits noted.Gait is stable. Had seizure today, staring, unresponsive, not able to recall word, or repeat words, some lip smacking. Lasted < 60 seconds.     ASSESSMENT:    1. 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 Cavernoma intracranial   3. Memory deficits secondary to seizures and antiepileptic drugs    4. Anxiety/Depression  5. Restless legs syndrome.  6. History of Migraines  7. Status post pacemaker placement 12/2003.       1. Refractory multifocal epilepsy.  Etiology multiple cavernomas.  The patient is status left temporal resection of AVM in 1985 (age 11), Right frontal hematoma 04/13/2006, treated with surgery.  Left-sided cavernous hemangioma 07/2002. Status post epilepsy surgery 05/29/2012.  Vagus nerve stimulator implanted on right and he has pacemaker on left.  Electrographically, he has multifocal epileptiform discharges and seizures arising from left and right temporal regions.     Antiepileptic drug discussion: Insurance coverage for new antiepileptic drug is not adequate, therefore he can not afford new AED. He is not able to tolerate higher doses of oxcarbazepine or phenytoin or levetiracetam. We will consider felbamate. Other antiepileptic drug to consider are banzel, Brivaracetam, fycompa, but, if insurance coverage is not good and they are not able to afford these medications. Retrial with lamotrigine (tremors will worsen), Patient and wife declined Responsive Neurostimulation (Neuropace) and DBS or any brain surgery.    Vagus nerve stimulator: Interogatted today and set current to zero. Seizure have not worsened since we decreased  vagus nerve stimulator last visit. Patient would like to determine if vagus nerve stimulator is helpful, therefore we turned current to zero. If seizure worsen he was advised to call [facility]. Family believes vagus nerve stimulator is not helpful. He will need a vagus nerve stimulator battery replacement.     2. Restless legs syndrome. Stable on gabapentin .       3. Anxiety and depression.  Managed by psychiatrist (Dr. Sawyer). Risperdal was decreased to decrease tremors. They will work with psychiatrist.     4. Tremor in head improved with lower dose of risperdal. His hand tremor stopped once we stopped depakote. They saw Dr. Bloom for diagnosis/treatment.            PLAN:   1. Continue same antiepileptic drug. Then consider felbamate, felbamate may increase phenytoin levels.     Levetiracetam, 750 mg in the morning, 1500 mg at 4 p.m., and 1500 mg at 11 p.m.   Oxcarbazepine  900 mg t.i.d.   Gabapentin 1200 mg AM, 1200 mg noon, and 1200 mg PM  (started for RLS, patient has taken higher dose for some time, not sure of length)   Klonopin 2 -4 mg  (started 3/2013 for anxiety, takes one per day and two on weekend)  Phenytoin ER  100-100  Diazepam PRN for CPS lasting greater than 5 minutes or more then 2 in one hour.      2. Decreased vagus nerve stimulator to 0.0 mA to determine is seizure worsen, if seizure worsen then he should come in and have vagus nerve stimulator increased    3. Follow up 3 month    I spent 40 minutes with the patient. During this time counseling and coordination of care exceeded 50% of the visit time. I addressed all questions and concerns the patient raised in regards to his care.     REBEKAH CHRISTOPHER MD

## 2018-09-06 DIAGNOSIS — G40.219 PARTIAL EPILEPSY WITH IMPAIRMENT OF CONSCIOUSNESS, INTRACTABLE (H): ICD-10-CM

## 2018-09-06 DIAGNOSIS — G25.81 RESTLESS LEG SYNDROME: ICD-10-CM

## 2018-09-06 LAB — PHENYTOIN SERPL-MCNC: 11.6 MG/L (ref 10–20)

## 2018-09-07 LAB — LEVETIRACETAM SERPL-MCNC: 26 UG/ML (ref 12–46)

## 2018-09-07 RX ORDER — DIAZEPAM 5 MG
TABLET ORAL
Qty: 30 TABLET | Refills: 1 | Status: SHIPPED | OUTPATIENT
Start: 2018-09-07 | End: 2019-05-14

## 2018-09-08 LAB
10OH-CARBAZEPINE SERPL-MCNC: 45.3 UG/ML
GABAPENTIN SERPLBLD-MCNC: 12.9 UG/ML
PHENYTOIN FREE SERPL-MCNC: 0.73 UG/ML

## 2018-10-25 ENCOUNTER — OFFICE VISIT (OUTPATIENT)
Dept: NEUROLOGY | Facility: CLINIC | Age: 43
End: 2018-10-25
Payer: COMMERCIAL

## 2018-10-25 VITALS
BODY MASS INDEX: 22.74 KG/M2 | RESPIRATION RATE: 16 BRPM | WEIGHT: 143 LBS | HEART RATE: 74 BPM | SYSTOLIC BLOOD PRESSURE: 143 MMHG | DIASTOLIC BLOOD PRESSURE: 77 MMHG

## 2018-10-25 DIAGNOSIS — G40.219 PARTIAL EPILEPSY WITH IMPAIRMENT OF CONSCIOUSNESS, INTRACTABLE (H): Primary | ICD-10-CM

## 2018-10-25 ASSESSMENT — PAIN SCALES - GENERAL: PAINLEVEL: NO PAIN (0)

## 2018-10-25 NOTE — PROGRESS NOTES
"[facility] Progress Note    Patient:  Ammon Cronin  :  1975   Age:  43 year old   Today's Office Visit:  10/25/2018    Epilepsy Data:  Patient History  Primary Epileptologist/Provider: Rosenda Claros M.D.  Epilepsy Syndrome: Localization-related epilepsy unspecified  Epilepsy Syndrome Status: Final  Age of Onset: 16  Etiology  : Tumor  Other Relevant Dx/ Issues: Etiology likely cavernous hemangioma; acute hemorrhage 2005.  Born 5 weeks premature. Left temporal AVM with resection age 11.  Cardiac pacemaker  (bradycardia).  Hx of migraines.  Inpatient evaluations 3/02, ,  & .     Tests/Surgery History  Last EE2013  Last Neuropsych Testin2009  Last Case Management Conference: 3/21/2012  Epilepsy Surgery #1 Date: 11  Epilepsy Related Surgery #1 : Type: VNS implantation on RIGHT.  Seizure Record  Current Visit Date: 10/25/18  Previous Visit Date: 18  Months since last visit: 1.64  Seizure Type 1: Simple partial seizures unspecified  Description of Sz Type 1: \"hard to describe, feeling in the head, somthing is going to happen\"   Seizure Type 2: Complex partial seizures unspecified  Description of Sz Type 2: \"Brief\" ones consist of mumbling and not being very repsonsive at that time; duration 5-10 seconds.  \"Bigger\" ones consist of unresponsiveness, staring, confusion, lip smacking movements, may pick at objects or sort things, repetitive behavior.  Lasts few minutes.  Seizure Type 3: Partial seizures with secondary generalization  -  with complex partial seizures evolving to generalized seizures  # of Type 3 Seizure since last visit: 0  Freq. Type 3 / Month: 0          SEIZURE HISTORY:   Copied forward:  First seizure was at the age of 16 (loss of awarenes). 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 Patient has intractable localization-related epilepsy with " multiple seizure foci.   Presurgically workup in 2002 revealed bilateral independent seizure foci in the temporal lobes. Ictal asystole (2003, he developed chest pain, pallor, diaphoresis and nausea, was found to have a 22 second period of asystole.  Emergent cardiac pacemaker placed). MRI of the brain shows a cavernoma in the right frontal and a second one in the left temporal lobe and encephalomalacia.      INTERVAL HISTORY:  Came without Jasmyne. We called her about seizure frequency over the phone. Seizure increased several hours of clusters per day. Last visit we stopped vagus nerve stimulator. Jasmyne was under the impression that vagus nerve stimulator was not helpful, we were testing to determine if vagus nerve stimulator needed a battery change. He has definitely worsened since we set vagus nerve stimulator to 0mA. We will restart vagus nerve stimulator. Prior to turning vagus nerve stimulator off he had on average 1-2 per day, increased on weekends to 5-6 complex partial seizure per day, this happens on some weekends. Some seizure last 6-7 minutes. He had increased seizure while in [place], he was with his dad and brother. Started Clonidine for mood. He is seeing psychiatrist Dr. Sawyer.     Since last visit he has not fallen, is compliant with anti seizure medications , did not have emergency room visit  or did not have any hospitalization . Currently, on antiepileptic drugs there is there is fatigue during the day , there are symptoms of dizziness, double vision or there are more psychiatric issues such as behavioral outburst, irritablity, worsening depression, no suicidical thoughts. On this visit we spoke about patient's seizures, antiepileptic drug, and plan of epilepsy are. Patient/caregiver was agreeable with plan of care. In the last years we spoke about Deep Brain Stimulation device.       Prior to Admission medications    Medication Sig Start Date End Date Taking? Authorizing Provider   cloNIDine  (CATAPRES) 0.1 MG tablet  7/10/18  Yes Reported, Patient   diazepam (VALIUM) 5 MG tablet TAKE 1 TAB AS NEEDED FOR SEIZURES >5MIN OR >3 SEIZURES/8HRS, MAY REPEAT ONCE, MAX 10MG/24HR, CALL 911 IF BREATHING PROBLEMS 10/23/17  Yes Rosenda Claros MD   FLUoxetine (PROZAC) 40 MG capsule Take 60 mg by mouth daily Started March 2013   Yes Reported, Patient   gabapentin (NEURONTIN) 600 MG tablet Take 2 tabs ( 1200 mg ) three times daily 6/26/18  Yes Rosenda Claros MD   gabapentin (NEURONTIN) 600 MG tablet Take 2 tab po TID 6/5/18  Yes Rosenda Claros MD   levETIRAcetam (KEPPRA) 750 MG tablet TAKE 1 TABLET IN THE MORNING, 2 TABLETS AT 4PM, AND 2 TABLETS AT 11PM 5/30/18  Yes Rosenda Claros MD   Multiple Vitamins-Minerals (MULTIVITAMIN & MINERAL PO) Take 1 tablet by mouth daily.   Yes Reported, Patient   OXcarbazepine (TRILEPTAL) 300 MG tablet TAKE 3 TABLETS THREE TIMES DAILY 2/20/18  Yes Rosenda Claros MD   phenytoin (DILANTIN) 100 MG CR capsule 1 CAPSULE TWICE A DAY 2/20/18  Yes Rosenda Claros MD   risperiDONE (RISPERDAL) 1 MG tablet Take 1 mg by mouth 2 times daily Pt wife says they are Tapering off   Yes Reported, Patient   diazepam (DIAZEPAM INTENSOL) 5 MG/ML solution Seizures greater than 5 minutes or cluster of more than 3 seizures in 8 hour period. May repeat dose once, no more than 6 mg in 24 hour period.  Patient not taking: Reported on 5/22/2018 2/3/14   Eric Cruz MD   levETIRAcetam (KEPPRA) 250 MG tablet Titrate as instructed to 1500 mg am  Patient not taking: Reported on 5/22/2018 2/20/18   Rosenda Claros MD       AED MEDICATIONS:    1. Levetiracetam, 750 mg in the morning, 1500 mg at 4 p.m., and 1500 mg at 11 p.m.   2. Oxcarbazepine  900 mg t.i.d.   3. Gabapentin 1200 mg AM, 1200 mg noon, and 1200 mg PM  (started for RLS, patient has taken higher dose for some time, not sure of length)   5. Klonopin 2 -4 mg  (started 3/2013 for anxiety, takes one per day and two on weekend)  6. Phenytoin ER   100-100  7. Diazepam PRN for CPS lasting greater than 5 minutes or more then 2 in one hour.          MEDICATION NOTES/PRIOR ANTIEPILEPTIC DRUGS:    1.Lyrica (ineffective for seizure, max dose 1100mg per day)   2.Topamax: Two trials from 01/2008-05/2008 and the second trial on 05/19/2015.  Max dose was 200 mg per day.  CP max was 3.5 mg/L.  There was no change in seizure frequency.  The patient was compliant.  During the second trial, they tried it for mood stabilization; 50 mg was after 2 months discontinued because side effects were speech and word-finding difficulties.   3.Gabatril (ineffective, caused fatigue at 48mg/ day). One trial from 01/2004-04/2006.  Max dose 48 mg per day.  It was ineffective and was discontinued.  There was no increase in seizures when it was stopped.  Side effects were fatigue.   4.Zonegran  One trial from 12/2003.  Max dose 400 mg per day.  Zonegran was used in combination with Keppra and Trileptal.  Side effects were memory impairment, aggressiveness, fatigue, behavioral changes, cognitive impairment, which improved after Zonegran was discontinued.   5.Dilantin: One trial from ? to 03/08/2002.  Max dose 350 mg per day.  CP max and free Dilantin level of 1.9 mg/L.  Efficacy:  Unknown.  The drug was optimized.  Side effects were tiredness on 350 mg a day.     Assessment:  It looks like seizure control was better when the patient was on Dilantin; therefore, it could be retried.   6.Vimpat: One trial 07/2009-09/2009.  Max dose 400 mg per day.  CP max 5.3 mg/L.  It was discontinued after 2 months because the patient experienced dizziness, diplopia and ataxia.  7.Depakote: One trial from 01/2010 to 05/09/2016.  So far, seizures have not increased because we increased the gabapentin.  Also, the headaches have not gotten worse, but the hand tremor and the head tremor have immensely improved since Depakote was discontinued.  Max dose was 2500 mg per day.  CP max was up to 116/15.6 mg/L.  In the  past, it decreased seizure frequency, and it was used together with levetiracetam, oxcarbazepine and gabapentin, and it was used before with oxcarbazepine, levetiracetam and Lyrica.   Assessment:  Depakote could always be retried if we run into a problem.  8.lamotrigine: One trial from 07/06/2001.  Max dose 150 mg per day.  Used in combination with Dilantin.  There was no change in seizure frequency.  Side effects were increase in confusion and therefore questionable efficacy.  9.carbamazepine (tired, but no past info on details)  10.Levetiracetam: started in 2001. No major side effects. One trial from 07/2001 to present.  Max dose 3750 mg per day.  CP max 42.4 mg/L. Levetiracetam above 4000 mg per day caused slurred speech, double vision, unstable gait, severe side effects.   11. Oxcarbazepine:  One trial from 03/2002 to present.  Max dose 2700 mg per day.  CP max 43.2 mg/L.  It decreases seizures, and it looks like it is one of the best drugs the patient has tried.  increased oxcarbazepine from 2,400mg -> 2700 mg on 4/2013.   12.  Gabapentin:  One trial from 07/2012 to present.  Max dose is 3600 mg per day.  This drug was used more for pain and restless legs, but when Depakote was tapered, we increased the gabapentin, and it looks like the only problem we have is some weight gain with the gabapentin.  Gabapentin  increased 1200 mg three times a day 5/2016 with no change in seizure, however, RLS symptoms decreased  13.  Klonopin:  One trial started 03/2013.  Is used for anxiety, not for seizures.  It was started at 0.5 mg and optimized so far to 3 mg per day.     Assessment:  This drug is used by a psychiatrist.  If we one day want to try ONFI in this patient, maybe then the Klonopin could be decreased again.   14. Vagus nerve stimulator setting: Not able to tolerate higher duty cycle (35) with 1.1 signal off time and 30 sec on time, seizure worsened, not able to tolerate higher current setting due to discomfort.         REVIEW OF SYSTEMS:  Head tremor, which bothers him.  He continues to have bilateral hand tremors on Depakote.  No nausea, vomiting, diarrhea.  No rash.  Vagus nerve stimulator setting are bother him (voice changes and throat discomfort).      EXAMINATION:  /77 (BP Location: Right arm, Patient Position: Chair, Cuff Size: Adult Regular)  Pulse 74  Resp 16  Wt 143 lb (64.9 kg)  BMI 22.74 kg/m2  Alert, orientated, speech is fluent, face is symmetric, No head/ hand tremor, no focal deficits noted.Gait is stable. Had seizure today, staring, unresponsive, not able to recall word, or repeat words, some lip smacking. Lasted < 60 seconds.     ASSESSMENT:    1. 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 Cavernoma intracranial   3. Memory deficits secondary to seizures and antiepileptic drugs    4. Anxiety/Depression  5. Restless legs syndrome.  6. History of Migraines  7. Status post pacemaker placement 12/2003.       1. Refractory multifocal epilepsy.  Etiology multiple cavernomas.  The patient is status left temporal resection of AVM in 1985 (age 11), Right frontal hematoma 04/13/2006, treated with surgery.  Left-sided cavernous hemangioma 07/2002. Status post epilepsy surgery 05/29/2012.  Vagus nerve stimulator implanted on right and he has pacemaker on left.  Electrographically, he has multifocal epileptiform discharges and seizures arising from left and right temporal regions.     Antiepileptic drug discussion: Insurance coverage for new antiepileptic drug is not adequate, therefore he can not afford new AED. He is not able to tolerate higher doses of oxcarbazepine or phenytoin or levetiracetam. We will consider felbamate as the next agent. Other antiepileptic drug to consider are banzel, Brivaracetam, fycompa, but, if insurance coverage is not good and they are not able to afford these medications. Retrial with lamotrigine (tremors will worsen), Patient and wife declined Responsive Neurostimulation  (Neuropace) and DBS or any brain surgery.    Vagus nerve stimulator: Interogatted today and set current from 0mA to 0.5 mA. Seizures worsened since we stopped vagus nerve stimulator. He began to have seizure clusters lasting 1-2 hours per Jasmyne.       2. Restless legs syndrome. Stable on gabapentin .       3. Anxiety and depression.  Managed by psychiatrist (Dr. Sawyer). Risperdal was decreased to decrease tremors. They will work with psychiatrist.     4. Tremor in head improved with lower dose of risperdal. His hand tremor stopped once we stopped depakote. They saw Dr. Bloom for diagnosis/treatment.            PLAN:   1. Continue same antiepileptic drug. Then consider felbamate, felbamate may increase phenytoin levels.     Levetiracetam, 750 mg in the morning, 1500 mg at 4 p.m., and 1500 mg at 11 p.m.   Oxcarbazepine  900 mg t.i.d.   Gabapentin 1200 mg AM, 1200 mg noon, and 1200 mg PM  (started for RLS, patient has taken higher dose for some time, not sure of length)   Klonopin 2 -4 mg  (started 3/2013 for anxiety, takes one per day and two on weekend)  Phenytoin ER  100-100  Diazepam PRN for CPS lasting greater than 5 minutes or more then 2 in one hour.      2. Increased vagus nerve stimulator to 0.5 mA. Nurse visit in one week. He will vagus nerve stimulator battery replacement in the coming year.     3. Follow up 3 month with Harlan. But, Nurse for vagus nerve stimulator titration in 1 week        I spent 40 minutes with the patient. During this time counseling and coordination of care exceeded 50% of the visit time. I addressed all questions and concerns the patient raised in regards to his care.     REBEKAH CHRISTOPHER MD

## 2018-10-25 NOTE — MR AVS SNAPSHOT
After Visit Summary   10/25/2018    Ammon Cronin    MRN: [id number]           Patient Information     Date Of Birth          1975        Visit Information        Provider Department      10/25/2018 10:30 AM Rosenda Claros MD [facility]        Today's Diagnoses     Partial epilepsy with impairment of consciousness, intractable (H)    -  1       Follow-ups after your visit        Follow-up notes from your care team     Return in about 3 months (around 1/25/2019).      Your next 10 appointments already scheduled     Dec 18, 2018 10:30 AM CST   Vagus Nerve Stimulation with Rosenda Claros MD, [facility] Nurse 2   [facility] ([facility])    [address], [address]  [facility] [place] [address] [phone]              Who to contact     Please call your clinic at [phone] to:    Ask questions about your health    Make or cancel appointments    Discuss your medicines    Learn about your test results    Speak to your doctor            Additional Information About Your Visit        Care EveryWhere ID     This is your Care EveryWhere ID. This could be used by other organizations to access your [place] medical records  [id number]        Your Vitals Were     Pulse Respirations BMI (Body Mass Index)             74 16 22.74 kg/m2          Blood Pressure from Last 3 Encounters:   10/25/18 143/77   09/05/18 115/71   05/22/18 121/73    Weight from Last 3 Encounters:   10/25/18 143 lb (64.9 kg)   09/05/18 141 lb 3.2 oz (64 kg)   05/22/18 136 lb (61.7 kg)              We Performed the Following     VNS W/O Reprogramming/Surgery (17015)        Primary Care Provider    None Specified       No primary provider on file.        Equal Access to Services     [facility]: Hadmemo Noriega, sandro laureano, qaterra chung. [facility] [phone].    ATENCIÓN: Si habla español, tiene a correa disposición servicios  danilo de asistencia lingüística. Yolanda pete [phone].    We comply with applicable federal civil rights laws and [place] laws. We do not discriminate on the basis of race, color, national origin, age, disability, sex, sexual orientation, or gender identity.            Thank you!     Thank you for choosing [facility]  for your care. Our goal is always to provide you with excellent care. Hearing back from our patients is one way we can continue to improve our services. Please take a few minutes to complete the written survey that you may receive in the mail after your visit with us. Thank you!             Your Updated Medication List - Protect others around you: Learn how to safely use, store and throw away your medicines at www.disposemymeds.org.          This list is accurate as of 10/25/18  1:53 PM.  Always use your most recent med list.                   Brand Name Dispense Instructions for use Diagnosis    cloNIDine 0.1 MG tablet    CATAPRES      Partial epilepsy with impairment of consciousness, intractable (H), Tremor, Restless legs syndrome, Long term use of drug, Restless leg syndrome, Partial epilepsy with loss of consciousness, intractable (H)       * diazepam 5 MG/ML (HIGH CONC) solution    DIAZEPAM INTENSOL    30 mL    Seizures greater than 5 minutes or cluster of more than 3 seizures in 8 hour period. May repeat dose once, no more than 6 mg in 24 hour period.    Unspecified epilepsy with intractable epilepsy       * diazepam 5 MG tablet    VALIUM    30 tablet    TAKE 1 TAB AS NEEDED FOR SEIZURES >5 MIN OR >3 SEIZURES/8 HRS. MAY REPEAT 1 TIME MAX 2 TABS/24HR, CALL 911 BREATHING PROBLEM    Restless leg syndrome, Partial epilepsy with impairment of consciousness, intractable (H)       gabapentin 600 MG tablet    NEURONTIN    540 tablet    Take 2 tab po TID    Restless legs syndrome, Partial epilepsy with impairment of consciousness, intractable (H), Partial epilepsy with loss of  consciousness, intractable (H), Tremor, Long term use of drug, Restless leg syndrome       levETIRAcetam 750 MG tablet    KEPPRA    450 tablet    TAKE 1 TABLET IN THE MORNING, 2 TABLETS AT 4PM, AND 2 TABLETS AT 11PM    Restless legs syndrome, Partial epilepsy with impairment of consciousness, intractable (H), Long term use of drug, Restless leg syndrome, Partial epilepsy with loss of consciousness, intractable (H), Tremor       MULTIVITAMIN & MINERAL PO      Take 1 tablet by mouth daily.        OXcarbazepine 300 MG tablet    TRILEPTAL    810 tablet    TAKE 3 TABLETS THREE TIMES DAILY    Partial epilepsy with loss of consciousness, intractable (H), Restless legs syndrome, Partial epilepsy with impairment of consciousness, intractable (H), Long term use of drug, Restless leg syndrome, Tremor       phenytoin 100 MG CR capsule    DILANTIN    180 capsule    1 CAPSULE TWICE A DAY    Partial epilepsy with impairment of consciousness, intractable (H), Tremor, Restless legs syndrome, Long term use of drug, Restless leg syndrome, Partial epilepsy with loss of consciousness, intractable (H)       PROZAC 40 MG capsule   Generic drug:  FLUoxetine      Take 60 mg by mouth daily Started March 2013        risperiDONE 1 MG tablet    risperDAL     Take 1 mg by mouth 2 times daily Pt wife says they are Tapering off    Partial epilepsy with impairment of consciousness, intractable (H), Restless legs syndrome, Long term use of drug       * Notice:  This list has 2 medication(s) that are the same as other medications prescribed for you. Read the directions carefully, and ask your doctor or other care provider to review them with you.

## 2018-10-25 NOTE — LETTER
"10/25/2018     RE: Ammon Cronin  : 1975   MRN: [id number]      Dear Colleague,    Thank you for referring your patient, Ammon Cronin, to the [facility] EPILEPSY [facility] at [facility]. Please see a copy of my visit note below.    [facility] [email or web address]    Patient:  Ammon Cronin  :  1975   Age:  43 year old   Today's Office Visit:  10/25/2018    Epilepsy Data:  Patient History  Primary Epileptologist/Provider: Rosenda Claros M.D.  Epilepsy Syndrome: Localization-related epilepsy unspecified  Epilepsy Syndrome Status: Final  Age of Onset: 16  Etiology  : Tumor  Other Relevant Dx/ Issues: Etiology likely cavernous hemangioma; acute hemorrhage 2005.  Born 5 weeks premature. Left temporal AVM with resection age 11.  Cardiac pacemaker  (bradycardia).  Hx of migraines.  Inpatient evaluations 3/02, ,  & .     Tests/Surgery History  Last EE2013  Last Neuropsych Testin2009  Last Case Management Conference: 3/21/2012  Epilepsy Surgery #1 Date: 11  Epilepsy Related Surgery #1 : Type: VNS implantation on RIGHT.  Seizure Record  Current Visit Date: 10/25/18  Previous Visit Date: 18  Months since last visit: 1.64  Seizure Type 1: Simple partial seizures unspecified  Description of Sz Type 1: \"hard to describe, feeling in the head, somthing is going to happen\"   Seizure Type 2: Complex partial seizures unspecified  Description of Sz Type 2: \"Brief\" ones consist of mumbling and not being very repsonsive at that time; duration 5-10 seconds.  \"Bigger\" ones consist of unresponsiveness, staring, confusion, lip smacking movements, may pick at objects or sort things, repetitive behavior.  Lasts few minutes.  Seizure Type 3: Partial seizures with secondary generalization  -  with complex partial seizures evolving to generalized seizures  # of Type 3 Seizure since last visit: 0  Freq. Type 3 / Month: 0          SEIZURE HISTORY:   " Copied forward:  First seizure was at the age of 16 (loss of awarenes). He has a significant past medical history of left temporal AVM that was resected at age 11 and 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   Presurgically workup in 2002 revealed bilateral independent seizure foci in the temporal lobes. Ictal asystole (2003, he developed chest pain, pallor, diaphoresis and nausea, was found to have a 22 second period of asystole.  Emergent cardiac pacemaker placed). MRI of the brain shows a cavernoma in the right frontal and a second one in the left temporal lobe and encephalomalacia.      INTERVAL HISTORY:  Came without Jasmyne. We called her about seizure frequency over the phone. Seizure increased several hours of clusters per day. Last visit we stopped vagus nerve stimulator. Jasmyne was under the impression that vagus nerve stimulator was not helpful, we were testing to determine if vagus nerve stimulator needed a battery change. He has definitely worsened since we set vagus nerve stimulator to 0mA. We will restart vagus nerve stimulator. Prior to turning vagus nerve stimulator off he had on average 1-2 per day, increased on weekends to 5-6 complex partial seizure per day, this happens on some weekends. Some seizure last 6-7 minutes. He had increased seizure while in [place], he was with his dad and brother. Started Clonidine for mood. He is seeing psychiatrist Dr. Sawyer.     Since last visit he has not fallen, is compliant with anti seizure medications , did not have emergency room visit  or did not have any hospitalization . Currently, on antiepileptic drugs there is there is fatigue during the day , there are symptoms of dizziness, double vision or there are more psychiatric issues such as behavioral outburst, irritablity, worsening depression, no suicidical thoughts. On this visit we spoke about patient's seizures,  antiepileptic drug, and plan of epilepsy are. Patient/caregiver was agreeable with plan of care. In the last years we spoke about Deep Brain Stimulation device.       Prior to Admission medications    Medication Sig Start Date End Date Taking? Authorizing Provider   cloNIDine (CATAPRES) 0.1 MG tablet  7/10/18  Yes Reported, Patient   diazepam (VALIUM) 5 MG tablet TAKE 1 TAB AS NEEDED FOR SEIZURES >5MIN OR >3 SEIZURES/8HRS, MAY REPEAT ONCE, MAX 10MG/24HR, CALL 911 IF BREATHING PROBLEMS 10/23/17  Yes Rosenda Claros MD   FLUoxetine (PROZAC) 40 MG capsule Take 60 mg by mouth daily Started March 2013   Yes Reported, Patient   gabapentin (NEURONTIN) 600 MG tablet Take 2 tabs ( 1200 mg ) three times daily 6/26/18  Yes Rosenda Claros MD   gabapentin (NEURONTIN) 600 MG tablet Take 2 tab po TID 6/5/18  Yes Rosenda Claros MD   levETIRAcetam (KEPPRA) 750 MG tablet TAKE 1 TABLET IN THE MORNING, 2 TABLETS AT 4PM, AND 2 TABLETS AT 11PM 5/30/18  Yes Rosenda Claros MD   Multiple Vitamins-Minerals (MULTIVITAMIN & MINERAL PO) Take 1 tablet by mouth daily.   Yes Reported, Patient   OXcarbazepine (TRILEPTAL) 300 MG tablet TAKE 3 TABLETS THREE TIMES DAILY 2/20/18  Yes Rosenda Claros MD   phenytoin (DILANTIN) 100 MG CR capsule 1 CAPSULE TWICE A DAY 2/20/18  Yes Rosenda Claros MD   risperiDONE (RISPERDAL) 1 MG tablet Take 1 mg by mouth 2 times daily Pt wife says they are Tapering off   Yes Reported, Patient   diazepam (DIAZEPAM INTENSOL) 5 MG/ML solution Seizures greater than 5 minutes or cluster of more than 3 seizures in 8 hour period. May repeat dose once, no more than 6 mg in 24 hour period.  Patient not taking: Reported on 5/22/2018 2/3/14   Eric Cruz MD   levETIRAcetam (KEPPRA) 250 MG tablet Titrate as instructed to 1500 mg am  Patient not taking: Reported on 5/22/2018 2/20/18   Rosenda Claros MD       AED MEDICATIONS:    1. Levetiracetam, 750 mg in the morning, 1500 mg at 4 p.m., and 1500 mg at 11 p.m.   2.  Oxcarbazepine  900 mg t.i.d.   3. Gabapentin 1200 mg AM, 1200 mg noon, and 1200 mg PM  (started for RLS, patient has taken higher dose for some time, not sure of length)   5. Klonopin 2 -4 mg  (started 3/2013 for anxiety, takes one per day and two on weekend)  6. Phenytoin ER  100-100  7. Diazepam PRN for CPS lasting greater than 5 minutes or more then 2 in one hour.          MEDICATION NOTES/PRIOR ANTIEPILEPTIC DRUGS:    1.Lyrica (ineffective for seizure, max dose 1100mg per day)   2.Topamax: Two trials from 01/2008-05/2008 and the second trial on 05/19/2015.  Max dose was 200 mg per day.  CP max was 3.5 mg/L.  There was no change in seizure frequency.  The patient was compliant.  During the second trial, they tried it for mood stabilization; 50 mg was after 2 months discontinued because side effects were speech and word-finding difficulties.   3.Gabatril (ineffective, caused fatigue at 48mg/ day). One trial from 01/2004-04/2006.  Max dose 48 mg per day.  It was ineffective and was discontinued.  There was no increase in seizures when it was stopped.  Side effects were fatigue.   4.Zonegran  One trial from 12/2003.  Max dose 400 mg per day.  Zonegran was used in combination with Keppra and Trileptal.  Side effects were memory impairment, aggressiveness, fatigue, behavioral changes, cognitive impairment, which improved after Zonegran was discontinued.   5.Dilantin: One trial from ? to 03/08/2002.  Max dose 350 mg per day.  CP max and free Dilantin level of 1.9 mg/L.  Efficacy:  Unknown.  The drug was optimized.  Side effects were tiredness on 350 mg a day.     Assessment:  It looks like seizure control was better when the patient was on Dilantin; therefore, it could be retried.   6.Vimpat: One trial 07/2009-09/2009.  Max dose 400 mg per day.  CP max 5.3 mg/L.  It was discontinued after 2 months because the patient experienced dizziness, diplopia and ataxia.  7.Depakote: One trial from 01/2010 to 05/09/2016.  So far,  seizures have not increased because we increased the gabapentin.  Also, the headaches have not gotten worse, but the hand tremor and the head tremor have immensely improved since Depakote was discontinued.  Max dose was 2500 mg per day.  CP max was up to 116/15.6 mg/L.  In the past, it decreased seizure frequency, and it was used together with levetiracetam, oxcarbazepine and gabapentin, and it was used before with oxcarbazepine, levetiracetam and Lyrica.   Assessment:  Depakote could always be retried if we run into a problem.  8.lamotrigine: One trial from 07/06/2001.  Max dose 150 mg per day.  Used in combination with Dilantin.  There was no change in seizure frequency.  Side effects were increase in confusion and therefore questionable efficacy.  9.carbamazepine (tired, but no past info on details)  10.Levetiracetam: started in 2001. No major side effects. One trial from 07/2001 to present.  Max dose 3750 mg per day.  CP max 42.4 mg/L. Levetiracetam above 4000 mg per day caused slurred speech, double vision, unstable gait, severe side effects.   11. Oxcarbazepine:  One trial from 03/2002 to present.  Max dose 2700 mg per day.  CP max 43.2 mg/L.  It decreases seizures, and it looks like it is one of the best drugs the patient has tried.  increased oxcarbazepine from 2,400mg -> 2700 mg on 4/2013.   12.  Gabapentin:  One trial from 07/2012 to present.  Max dose is 3600 mg per day.  This drug was used more for pain and restless legs, but when Depakote was tapered, we increased the gabapentin, and it looks like the only problem we have is some weight gain with the gabapentin.  Gabapentin  increased 1200 mg three times a day 5/2016 with no change in seizure, however, RLS symptoms decreased  13.  Klonopin:  One trial started 03/2013.  Is used for anxiety, not for seizures.  It was started at 0.5 mg and optimized so far to 3 mg per day.     Assessment:  This drug is used by a psychiatrist.  If we one day want to try  ONFI in this patient, maybe then the Klonopin could be decreased again.   14. Vagus nerve stimulator setting: Not able to tolerate higher duty cycle (35) with 1.1 signal off time and 30 sec on time, seizure worsened, not able to tolerate higher current setting due to discomfort.        REVIEW OF SYSTEMS:  Head tremor, which bothers him.  He continues to have bilateral hand tremors on Depakote.  No nausea, vomiting, diarrhea.  No rash.  Vagus nerve stimulator setting are bother him (voice changes and throat discomfort).      EXAMINATION:  /77 (BP Location: Right arm, Patient Position: Chair, Cuff Size: Adult Regular)  Pulse 74  Resp 16  Wt 143 lb (64.9 kg)  BMI 22.74 kg/m2  Alert, orientated, speech is fluent, face is symmetric, No head/ hand tremor, no focal deficits noted.Gait is stable. Had seizure today, staring, unresponsive, not able to recall word, or repeat words, some lip smacking. Lasted < 60 seconds.     ASSESSMENT:    1. 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 Cavernoma intracranial   3. Memory deficits secondary to seizures and antiepileptic drugs    4. Anxiety/Depression  5. Restless legs syndrome.  6. History of Migraines  7. Status post pacemaker placement 12/2003.       1. Refractory multifocal epilepsy.  Etiology multiple cavernomas.  The patient is status left temporal resection of AVM in 1985 (age 11), Right frontal hematoma 04/13/2006, treated with surgery.  Left-sided cavernous hemangioma 07/2002. Status post epilepsy surgery 05/29/2012.  Vagus nerve stimulator implanted on right and he has pacemaker on left.  Electrographically, he has multifocal epileptiform discharges and seizures arising from left and right temporal regions.     Antiepileptic drug discussion: Insurance coverage for new antiepileptic drug is not adequate, therefore he can not afford new AED. He is not able to tolerate higher doses of oxcarbazepine or phenytoin or levetiracetam. We will consider felbamate as the  next agent. Other antiepileptic drug to consider are banzel, Brivaracetam, fycompa, but, if insurance coverage is not good and they are not able to afford these medications. Retrial with lamotrigine (tremors will worsen), Patient and wife declined Responsive Neurostimulation (Neuropace) and DBS or any brain surgery.    Vagus nerve stimulator: Interogatted today and set current from 0mA to 0.5 mA. Seizures worsened since we stopped vagus nerve stimulator. He began to have seizure clusters lasting 1-2 hours per Jasmyne.       2. Restless legs syndrome. Stable on gabapentin .       3. Anxiety and depression.  Managed by psychiatrist (Dr. Sawyer). Risperdal was decreased to decrease tremors. They will work with psychiatrist.     4. Tremor in head improved with lower dose of risperdal. His hand tremor stopped once we stopped depakote. They saw Dr. Bloom for diagnosis/treatment.            PLAN:   1. Continue same antiepileptic drug. Then consider felbamate, felbamate may increase phenytoin levels.     Levetiracetam, 750 mg in the morning, 1500 mg at 4 p.m., and 1500 mg at 11 p.m.   Oxcarbazepine  900 mg t.i.d.   Gabapentin 1200 mg AM, 1200 mg noon, and 1200 mg PM  (started for RLS, patient has taken higher dose for some time, not sure of length)   Klonopin 2 -4 mg  (started 3/2013 for anxiety, takes one per day and two on weekend)  Phenytoin ER  100-100  Diazepam PRN for CPS lasting greater than 5 minutes or more then 2 in one hour.      2. Increased vagus nerve stimulator to 0.5 mA. Nurse visit in one week. He will vagus nerve stimulator battery replacement in the coming year.     3. Follow up 3 month with Har[facility]. But, Nurse for vagus nerve stimulator titration in 1 week        I spent 40 minutes with the patient. During this time counseling and coordination of care exceeded 50% of the visit time. I addressed all questions and concerns the patient raised in regards to his care.     REBEKAH CHRISTOPHER MD

## 2018-11-09 ENCOUNTER — OFFICE VISIT (OUTPATIENT)
Dept: NEUROLOGY | Facility: CLINIC | Age: 43
End: 2018-11-09
Payer: COMMERCIAL

## 2018-11-09 VITALS
BODY MASS INDEX: 23.21 KG/M2 | DIASTOLIC BLOOD PRESSURE: 66 MMHG | WEIGHT: 146 LBS | TEMPERATURE: 98.2 F | SYSTOLIC BLOOD PRESSURE: 117 MMHG | HEART RATE: 62 BPM

## 2018-11-09 DIAGNOSIS — G40.219 PARTIAL EPILEPSY WITH IMPAIRMENT OF CONSCIOUSNESS, INTRACTABLE (H): Primary | ICD-10-CM

## 2018-11-09 ASSESSMENT — PAIN SCALES - GENERAL: PAINLEVEL: NO PAIN (0)

## 2018-11-09 NOTE — PROGRESS NOTES
"[facility] Progress Note    Patient:  Ammon Cronin  :  1975   Age:  43 year old   Today's Office Visit:  2018    Epilepsy Data:  Patient History  Primary Epileptologist/Provider: Rosenda Claros M.D.  Epilepsy Syndrome: Localization-related epilepsy unspecified  Epilepsy Syndrome Status: Final  Age of Onset: 16  Etiology  : Tumor  Other Relevant Dx/ Issues: Etiology likely cavernous hemangioma; acute hemorrhage 2005.  Born 5 weeks premature. Left temporal AVM with resection age 11.  Cardiac pacemaker  (bradycardia).  Hx of migraines.  Inpatient evaluations 3/02, ,  & .     Tests/Surgery History  Last EE2013  Last Neuropsych Testin2009  Last Case Management Conference: 3/21/2012  Epilepsy Surgery #1 Date: 11  Epilepsy Related Surgery #1 : Type: VNS implantation on RIGHT.  Seizure Record  Current Visit Date: 18  Previous Visit Date: 10/25/18  Months since last visit: 0.49  Seizure Type 1: Simple partial seizures unspecified  Description of Sz Type 1: \"hard to describe, feeling in the head, somthing is going to happen\"   Seizure Type 2: Complex partial seizures unspecified  Description of Sz Type 2: \"Brief\" ones consist of mumbling and not being very repsonsive at that time; duration 5-10 seconds.  \"Bigger\" ones consist of unresponsiveness, staring, confusion, lip smacking movements, may pick at objects or sort things, repetitive behavior.  Lasts few minutes.  # of Type 2 Seizure since last visit: 30  Freq. Type 2 / Month: 61.22  Seizure Type 3: Partial seizures with secondary generalization  -  with complex partial seizures evolving to generalized seizures  Description of Sz Type 3: 0        SEIZURE HISTORY:   Copied forward:  First seizure was at the age of 16 (loss of awarenes). 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 Patient has intractable " localization-related epilepsy with multiple seizure foci.   Presurgically workup in 2002 revealed bilateral independent seizure foci in the temporal lobes. Ictal asystole (2003, he developed chest pain, pallor, diaphoresis and nausea, was found to have a 22 second period of asystole.  Emergent cardiac pacemaker placed). MRI of the brain shows a cavernoma in the right frontal and a second one in the left temporal lobe and encephalomalacia.      INTERVAL HISTORY:  Came with Jasmyne.  Return vagus nerve stimulator back on on last visit.  Seizure clustering up to several hours in the morning has stopped.  He continues to have baseline seizures of 1-2/day, he is tolerating vagus nerve stimulator.     Since last visit he has not fallen, is compliant with anti seizure medications , did not have emergency room visit  or did not have any hospitalization . Currently, on antiepileptic drugs there is there is fatigue during the day , there are symptoms of dizziness, double vision or there are more psychiatric issues such as behavioral outburst, irritablity, worsening depression, no suicidical thoughts. On this visit we spoke about patient's seizures, antiepileptic drug, and plan of epilepsy are. Patient/caregiver was agreeable with plan of care. In the last years we spoke about Deep Brain Stimulation device.       Prior to Admission medications    Medication Sig Start Date End Date Taking? Authorizing Provider   cloNIDine (CATAPRES) 0.1 MG tablet  7/10/18  Yes Reported, Patient   diazepam (VALIUM) 5 MG tablet TAKE 1 TAB AS NEEDED FOR SEIZURES >5MIN OR >3 SEIZURES/8HRS, MAY REPEAT ONCE, MAX 10MG/24HR, CALL 911 IF BREATHING PROBLEMS 10/23/17  Yes Rosenda Claros MD   FLUoxetine (PROZAC) 40 MG capsule Take 60 mg by mouth daily Started March 2013   Yes Reported, Patient   gabapentin (NEURONTIN) 600 MG tablet Take 2 tabs ( 1200 mg ) three times daily 6/26/18  Yes oRsenda Claros MD   gabapentin (NEURONTIN) 600 MG tablet Take 2 tab po  TID 6/5/18  Yes Rosenda Claros MD   levETIRAcetam (KEPPRA) 750 MG tablet TAKE 1 TABLET IN THE MORNING, 2 TABLETS AT 4PM, AND 2 TABLETS AT 11PM 5/30/18  Yes Rosenda Claros MD   Multiple Vitamins-Minerals (MULTIVITAMIN & MINERAL PO) Take 1 tablet by mouth daily.   Yes Reported, Patient   OXcarbazepine (TRILEPTAL) 300 MG tablet TAKE 3 TABLETS THREE TIMES DAILY 2/20/18  Yes Rosenda Claros MD   phenytoin (DILANTIN) 100 MG CR capsule 1 CAPSULE TWICE A DAY 2/20/18  Yes Rosenda Claros MD   risperiDONE (RISPERDAL) 1 MG tablet Take 1 mg by mouth 2 times daily Pt wife says they are Tapering off   Yes Reported, Patient   diazepam (DIAZEPAM INTENSOL) 5 MG/ML solution Seizures greater than 5 minutes or cluster of more than 3 seizures in 8 hour period. May repeat dose once, no more than 6 mg in 24 hour period.  Patient not taking: Reported on 5/22/2018 2/3/14   Eric Cruz MD   levETIRAcetam (KEPPRA) 250 MG tablet Titrate as instructed to 1500 mg am  Patient not taking: Reported on 5/22/2018 2/20/18   Rosenda Claros MD       AED MEDICATIONS:    1. Levetiracetam, 750 mg in the morning, 1500 mg at 4 p.m., and 1500 mg at 11 p.m.   2. Oxcarbazepine  900 mg t.i.d.   3. Gabapentin 1200 mg AM, 1200 mg noon, and 1200 mg PM  (started for RLS, patient has taken higher dose for some time, not sure of length)   5. Klonopin 2 -4 mg  (started 3/2013 for anxiety, takes one per day and two on weekend)  6. Phenytoin ER  100-100  7. Diazepam PRN for CPS lasting greater than 5 minutes or more then 2 in one hour.          MEDICATION NOTES/PRIOR ANTIEPILEPTIC DRUGS:    1.Lyrica (ineffective for seizure, max dose 1100mg per day)   2.Topamax: Two trials from 01/2008-05/2008 and the second trial on 05/19/2015.  Max dose was 200 mg per day.  CP max was 3.5 mg/L.  There was no change in seizure frequency.  The patient was compliant.  During the second trial, they tried it for mood stabilization; 50 mg was after 2 months discontinued because  side effects were speech and word-finding difficulties.   3.Gabatril (ineffective, caused fatigue at 48mg/ day). One trial from 01/2004-04/2006.  Max dose 48 mg per day.  It was ineffective and was discontinued.  There was no increase in seizures when it was stopped.  Side effects were fatigue.   4.Zonegran  One trial from 12/2003.  Max dose 400 mg per day.  Zonegran was used in combination with Keppra and Trileptal.  Side effects were memory impairment, aggressiveness, fatigue, behavioral changes, cognitive impairment, which improved after Zonegran was discontinued.   5.Dilantin: One trial from ? to 03/08/2002.  Max dose 350 mg per day.  CP max and free Dilantin level of 1.9 mg/L.  Efficacy:  Unknown.  The drug was optimized.  Side effects were tiredness on 350 mg a day.     Assessment:  It looks like seizure control was better when the patient was on Dilantin; therefore, it could be retried.   6.Vimpat: One trial 07/2009-09/2009.  Max dose 400 mg per day.  CP max 5.3 mg/L.  It was discontinued after 2 months because the patient experienced dizziness, diplopia and ataxia.  7.Depakote: One trial from 01/2010 to 05/09/2016.  So far, seizures have not increased because we increased the gabapentin.  Also, the headaches have not gotten worse, but the hand tremor and the head tremor have immensely improved since Depakote was discontinued.  Max dose was 2500 mg per day.  CP max was up to 116/15.6 mg/L.  In the past, it decreased seizure frequency, and it was used together with levetiracetam, oxcarbazepine and gabapentin, and it was used before with oxcarbazepine, levetiracetam and Lyrica.   Assessment:  Depakote could always be retried if we run into a problem.  8.lamotrigine: One trial from 07/06/2001.  Max dose 150 mg per day.  Used in combination with Dilantin.  There was no change in seizure frequency.  Side effects were increase in confusion and therefore questionable efficacy.  9.carbamazepine (tired, but no past  info on details)  10.Levetiracetam: started in 2001. No major side effects. One trial from 07/2001 to present.  Max dose 3750 mg per day.  CP max 42.4 mg/L. Levetiracetam above 4000 mg per day caused slurred speech, double vision, unstable gait, severe side effects.   11. Oxcarbazepine:  One trial from 03/2002 to present.  Max dose 2700 mg per day.  CP max 43.2 mg/L.  It decreases seizures, and it looks like it is one of the best drugs the patient has tried.  increased oxcarbazepine from 2,400mg -> 2700 mg on 4/2013.   12.  Gabapentin:  One trial from 07/2012 to present.  Max dose is 3600 mg per day.  This drug was used more for pain and restless legs, but when Depakote was tapered, we increased the gabapentin, and it looks like the only problem we have is some weight gain with the gabapentin.  Gabapentin  increased 1200 mg three times a day 5/2016 with no change in seizure, however, RLS symptoms decreased  13.  Klonopin:  One trial started 03/2013.  Is used for anxiety, not for seizures.  It was started at 0.5 mg and optimized so far to 3 mg per day.     Assessment:  This drug is used by a psychiatrist.  If we one day want to try ONFI in this patient, maybe then the Klonopin could be decreased again.   14. Vagus nerve stimulator setting: Not able to tolerate higher duty cycle (35) with 1.1 signal off time and 30 sec on time, seizure worsened, not able to tolerate higher current setting due to discomfort.        REVIEW OF SYSTEMS:  Head tremor, which bothers him.  He continues to have bilateral hand tremors on Depakote.  No nausea, vomiting, diarrhea.  No rash.  Vagus nerve stimulator setting are bother him (voice changes and throat discomfort).      EXAMINATION:  /66 (BP Location: Right arm, Patient Position: Chair, Cuff Size: Adult Regular)  Pulse 62  Temp 98.2  F (36.8  C)  Wt 146 lb (66.2 kg)  BMI 23.21 kg/m2  Alert, orientated, speech is fluent, face is symmetric, No head/ hand tremor, no focal  deficits noted.Gait is stable. Had seizure today, staring, unresponsive, not able to recall word, or repeat words, some lip smacking. Lasted < 60 seconds.     ASSESSMENT:    1. 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 Cavernoma intracranial   3. Memory deficits secondary to seizures and antiepileptic drugs    4. Anxiety/Depression  5. Restless legs syndrome.  6. History of Migraines  7. Status post pacemaker placement 12/2003.       1. Refractory multifocal epilepsy.  Etiology multiple cavernomas.  The patient is status left temporal resection of AVM in 1985 (age 11), Right frontal hematoma 04/13/2006, treated with surgery.  Left-sided cavernous hemangioma 07/2002. Status post epilepsy surgery 05/29/2012.  Vagus nerve stimulator implanted on right and he has pacemaker on left.  Electrographically, he has multifocal epileptiform discharges and seizures arising from left and right temporal regions.     Antiepileptic drug discussion: Insurance coverage for new antiepileptic drug is not adequate, therefore he can not afford new AED. He is not able to tolerate higher doses of oxcarbazepine or phenytoin or levetiracetam. We will consider felbamate as the next agent. Other antiepileptic drug to consider are banzel, Brivaracetam, fycompa, but, if insurance coverage is not good and they are not able to afford these medications. Retrial with lamotrigine (tremors will worsen), Patient and wife declined Responsive Neurostimulation (Neuropace) and DBS or any brain surgery.    Vagus nerve stimulator: Interogatted today and set current from  0.5 mA to 0.75mA.  Vagus nerve stimulator is effective in reducing seizures.  When we stopped his vagus nerve stimulator he had seizure clusters lasting 1-2 hours in 2018.     2. Restless legs syndrome. Stable on gabapentin .       3. Anxiety and depression.  Managed by psychiatrist (Dr. Sawyer). Risperdal was decreased to decrease tremors. They will work with psychiatrist.     4. Tremor in  head improved with lower dose of risperdal. His hand tremor stopped once we stopped depakote. They saw Dr. Bloom for diagnosis/treatment.            PLAN:   1. Continue same antiepileptic drug. Then consider felbamate, felbamate may increase phenytoin levels.     Levetiracetam, 750 mg in the morning, 1500 mg at 4 p.m., and 1500 mg at 11 p.m.   Oxcarbazepine  900 mg t.i.d.   Gabapentin 1200 mg AM, 1200 mg noon, and 1200 mg PM  (started for RLS, patient has taken higher dose for some time, not sure of length)   Klonopin 2 -4 mg  (started 3/2013 for anxiety, takes one per day and two on weekend)  Phenytoin ER  100-100  Diazepam PRN for CPS lasting greater than 5 minutes or more then 2 in one hour.      2. Increased vagus nerve stimulator to 0.75 mA. Nurse visit in two weeks, increase current to 1.0 mA. He will vagus nerve stimulator battery replacement in the coming year.     3. Follow up 3 month with Harlan. But, Nurse for vagus nerve stimulator titration in 2 week        I spent 40 minutes with the patient. During this time counseling and coordination of care exceeded 50% of the visit time. I addressed all questions and concerns the patient raised in regards to his care.     REBEKAH CHRISTOPHER MD

## 2018-11-09 NOTE — MR AVS SNAPSHOT
After Visit Summary   11/9/2018    Ammon Cronin    MRN: [id number]           Patient Information     Date Of Birth          1975        Visit Information        Provider Department      11/9/2018 1:30 PM Rosenda Claros MD [facility]        Today's Diagnoses     Partial epilepsy with impairment of consciousness, intractable (H)    -  1       Follow-ups after your visit        Follow-up notes from your care team     Return in about 2 weeks (around 11/23/2018).      Your next 10 appointments already scheduled     Nov 20, 2018  1:00 PM CST   Vagus Nerve Stimulation with [facility] Nurse 1   [facility] ([facility])    [address], [address]  [facility] [address]   [phone]            Dec 18, 2018 10:30 AM CST   Vagus Nerve Stimulation with Rosenda Claros MD, [facility] Nurse 2   [facility] ([facility])    [address], [address]  [facility] [id number] [phone]            Mar 12, 2019  2:00 PM CDT   Vagus Nerve Stimulation with Rosenda Claros MD   [facility] ([facility])    [address], [address]  [facility] [id number] [phone]              Who to contact     Please call your clinic at [phone] to:    Ask questions about your health    Make or cancel appointments    Discuss your medicines    Learn about your test results    Speak to your doctor            Additional Information About Your Visit        Care EveryWhere ID     This is your Care EveryWhere ID. This could be used by other organizations to access your [facility] medical records  [id number]        Your Vitals Were     Pulse Temperature BMI (Body Mass Index)             62 98.2  F (36.8  C) 23.21 kg/m2          Blood Pressure from Last 3 Encounters:   11/09/18 117/66   10/25/18 143/77   09/05/18 115/71    Weight from Last 3 Encounters:   11/09/18 146 lb (66.2 kg)   10/25/18 143 lb (64.9 kg)   09/05/18 141 lb 3.2 oz  (64 kg)              Today, you had the following     No orders found for display       Primary Care Provider    None Specified       No primary provider on file.        Equal Access to Services     ESTER LITTLE : Madina aad ku hadaudrey Noriega, giselada coririch, vikkita kasohada maggie, terra loaiza matthewzunilda gooden laArielacorey rivera. So [facility] [phone].    ATENCIÓN: Si habla español, tiene a correa disposición servicios gratuitos de asistencia lingüística. Llame al [phone].    We comply with applicable federal civil rights laws and [place] laws. We do not discriminate on the basis of race, color, national origin, age, disability, sex, sexual orientation, or gender identity.            Thank you!     Thank you for choosing [facility]  for your care. Our goal is always to provide you with excellent care. Hearing back from our patients is one way we can continue to improve our services. Please take a few minutes to complete the written survey that you may receive in the mail after your visit with us. Thank you!             Your Updated Medication List - Protect others around you: Learn how to safely use, store and throw away your medicines at www.disposemymeds.org.          This list is accurate as of 11/9/18  5:20 PM.  Always use your most recent med list.                   Brand Name Dispense Instructions for use Diagnosis    cloNIDine 0.1 MG tablet    CATAPRES      Partial epilepsy with impairment of consciousness, intractable (H), Tremor, Restless legs syndrome, Long term use of drug, Restless leg syndrome, Partial epilepsy with loss of consciousness, intractable (H)       * diazepam 5 MG/ML (HIGH CONC) solution    DIAZEPAM INTENSOL    30 mL    Seizures greater than 5 minutes or cluster of more than 3 seizures in 8 hour period. May repeat dose once, no more than 6 mg in 24 hour period.    Unspecified epilepsy with intractable epilepsy       * diazepam 5 MG tablet    VALIUM    30 tablet    TAKE 1 TAB AS  NEEDED FOR SEIZURES >5 MIN OR >3 SEIZURES/8 HRS. MAY REPEAT 1 TIME MAX 2 TABS/24HR, CALL 911 BREATHING PROBLEM    Restless leg syndrome, Partial epilepsy with impairment of consciousness, intractable (H)       gabapentin 600 MG tablet    NEURONTIN    540 tablet    Take 2 tab po TID    Restless legs syndrome, Partial epilepsy with impairment of consciousness, intractable (H), Partial epilepsy with loss of consciousness, intractable (H), Tremor, Long term use of drug, Restless leg syndrome       levETIRAcetam 750 MG tablet    KEPPRA    450 tablet    TAKE 1 TABLET IN THE MORNING, 2 TABLETS AT 4PM, AND 2 TABLETS AT 11PM    Restless legs syndrome, Partial epilepsy with impairment of consciousness, intractable (H), Long term use of drug, Restless leg syndrome, Partial epilepsy with loss of consciousness, intractable (H), Tremor       MULTIVITAMIN & MINERAL PO      Take 1 tablet by mouth daily.        OXcarbazepine 300 MG tablet    TRILEPTAL    810 tablet    TAKE 3 TABLETS THREE TIMES DAILY    Partial epilepsy with loss of consciousness, intractable (H), Restless legs syndrome, Partial epilepsy with impairment of consciousness, intractable (H), Long term use of drug, Restless leg syndrome, Tremor       phenytoin 100 MG CR capsule    DILANTIN    180 capsule    1 CAPSULE TWICE A DAY    Partial epilepsy with impairment of consciousness, intractable (H), Tremor, Restless legs syndrome, Long term use of drug, Restless leg syndrome, Partial epilepsy with loss of consciousness, intractable (H)       PROZAC 40 MG capsule   Generic drug:  FLUoxetine      Take 60 mg by mouth daily Started March 2013        risperiDONE 1 MG tablet    risperDAL     Take 1 mg by mouth 2 times daily Pt wife says they are Tapering off    Partial epilepsy with impairment of consciousness, intractable (H), Restless legs syndrome, Long term use of drug       * Notice:  This list has 2 medication(s) that are the same as other medications prescribed for you.  Read the directions carefully, and ask your doctor or other care provider to review them with you.

## 2018-11-09 NOTE — LETTER
"2018     RE: Ammon Cronin  : 1975   MRN: [id number]      Dear Colleague,    Thank you for referring your patient, Ammon Cronin, to the [facility] EPILEPSY CARE at [facility]. Please see a copy of my visit note below.    [facility]    Patient:  Ammon Cronin  :  1975   Age:  43 year old   Today's Office Visit:  2018    Epilepsy Data:  Patient History  Primary Epileptologist/Provider: Rosenda Claros M.D.  Epilepsy Syndrome: Localization-related epilepsy unspecified  Epilepsy Syndrome Status: Final  Age of Onset: 16  Etiology  : Tumor  Other Relevant Dx/ Issues: Etiology likely cavernous hemangioma; acute hemorrhage 2005.  Born 5 weeks premature. Left temporal AVM with resection age 11.  Cardiac pacemaker  (bradycardia).  Hx of migraines.  Inpatient evaluations 3/02, ,  & .     Tests/Surgery History  Last EE2013  Last Neuropsych Testin2009  Last Case Management Conference: 3/21/2012  Epilepsy Surgery #1 Date: 11  Epilepsy Related Surgery #1 : Type: VNS implantation on RIGHT.  Seizure Record  Current Visit Date: 18  Previous Visit Date: 10/25/18  Months since last visit: 0.49  Seizure Type 1: Simple partial seizures unspecified  Description of Sz Type 1: \"hard to describe, feeling in the head, somthing is going to happen\"   Seizure Type 2: Complex partial seizures unspecified  Description of Sz Type 2: \"Brief\" ones consist of mumbling and not being very repsonsive at that time; duration 5-10 seconds.  \"Bigger\" ones consist of unresponsiveness, staring, confusion, lip smacking movements, may pick at objects or sort things, repetitive behavior.  Lasts few minutes.  # of Type 2 Seizure since last visit: 30  Freq. Type 2 / Month: 61.22  Seizure Type 3: Partial seizures with secondary generalization  -  with complex partial seizures evolving to generalized seizures  Description of Sz Type " 3: 0        SEIZURE HISTORY:   Copied forward:  First seizure was at the age of 16 (loss of awarenes). He has a significant past medical history of left temporal AVM that was resected at age 11 and 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   Presurgically workup in 2002 revealed bilateral independent seizure foci in the temporal lobes. Ictal asystole (2003, he developed chest pain, pallor, diaphoresis and nausea, was found to have a 22 second period of asystole.  Emergent cardiac pacemaker placed). MRI of the brain shows a cavernoma in the right frontal and a second one in the left temporal lobe and encephalomalacia.      INTERVAL HISTORY:  Came with Jasmyne.  Return vagus nerve stimulator back on on last visit.  Seizure clustering up to several hours in the morning has stopped.  He continues to have baseline seizures of 1-2/day, he is tolerating vagus nerve stimulator.     Since last visit he has not fallen, is compliant with anti seizure medications , did not have emergency room visit  or did not have any hospitalization . Currently, on antiepileptic drugs there is there is fatigue during the day , there are symptoms of dizziness, double vision or there are more psychiatric issues such as behavioral outburst, irritablity, worsening depression, no suicidical thoughts. On this visit we spoke about patient's seizures, antiepileptic drug, and plan of epilepsy are. Patient/caregiver was agreeable with plan of care. In the last years we spoke about Deep Brain Stimulation device.       Prior to Admission medications    Medication Sig Start Date End Date Taking? Authorizing Provider   cloNIDine (CATAPRES) 0.1 MG tablet  7/10/18  Yes Reported, Patient   diazepam (VALIUM) 5 MG tablet TAKE 1 TAB AS NEEDED FOR SEIZURES >5MIN OR >3 SEIZURES/8HRS, MAY REPEAT ONCE, MAX 10MG/24HR, CALL 911 IF BREATHING PROBLEMS 10/23/17  Yes Rosenda Claros MD    FLUoxetine (PROZAC) 40 MG capsule Take 60 mg by mouth daily Started March 2013   Yes Reported, Patient   gabapentin (NEURONTIN) 600 MG tablet Take 2 tabs ( 1200 mg ) three times daily 6/26/18  Yes Rosenda Claros MD   gabapentin (NEURONTIN) 600 MG tablet Take 2 tab po TID 6/5/18  Yes Rosenda Claros MD   levETIRAcetam (KEPPRA) 750 MG tablet TAKE 1 TABLET IN THE MORNING, 2 TABLETS AT 4PM, AND 2 TABLETS AT 11PM 5/30/18  Yes Rosenda Claros MD   Multiple Vitamins-Minerals (MULTIVITAMIN & MINERAL PO) Take 1 tablet by mouth daily.   Yes Reported, Patient   OXcarbazepine (TRILEPTAL) 300 MG tablet TAKE 3 TABLETS THREE TIMES DAILY 2/20/18  Yes Rosenda Claros MD   phenytoin (DILANTIN) 100 MG CR capsule 1 CAPSULE TWICE A DAY 2/20/18  Yes Rosenda Claros MD   risperiDONE (RISPERDAL) 1 MG tablet Take 1 mg by mouth 2 times daily Pt wife says they are Tapering off   Yes Reported, Patient   diazepam (DIAZEPAM INTENSOL) 5 MG/ML solution Seizures greater than 5 minutes or cluster of more than 3 seizures in 8 hour period. May repeat dose once, no more than 6 mg in 24 hour period.  Patient not taking: Reported on 5/22/2018 2/3/14   Eric Cruz MD   levETIRAcetam (KEPPRA) 250 MG tablet Titrate as instructed to 1500 mg am  Patient not taking: Reported on 5/22/2018 2/20/18   Rosenda Claros MD       AED MEDICATIONS:    1. Levetiracetam, 750 mg in the morning, 1500 mg at 4 p.m., and 1500 mg at 11 p.m.   2. Oxcarbazepine  900 mg t.i.d.   3. Gabapentin 1200 mg AM, 1200 mg noon, and 1200 mg PM  (started for RLS, patient has taken higher dose for some time, not sure of length)   5. Klonopin 2 -4 mg  (started 3/2013 for anxiety, takes one per day and two on weekend)  6. Phenytoin ER  100-100  7. Diazepam PRN for CPS lasting greater than 5 minutes or more then 2 in one hour.          MEDICATION NOTES/PRIOR ANTIEPILEPTIC DRUGS:    1.Lyrica (ineffective for seizure, max dose 1100mg per day)   2.Topamax: Two trials from 01/2008-05/2008  and the second trial on 05/19/2015.  Max dose was 200 mg per day.  CP max was 3.5 mg/L.  There was no change in seizure frequency.  The patient was compliant.  During the second trial, they tried it for mood stabilization; 50 mg was after 2 months discontinued because side effects were speech and word-finding difficulties.   3.Gabatril (ineffective, caused fatigue at 48mg/ day). One trial from 01/2004-04/2006.  Max dose 48 mg per day.  It was ineffective and was discontinued.  There was no increase in seizures when it was stopped.  Side effects were fatigue.   4.Zonegran  One trial from 12/2003.  Max dose 400 mg per day.  Zonegran was used in combination with Keppra and Trileptal.  Side effects were memory impairment, aggressiveness, fatigue, behavioral changes, cognitive impairment, which improved after Zonegran was discontinued.   5.Dilantin: One trial from ? to 03/08/2002.  Max dose 350 mg per day.  CP max and free Dilantin level of 1.9 mg/L.  Efficacy:  Unknown.  The drug was optimized.  Side effects were tiredness on 350 mg a day.     Assessment:  It looks like seizure control was better when the patient was on Dilantin; therefore, it could be retried.   6.Vimpat: One trial 07/2009-09/2009.  Max dose 400 mg per day.  CP max 5.3 mg/L.  It was discontinued after 2 months because the patient experienced dizziness, diplopia and ataxia.  7.Depakote: One trial from 01/2010 to 05/09/2016.  So far, seizures have not increased because we increased the gabapentin.  Also, the headaches have not gotten worse, but the hand tremor and the head tremor have immensely improved since Depakote was discontinued.  Max dose was 2500 mg per day.  CP max was up to 116/15.6 mg/L.  In the past, it decreased seizure frequency, and it was used together with levetiracetam, oxcarbazepine and gabapentin, and it was used before with oxcarbazepine, levetiracetam and Lyrica.   Assessment:  Depakote could always be retried if we run into a  problem.  8.lamotrigine: One trial from 07/06/2001.  Max dose 150 mg per day.  Used in combination with Dilantin.  There was no change in seizure frequency.  Side effects were increase in confusion and therefore questionable efficacy.  9.carbamazepine (tired, but no past info on details)  10.Levetiracetam: started in 2001. No major side effects. One trial from 07/2001 to present.  Max dose 3750 mg per day.  CP max 42.4 mg/L. Levetiracetam above 4000 mg per day caused slurred speech, double vision, unstable gait, severe side effects.   11. Oxcarbazepine:  One trial from 03/2002 to present.  Max dose 2700 mg per day.  CP max 43.2 mg/L.  It decreases seizures, and it looks like it is one of the best drugs the patient has tried.  increased oxcarbazepine from 2,400mg -> 2700 mg on 4/2013.   12.  Gabapentin:  One trial from 07/2012 to present.  Max dose is 3600 mg per day.  This drug was used more for pain and restless legs, but when Depakote was tapered, we increased the gabapentin, and it looks like the only problem we have is some weight gain with the gabapentin.  Gabapentin  increased 1200 mg three times a day 5/2016 with no change in seizure, however, RLS symptoms decreased  13.  Klonopin:  One trial started 03/2013.  Is used for anxiety, not for seizures.  It was started at 0.5 mg and optimized so far to 3 mg per day.     Assessment:  This drug is used by a psychiatrist.  If we one day want to try ONFI in this patient, maybe then the Klonopin could be decreased again.   14. Vagus nerve stimulator setting: Not able to tolerate higher duty cycle (35) with 1.1 signal off time and 30 sec on time, seizure worsened, not able to tolerate higher current setting due to discomfort.        REVIEW OF SYSTEMS:  Head tremor, which bothers him.  He continues to have bilateral hand tremors on Depakote.  No nausea, vomiting, diarrhea.  No rash.  Vagus nerve stimulator setting are bother him (voice changes and throat discomfort).       EXAMINATION:  /66 (BP Location: Right arm, Patient Position: Chair, Cuff Size: Adult Regular)  Pulse 62  Temp 98.2  F (36.8  C)  Wt 146 lb (66.2 kg)  BMI 23.21 kg/m2  Alert, orientated, speech is fluent, face is symmetric, No head/ hand tremor, no focal deficits noted.Gait is stable. Had seizure today, staring, unresponsive, not able to recall word, or repeat words, some lip smacking. Lasted < 60 seconds.     ASSESSMENT:    1. 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 Cavernoma intracranial   3. Memory deficits secondary to seizures and antiepileptic drugs    4. Anxiety/Depression  5. Restless legs syndrome.  6. History of Migraines  7. Status post pacemaker placement 12/2003.       1. Refractory multifocal epilepsy.  Etiology multiple cavernomas.  The patient is status left temporal resection of AVM in 1985 (age 11), Right frontal hematoma 04/13/2006, treated with surgery.  Left-sided cavernous hemangioma 07/2002. Status post epilepsy surgery 05/29/2012.  Vagus nerve stimulator implanted on right and he has pacemaker on left.  Electrographically, he has multifocal epileptiform discharges and seizures arising from left and right temporal regions.     Antiepileptic drug discussion: Insurance coverage for new antiepileptic drug is not adequate, therefore he can not afford new AED. He is not able to tolerate higher doses of oxcarbazepine or phenytoin or levetiracetam. We will consider felbamate as the next agent. Other antiepileptic drug to consider are banzel, Brivaracetam, fycompa, but, if insurance coverage is not good and they are not able to afford these medications. Retrial with lamotrigine (tremors will worsen), Patient and wife declined Responsive Neurostimulation (Neuropace) and DBS or any brain surgery.    Vagus nerve stimulator: Interogatted today and set current from  0.5 mA to 0.75mA.  Vagus nerve stimulator is effective in reducing seizures.  When we stopped his vagus nerve stimulator he had  seizure clusters lasting 1-2 hours in 2018.     2. Restless legs syndrome. Stable on gabapentin .       3. Anxiety and depression.  Managed by psychiatrist (Dr. Sawyer). Risperdal was decreased to decrease tremors. They will work with psychiatrist.     4. Tremor in head improved with lower dose of risperdal. His hand tremor stopped once we stopped depakote. They saw Dr. Bloom for diagnosis/treatment.            PLAN:   1. Continue same antiepileptic drug. Then consider felbamate, felbamate may increase phenytoin levels.     Levetiracetam, 750 mg in the morning, 1500 mg at 4 p.m., and 1500 mg at 11 p.m.   Oxcarbazepine  900 mg t.i.d.   Gabapentin 1200 mg AM, 1200 mg noon, and 1200 mg PM  (started for RLS, patient has taken higher dose for some time, not sure of length)   Klonopin 2 -4 mg  (started 3/2013 for anxiety, takes one per day and two on weekend)  Phenytoin ER  100-100  Diazepam PRN for CPS lasting greater than 5 minutes or more then 2 in one hour.      2. Increased vagus nerve stimulator to 0.75 mA. Nurse visit in two weeks, increase current to 1.0 mA. He will vagus nerve stimulator battery replacement in the coming year.     3. Follow up 3 month with Harlan. But, Nurse for vagus nerve stimulator titration in 2 week        I spent 40 minutes with the patient. During this time counseling and coordination of care exceeded 50% of the visit time. I addressed all questions and concerns the patient raised in regards to his care.     REBEKAH CHRISTOPHER MD

## 2018-11-20 ENCOUNTER — ALLIED HEALTH/NURSE VISIT (OUTPATIENT)
Dept: NEUROLOGY | Facility: CLINIC | Age: 43
End: 2018-11-20
Payer: COMMERCIAL

## 2018-11-20 VITALS
BODY MASS INDEX: 23.82 KG/M2 | DIASTOLIC BLOOD PRESSURE: 74 MMHG | SYSTOLIC BLOOD PRESSURE: 121 MMHG | HEART RATE: 64 BPM | WEIGHT: 149.8 LBS

## 2018-11-20 DIAGNOSIS — G40.219 PARTIAL EPILEPSY WITH IMPAIRMENT OF CONSCIOUSNESS, INTRACTABLE (H): Primary | ICD-10-CM

## 2018-11-20 RX ORDER — RISPERIDONE 0.5 MG/1
0.5 TABLET ORAL 2 TIMES DAILY
COMMUNITY
End: 2019-03-12

## 2018-11-20 ASSESSMENT — PAIN SCALES - GENERAL: PAINLEVEL: NO PAIN (0)

## 2018-11-20 NOTE — PATIENT INSTRUCTIONS
Continue to make a note of seizure activity.  Continue medications as prescribed.  The VNS has been set to 1.25mA output current. If you have discomfort (or any other problem) tape your VNS magnet of there site of the device, and call the clinic during regular office hours to be seen as soon as possible  [facility] [phone]

## 2018-11-20 NOTE — PROGRESS NOTES
Ammon Cronin comes into clinic today at the request of Dr.Sima DALY Claros Ordering Provider for Vagus Nerve Stimulator adjustment    This service provided today was under the supervising provider of the day Dr.Minoo Matt Machuca, who was available if needed.    David Oviedo      [employer] Vagus Nerve Stimulator interrogated and adjusted as per MD recommendations.   Settings as follows:-  Patient ID IDA, Model , Serial # [id number], Implant date 5/29/2012.    Diagnostic performed prior to adjusmtents.  Output 0.75mA  Impedance OK 3545 ohms  Generator battery 18-25% OK Green    Total Magnet activations 77    NORMAL SETTINGS:  Output Current 0.75 mA, sequentially increased to 1.25mA with time provided for assessment of tolerability in between adjustments  Pulse/signal Frequency 30 Hz,  Pulse Width 250 ìsec,  On Time 30 sec,  Off Time 3 min,    MAGNET SETTINGS:  Magnet Output Current 2.25mA,  Magnet Pulse Width 500 ìsec,  Magnet On Time 60 sec.    Patient was noted to tolerated the change in output current with minimal discomfort, noticeable voice changes during stimulation.  Patient does not use a magnet, but he has one available at home.  He as instructed in how to use the magnet to pause / temporarily disable stimulation in the event there were undesirable side effects.  Prior to discharge the device was interrogated and settings as expected.    Patient should follow up in clinic in two weeks for further adjustment of the VNS

## 2018-11-20 NOTE — MR AVS SNAPSHOT
After Visit Summary   11/20/2018    Ammon Cronin    MRN: [id number]           Patient Information     Date Of Birth          1975        Visit Information        Provider Department      11/20/2018 1:00 PM 1, [facility] Nurse [facility] Epilepsy [facility]        Today's Diagnoses     Partial epilepsy with impairment of consciousness, intractable (H)    -  1      Care Instructions    Continue to make a note of seizure activity.  Continue medications as prescribed.  The VNS has been set to 1.25mA output current. If you have discomfort (or any other problem) tape your VNS magnet of there site of the device, and call the clinic during regular office hours to be seen as soon as possible  [facility] [phone]          Follow-ups after your visit        Follow-up notes from your care team     Return in 2 weeks (on 12/4/2018) for VNS adjustment.      Your next 10 appointments already scheduled     Dec 04, 2018  9:30 AM CST   Vagus Nerve Stimulation with [facility] Nurse 1   [facility] ([facility])    [address], [address]  [facility] [address] [phone]            Dec 18, 2018 10:30 AM CST   Vagus Nerve Stimulation with Rosenda Claros MD, [facility] Nurse 2   [facility] ([facility])    [address], [address]  [facility] [id number] [phone]            Mar 12, 2019  2:00 PM CDT   Vagus Nerve Stimulation with Rosenda Claros MD   [facility] ([facility])    [address], [address]  [facility] [address] [phone]              Who to contact     Please call your clinic at [phone] to:    Ask questions about your health    Make or cancel appointments    Discuss your medicines    Learn about your test results    Speak to your doctor            Additional Information About Your Visit        Care EveryWhere ID     This is your Care EveryWhere ID. This could be used by other organizations to access your  [facility] medical records  [id number]        Your Vitals Were     Pulse BMI (Body Mass Index)                64 23.82 kg/m2           Blood Pressure from Last 3 Encounters:   11/20/18 121/74   11/09/18 117/66   10/25/18 143/77    Weight from Last 3 Encounters:   11/20/18 149 lb 12.8 oz (67.9 kg)   11/09/18 146 lb (66.2 kg)   10/25/18 143 lb (64.9 kg)              We Performed the Following     VNS W/O Reprogramming/Surgery (08791)          Today's Medication Changes          These changes are accurate as of 11/20/18 11:59 PM.  If you have any questions, ask your nurse or doctor.               These medicines have changed or have updated prescriptions.        Dose/Directions    risperiDONE 0.5 MG tablet   Commonly known as:  risperDAL   This may have changed:  Another medication with the same name was removed. Continue taking this medication, and follow the directions you see here.   Changed by:  1, Me Ump Nurse        Dose:  0.5 mg   Take 0.5 mg by mouth 2 times daily   Refills:  0                Primary Care Provider    None Specified       No primary provider on file.        Equal Access to Services     [facility]: Madina Noriega, sandro laureano, terra bill. So [facility] [phone].    ATENCIÓN: Si habla español, tiene a correa disposición servicios gratuitos de asistencia lingüística. Llame al [phone].    We comply with applicable federal civil rights laws and [place] laws. We do not discriminate on the basis of race, color, national origin, age, disability, sex, sexual orientation, or gender identity.            Thank you!     Thank you for choosing [facility] EPILEPSY [facility]  for your care. Our goal is always to provide you with excellent care. Hearing back from our patients is one way we can continue to improve our services. Please take a few minutes to complete the written survey that you may receive in the mail after your visit with us.  Thank you!             Your Updated Medication List - Protect others around you: Learn how to safely use, store and throw away your medicines at www.disposemymeds.org.          This list is accurate as of 11/20/18 11:59 PM.  Always use your most recent med list.                   Brand Name Dispense Instructions for use Diagnosis    CLONAZEPAM PO      Take 1 mg by mouth as needed        cloNIDine 0.1 MG tablet    CATAPRES     Take 0.1 mg by mouth 3 times daily    Partial epilepsy with impairment of consciousness, intractable (H), Tremor, Restless legs syndrome, Long term use of drug, Restless leg syndrome, Partial epilepsy with loss of consciousness, intractable (H)       diazepam 5 MG tablet    VALIUM    30 tablet    TAKE 1 TAB AS NEEDED FOR SEIZURES >5 MIN OR >3 SEIZURES/8 HRS. MAY REPEAT 1 TIME MAX 2 TABS/24HR, CALL 911 BREATHING PROBLEM    Restless leg syndrome, Partial epilepsy with impairment of consciousness, intractable (H)       gabapentin 600 MG tablet    NEURONTIN    540 tablet    Take 2 tab po TID    Restless legs syndrome, Partial epilepsy with impairment of consciousness, intractable (H), Partial epilepsy with loss of consciousness, intractable (H), Tremor, Long term use of drug, Restless leg syndrome       levETIRAcetam 750 MG tablet    KEPPRA    450 tablet    TAKE 1 TABLET IN THE MORNING, 2 TABLETS AT 4PM, AND 2 TABLETS AT 11PM    Restless legs syndrome, Partial epilepsy with impairment of consciousness, intractable (H), Long term use of drug, Restless leg syndrome, Partial epilepsy with loss of consciousness, intractable (H), Tremor       MULTIVITAMIN & MINERAL PO      Take 1 tablet by mouth daily.        OXcarbazepine 300 MG tablet    TRILEPTAL    810 tablet    TAKE 3 TABLETS THREE TIMES DAILY    Partial epilepsy with loss of consciousness, intractable (H), Restless legs syndrome, Partial epilepsy with impairment of consciousness, intractable (H), Long term use of drug, Restless leg syndrome,  Tremor       phenytoin 100 MG CR capsule    DILANTIN    180 capsule    1 CAPSULE TWICE A DAY    Partial epilepsy with impairment of consciousness, intractable (H), Tremor, Restless legs syndrome, Long term use of drug, Restless leg syndrome, Partial epilepsy with loss of consciousness, intractable (H)       PROZAC 40 MG capsule   Generic drug:  FLUoxetine      Take 80 mg by mouth daily Started March 2013        risperiDONE 0.5 MG tablet    risperDAL     Take 0.5 mg by mouth 2 times daily

## 2018-12-04 ENCOUNTER — ALLIED HEALTH/NURSE VISIT (OUTPATIENT)
Dept: NEUROLOGY | Facility: CLINIC | Age: 43
End: 2018-12-04
Payer: COMMERCIAL

## 2018-12-04 VITALS
BODY MASS INDEX: 23.37 KG/M2 | DIASTOLIC BLOOD PRESSURE: 81 MMHG | HEART RATE: 71 BPM | SYSTOLIC BLOOD PRESSURE: 118 MMHG | TEMPERATURE: 98.4 F | WEIGHT: 147 LBS

## 2018-12-04 DIAGNOSIS — Z45.42 BATTERY END OF LIFE OF VAGUS NERVE STIMULATOR: ICD-10-CM

## 2018-12-04 DIAGNOSIS — G40.219 PARTIAL EPILEPSY WITH IMPAIRMENT OF CONSCIOUSNESS, INTRACTABLE (H): Primary | ICD-10-CM

## 2018-12-04 ASSESSMENT — PAIN SCALES - GENERAL: PAINLEVEL: NO PAIN (0)

## 2018-12-04 NOTE — MR AVS SNAPSHOT
After Visit Summary   12/4/2018    Ammon Cronin    MRN: [id number]           Patient Information     Date Of Birth          1975        Visit Information        Provider Department      12/4/2018 9:30 AM 1, [facility] Nurse [facility]        Today's Diagnoses     Partial epilepsy with impairment of consciousness, intractable (H)    -  1       Follow-ups after your visit        Your next 10 appointments already scheduled     Dec 18, 2018 10:30 AM CST   Vagus Nerve Stimulation with Rosenda Claros MD, [facility] Nurse 2   [facility] ([facility])    [address], [address]  [place] [id number] [phone]            Mar 12, 2019  2:00 PM CDT   Vagus Nerve Stimulation with Rosenda Claros MD   [facility] ([facility])    [address], [address]  [place] [place] [id number] [phone]              Who to contact     Please call your clinic at [phone] to:    Ask questions about your health    Make or cancel appointments    Discuss your medicines    Learn about your test results    Speak to your doctor            Additional Information About Your Visit        Care EveryWhere ID     This is your Care EveryWhere ID. This could be used by other organizations to access your [facility] [place] medical records  [id number]        Your Vitals Were     Pulse Temperature BMI (Body Mass Index)             71 98.4  F (36.9  C) (Temporal) 23.37 kg/m2          Blood Pressure from Last 3 Encounters:   12/04/18 118/81   11/20/18 121/74   11/09/18 117/66    Weight from Last 3 Encounters:   12/04/18 147 lb (66.7 kg)   11/20/18 149 lb 12.8 oz (67.9 kg)   11/09/18 146 lb (66.2 kg)              Today, you had the following     No orders found for display       Primary Care Provider    None Specified       No primary provider on file.        Equal Access to Services     ESTER LITTLE : sandro Lerma, feliberto serrano,  terra benítezrosaline loja'aan ah. So [facility] [phone].    ATENCIÓN: Si jose e sorenson, tiene a correa disposición servicios gratuitos de asistencia lingüística. Yolanda pete [phone].    We comply with applicable federal civil rights laws and [place] laws. We do not discriminate on the basis of race, color, national origin, age, disability, sex, sexual orientation, or gender identity.            Thank you!     Thank you for choosing [facility]  for your care. Our goal is always to provide you with excellent care. Hearing back from our patients is one way we can continue to improve our services. Please take a few minutes to complete the written survey that you may receive in the mail after your visit with us. Thank you!             Your Updated Medication List - Protect others around you: Learn how to safely use, store and throw away your medicines at www.disposemymeds.org.          This list is accurate as of 12/4/18 10:41 AM.  Always use your most recent med list.                   Brand Name Dispense Instructions for use Diagnosis    CLONAZEPAM PO      Take 1 mg by mouth as needed        cloNIDine 0.1 MG tablet    CATAPRES     Take 0.1 mg by mouth 3 times daily    Partial epilepsy with impairment of consciousness, intractable (H), Tremor, Restless legs syndrome, Long term use of drug, Restless leg syndrome, Partial epilepsy with loss of consciousness, intractable (H)       diazepam 5 MG tablet    VALIUM    30 tablet    TAKE 1 TAB AS NEEDED FOR SEIZURES >5 MIN OR >3 SEIZURES/8 HRS. MAY REPEAT 1 TIME MAX 2 TABS/24HR, CALL 911 BREATHING PROBLEM    Restless leg syndrome, Partial epilepsy with impairment of consciousness, intractable (H)       gabapentin 600 MG tablet    NEURONTIN    540 tablet    Take 2 tab po TID    Restless legs syndrome, Partial epilepsy with impairment of consciousness, intractable (H), Partial epilepsy with loss of consciousness, intractable (H), Tremor, Long term use of drug,  Restless leg syndrome       levETIRAcetam 750 MG tablet    KEPPRA    450 tablet    TAKE 1 TABLET IN THE MORNING, 2 TABLETS AT 4PM, AND 2 TABLETS AT 11PM    Restless legs syndrome, Partial epilepsy with impairment of consciousness, intractable (H), Long term use of drug, Restless leg syndrome, Partial epilepsy with loss of consciousness, intractable (H), Tremor       MULTIVITAMIN & MINERAL PO      Take 1 tablet by mouth daily.        OXcarbazepine 300 MG tablet    TRILEPTAL    810 tablet    TAKE 3 TABLETS THREE TIMES DAILY    Partial epilepsy with loss of consciousness, intractable (H), Restless legs syndrome, Partial epilepsy with impairment of consciousness, intractable (H), Long term use of drug, Restless leg syndrome, Tremor       phenytoin 100 MG capsule    DILANTIN    180 capsule    1 CAPSULE TWICE A DAY    Partial epilepsy with impairment of consciousness, intractable (H), Tremor, Restless legs syndrome, Long term use of drug, Restless leg syndrome, Partial epilepsy with loss of consciousness, intractable (H)       PROZAC 40 MG capsule   Generic drug:  FLUoxetine      Take 80 mg by mouth daily Started March 2013        risperiDONE 0.5 MG tablet    risperDAL     Take 0.5 mg by mouth 2 times daily

## 2018-12-04 NOTE — PROCEDURES
[employer] Vagus Nerve Stimulator interrogated. Settings as follows:-  Patient ID IDA, Model ID Demipulse [id number], Serial # [id number], Implant date 5/29/2012.    NORMAL SETTINGS:  Output Current 1.25 mA, --> 1.50 mA  Pulse/signal Frequency 30 Hz,  Pulse Width 250 ìsec,  On Time 30 sec,  Off Time 3 min,  IFI yes.    MAGNET SETTINGS:  Magnet Output Current 2.25mA,  Magnet Pulse Width 500 ìsec,  Magnet On Time 60 sec.    Patient has voice changes with stimulation but this is not bothersome to him. We increased the output to 1.75 but this was not tolerated due to cough. IFI is 8- 18%. Patient advised to review the VNS generator options to replace with and to set up an appt with Dr. Carolina to start the discussion of battery/generator replacement.     Dr. Claros notified of upcoming need to replace battery. Consider updated the generator type.     Ammon Cronin comes into clinic today at the request of Dr. Rosenda Claros Ordering Provider for VNS titration.     This service provided today was under the supervising provider of the day Dr. Aditi Gutierrez, who was available if needed.    Alexandria Khan RN

## 2018-12-07 ENCOUNTER — TELEPHONE (OUTPATIENT)
Dept: NEUROLOGY | Facility: CLINIC | Age: 43
End: 2018-12-07

## 2018-12-07 NOTE — TELEPHONE ENCOUNTER
----- Message from Harleen Jones sent at 12/7/2018 10:42 AM CST -----  Regarding: jean  Caller: Suad     Relationship to Patient: Nurse     Call Back Number: [phone]    Reason for Call: Pt's psychiatrist switching medications and the nurse would like to discuss this with someone.

## 2018-12-07 NOTE — TELEPHONE ENCOUNTER
Call returned to [facility] Sawyer's office. Lizbet, nurse for Dr. Sawyer was reached. She states that Dr. Sawyer is changing patient's medications as: Prozac 80 mg changed to Paxil 40 mg. They would like to be notified if this is contraindicated in Ammon's case in relateion to seizure threshold.     Chart routed to Dr. Claros to advise.

## 2018-12-07 NOTE — TELEPHONE ENCOUNTER
Chart discussed with Dr. Cedeno (Samson GRANT). Per Dr. Cedeno there is no contraindication with Paxil. No further action required.

## 2018-12-11 NOTE — TELEPHONE ENCOUNTER
Dr. Sawyer's nurse calls the clinic today to follow up. I reported to her that again, Dr. Cedeno reviewed the request and found no contraindication.

## 2018-12-16 ENCOUNTER — HOSPITAL ENCOUNTER (EMERGENCY)
Facility: CLINIC | Age: 43
Discharge: PSYCHIATRIC OR SUBSTANCE USE FACILITY | End: 2018-12-17
Attending: EMERGENCY MEDICINE | Admitting: EMERGENCY MEDICINE
Payer: MEDICARE

## 2018-12-16 DIAGNOSIS — R45.851 SUICIDAL IDEATION: ICD-10-CM

## 2018-12-16 PROCEDURE — 25000132 ZZH RX MED GY IP 250 OP 250 PS 637: Mod: GY | Performed by: EMERGENCY MEDICINE

## 2018-12-16 PROCEDURE — 99285 EMERGENCY DEPT VISIT HI MDM: CPT | Mod: 25

## 2018-12-16 PROCEDURE — 90791 PSYCH DIAGNOSTIC EVALUATION: CPT

## 2018-12-16 PROCEDURE — A9270 NON-COVERED ITEM OR SERVICE: HCPCS | Mod: GY | Performed by: EMERGENCY MEDICINE

## 2018-12-16 RX ORDER — LEVETIRACETAM 750 MG/1
1500 TABLET ORAL DAILY
Status: DISCONTINUED | OUTPATIENT
Start: 2018-12-16 | End: 2018-12-17 | Stop reason: HOSPADM

## 2018-12-16 RX ORDER — IBUPROFEN 200 MG
400 TABLET ORAL EVERY 8 HOURS PRN
COMMUNITY

## 2018-12-16 RX ORDER — LANOLIN ALCOHOL/MO/W.PET/CERES
3 CREAM (GRAM) TOPICAL
Status: DISCONTINUED | OUTPATIENT
Start: 2018-12-16 | End: 2018-12-17 | Stop reason: HOSPADM

## 2018-12-16 RX ORDER — OXCARBAZEPINE 300 MG/1
300 TABLET, FILM COATED ORAL 3 TIMES DAILY
Status: DISCONTINUED | OUTPATIENT
Start: 2018-12-16 | End: 2018-12-17 | Stop reason: HOSPADM

## 2018-12-16 RX ORDER — PAROXETINE 40 MG/1
40 TABLET, FILM COATED ORAL EVERY EVENING
COMMUNITY
End: 2019-03-12

## 2018-12-16 RX ORDER — RISPERIDONE 0.5 MG/1
0.5 TABLET ORAL 2 TIMES DAILY
Status: DISCONTINUED | OUTPATIENT
Start: 2018-12-16 | End: 2018-12-17 | Stop reason: HOSPADM

## 2018-12-16 RX ORDER — LEVETIRACETAM 750 MG/1
750 TABLET ORAL DAILY
Status: DISCONTINUED | OUTPATIENT
Start: 2018-12-17 | End: 2018-12-17 | Stop reason: HOSPADM

## 2018-12-16 RX ORDER — CLONIDINE HYDROCHLORIDE 0.1 MG/1
0.1 TABLET ORAL 2 TIMES DAILY
Status: DISCONTINUED | OUTPATIENT
Start: 2018-12-16 | End: 2018-12-17 | Stop reason: HOSPADM

## 2018-12-16 RX ORDER — PHENYTOIN SODIUM 100 MG/1
100 CAPSULE, EXTENDED RELEASE ORAL 2 TIMES DAILY
Status: DISCONTINUED | OUTPATIENT
Start: 2018-12-16 | End: 2018-12-17 | Stop reason: HOSPADM

## 2018-12-16 RX ORDER — LEVETIRACETAM 750 MG/1
1500 TABLET ORAL
Status: DISCONTINUED | OUTPATIENT
Start: 2018-12-17 | End: 2018-12-17 | Stop reason: HOSPADM

## 2018-12-16 RX ORDER — GABAPENTIN 600 MG/1
1200 TABLET ORAL 3 TIMES DAILY
Status: DISCONTINUED | OUTPATIENT
Start: 2018-12-16 | End: 2018-12-17 | Stop reason: HOSPADM

## 2018-12-16 RX ORDER — ACETAMINOPHEN 325 MG/1
650 TABLET ORAL EVERY 4 HOURS PRN
Status: DISCONTINUED | OUTPATIENT
Start: 2018-12-16 | End: 2018-12-17 | Stop reason: HOSPADM

## 2018-12-16 RX ORDER — HYDROXYZINE PAMOATE 25 MG/1
25 CAPSULE ORAL
Status: DISCONTINUED | OUTPATIENT
Start: 2018-12-16 | End: 2018-12-17 | Stop reason: HOSPADM

## 2018-12-16 RX ORDER — PAROXETINE 40 MG/1
40 TABLET, FILM COATED ORAL EVERY EVENING
Status: DISCONTINUED | OUTPATIENT
Start: 2018-12-16 | End: 2018-12-17 | Stop reason: HOSPADM

## 2018-12-16 RX ORDER — CLONAZEPAM 0.5 MG/1
1 TABLET ORAL DAILY PRN
Status: DISCONTINUED | OUTPATIENT
Start: 2018-12-16 | End: 2018-12-17 | Stop reason: HOSPADM

## 2018-12-16 RX ORDER — CLONAZEPAM 1 MG/1
1 TABLET ORAL DAILY PRN
COMMUNITY
End: 2019-05-14

## 2018-12-16 RX ADMIN — PAROXETINE 40 MG: 40 TABLET, FILM COATED ORAL at 21:23

## 2018-12-16 RX ADMIN — OXCARBAZEPINE 300 MG: 300 TABLET, FILM COATED ORAL at 21:21

## 2018-12-16 RX ADMIN — PHENYTOIN SODIUM 100 MG: 100 CAPSULE, EXTENDED RELEASE ORAL at 21:22

## 2018-12-16 RX ADMIN — GABAPENTIN 1200 MG: 600 TABLET, FILM COATED ORAL at 21:22

## 2018-12-16 RX ADMIN — RISPERIDONE 0.5 MG: 0.5 TABLET ORAL at 21:22

## 2018-12-16 RX ADMIN — LEVETIRACETAM 1500 MG: 750 TABLET, FILM COATED ORAL at 21:27

## 2018-12-16 RX ADMIN — CLONIDINE HYDROCHLORIDE 0.1 MG: 0.1 TABLET ORAL at 21:20

## 2018-12-16 ASSESSMENT — MIFFLIN-ST. JEOR: SCORE: 1467.79

## 2018-12-16 NOTE — ED NOTES
Bed: BH3  Expected date:   Expected time:   Means of arrival:   Comments:  Rita 434 Suicide attempt cooperative hold 43 male

## 2018-12-16 NOTE — ED PROVIDER NOTES
"  History     Chief Complaint:  Suicide attempt    HPI   Ammon Cronin is a 43 year old male with a history of AVM and epilepsy with a pacemaker and a vagus nerve stimulator who presents after attempting suicide. The patient was in the car today with his wife and kids when he and his wife got into a disagreement. He says his wife asked him if he would like to exit the car on the side of the road to which the patient obliged. He then began to walk in the direction of his home along [address] in [place] when he attempted to jump in front of two cars to end his life. Both drivers were able to come to a stop. The first  asked him if he was okay and the patient responded by saying, \"Don't mind me, I am just trying to kill myself.\" The patient believes this  called the police for him. The patient then sat on the edge of a highway overpass thinking about jumping down to the cars below, but says that God wanted him here for a different reason, which he believes is to take care of his children. At this point, police arrived and the patient was brought here to the ED cooperatively. The patient does have a history of suicidal ideation, but has not attempted to hurt himself in the past. He does not have a history of hospitalization due to mental illness.     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 He says he takes Valium for anxiety in addition to his epilepsy medications to help him keep a \"level-head\" and deal with his 14-year-old daughter, which seems to be a major stressor for him; he says that he has a history of \"over-reacting\" to some things and he is frustrated that he can't just \"suck it up and bite the bullet.\" The patient is very close with his son and cites him as a reason for him to continue living. He also says he is very close with his father, who he talks to every other day, and " "his younger brother who is somewhat of a role model for him.       Allergies:  No known drug allergies     Medications:    Clonazepam  Clonidine  Valium  Prozac  Gabapentin  Keppra  Trileptal  Dilantin  Risperdal     Past Medical History:    S/p pacemaker  AVM brain  Tremor  Sensorineural hearing loss  Restless legs syndrome  Partial epilepsy with impairment of consciousness  Partial or petit mal seizures  Asystole with seizure  Congential anomaly of cerebrovascular system  Cavernous hemangioma of cerebellum  Migraines    Past Surgical History:    Multilobulated intra axillary mass involving right frontal lobe removed   Left temporal AVM resection  Cardiac pacemaker implant  Right vagus nerve stimulator implant     Family History:    Musculoskeletal disorder of elbow    Social History:  Smoking Status: Never Smoker  Alcohol Use: No  Patient presents by himself.   Marital Status:        Review of Systems   Psychiatric/Behavioral: Positive for self-injury and suicidal ideas.   All other systems reviewed and are negative.      Physical Exam     Patient Vitals for the past 24 hrs:   BP Temp Temp src Heart Rate Resp SpO2 Height Weight   12/16/18 1549 130/79 98.2  F (36.8  C) Oral 87 16 99 % -- --   12/16/18 1548 109/77 98.3  F (36.8  C) Oral 69 16 97 % 1.676 m (5' 6\") 63.5 kg (140 lb)      Physical Exam  Eyes:  The pupils are equal and round    Conjunctivae and sclerae are normal  ENT:    The nose is normal    Pinnae are normal  CV:  Regular rate and rhythm     No edema  Resp:  Lungs are clear    Non-labored    No rales    No wheezing   GI:  Abdomen is soft, there is no rigidity    No distension    No rebound tenderness   MS:  Normal muscular tone    No asymmetric leg swelling  Skin:  No rash or acute skin lesions noted  Neuro:   Awake, alert.      Speech is normal and fluent.    Face is symmetric.     Moves all extremities  Psych:  Good eye contact. Endorses that he was suicidal prior.  Labile.  Reports that he " does not want to kill self now    Emergency Department Course     Laboratory:  Labs Ordered and Resulted from Time of ED Arrival Up to the Time of Departure from the ED - No data to display     Interventions:  Medications   acetaminophen (TYLENOL) tablet 650 mg (not administered)   melatonin tablet 3 mg (not administered)   hydrOXYzine (VISTARIL) capsule 25 mg (not administered)   clonazePAM (klonoPIN) tablet 1 mg (not administered)   cloNIDine (CATAPRES) tablet 0.1 mg (0.1 mg Oral Given 12/16/18 2120)   gabapentin (NEURONTIN) tablet 1,200 mg (1,200 mg Oral Given 12/16/18 2122)   OXcarbazepine (TRILEPTAL) tablet 300 mg (300 mg Oral Given 12/16/18 2121)   PARoxetine (PAXIL) tablet 40 mg (40 mg Oral Given 12/16/18 2123)   phenytoin (DILANTIN) CR capsule 100 mg (100 mg Oral Given 12/16/18 2122)   risperiDONE (risperDAL) tablet 0.5 mg (0.5 mg Oral Given 12/16/18 2122)   levETIRAcetam (KEPPRA) tablet 750 mg (not administered)   levETIRAcetam (KEPPRA) tablet 1,500 mg (not administered)   levETIRAcetam (KEPPRA) tablet 1,500 mg (1,500 mg Oral Given 12/16/18 2127)       Emergency Department Course:  The patient arrived in the emergency department via EMS.     Past medical records, nursing notes, and vitals reviewed.  1510: I performed an exam of the patient and obtained history, as documented above.     The patient was evaluated by the DEC .     1718: I spoke to the DEC , Omar, regarding the patient.     1745: I rechecked the patient. Explained plan to patient.     Patient will be signed out to the incoming provider, Dr. Dee.     Impression & Plan      Medical Decision Making:  Ammon Cronin is a 43 year old male who is brought in by ambulance on a hold.  He jumped out in front of 2 cars today to try to kill himself after having an argument with his wife.  He denies ever being admitted to the hospital for suicidal attempts or depression.  Here he seems somewhat labile.  Given multiple suicidal  gestures prior to arrival will plan them having him seen by our mental health  who agreed that he would benefit from admission.  At this time is willing to come in voluntarily.  If at any time he would try to leave I would place him on a 72 hour hold.    His home medications were ordered.  He is signed out to my partner awaiting admission.      Diagnosis:    ICD-10-CM    1. Suicidal ideation R45.851        Disposition:  Signed out to Dr. Leila Villa Chi  12/16/2018    EMERGENCY DEPARTMENT    Scribe Disclosure:  I, Allen Newton, am serving as a scribe at 3:27 PM on 12/16/2018 to document services personally performed by Nils Renee MD based on my observations and the provider's statements to me.        Nils Renee MD  12/16/18 4250

## 2018-12-17 ENCOUNTER — HOSPITAL ENCOUNTER (INPATIENT)
Facility: CLINIC | Age: 43
LOS: 2 days | Discharge: HOME | DRG: 881 | End: 2018-12-19
Attending: PSYCHIATRY & NEUROLOGY | Admitting: PSYCHIATRY & NEUROLOGY
Payer: MEDICARE

## 2018-12-17 VITALS
TEMPERATURE: 98.2 F | OXYGEN SATURATION: 96 % | WEIGHT: 140 LBS | SYSTOLIC BLOOD PRESSURE: 126 MMHG | RESPIRATION RATE: 16 BRPM | HEIGHT: 66 IN | BODY MASS INDEX: 22.5 KG/M2 | HEART RATE: 62 BPM | DIASTOLIC BLOOD PRESSURE: 77 MMHG

## 2018-12-17 PROBLEM — T14.91XA SUICIDE ATTEMPT (H): Status: ACTIVE | Noted: 2018-12-17

## 2018-12-17 LAB
AMPHETAMINES UR QL SCN: NEGATIVE
BARBITURATES UR QL: NEGATIVE
BENZODIAZ UR QL: NEGATIVE
CANNABINOIDS UR QL SCN: NEGATIVE
COCAINE UR QL: NEGATIVE
OPIATES UR QL SCN: NEGATIVE
PCP UR QL SCN: NEGATIVE

## 2018-12-17 PROCEDURE — A9270 NON-COVERED ITEM OR SERVICE: HCPCS | Mod: GY | Performed by: EMERGENCY MEDICINE

## 2018-12-17 PROCEDURE — 25000132 ZZH RX MED GY IP 250 OP 250 PS 637: Mod: GY | Performed by: EMERGENCY MEDICINE

## 2018-12-17 PROCEDURE — 80307 DRUG TEST PRSMV CHEM ANLYZR: CPT | Performed by: EMERGENCY MEDICINE

## 2018-12-17 PROCEDURE — 12400007 ZZH R&B MH INTERMEDIATE UMMC

## 2018-12-17 PROCEDURE — 25000132 ZZH RX MED GY IP 250 OP 250 PS 637: Performed by: CLINICAL NURSE SPECIALIST

## 2018-12-17 PROCEDURE — A9270 NON-COVERED ITEM OR SERVICE: HCPCS | Mod: GY | Performed by: CLINICAL NURSE SPECIALIST

## 2018-12-17 RX ORDER — CLONAZEPAM 1 MG/1
1 TABLET ORAL DAILY PRN
Status: DISCONTINUED | OUTPATIENT
Start: 2018-12-17 | End: 2018-12-19 | Stop reason: HOSPADM

## 2018-12-17 RX ORDER — OLANZAPINE 10 MG/2ML
5-10 INJECTION, POWDER, FOR SOLUTION INTRAMUSCULAR 3 TIMES DAILY PRN
Status: DISCONTINUED | OUTPATIENT
Start: 2018-12-17 | End: 2018-12-19 | Stop reason: HOSPADM

## 2018-12-17 RX ORDER — TRAZODONE HYDROCHLORIDE 50 MG/1
50 TABLET, FILM COATED ORAL
Status: DISCONTINUED | OUTPATIENT
Start: 2018-12-17 | End: 2018-12-19 | Stop reason: HOSPADM

## 2018-12-17 RX ORDER — HYDROXYZINE HYDROCHLORIDE 25 MG/1
25 TABLET, FILM COATED ORAL EVERY 4 HOURS PRN
Status: DISCONTINUED | OUTPATIENT
Start: 2018-12-17 | End: 2018-12-19 | Stop reason: HOSPADM

## 2018-12-17 RX ORDER — PHENYTOIN SODIUM 100 MG/1
100 CAPSULE, EXTENDED RELEASE ORAL 2 TIMES DAILY
Status: DISCONTINUED | OUTPATIENT
Start: 2018-12-17 | End: 2018-12-19 | Stop reason: HOSPADM

## 2018-12-17 RX ORDER — CLONIDINE HYDROCHLORIDE 0.1 MG/1
0.1 TABLET ORAL 2 TIMES DAILY
Status: DISCONTINUED | OUTPATIENT
Start: 2018-12-17 | End: 2018-12-19 | Stop reason: HOSPADM

## 2018-12-17 RX ORDER — ALUMINA, MAGNESIA, AND SIMETHICONE 2400; 2400; 240 MG/30ML; MG/30ML; MG/30ML
30 SUSPENSION ORAL EVERY 4 HOURS PRN
Status: DISCONTINUED | OUTPATIENT
Start: 2018-12-17 | End: 2018-12-19 | Stop reason: HOSPADM

## 2018-12-17 RX ORDER — OXCARBAZEPINE 300 MG/1
300 TABLET, FILM COATED ORAL 3 TIMES DAILY
Status: DISCONTINUED | OUTPATIENT
Start: 2018-12-17 | End: 2018-12-17

## 2018-12-17 RX ORDER — IBUPROFEN 400 MG/1
400 TABLET, FILM COATED ORAL EVERY 8 HOURS PRN
Status: DISCONTINUED | OUTPATIENT
Start: 2018-12-17 | End: 2018-12-19 | Stop reason: HOSPADM

## 2018-12-17 RX ORDER — LEVETIRACETAM 750 MG/1
750 TABLET ORAL DAILY
Status: DISCONTINUED | OUTPATIENT
Start: 2018-12-18 | End: 2018-12-19 | Stop reason: HOSPADM

## 2018-12-17 RX ORDER — ACETAMINOPHEN 325 MG/1
650 TABLET ORAL EVERY 4 HOURS PRN
Status: DISCONTINUED | OUTPATIENT
Start: 2018-12-17 | End: 2018-12-19 | Stop reason: HOSPADM

## 2018-12-17 RX ORDER — OLANZAPINE 5 MG/1
5-10 TABLET ORAL 3 TIMES DAILY PRN
Status: DISCONTINUED | OUTPATIENT
Start: 2018-12-17 | End: 2018-12-19 | Stop reason: HOSPADM

## 2018-12-17 RX ORDER — RISPERIDONE 0.5 MG/1
0.5 TABLET ORAL 2 TIMES DAILY
Status: DISCONTINUED | OUTPATIENT
Start: 2018-12-17 | End: 2018-12-19 | Stop reason: HOSPADM

## 2018-12-17 RX ORDER — PAROXETINE 40 MG/1
40 TABLET, FILM COATED ORAL EVERY EVENING
Status: DISCONTINUED | OUTPATIENT
Start: 2018-12-17 | End: 2018-12-19 | Stop reason: HOSPADM

## 2018-12-17 RX ORDER — BISACODYL 10 MG
10 SUPPOSITORY, RECTAL RECTAL DAILY PRN
Status: DISCONTINUED | OUTPATIENT
Start: 2018-12-17 | End: 2018-12-19 | Stop reason: HOSPADM

## 2018-12-17 RX ORDER — OXCARBAZEPINE 300 MG/1
900 TABLET, FILM COATED ORAL 3 TIMES DAILY
Status: DISCONTINUED | OUTPATIENT
Start: 2018-12-17 | End: 2018-12-19 | Stop reason: HOSPADM

## 2018-12-17 RX ORDER — LEVETIRACETAM 750 MG/1
1500 TABLET ORAL 2 TIMES DAILY
Status: DISCONTINUED | OUTPATIENT
Start: 2018-12-17 | End: 2018-12-19 | Stop reason: HOSPADM

## 2018-12-17 RX ORDER — DIAZEPAM 5 MG
5 TABLET ORAL ONCE
Status: COMPLETED | OUTPATIENT
Start: 2018-12-17 | End: 2018-12-17

## 2018-12-17 RX ORDER — GABAPENTIN 600 MG/1
1200 TABLET ORAL 3 TIMES DAILY
Status: DISCONTINUED | OUTPATIENT
Start: 2018-12-17 | End: 2018-12-19 | Stop reason: HOSPADM

## 2018-12-17 RX ADMIN — PHENYTOIN SODIUM 100 MG: 100 CAPSULE, EXTENDED RELEASE ORAL at 08:32

## 2018-12-17 RX ADMIN — DIAZEPAM 5 MG: 5 TABLET ORAL at 13:42

## 2018-12-17 RX ADMIN — LEVETIRACETAM 750 MG: 750 TABLET, FILM COATED ORAL at 08:33

## 2018-12-17 RX ADMIN — CLONIDINE HYDROCHLORIDE 0.1 MG: 0.1 TABLET ORAL at 21:48

## 2018-12-17 RX ADMIN — PAROXETINE 40 MG: 40 TABLET, FILM COATED ORAL at 21:46

## 2018-12-17 RX ADMIN — PHENYTOIN SODIUM 100 MG: 100 CAPSULE ORAL at 21:46

## 2018-12-17 RX ADMIN — GABAPENTIN 1200 MG: 600 TABLET, FILM COATED ORAL at 08:33

## 2018-12-17 RX ADMIN — OXCARBAZEPINE 900 MG: 300 TABLET ORAL at 21:48

## 2018-12-17 RX ADMIN — CLONIDINE HYDROCHLORIDE 0.1 MG: 0.1 TABLET ORAL at 08:31

## 2018-12-17 RX ADMIN — LEVETIRACETAM 1500 MG: 750 TABLET, FILM COATED ORAL at 22:26

## 2018-12-17 RX ADMIN — RISPERIDONE 0.5 MG: 0.5 TABLET ORAL at 08:31

## 2018-12-17 RX ADMIN — RISPERIDONE 0.5 MG: 0.5 TABLET ORAL at 21:48

## 2018-12-17 RX ADMIN — OXCARBAZEPINE 300 MG: 300 TABLET, FILM COATED ORAL at 08:32

## 2018-12-17 RX ADMIN — GABAPENTIN 1200 MG: 600 TABLET, FILM COATED ORAL at 21:48

## 2018-12-17 ASSESSMENT — ACTIVITIES OF DAILY LIVING (ADL)
TOILETING: 0-->INDEPENDENT
HYGIENE/GROOMING: INDEPENDENT
AMBULATION: 0-->INDEPENDENT
ORAL_HYGIENE: INDEPENDENT
RETIRED_COMMUNICATION: 2-->DIFFICULTY UNDERSTANDING (NOT RELATED TO LANGUAGE BARRIER)
COGNITION: 2 - DIFFICULTY WITH ORGANIZING THOUGHTS
BATHING: 0-->INDEPENDENT
RETIRED_EATING: 0-->INDEPENDENT
DRESS: 0-->INDEPENDENT
SWALLOWING: 0-->SWALLOWS FOODS/LIQUIDS WITHOUT DIFFICULTY
FALL_HISTORY_WITHIN_LAST_SIX_MONTHS: NO
TRANSFERRING: 0-->INDEPENDENT
WHICH_OF_THE_ABOVE_FUNCTIONAL_RISKS_HAD_A_RECENT_ONSET_OR_CHANGE?: COGNITION
DRESS: INDEPENDENT
LAUNDRY: WITH SUPERVISION

## 2018-12-17 ASSESSMENT — MIFFLIN-ST. JEOR: SCORE: 1467.79

## 2018-12-17 NOTE — ED NOTES
Video Observation initiated, patient states he informed upon arrival of this.     Vaishnavi Santana RN

## 2018-12-17 NOTE — ED PROVIDER NOTES
Patient was signed out to me by Dr. Dee awaiting inpatient psychiatric admission for suicide attempt and increasing depression.  Patient has a history of epilepsy and states he had multiple seizures this morning although no witnessed seizure activity was noticed.  Patient is prescribed a home Valium 5 mg orally if he were to have multiple seizures with an eight hour period.  He was given a single dose of IM while under my care here in the emergency department.  He has been taking his at home antiepileptic medications while under our care here in the emergency department.  There have been no significant changes to the patient's vital signs and he is walking talking eating and drinking normally.  Patient was accepted for inpatient psychiatric admission at [facility] under Dr. Anglin.  Patient remained otherwise vitally stable under my care in the emergency department.    Patient signed out to Dr. Gandhi pending admission.      Brandon Alarcon MD  12/17/18 9870

## 2018-12-17 NOTE — ED NOTES
Observation Brochure and Video    Observation Brochure and Video    Video Observation initiated, patient informed.     Lori Crespo RN

## 2018-12-17 NOTE — PHARMACY-ADMISSION MEDICATION HISTORY
Admission medication history interview status for the 12/16/2018  admission is complete. See EPIC admission navigator for prior to admission medications     Medication history source reliability:Good    Actions taken by pharmacist (provider contacted, etc):None     Additional medication history information not noted on PTA med list :None    Medications added: ibuprofen, paroxetine    Medication removed: fluoxetine    Medication reconciliation/reorder completed by provider prior to medication history? No    Time spent in this activity: 15 minutes    Prior to Admission medications    Medication Sig Last Dose Taking? Auth Provider   clonazePAM (KLONOPIN) 1 MG tablet Take 1 mg by mouth daily as needed for anxiety  at prn Yes Unknown, Entered By History   cloNIDine (CATAPRES) 0.1 MG tablet Take 0.1 mg by mouth 2 times daily  12/16/2018 at x1 Yes Reported, Patient   diazepam (VALIUM) 5 MG tablet TAKE 1 TAB AS NEEDED FOR SEIZURES >5 MIN OR >3 SEIZURES/8 HRS. MAY REPEAT 1 TIME MAX 2 TABS/24HR, CALL 911 BREATHING PROBLEM  at prn Yes Rosenda Claros MD   gabapentin (NEURONTIN) 600 MG tablet Take 2 tab po TID 12/16/2018 at x2 Yes Rosenda Claros MD   ibuprofen (ADVIL/MOTRIN) 200 MG tablet Take 400 mg by mouth every 8 hours as needed for mild pain  at prn Yes Unknown, Entered By History   levETIRAcetam (KEPPRA) 750 MG tablet TAKE 1 TABLET IN THE MORNING, 2 TABLETS AT 4PM, AND 2 TABLETS AT 11PM 12/16/2018 at x2 Yes Rosenda Claros MD   Multiple Vitamins-Minerals (MULTIVITAMIN & MINERAL PO) Take 1 tablet by mouth daily. 12/16/2018 at Unknown time Yes Reported, Patient   OXcarbazepine (TRILEPTAL) 300 MG tablet TAKE 3 TABLETS THREE TIMES DAILY 12/16/2018 at x2 Yes Rosenda Claros MD   PARoxetine (PAXIL) 40 MG tablet Take 40 mg by mouth every evening 12/15/2018 at Unknown time Yes Unknown, Entered By History   phenytoin (DILANTIN) 100 MG CR capsule 1 CAPSULE TWICE A DAY 12/16/2018 at x1 Yes Rosenda Claros MD   risperiDONE (RISPERDAL)  0.5 MG tablet Take 0.5 mg by mouth 2 times daily 12/16/2018 at x1 Yes Reported, Patient

## 2018-12-17 NOTE — ED PROVIDER NOTES
Patient was received in sign out with psychiatry  admission pending.  No issues overnight.  Patient signed out to Dr. Alarcon with admission pending.       Sony Dee MD  12/17/18 3501

## 2018-12-18 VITALS
DIASTOLIC BLOOD PRESSURE: 71 MMHG | HEART RATE: 60 BPM | HEIGHT: 66 IN | BODY MASS INDEX: 22.82 KG/M2 | SYSTOLIC BLOOD PRESSURE: 118 MMHG | RESPIRATION RATE: 16 BRPM | WEIGHT: 142 LBS | TEMPERATURE: 98.3 F

## 2018-12-18 LAB
ALBUMIN SERPL-MCNC: 3.3 G/DL (ref 3.4–5)
ALP SERPL-CCNC: 96 U/L (ref 40–150)
ALT SERPL W P-5'-P-CCNC: 24 U/L (ref 0–70)
ANION GAP SERPL CALCULATED.3IONS-SCNC: 5 MMOL/L (ref 3–14)
AST SERPL W P-5'-P-CCNC: 25 U/L (ref 0–45)
BASOPHILS # BLD AUTO: 0 10E9/L (ref 0–0.2)
BASOPHILS NFR BLD AUTO: 0.9 %
BILIRUB SERPL-MCNC: 0.3 MG/DL (ref 0.2–1.3)
BUN SERPL-MCNC: 11 MG/DL (ref 7–30)
CALCIUM SERPL-MCNC: 8.6 MG/DL (ref 8.5–10.1)
CHLORIDE SERPL-SCNC: 104 MMOL/L (ref 94–109)
CHOLEST SERPL-MCNC: 140 MG/DL
CO2 SERPL-SCNC: 32 MMOL/L (ref 20–32)
CREAT SERPL-MCNC: 0.71 MG/DL (ref 0.66–1.25)
DIFFERENTIAL METHOD BLD: NORMAL
EOSINOPHIL # BLD AUTO: 0.2 10E9/L (ref 0–0.7)
EOSINOPHIL NFR BLD AUTO: 3.9 %
ERYTHROCYTE [DISTWIDTH] IN BLOOD BY AUTOMATED COUNT: 12.1 % (ref 10–15)
GFR SERPL CREATININE-BSD FRML MDRD: >90 ML/MIN/1.7M2
GLUCOSE SERPL-MCNC: 85 MG/DL (ref 70–99)
HCT VFR BLD AUTO: 48.2 % (ref 40–53)
HDLC SERPL-MCNC: 47 MG/DL
HGB BLD-MCNC: 16.3 G/DL (ref 13.3–17.7)
IMM GRANULOCYTES # BLD: 0 10E9/L (ref 0–0.4)
IMM GRANULOCYTES NFR BLD: 0 %
LDLC SERPL CALC-MCNC: 71 MG/DL
LYMPHOCYTES # BLD AUTO: 1.8 10E9/L (ref 0.8–5.3)
LYMPHOCYTES NFR BLD AUTO: 38.9 %
MCH RBC QN AUTO: 31.3 PG (ref 26.5–33)
MCHC RBC AUTO-ENTMCNC: 33.8 G/DL (ref 31.5–36.5)
MCV RBC AUTO: 93 FL (ref 78–100)
MONOCYTES # BLD AUTO: 0.5 10E9/L (ref 0–1.3)
MONOCYTES NFR BLD AUTO: 11.8 %
NEUTROPHILS # BLD AUTO: 2 10E9/L (ref 1.6–8.3)
NEUTROPHILS NFR BLD AUTO: 44.5 %
NONHDLC SERPL-MCNC: 93 MG/DL
NRBC # BLD AUTO: 0 10*3/UL
NRBC BLD AUTO-RTO: 0 /100
PLATELET # BLD AUTO: 223 10E9/L (ref 150–450)
POTASSIUM SERPL-SCNC: 4.2 MMOL/L (ref 3.4–5.3)
PROT SERPL-MCNC: 6.8 G/DL (ref 6.8–8.8)
RBC # BLD AUTO: 5.21 10E12/L (ref 4.4–5.9)
SODIUM SERPL-SCNC: 141 MMOL/L (ref 133–144)
TRIGL SERPL-MCNC: 110 MG/DL
TSH SERPL DL<=0.005 MIU/L-ACNC: 1.8 MU/L (ref 0.4–4)
WBC # BLD AUTO: 4.6 10E9/L (ref 4–11)

## 2018-12-18 PROCEDURE — 36415 COLL VENOUS BLD VENIPUNCTURE: CPT | Performed by: CLINICAL NURSE SPECIALIST

## 2018-12-18 PROCEDURE — 99223 1ST HOSP IP/OBS HIGH 75: CPT | Mod: AI | Performed by: PSYCHIATRY & NEUROLOGY

## 2018-12-18 PROCEDURE — A9270 NON-COVERED ITEM OR SERVICE: HCPCS | Mod: GY | Performed by: CLINICAL NURSE SPECIALIST

## 2018-12-18 PROCEDURE — 25000132 ZZH RX MED GY IP 250 OP 250 PS 637: Mod: GY | Performed by: CLINICAL NURSE SPECIALIST

## 2018-12-18 PROCEDURE — 85025 COMPLETE CBC W/AUTO DIFF WBC: CPT | Performed by: CLINICAL NURSE SPECIALIST

## 2018-12-18 PROCEDURE — 84443 ASSAY THYROID STIM HORMONE: CPT | Performed by: CLINICAL NURSE SPECIALIST

## 2018-12-18 PROCEDURE — 80061 LIPID PANEL: CPT | Performed by: CLINICAL NURSE SPECIALIST

## 2018-12-18 PROCEDURE — 12400007 ZZH R&B MH INTERMEDIATE UMMC

## 2018-12-18 PROCEDURE — 80053 COMPREHEN METABOLIC PANEL: CPT | Performed by: CLINICAL NURSE SPECIALIST

## 2018-12-18 RX ADMIN — GABAPENTIN 1200 MG: 600 TABLET, FILM COATED ORAL at 14:08

## 2018-12-18 RX ADMIN — CLONIDINE HYDROCHLORIDE 0.1 MG: 0.1 TABLET ORAL at 21:13

## 2018-12-18 RX ADMIN — LEVETIRACETAM 1500 MG: 750 TABLET, FILM COATED ORAL at 17:49

## 2018-12-18 RX ADMIN — OXCARBAZEPINE 900 MG: 300 TABLET ORAL at 21:13

## 2018-12-18 RX ADMIN — GABAPENTIN 1200 MG: 600 TABLET, FILM COATED ORAL at 21:13

## 2018-12-18 RX ADMIN — LEVETIRACETAM 1500 MG: 750 TABLET, FILM COATED ORAL at 21:15

## 2018-12-18 RX ADMIN — RISPERIDONE 0.5 MG: 0.5 TABLET ORAL at 21:13

## 2018-12-18 RX ADMIN — PHENYTOIN SODIUM 100 MG: 100 CAPSULE ORAL at 08:28

## 2018-12-18 RX ADMIN — GABAPENTIN 1200 MG: 600 TABLET, FILM COATED ORAL at 08:28

## 2018-12-18 RX ADMIN — OXCARBAZEPINE 900 MG: 300 TABLET ORAL at 14:08

## 2018-12-18 RX ADMIN — CLONIDINE HYDROCHLORIDE 0.1 MG: 0.1 TABLET ORAL at 08:28

## 2018-12-18 RX ADMIN — PHENYTOIN SODIUM 100 MG: 100 CAPSULE ORAL at 21:13

## 2018-12-18 RX ADMIN — RISPERIDONE 0.5 MG: 0.5 TABLET ORAL at 08:28

## 2018-12-18 RX ADMIN — LEVETIRACETAM 750 MG: 750 TABLET, FILM COATED ORAL at 08:27

## 2018-12-18 RX ADMIN — PAROXETINE 40 MG: 40 TABLET, FILM COATED ORAL at 21:13

## 2018-12-18 RX ADMIN — OXCARBAZEPINE 900 MG: 300 TABLET ORAL at 08:28

## 2018-12-18 ASSESSMENT — ACTIVITIES OF DAILY LIVING (ADL)
DRESS: SCRUBS (BEHAVIORAL HEALTH);INDEPENDENT
ORAL_HYGIENE: INDEPENDENT
HYGIENE/GROOMING: INDEPENDENT

## 2018-12-18 ASSESSMENT — MIFFLIN-ST. JEOR: SCORE: 1476.86

## 2018-12-18 NOTE — PROGRESS NOTES
Pt was in the milieu only late in the shift.  Kept to himself in bed either resting or napping all morning.  Pt talked about how complicated his life is with his seizure disorder + teenage children.  Pt denies SI, SIB.  Anx/dep is 7 of 10.  I have been having increasing problems with my daughter.  We are both guilty of not wanting to give in + it has resulted in more + more problems.

## 2018-12-18 NOTE — PROGRESS NOTES
Initial Psychosocial Assessment    I have reviewed the chart, met with the patient, and developed Care Plan. Information for assessment was obtained from: Pt, medical record                                                voluntary    Presenting Problem: Patient was brought in by EMS after jumping out of a moving car in a suicide attempt. He then walked [place] planning to jump. Patient was upset that his 14 year old daughter doesn't communicate with him. Patient has a history of a TBI with seizures. Wife reports patient is very sensitive.     Writer attempted to interview patient however he had difficulty word finding and articulation of his thoughts. Most of the information is taken from the chart.      History of Mental Health and Chemical Dependency:  1st mental health hospitalization      Denies any drug or alcohol use      Significant Life Events   (Illness, Abuse, Trauma, Death): brain injury, multiple medical issues    Family: raised in [place] with intact family, has one younger brother, he is  with two children, daughter 14 and son 9.    Living Situation: lives with wife and children      Educational Background: GED       Financial Status: SSDI    Legal Issues:  Patient stated yes but unable to say what- this is not confirmed    Ethnic/Cultural Issues:     Spiritual Orientation: Judaism      Service History: none    Social Functioning (organization, interests): sports fan, loves golf    Current Treatment Providers are:  KALPESH Claros        [facility]    Social Service Assessment/Plan:   Provide a psychological assessment and manage meds per psychiatry. CTC to contact family for additional information and explore options for psychotherapy. Staff to provide safe environment and observe for behavior changes.

## 2018-12-18 NOTE — PLAN OF CARE
General Plan of Care (Inpatient Behavioral)  Team Discussion  Description  Team Plan:  12/18/2018 1522 by Kamryn Main, [facility]  Note  BEHAVIORAL TEAM DISCUSSION    Participants: Dr. Sanderson, [facility] Vikash Sena, [facility] Kamryn Main, [facility]ДР[facility]Й [facility]  Progress: new patient  Continued Stay Criteria/Rationale: suicide attempt  Medical/Physical: TB I  Precautions:   Behavioral Orders   Procedures    Code 1 - Restrict to Unit    Routine Programming     As clinically indicated    Seizure precautions    Status 15     Every 15 minutes.    Suicide precautions     Patients on Suicide Precautions should have a Combination Diet ordered that includes a Diet selection(s) AND a Behavioral Tray selection for Safe Tray - with utensils, or Safe Tray - NO utensils       Plan: Provide a psychological assessment and manage meds per psychiatry. CTC to contact family and explore options for therapy. Staff to provide safe environment  Rationale for change in precautions or plan: no change

## 2018-12-18 NOTE — PROGRESS NOTES
12/17/18 1810   Patient Belongings   Did you bring any home meds/supplements to the hospital?  No   Patient Belongings other (see comments)   Belongings Search Yes   Clothing Search Yes   Second Staff Alex Calle #6: pants, belt, necklace, socks, button up shirt, long sleeve, shoes, wallet, keys, cell phone.   Security Envelope #[id number]: [employer] Card (#[id number]), [employer] [place] (#[phone]), [employer] Visa (#[phone]), REDcard (#[id number]).  A               Admission:  I am responsible for any personal items that are not sent to the safe or pharmacy.  [facility] is not responsible for loss, theft or damage of any property in my possession.    Signature:  _________________________________ Date: _______  Time: _____                                              Staff Signature:  ____________________________ Date: ________  Time: _____      2nd Staff person, if patient is unable/unwilling to sign:    Signature: ________________________________ Date: ________  Time: _____     Discharge:  [facility] has returned all of my personal belongings:    Signature: _________________________________ Date: ________  Time: _____                                          Staff Signature:  ____________________________ Date: ________  Time: _____

## 2018-12-18 NOTE — H&P
"[facility]  Psychiatric History and Physical      Patient name: Ammon Cronin   MRN: [id number]    Age: 43 year old    YOB: 1975    Identifying information:   The patient is a 43 year old   male who resides with his wife and their 2 children.    Chief complaint:  \"Me and my daughter sometimes do not agree on things but I need to learn to walk away and let it go.  I do not do that enough.  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  He was brought to the emergency room for a mental health evaluation following a recent argument with his wife and daughter which prompted the onset of suicidal ideation and a suicidal gesture.  In the emergency room, his urine drug screen was negative for illicit substances.  He was admitted voluntarily.  On examination today, he recalls the events which precipitated his hospitalization.  He and his family were in the car and the patient was in the passenger seat with his 2 children in the backseat and his wife driving.  He and his daughter have been having ongoing verbal arguments and another had occurred while they were in the car.  He became more frustrated than usual and overwhelmed with anxiety prompting him to desire fleeing the situation.  He threatened to jump out of the car and while the car was at a near stop, he exited the car while telling his wife that he was going to commit suicide.  He then walked towards a nearby bridge somewhere between [place] in [place] and stood at the overpass.  He called his father as well and had a brief conversation with him essentially saying goodbye to him as well.  He then called his wife back so that she could hear him as he committed suicide.  He explains that he came to his senses shortly " afterwards and decided that he no longer wished to jump.  He could hear that his wife was quite scared and tearful which helped influence him to abort the attempt.  He then return back to the car with his wife however by then the police had showed up.  His wife insisted that he come to the emergency room to ensure mental health stability.  Today, he has quite regretful regarding the incident that led to his hospitalization.  He explains that he needs to learn how to cope better with these ongoing conflicts with his daughter.  The patient also mentions that he was recently changed from Prozac to Paxil about 2 weeks ago and is awaiting therapeutic response to that medicine which she hopes will help with these ongoing bouts of emotional lability and anxiety.  He has had suicidal thoughts on and off over the past 1 year however this is the first time that he actually acted on those thoughts.    Psychiatric Review of Systems:    -- Depressive episode: He denied neurovegetative symptoms, denied anhedonia, denied depressed mood today.  He denied suicidal and homicidal thoughts today.  He did describe moments of brief emotional intensity as outlined above, typically in response to psychosocial stressors.  -- Vicki:  denies symptoms  -- Psychosis:  denies symptoms  -- Anxiety: denies symptoms corresponding to ALEJANDRA or panic disorder  -- PTSD: denies symptoms  -- OCD: denies  symptoms  -- Eating disorder: denies symptoms    Psychiatric History:    No prior inpatient hospitalizations.  No prior suicide attempts other than what was mentioned above.  He sees a psychiatrist through [facility] and was recently switched from Prozac to Paxil about 2 weeks ago.  He currently does not see a therapist.    Substance Use History:    Denies using illicit substances or alcohol corresponding to a diagnosis of abuse or dependence. No prior chemical dependency treatments reported.    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  He sees his neurologist Dr. Claros about every 3 months      Current Facility-Administered Medications on File Prior to Encounter:  [COMPLETED] diazepam (VALIUM) tablet 5 mg   [DISCONTINUED] acetaminophen (TYLENOL) tablet 650 mg   [DISCONTINUED] clonazePAM (klonoPIN) tablet 1 mg   [DISCONTINUED] cloNIDine (CATAPRES) tablet 0.1 mg   [DISCONTINUED] gabapentin (NEURONTIN) tablet 1,200 mg   [DISCONTINUED] hydrOXYzine (VISTARIL) capsule 25 mg   [DISCONTINUED] levETIRAcetam (KEPPRA) tablet 1,500 mg   [DISCONTINUED] levETIRAcetam (KEPPRA) tablet 1,500 mg   [DISCONTINUED] levETIRAcetam (KEPPRA) tablet 750 mg   [DISCONTINUED] melatonin tablet 3 mg   [DISCONTINUED] OXcarbazepine (TRILEPTAL) tablet 300 mg   [DISCONTINUED] PARoxetine (PAXIL) tablet 40 mg   [DISCONTINUED] phenytoin (DILANTIN) CR capsule 100 mg   [DISCONTINUED] risperiDONE (risperDAL) tablet 0.5 mg     Current Outpatient Medications on File Prior to Encounter:  clonazePAM (KLONOPIN) 1 MG tablet Take 1 mg by mouth daily as needed for anxiety   cloNIDine (CATAPRES) 0.1 MG tablet Take 0.1 mg by mouth 2 times daily    diazepam (VALIUM) 5 MG tablet TAKE 1 TAB AS NEEDED FOR SEIZURES >5 MIN OR >3 SEIZURES/8 HRS. MAY REPEAT 1 TIME MAX 2 TABS/24HR, CALL 911 BREATHING PROBLEM   gabapentin (NEURONTIN) 600 MG tablet Take 2 tab po TID   ibuprofen (ADVIL/MOTRIN) 200 MG tablet Take 400 mg by mouth every 8 hours as needed for mild pain   levETIRAcetam (KEPPRA) 750 MG tablet TAKE 1 TABLET IN THE MORNING, 2 TABLETS AT 4PM, AND 2 TABLETS AT 11PM   Multiple Vitamins-Minerals (MULTIVITAMIN & MINERAL PO) Take 1 tablet by mouth daily.   OXcarbazepine (TRILEPTAL) 300 MG tablet TAKE 3 TABLETS THREE TIMES DAILY   PARoxetine (PAXIL) 40 MG tablet Take 40 mg by mouth every evening   phenytoin  "(DILANTIN) 100 MG CR capsule 1 CAPSULE TWICE A DAY   risperiDONE (RISPERDAL) 0.5 MG tablet Take 0.5 mg by mouth 2 times daily        Social History:  Refer to the psychosocial assessment completed by our .     Family History:    The patient denied a family history of mental illnesses or suicide attempts    Medical review of systems: 10 systems were reviewed and positive for psychiatric symptoms as noted above otherwise negative    Physical Exam:    B/P: 130/61, T: 97.7, P: 67, R: 16  Estimated body mass index is 22.92 kg/m  as calculated from the following:    Height as of this encounter: 1.676 m (5' 6\").    Weight as of this encounter: 64.4 kg (142 lb).    The rest of the physical examination was reviewed in the emergency room note completed by the emergency room physician.      Mental status examination:  Appearance:  Alert, fair hygiene, no acute distress  Attitude:  Attempts to be cooperative  Eye Contact: Fair  Mood: \"Fine\"  Affect: Full and appropriate.  He did not appear hostile, guarded, or irritable.  Appeared pleasant and social.  Speech: Normal rate and tone.  There were moments where he needed to pause to find the correct word.  Psychomotor Behavior: A very slight and tremor was notable otherwise no other overt psychomotor abnormalities  Thought Process: Linear and logical; not tangential or circumstantial or disorganized  Associations:  Logical; no loose associations Noted  Thought Content:  No obvious paranoia, delusions, ideas of reference, or grandiosity noted. Denies auditory or visual hallucinations. Denies suicidal Ideations. Denies homicidal ideations.  Insight:  Fair  Judgment:  Fair  Oriented to:  time, person, and place  Attention Span and Concentration:  Intact  Recent and Remote Memory: Recent memory appeared intact, remote memory was somewhat limited and may have been complicated by ability to recall appropriate words  Language: Appropriate based on interviewing  Fund of " Knowledge: Appropriate based on interviewing  Muscle Strength and Tone: Normal upon visual inspection  Gait and Station: Normal upon visual inspection            Diagnoses:    Unspecified anxiety disorder  Unspecified depressive disorder  Refractory epilepsy secondary to AVM  Unspecified neurocognitive disorder (likely secondary to chronic medical condition and medications)  Restless leg syndrome  Migraine headaches     Plan:  1.  The patient has been admitted to our behavioral health unit under voluntary status for reports of depressed mood and suicidal thoughts. Treatment will be continued voluntarily.    2.  Paxil has been continued at the current dose for management of mood and anxiety symptoms.  Klonopin is available as needed.  Risperidone has also been continued for mood stabilization.  He is currently awaiting full therapeutic benefit of Paxil which had been introduced 2 weeks ago.  Tolerating well without side effects.    3. Psychosocial treatments to be addressed with social work consult and groups.  The patient would benefit from a referral for individual psychotherapy.    4.  Anticipate a hospital stay of approximately 2-4 days as we target improvement in mood while maintaining remission of suicidal thoughts.

## 2018-12-18 NOTE — PROGRESS NOTES
"Patient arrived  12/18/2018 to station 30[facility]    [facility] Admitting  DX: mental health  Suicide attempt (H)    Vital signs reviewed related to admission.  /61   Pulse 67   Resp 16   Ht 1.676 m (5' 6\")   Wt 63.5 kg (140 lb)   BMI 22.60 kg/m  .    Patient arrived on the unit and was cooperative with the clothing search and admission profile interview. Patient was unable to sign the consent for service form. Read it several times and stared at the paper. Will attempt tomorrow when patient is clearer.     Patient appears to be thought blocking. During the interview patient had difficulty coming up with words and needed to have the questions repeated several times.     Patient stated that one of his stressors is not being able to work. Patient stated that he is trained as a . He has had jobs at [employer] but keeps getting fired after they find out about his seizures.    Patient denies any current suicidal or self injurious thoughts. Stated his suicide attempt was impulsive and was after he got into an argument with his teenage daughter. Patient stated that he asked his wife to let him out of the car. He then jumped in front of a car, the  slammed of there brakes and asked him what he was doing, he told him that he wanted to kill himself, 911 was called.     Patient states his  anxiety is a 7 and depression is a 7.(1 low-10 high). Denies auditory and visual hallucinations. Denies any physical pain. States that he sleeps well at night averaging more than 8 hours.     Patient observed at the desk staring into space and shaking. This lasted about 2 minutes. Patient afterwards was able to speak with writer normally.     Patient is voluntary.        "

## 2018-12-18 NOTE — ED NOTES
I took over care of this patient from my partner, Dr. Alarcon, at approximately 1430.    Briefly, patient presented with decompensated psychiatric illness.  I was asked to assume care until an inpatient mental health bed can be obtained.    A bed was ultimately secured at [place], Dr. Sanderson.  Transported there by EMS.  No other acute events during my shift.       Edinson Gandhi MD  12/17/18 0996

## 2018-12-19 PROCEDURE — 25000132 ZZH RX MED GY IP 250 OP 250 PS 637: Mod: GY | Performed by: CLINICAL NURSE SPECIALIST

## 2018-12-19 PROCEDURE — 99239 HOSP IP/OBS DSCHRG MGMT >30: CPT | Performed by: PSYCHIATRY & NEUROLOGY

## 2018-12-19 PROCEDURE — A9270 NON-COVERED ITEM OR SERVICE: HCPCS | Mod: GY | Performed by: CLINICAL NURSE SPECIALIST

## 2018-12-19 RX ADMIN — GABAPENTIN 1200 MG: 600 TABLET, FILM COATED ORAL at 09:45

## 2018-12-19 RX ADMIN — RISPERIDONE 0.5 MG: 0.5 TABLET ORAL at 09:00

## 2018-12-19 RX ADMIN — OXCARBAZEPINE 900 MG: 300 TABLET ORAL at 09:00

## 2018-12-19 RX ADMIN — LEVETIRACETAM 750 MG: 750 TABLET, FILM COATED ORAL at 09:01

## 2018-12-19 RX ADMIN — CLONIDINE HYDROCHLORIDE 0.1 MG: 0.1 TABLET ORAL at 09:00

## 2018-12-19 RX ADMIN — PHENYTOIN SODIUM 100 MG: 100 CAPSULE ORAL at 09:01

## 2018-12-19 NOTE — DISCHARGE INSTRUCTIONS
Behavioral Discharge Planning and Instructions      Summary:  You were admitted on 12/17/2018  due to thoughts of self harm.  You were treated by Dr. Law Sanderson MD and discharged on  from Station 30 to Home    Principal Diagnosis:   Unspecified anxiety disorder  Unspecified depressive disorder  Refractory epilepsy secondary to AVM  Unspecified neurocognitive disorder (likely secondary to chronic medical condition and medications)  Restless leg syndrome  Migraine headaches    Health Care Follow-up Appointments:    [facility] ([facility]) [phone]  [address] [place] [address] [address]   [place] [address]  Free parking in lot on west side of [facility] or on the frontage road.  Please bring both insurance cards and photo identification card to your appointment.  Giselle Wolfe, PhD, LP  Thursday, December 20th @ 10:45am    [facility]   [facility]  [phone]  [address] [facility]  Dr. Rosenda Claros  Tuesday January 8th @ 1:15    [facility]  [address] [facility]  [address]  [place]  Dr. Rosenda Claros  Tuesday March 12th @ 2:00    [facility] [phone]  [address] [place] [address]  Dr. Sawyer Wednesday January 9th @ 3:40  Attend all scheduled appointments with your outpatient providers. Call at least 24 hours in advance if you need to reschedule an appointment to ensure continued access to your outpatient providers.   Major Treatments, Procedures and Findings:  You were provided with: a psychiatric assessment, assessed for medical stability, medication evaluation and/or management, group therapy, individual therapy, milieu management and medical interventions    Symptoms to Report: feeling more aggressive, increased confusion, losing more sleep, mood getting worse or thoughts of suicide    Early warning signs can include: increased depression or anxiety sleep disturbances increased thoughts or behaviors of suicide or self-harm  increased unusual thinking, such as paranoia  or hearing voices    Safety and Wellness:  Take all medicines as directed.  Make no changes unless your doctor suggests them.      Follow treatment recommendations.  Refrain from alcohol and non-prescribed drugs.  If there is a concern for safety, call 911.    Resources:   COPE (Community Outreach for Psychiatric Emergencies): [phone].Available 24-hours a day, 7 days a week. Call a COPE professional when a severe disturbance of mood or thinking is impacting your safety. COPE professionals are available to go where you are, handle an immediate crisis, and provide a clinical assessment. If needed, COPE will arrange an emergency evaluation for inpatient psychiatric services. Telephone consultations are also available. Ask them if you meet criteria for a crisis residence.   *You can also talk to [facility] about crisis stabilization services if you are experiencing difficulty with the transition from the hospital.  [facility] Acute Psychiatric Services  Acute Psychiatric Services/Crisis Intervention: [phone]  24-hour walk-in crisis intervention and treatment of behavioral emergencies    Located at:  [address] -  in the Emergency room on the first floor of the [facility]  [address] [place] [address]  Phone: [phone]  This is a crisis residence where you can stay for a short time if you feel like you need to stabilize but do not need to go to the hospital.    In the metro area, people in crisis can call **CRISIS (581504) from a mobile phone. This mobile phone service will soon be available statewide.     Crisis Text Line is a text-based crisis line available 24/7. Text [place] to 827659.    The National Suicide Prevention Lifeline will continue to be available without interruption at 435-996-YHDG (8464).     [employer] [place] of Mental Illness  You and your family would benefit from the support groups at [employer] [place] [facility]  [facility].   Www.namihelps.org    www.[facility].org    The [employer] [place] [facility] has a parent support and educator staff - Dallas Bejarano - that can be a support for your parents. There are also many classes that are available to you and your family.  Dallas can be reached at [phone] xt 766 or through e-mail at lino@[email or web address].    The treatment team has appreciated the opportunity to work with you.     If you have any questions or concerns our unit number is [phone].  You may be receiving a follow-up phone call within the next three days from a representative from behavioral health.    You have identified the best phone number to reach you as [phone].

## 2018-12-19 NOTE — PROGRESS NOTES
Pt states he was in a good mood this evening. He appeared to be confused at times and had delayed responses to staff's inquiries. He also would go on tangents about his seizures and his family. He says he needs to make changes regarding his relationship with his daughter and wife. He visited with his wife this evening and he said it was nice to see her. They discussed steps he needs to take to be able to rejoin his family and he plans to talk to his doctor about those plans tomorrow. He did not have a goal for the evening. He denies any SI, SIB, or hallucinations.     12/18/18 2230   Behavioral Health   Hallucinations denies / not responding to hallucinations   Thinking poor concentration;distractable   Orientation time: oriented;date: oriented;place: oriented;person: oriented   Memory baseline memory;new learning, recall loss   Insight insight appropriate to events;insight appropriate to situation   Judgement impaired   Eye Contact at examiner;staring   Affect blunted, flat   Mood anxious;depressed   Physical Appearance/Attire attire appropriate to age and situation;appears stated age   Hygiene well groomed   Suicidality other (see comments)  (denies)   1. Wish to be Dead No   2. Non-Specific Active Suicidal Thoughts  No   Self Injury other (see comment)  (denies)   Elopement (none)   Activity isolative;withdrawn   Speech clear;flight of ideas;pressured;rambling   Medication Sensitivity no observed side effects;no stated side effects   Psychomotor / Gait balanced;steady   Activities of Daily Living   Hygiene/Grooming independent   Oral Hygiene independent   Dress scrubs (behavioral health);independent   Room Organization independent

## 2018-12-19 NOTE — PLAN OF CARE
Initial Occupational Therapy Note     Date of Groups: 12/18/18    Groups Attended: OT health&coping    Affect/Hygiene Presentation: appropriate/no issues    Therapeutic Intervention: General health & coping provides graded experiential activity paired with psychoeducation to promote social interaction, sensorimotor engagement, wellness, coping, and leisure exploration to support improved occupational engagement in mental health management in and outside of the hospital.    Patient Performance/Response: Patient initiated attendance. Patient participated with cues for staying on topic. He was engaged and social, but used sarcastic humor and was quick to share and become circumstantial. His contributions were on topic when questions were concrete. He completed mindfulness based activities but did not engage in gratitude writing activity. He sat for a long time during writing exercise and wrote down a few things from the white board, despite verbal instruction and written instruction on the white board to choose 1 subject of gratitude to focus on and writer about for several minutes. When asked if he was stuck, he said No.     Plan: Patient will be given self-assessment form. Patient will be informed of the role occupational therapy plays in treatment. Will continue to encourage patient attendance in groups for ongoing assessment.

## 2018-12-19 NOTE — PLAN OF CARE
Pt discharged today with all belongings in the company of his wife @ 1340 today. Pt denies any suicidal thoughts. Pt reports mood is improving. Full range of affect.  Pt reported feeling hopeful about the future. All discharge instructions reviewed.  Pt is familiar with all of his medications.

## 2018-12-20 ENCOUNTER — TELEPHONE (OUTPATIENT)
Dept: PHARMACY | Facility: OTHER | Age: 43
End: 2018-12-20

## 2019-01-08 ENCOUNTER — OFFICE VISIT (OUTPATIENT)
Dept: NEUROSURGERY | Facility: CLINIC | Age: 44
End: 2019-01-08
Payer: COMMERCIAL

## 2019-01-08 VITALS — HEART RATE: 62 BPM | OXYGEN SATURATION: 97 % | DIASTOLIC BLOOD PRESSURE: 71 MMHG | SYSTOLIC BLOOD PRESSURE: 99 MMHG

## 2019-01-08 DIAGNOSIS — G40.011 PARTIAL IDIOPATHIC EPILEPSY WITH SEIZURES OF LOCALIZED ONSET, INTRACTABLE, WITH STATUS EPILEPTICUS (H): ICD-10-CM

## 2019-01-08 ASSESSMENT — PAIN SCALES - GENERAL: PAINLEVEL: NO PAIN (0)

## 2019-01-08 NOTE — NURSING NOTE
Chief Complaint   Patient presents with     RECHECK     [facility] RETURN - VNS REPLACEMENT       Alpesh Forman, EMT

## 2019-01-08 NOTE — PROGRESS NOTES
Service Date: 01/08/2019      HISTORY OF PRESENT ILLNESS:  It is my pleasure to meet Mr. Cronin during today's clinic visit.      In brief, Mr. Cronin is a 43-year-old man with a past history of complex partial seizure disorder that began when the patient was age 16.  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  Additionally, the patient's past medical history is notable for an episode of ictal asystole for approximately 22 seconds in 2003, status post left-sided permanent pacemaker placement.      During today's clinic visit, Mr. Cronin states that his complex partial seizures have been occurring 1-8 times per day, typically lasting approximately 10 seconds, though they have lasted up to 1 hour in the past.  For his antiepileptic drug regimen, he currently utilizes gabapentin, oxcarbazepine, levetiracetam, phenytoin, clonazepam and midazolam as needed for refractory seizures.  He states that since the placement of his vagal nerve stimulator in 2012 by Dr. Carolina, his seizure control has improved.  He currently follows with Dr. Rosenda Claros in our [facility] for ongoing management of his seizure disorder.      The patient denies any recent history of generalized tonic-clonic seizures.  He states that he is unaware of his surroundings during his seizures, and he appears to stare off into space, as he has been told by his family.  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        PHYSICAL EXAMINATION:   GENERAL:  A well-appearing man, appearing stated age, seated comfortably in examination room chair, pleasantly conversant.    HEENT:  Normocephalic, no gross  abnormalities.  Bespectacled.  Left C-shaped craniotomy surgical scar well healed without overlying erythema or tenderness.     NEUROLOGIC:   MENTAL STATUS:  Alert and oriented to name, location and time.  Mild dysarthria.   CRANIAL NERVES:  Cranial nerves II-XII intact.     MOTOR:  No weakness to shoulder shrug, equal bilaterally.  5/5 strength throughout the bilateral upper and lower extremities.     SENSORY:  Sensation intact to light touch in all 3 distributions of the trigeminal nerve bilaterally.  Sensation intact to light touch throughout the bilateral upper and lower extremities.     REFLEXES:  1+ bilaterally in the biceps, triceps and brachioradialis.  1+ bilaterally in the patella and ankle.  Sabiha sign negative, no ankle clonus.    GAIT:  Stride gait and normal tandem with no gross abnormalities.        ASSESSMENT:  Complex partial seizure disorder, currently controlled with vagal nerve stimulator.  Vagal nerve stimulator battery life waning.        PLAN:  At the conclusion of today's clinic visit, Dr. Carolina recommended to Mr. Cronin that he undergo replacement of his vagal nerve stimulator battery.  He informed the patient that he may be eligible for a newer version of his vagal nerve stimulator, though it may be contraindicated given his concurrent indwelling permanent cardiac pacemaker and that we will defer to the patient's primary epileptologist, Dr. Claros, for recommendations of specific vagal nerve stimulator type.  Mr. Cronin expressed understanding and was amenable to proceed with this plan at this time.      Again, it was my pleasure to meet Mr. Cronin during today's clinic visit.  He may feel free to call our clinic at any time in the future with further issues, questions or concerns.      Dr. Carolina spent approximately 20 minutes in conversation with the patient during this encounter.  The majority of that time was spent in counseling and care coordination regarding the patient's complex  partial seizure disorder and vagal nerve stimulator.      Dictated by Claudia Chew MD   Resident         GABINO MARMOLEJO MD       As dictated by CLAUDIA CHEW MD       Agree with resident's note.  Seen and examined today with team.  Gabino Marmolejo       D: 2019   T: 2019   MT: kelsey      Name:     YUE OLSEN   MRN:      [id number]        Account:      [id number]   :      1975           Service Date: 2019      Document: [id number]

## 2019-01-08 NOTE — NURSING NOTE
Pre-op Teaching  Performed by:Ce Resendez   Procedure:VNS Battery Replacement   Date:1/29/2018   Surgeon:Ge        Pre-op folder with specific written instructions    Discussed pre-op routine and requirements to include:  surgical procedure, post-op recovery and expectations, need for H&P, NPO prior to OR, pre-op antibacterial showers, pain control and importance of follow-up visits.  Surgery scheduling will coordinate OR time/date and update patient as appropriate.  3C will call with more instructions pre-op.   Ample time was provided for patient questions and in-depth discussion of topics of heightened interest.  A four ounce bottle of antibacterial soap solution was given to patient as well as specific instructions for use. Ammon verbalized understanding of instructions.  Approximately 20 minutes spent with patient and family discussing and reviewing.

## 2019-01-08 NOTE — LETTER
1/8/2019       RE: Ammon Cronin  [address]  [place] [address]     Dear Colleague,    Thank you for referring your patient, Ammon Cronin, to the [facility] NEUROSURGERY at [facility]. Please see a copy of my visit note below.    Service Date: 01/08/2019      HISTORY OF PRESENT ILLNESS:  It is my pleasure to meet Mr. Cronin during today's clinic visit.      In brief, Mr. Cronin is a 43-year-old man with a past history of complex partial seizure disorder that began when the patient was age 16.  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  Additionally, the patient's past medical history is notable for an episode of ictal asystole for approximately 22 seconds in 2003, status post left-sided permanent pacemaker placement.      During today's clinic visit, Mr. Cronin states that his complex partial seizures have been occurring 1-8 times per day, typically lasting approximately 10 seconds, though they have lasted up to 1 hour in the past.  For his antiepileptic drug regimen, he currently utilizes gabapentin, oxcarbazepine, levetiracetam, phenytoin, clonazepam and midazolam as needed for refractory seizures.  He states that since the placement of his vagal nerve stimulator in 2012 by Dr. Carolina, his seizure control has improved.  He currently follows with Dr. Rosenda Claros in our Neurology Epilepsy Clinic for ongoing management of his seizure disorder.      The patient denies any recent history of generalized tonic-clonic seizures.  He states that he is unaware of his surroundings during his seizures, and he appears to stare off into space, as he has been told by his family.  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        PHYSICAL EXAMINATION:   GENERAL:  A well-appearing man, appearing stated age, seated comfortably in examination room chair, pleasantly conversant.    HEENT:  Normocephalic, no gross abnormalities.  Bespectacled.  Left C-shaped craniotomy surgical scar well healed without overlying erythema or tenderness.     NEUROLOGIC:   MENTAL STATUS:  Alert and oriented to name, location and time.  Mild dysarthria.   CRANIAL NERVES:  Cranial nerves II-XII intact.     MOTOR:  No weakness to shoulder shrug, equal bilaterally.  5/5 strength throughout the bilateral upper and lower extremities.     SENSORY:  Sensation intact to light touch in all 3 distributions of the trigeminal nerve bilaterally.  Sensation intact to light touch throughout the bilateral upper and lower extremities.     REFLEXES:  1+ bilaterally in the biceps, triceps and brachioradialis.  1+ bilaterally in the patella and ankle.  Sabiha sign negative, no ankle clonus.    GAIT:  Stride gait and normal tandem with no gross abnormalities.        ASSESSMENT:  Complex partial seizure disorder, currently controlled with vagal nerve stimulator.  Vagal nerve stimulator battery life waning.        PLAN:  At the conclusion of today's clinic visit, Dr. Carolina recommended to Mr. Cronin that he undergo replacement of his vagal nerve stimulator battery.  He informed the patient that he may be eligible for a newer version of his vagal nerve stimulator, though it may be contraindicated given his concurrent indwelling permanent cardiac pacemaker and that we will defer to the patient's primary epileptologist, Dr. Claros, for recommendations of specific vagal nerve stimulator type.  Mr. Cronin expressed understanding and was amenable to proceed with this plan at this time.      Again, it was my pleasure to meet Mr. Cronin during today's clinic visit.  He may feel free to call our  clinic at any time in the future with further issues, questions or concerns.      Dr. Carolina spent approximately 20 minutes in conversation with the patient during this encounter.  The majority of that time was spent in counseling and care coordination regarding the patient's complex partial seizure disorder and vagal nerve stimulator.      Dictated by Claudia Kearns MD   Resident         GABINO CAROLINA MD       As dictated by CLAUDIA KEARNS MD       Agree with resident's note.  Seen and examined today with team.  Gabino Carolina       D: 2019   T: 2019   MT: kelsey      Name:     RADHAMARSHAL YUE   MRN:      [id number]        Account:      [id number]   :      1975           Service Date: 2019      Document: [id number]

## 2019-01-10 ENCOUNTER — DOCUMENTATION ONLY (OUTPATIENT)
Dept: NEUROLOGY | Facility: CLINIC | Age: 44
End: 2019-01-10

## 2019-01-10 NOTE — PROGRESS NOTES
Discussed Ammon's case with .  She recommends a VNS Model 1000 ([employer]) is used to replace the existing model 103 that is to be replaced.    feels the cardiac rate detection/ autostim feature could be beneficial for Ammon, and the overall size is about the same as the device being explanted

## 2019-01-11 DIAGNOSIS — R56.9 SEIZURES (H): Primary | ICD-10-CM

## 2019-01-15 PROBLEM — G40.909 EPILEPSY (H): Status: ACTIVE | Noted: 2019-01-15

## 2019-01-22 RX ORDER — CEFAZOLIN SODIUM 2 G/100ML
2 INJECTION, SOLUTION INTRAVENOUS
Status: CANCELLED | OUTPATIENT
Start: 2019-01-22

## 2019-01-22 RX ORDER — CEFAZOLIN SODIUM 1 G/3ML
1 INJECTION, POWDER, FOR SOLUTION INTRAMUSCULAR; INTRAVENOUS SEE ADMIN INSTRUCTIONS
Status: CANCELLED | OUTPATIENT
Start: 2019-01-22

## 2019-01-24 ENCOUNTER — OFFICE VISIT (OUTPATIENT)
Dept: SURGERY | Facility: CLINIC | Age: 44
End: 2019-01-24
Payer: COMMERCIAL

## 2019-01-24 ENCOUNTER — ANESTHESIA EVENT (OUTPATIENT)
Dept: SURGERY | Facility: CLINIC | Age: 44
End: 2019-01-24
Payer: COMMERCIAL

## 2019-01-24 VITALS
TEMPERATURE: 98.4 F | SYSTOLIC BLOOD PRESSURE: 119 MMHG | OXYGEN SATURATION: 96 % | WEIGHT: 149.7 LBS | BODY MASS INDEX: 24.16 KG/M2 | HEART RATE: 64 BPM | DIASTOLIC BLOOD PRESSURE: 76 MMHG

## 2019-01-24 ASSESSMENT — ENCOUNTER SYMPTOMS: SEIZURES: 1

## 2019-01-24 NOTE — ANESTHESIA PREPROCEDURE EVALUATION
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        BRAIN SURGERY Left     Left temporal AVM resection at age 11.        BRAIN SURGERY Right     AVM surgery      CARDIAC SURGERY      pacemaker     IMPLANT STIMULATOR VAGUS NERVE  11    implanted on RIGHT side.          Anesthesia Evaluation     . Pt has had prior anesthetic. Type: General and MAC    History of anesthetic complications   - PONV        ROS/MED HX    ENT/Pulmonary:     (+)ANGY risk factors snores loudly, , . .    Neurologic:     (+)seizures last seizure: today features: petit mal : staring spells, other neuro AVM age 11 - right frontal AVM hemorrhage     Cardiovascular: Comment: With magnet placement, will go to DOO w rate of 85, with removal will revert to baseline settings    (+) ----. : . . fainting (syncope). :settings: AAI-DDD  . . Previous cardiac testing Echodate:18results:Normal valves EF 65-70%date: results: date: results: date: results:          METS/Exercise Tolerance:  >4 METS   Hematologic:         Musculoskeletal:  - neg musculoskeletal ROS       GI/Hepatic:  - neg GI/hepatic ROS       Renal/Genitourinary:  - ROS Renal section negative       Endo:  - neg endo ROS       Psychiatric:     (+) psychiatric history  "anxiety      Infectious Disease:  - neg infectious disease ROS       Malignancy:      - no malignancy   Other:    (+) No chance of pregnancy no H/O Chronic Pain,no other significant disability                        PHYSICAL EXAM:   Mental Status/Neuro: A/A/O   Airway: Facies: Feasible  Mallampati: I  Mouth/Opening: Full  TM distance: > 6 cm  Neck ROM: Full   Respiratory:   Resp. Rate: Normal     Resp. Effort: Normal      CV: Rhythm: Regular  Heart: Normal Sounds   Comments:      Dental: Normal                  Lab Results   Component Value Date    WBC 4.6 12/18/2018    HGB 16.3 12/18/2018    HCT 48.2 12/18/2018     12/18/2018     12/18/2018    POTASSIUM 4.2 12/18/2018    CHLORIDE 104 12/18/2018    CO2 32 12/18/2018    BUN 11 12/18/2018    CR 0.71 12/18/2018    GLC 85 12/18/2018    MAGDA 8.6 12/18/2018    ALBUMIN 3.3 (L) 12/18/2018    PROTTOTAL 6.8 12/18/2018    ALT 24 12/18/2018    AST 25 12/18/2018    ALKPHOS 96 12/18/2018    BILITOTAL 0.3 12/18/2018    TSH 1.80 12/18/2018       Preop Vitals  BP Readings from Last 3 Encounters:   01/24/19 119/76   01/08/19 99/71   12/18/18 118/71    Pulse Readings from Last 3 Encounters:   01/24/19 64   01/08/19 62   12/18/18 60      Resp Readings from Last 3 Encounters:   12/18/18 16   12/17/18 16   10/25/18 16    SpO2 Readings from Last 3 Encounters:   01/24/19 96%   01/08/19 97%   12/17/18 96%      Temp Readings from Last 1 Encounters:   01/24/19 98.4  F (36.9  C) (Oral)    Ht Readings from Last 1 Encounters:   12/17/18 1.676 m (5' 6\")      Wt Readings from Last 1 Encounters:   01/24/19 67.9 kg (149 lb 11.2 oz)    Estimated body mass index is 24.16 kg/m  as calculated from the following:    Height as of 12/17/18: 1.676 m (5' 6\").    Weight as of this encounter: 67.9 kg (149 lb 11.2 oz).     LDA:            Assessment:   ASA SCORE: 3    NPO Status: > 6 hours since completed [employer]   Documentation: H&P complete; Preop Testing complete; Consents complete "   Proceeding: Proceed without further delay     Plan:   Anes. Type:  General   Pre-Induction: Midazolam IV; Acetaminophen PO   Induction:  IV (Standard)   Airway: Oral ETT   Access/Monitoring: PIV   Maintenance: Balanced; Propofol   Emergence: Procedure Site   Logistics: Same Day Surgery     Postop Pain/Sedation Strategy:  Standard-Options: Opioids PRN     PONV Management:  Adult Risk Factors:, Postop Opioids  Prevention: Propofol Infusion; Ondansetron; Dexamethasone; Scop. Patch     CONSENT: Direct conversation   Plan and risks discussed with: Patient   Blood Products: Consent Deferred (Minimal Blood Loss)       Comments for Plan/Consent:  Anesthesia Plan - To be discussed with staff.   - ASA 3  - GETA with standard ASA monitors, IV induction, balanced anesthetic  - PIVx1  - Antibiotics per surgery  - PONV prophylaxis with Decadron, Zofran, and Propofol infusion.     Myla Martinez MD  CA-1 Anesthesiology Resident                    PAC Discussion and Assessment    ASA Classification: 3  Case is suitable for:   Anesthetic techniques and relevant risks discussed: GA  Invasive monitoring and risk discussed:   Types:   Possibility and Risk of blood transfusion discussed:   NPO instructions given:   Additional anesthetic preparation and risks discussed:   Needs early admission to pre-op area:   Other:     PAC Resident/NP Anesthesia Assessment:  43 year old male for right vagus stimulator battery replacement, on 1/29/19, at [facility], with Dr. Nate Carolina, in treatment for complex partial persistent seizures. PAC referral for risk assessment and optimization for anesthesia.  Pre-operative considerations include:  1.) CV: Functional status independent. Exercise tolerance > 4 METS. Ictal asystole 2003 with pacemaker placement AAI-DDD, no dependent. ECHO 12/26/18 EF 65-70%, NL LV and RV size, NL valves.  RCRI= 0 reflecting 0.4% risk of major adverse cardiac event.  Pacemaker nurse notification  2.) Pulmonary: never  smoker, no pulm dx, sx or meds  ANGY risk is low  3.) Heme: No bleeding or clotting dysfunction. VTE risk is low  4.) Neuro: Right frontal AVM hemorrhage 2005; cavernous hemangioma resection 2002; left temporal brain resection (AVM) age 11. Partial complex seizures on keppra, kilantin, trileptal, klonopin, neurontin and valium. Most daily - some prn. He may take these as needed DOS. Followed by Dr. Rosenda Claros in [facility] neuro clinic.   RLS. Tremor. Suicide ideation in December / seen in clinic / denies current ideation or plan.  5.) GI: PONV socre = 2 )( 2 or > antiemetic prophylaxis recommended).    Anesthesia: No prior complications. Episode of PONV.  Discussed with an seen by Dr. Eng      Reviewed and Signed by PAC Mid-Level Provider/Resident  Mid-Level Provider/Resident: FELIBERTO You  Date: 1/24/19  Time: 11:26 AM    Attending Anesthesiologist Anesthesia Assessment:        Anesthesiologist:   Date:   Time:   Pass/Fail:   Disposition:     PAC Pharmacist Assessment:        Pharmacist:   Date:   Time:        XAVIER Haskins       Agree with plan as outlined above    Robbie Carreno MD  Staff Anesthesiologist  [phone]

## 2019-01-24 NOTE — H&P
Pre-Operative H & P     CC:  Preoperative exam to assess for increased cardiopulmonary risk while undergoing surgery and anesthesia.    Date of Encounter: 1/24/2019  Primary Care Physician:  Deana, [facility] [place] HPI  Ammon Cronin is a 43 year old male who presents for pre-operative H & P in preparation for right vagus stimulator battery replacement, on 1/29/19, at [facility], with Dr. Nate Carolina, in treatment for complex partial persistent seizures. This is scheduled  at [facility]. His sx began at age 11 when an AVM was diagnosed and resected from te left temporal area of the brain.   A vagal nerve stimulator was placed in 2012. He continues with persistent seizure activity described as staring spells.  History is obtained from the patient.     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        BRAIN SURGERY Left 1985    Left temporal AVM resection at age 11.        BRAIN SURGERY Right 2003    AVM surgery      CARDIAC SURGERY  12/03    pacemaker     IMPLANT STIMULATOR VAGUS NERVE  5/29/12    implanted on RIGHT side.       Hx of Blood transfusions/reactions: no     Hx of abnormal bleeding or anti-platelet use: no    Menstrual history: No LMP for male patient.    Steroid use in the last year: no    Personal or FH with difficulty with Anesthesia:  PONV    Prior to Admission Medications  Current Outpatient Medications   Medication Sig Dispense Refill     clonazePAM  (KLONOPIN) 1 MG tablet Take 1 mg by mouth daily as needed for anxiety       cloNIDine (CATAPRES) 0.1 MG tablet Take 0.1 mg by mouth 2 times daily        diazepam (VALIUM) 5 MG tablet TAKE 1 TAB AS NEEDED FOR SEIZURES >5 MIN OR >3 SEIZURES/8 HRS. MAY REPEAT 1 TIME MAX 2 TABS/24HR, CALL 911 BREATHING PROBLEM 30 tablet 1     gabapentin (NEURONTIN) 600 MG tablet Take 2 tab po TID (Patient taking differently: Take 2 tablets two times daily) 540 tablet 3     ibuprofen (ADVIL/MOTRIN) 200 MG tablet Take 400 mg by mouth every 8 hours as needed for mild pain       levETIRAcetam (KEPPRA) 750 MG tablet TAKE 1 TABLET IN THE MORNING, 2 TABLETS AT 4PM, AND 2 TABLETS AT 11PM 450 tablet 3     Multiple Vitamins-Minerals (MULTIVITAMIN & MINERAL PO) Take 1 tablet by mouth daily.       OXcarbazepine (TRILEPTAL) 300 MG tablet TAKE 3 TABLETS THREE TIMES DAILY 810 tablet 3     PARoxetine (PAXIL) 40 MG tablet Take 40 mg by mouth every evening       phenytoin (DILANTIN) 100 MG CR capsule 1 CAPSULE TWICE A  capsule 3     risperiDONE (RISPERDAL) 0.5 MG tablet Take 0.5 mg by mouth 2 times daily         Allergies  Allergies   Allergen Reactions     Cats      Eyes get itchy and watery       Social History  Social History     Socioeconomic History     Marital status:      Spouse name: Jasmyne     Number of children: 2   Occupational History     Not on file   Tobacco Use     Smoking status: Never Smoker     Smokeless tobacco: Never Used   Substance and Sexual Activity     Alcohol use: No     Drug use: Not on file     Sexual activity: Not on file   Other Topics Concern     Not on file   Social History Narrative    . lives in [place] [facility] [place]. Spouse is Jasmyne CroninZachary Obriensam at his parents house since a disagreement with his wife.       Family History  Family History   Problem Relation Age of Onset     Musculoskeletal Disorder Daughter         elbow        Anesthesia Evaluation     . Pt has had prior anesthetic. Type: General and MAC     History of anesthetic complications   - PONV        ROS/MED HX    ENT/Pulmonary:     (+)ANGY risk factors snores loudly, male over age 30 = 3/8 = intermediate risk   Neurologic:     (+)seizures last seizure: today features: petit mal : staring spells, other neuro AVM age 11 - right frontal AVM hemorrhage 2005    Cardiovascular:   Pacemaker placement for ictal asystole  (+) (syncope). :settings: AAI-DDD  . . Previous cardiac testing Echodate:12/26/18results:Normal valves EF 65-70%   METS/Exercise Tolerance:  >4 METS   Hematologic:    Rt frontal hemorrhage 2005. No currentl bleeding issues     Musculoskeletal:  - neg musculoskeletal ROS       GI/Hepatic:  - neg GI/hepatic ROS       Renal/Genitourinary:  - ROS Renal section negative       Endo:  - neg endo ROS       Psychiatric:     (+) psychiatric history anxiety      Infectious Disease:  - neg infectious disease ROS       Malignancy:      - no malignancy   Other:    (+) No chance of pregnancy no H/O Chronic Pain,no other significant disability              The complete review of systems is negative other than noted in the HPI or here.   Temp: 98.4  F (36.9  C) Temp src: Oral BP: 119/76 Pulse: 64     SpO2: 96 %         149 lbs 11.2 oz  Data Unavailable   Body mass index is 24.16 kg/m .       Physical Exam  Constitutional: Awake, alert, cooperative, no apparent distress, and appears stated age.  Eyes: Pupils equal, round and reactive to light, extra ocular muscles intact, sclera clear, conjunctiva normal.  HENT: Normocephalic, oral pharynx with moist mucus membranes, good dentition. No goiter appreciated.   Respiratory: Clear to auscultation bilaterally, no crackles or wheezing. Chest right side with implanted device palpable; left side palpable pacemaker implanted device  Cardiovascular: Regular rate and rhythm, normal S1 and S2, and no murmur noted.  Carotids +2, no bruits. No LE edema. Palpable pulses to radial  DP and PT arteries.   GI: Normal bowel sounds,  soft, non-distended, non-tender, no masses palpated, no hepatosplenomegaly.    Lymph/Hematologic: No cervical lymphadenopathy and no supraclavicular lymphadenopathy.  Genitourinary:  deferred  Skin: Warm and dry.  No rashes at anticipated surgical site.   Musculoskeletal: Full ROM of neck. There is no redness, warmth, or swelling of the joints. Gross motor strength is normal.    Neurologic: Awake, alert, oriented to name, place and time. Cranial nerves II-XII are grossly intact. Gait is normal.   Neuropsychiatric: Calm, cooperative. Normal affect.     Labs: (personally reviewed)  Lab Results   Component Value Date    WBC 4.6 12/18/2018     Lab Results   Component Value Date    RBC 5.21 12/18/2018     Lab Results   Component Value Date    HGB 16.3 12/18/2018     Lab Results   Component Value Date    HCT 48.2 12/18/2018     Lab Results   Component Value Date    MCV 93 12/18/2018     Lab Results   Component Value Date    MCH 31.3 12/18/2018     Lab Results   Component Value Date    MCHC 33.8 12/18/2018     Lab Results   Component Value Date    RDW 12.1 12/18/2018     Lab Results   Component Value Date     12/18/2018     Last Comprehensive Metabolic Panel:  Sodium   Date Value Ref Range Status   12/18/2018 141 133 - 144 mmol/L Final     Potassium   Date Value Ref Range Status   12/18/2018 4.2 3.4 - 5.3 mmol/L Final     Chloride   Date Value Ref Range Status   12/18/2018 104 94 - 109 mmol/L Final     Carbon Dioxide   Date Value Ref Range Status   12/18/2018 32 20 - 32 mmol/L Final     Anion Gap   Date Value Ref Range Status   12/18/2018 5 3 - 14 mmol/L Final     Glucose   Date Value Ref Range Status   12/18/2018 85 70 - 99 mg/dL Final     Urea Nitrogen   Date Value Ref Range Status   12/18/2018 11 7 - 30 mg/dL Final     Creatinine   Date Value Ref Range Status   12/18/2018 0.71 0.66 - 1.25 mg/dL Final     GFR Estimate   Date Value Ref Range Status   12/18/2018 >90 >60 mL/min/1.7m2 Final     Comment:     Non   GFR Calc     Calcium   Date Value Ref Range Status   12/18/2018 8.6 8.5 - 10.1 mg/dL Final     EKG: NSR  Cardiac echo: 12/26/18 reviewed by me: EF 65-70% with NL LV and RV size and normal valves    ASSESSMENT and PLAN  Ammon Cronin is a 43 year old male scheduled to undergo right vagus stimulator battery replacement, on 1/29/19, at [facility], with Dr. Nate Carolina, in treatment for complex partial persistent seizures.     Pre-operative considerations include:  1.) CV: Functional status independent. Exercise tolerance > 4 METS. Ictal asystole 2003 with pacemaker placement AAI-DDD, no dependent. ECHO 12/26/18 EF 65-70%, NL LV and RV size, NL valves.  RCRI= 0 reflecting 0.4% risk of major adverse cardiac event.  Pacemaker nurse notification    2.) Pulmonary: never smoker, no pulm dx, sx or meds  AGNY risk is low    3.) Heme: No bleeding or clotting dysfunction. VTE risk is low    4.) Neuro: Right frontal AVM hemorrhage 2005; cavernous hemangioma resection 2002; left temporal brain resection (AVM) age 11. Partial complex seizures on keppra, kilantin, trileptal, klonopin, neurontin and valium. Most daily - some prn. He may take these as needed DOS. Followed by Dr. Rosenda Claros in [facility] neuro clinic.   RLS. Tremor. Suicide ideation in December / seen in clinic / denies current ideation or plan.    5.) GI: PONV socre = 2 )( 2 or > antiemetic prophylaxis recommended).    Patient was discussed with Dr Eng.  Patient is optimized and is acceptable candidate for the proposed procedure.  No further diagnostic evaluation is needed.     XAVIER Haskins  Preoperative Assessment Center  [facility]  Clinic and [facility] Center  Phone: [phone]  Fax: [phone]

## 2019-01-24 NOTE — PATIENT INSTRUCTIONS
Preparing for Your Surgery      Name:  Ammon Cronin   MRN:  [id number]   :  1975   Today's Date:  2019     Arriving for surgery:  Surgery date:  19  Arrival time:  1:30PM  Please come to:       [facility] [address]  [address] [place]  [address]    -   parking is available in front of the hospital from 5:15 am to 8:00 pm    -  Stop at the Information Desk in the lobby    -   Inform the information person that you are here for surgery. An escort to 3c will be provided. If you would not like an escort, please proceed to 3C on the 3rd floor. [phone]     Influenza visitor restrictions are in force at this time.  The [facility] is prohibiting the following visitors:  -Any sick persons regardless of age  -Anyone with known exposure to a communicable illness  -Anyone under the age of 5    What can I eat or drink?  -  You may have solid food or milk products until 8 hours prior to your surgery. 19, 7:30AM  -  You may have water, apple juice or 7up/Sprite until 2 hours prior to your surgery. 19, 1:30PM    Which medicines can I take?  Stop Aspirin, Multivitamins and supplements one week prior to surgery.  Hold Ibuprofen for 24 hours and/or Naproxen for 48 hours prior to surgery.     -  Please take these medications the day of surgery:    Clonidine(Catapres)  Gabapentin(Neurontin)   Levetiracetam(Keppra) Oxcarbazepine(Trileptal)   Phenytoin(Dilantin)  Respirdone(Risperdal)    How do I prepare myself?  -  Take two showers: one the night before surgery; and one the morning of surgery.         Use Scrubcare or Hibiclens to wash from neck down.  You may use your own shampoo and conditioner. No other hair products.   -  Do NOT use lotion, powder, deodorant, or antiperspirant the day of your surgery.  -  Do NOT wear jewelry.  - Do not bring your own medications to the hospital, except for inhalers and eye drops.  -  Bring your ID and insurance  card.    Questions or Concerns:  -If you have questions or concerns regarding the day of surgery, please call [phone].      -For questions after surgery please call your surgeons office.     AFTER YOUR SURGERY  Breathing exercises   Breathing exercises help you recover faster. Take deep breaths and let the air out slowly. This will:     Help you wake up after surgery.    Help prevent complications like pneumonia.  Preventing complications will help you go home sooner.   We may give you a breathing device (incentive spirometer) to encourage you to breathe deeply.   Nausea and vomiting   You may feel sick to your stomach after surgery; if so, let your nurse know.    Pain control:  After surgery, you may have pain. Our goal is to help you manage your pain. Pain medicine will help you feel comfortable enough to do activities that will help you heal.  These activities may include breathing exercises, walking and physical therapy.   To help your health care team treat your pain we will ask: 1) If you have pain  2) where it is located 3) describe your pain in your words  Methods of pain control include medications given by mouth, vein or by nerve block for some surgeries.  Sequential Compression Device (SCD) or Pneumo Boots:  You may need to wear SCD S on your legs or feet. These are wraps connected to a machine that pumps in air and releases it. The repeated pumping helps prevent blood clots from forming.

## 2019-01-29 ENCOUNTER — HOSPITAL ENCOUNTER (OUTPATIENT)
Facility: CLINIC | Age: 44
Discharge: HOME | End: 2019-01-29
Attending: NEUROLOGICAL SURGERY | Admitting: NEUROLOGICAL SURGERY
Payer: COMMERCIAL

## 2019-01-29 ENCOUNTER — TELEPHONE (OUTPATIENT)
Dept: NEUROLOGY | Facility: CLINIC | Age: 44
End: 2019-01-29

## 2019-01-29 ENCOUNTER — ANESTHESIA (OUTPATIENT)
Dept: SURGERY | Facility: CLINIC | Age: 44
End: 2019-01-29
Payer: COMMERCIAL

## 2019-01-29 ENCOUNTER — ANCILLARY PROCEDURE (OUTPATIENT)
Dept: CARDIOLOGY | Facility: CLINIC | Age: 44
End: 2019-01-29
Attending: INTERNAL MEDICINE
Payer: COMMERCIAL

## 2019-01-29 VITALS
BODY MASS INDEX: 23.84 KG/M2 | SYSTOLIC BLOOD PRESSURE: 134 MMHG | TEMPERATURE: 97.8 F | WEIGHT: 148.37 LBS | OXYGEN SATURATION: 97 % | HEART RATE: 69 BPM | HEIGHT: 66 IN | DIASTOLIC BLOOD PRESSURE: 92 MMHG | RESPIRATION RATE: 18 BRPM

## 2019-01-29 PROBLEM — Z95.0 CARDIAC PACEMAKER IN SITU: Status: ACTIVE | Noted: 2017-10-01

## 2019-01-29 LAB — GLUCOSE BLDC GLUCOMTR-MCNC: 83 MG/DL (ref 70–99)

## 2019-01-29 PROCEDURE — 82962 GLUCOSE BLOOD TEST: CPT

## 2019-01-29 PROCEDURE — 93286 PERI-PX EVAL PM/LDLS PM IP: CPT | Mod: 26 | Performed by: INTERNAL MEDICINE

## 2019-01-29 PROCEDURE — 27210794 ZZH OR GENERAL SUPPLY STERILE: Performed by: NEUROLOGICAL SURGERY

## 2019-01-29 PROCEDURE — 25000125 ZZHC RX 250: Performed by: ANESTHESIOLOGY

## 2019-01-29 PROCEDURE — 25000125 ZZHC RX 250: Performed by: STUDENT IN AN ORGANIZED HEALTH CARE EDUCATION/TRAINING PROGRAM

## 2019-01-29 PROCEDURE — 25000566 ZZH SEVOFLURANE, EA 15 MIN: Performed by: NEUROLOGICAL SURGERY

## 2019-01-29 PROCEDURE — 25000128 H RX IP 250 OP 636: Performed by: STUDENT IN AN ORGANIZED HEALTH CARE EDUCATION/TRAINING PROGRAM

## 2019-01-29 PROCEDURE — C1767 GENERATOR, NEURO NON-RECHARG: HCPCS | Performed by: NEUROLOGICAL SURGERY

## 2019-01-29 PROCEDURE — 36000057 ZZH SURGERY LEVEL 3 1ST 30 MIN - UMMC: Performed by: NEUROLOGICAL SURGERY

## 2019-01-29 PROCEDURE — 37000008 ZZH ANESTHESIA TECHNICAL FEE, 1ST 30 MIN: Performed by: NEUROLOGICAL SURGERY

## 2019-01-29 PROCEDURE — 36000059 ZZH SURGERY LEVEL 3 EA 15 ADDTL MIN UMMC: Performed by: NEUROLOGICAL SURGERY

## 2019-01-29 PROCEDURE — 37000009 ZZH ANESTHESIA TECHNICAL FEE, EACH ADDTL 15 MIN: Performed by: NEUROLOGICAL SURGERY

## 2019-01-29 PROCEDURE — 25000125 ZZHC RX 250: Performed by: NEUROLOGICAL SURGERY

## 2019-01-29 PROCEDURE — 40000170 ZZH STATISTIC PRE-PROCEDURE ASSESSMENT II: Performed by: NEUROLOGICAL SURGERY

## 2019-01-29 PROCEDURE — 93286 PERI-PX EVAL PM/LDLS PM IP: CPT

## 2019-01-29 PROCEDURE — 71000027 ZZH RECOVERY PHASE 2 EACH 15 MINS: Performed by: NEUROLOGICAL SURGERY

## 2019-01-29 PROCEDURE — 71000014 ZZH RECOVERY PHASE 1 LEVEL 2 FIRST HR: Performed by: NEUROLOGICAL SURGERY

## 2019-01-29 PROCEDURE — 25000128 H RX IP 250 OP 636: Performed by: NURSE PRACTITIONER

## 2019-01-29 PROCEDURE — 27810169 ZZH OR IMPLANT GENERAL: Performed by: NEUROLOGICAL SURGERY

## 2019-01-29 RX ORDER — MAGNESIUM HYDROXIDE 1200 MG/15ML
LIQUID ORAL PRN
Status: DISCONTINUED | OUTPATIENT
Start: 2019-01-29 | End: 2019-01-29 | Stop reason: HOSPADM

## 2019-01-29 RX ORDER — DEXAMETHASONE SODIUM PHOSPHATE 4 MG/ML
INJECTION, SOLUTION INTRA-ARTICULAR; INTRALESIONAL; INTRAMUSCULAR; INTRAVENOUS; SOFT TISSUE PRN
Status: DISCONTINUED | OUTPATIENT
Start: 2019-01-29 | End: 2019-01-29

## 2019-01-29 RX ORDER — FENTANYL CITRATE 50 UG/ML
25-50 INJECTION, SOLUTION INTRAMUSCULAR; INTRAVENOUS
Status: DISCONTINUED | OUTPATIENT
Start: 2019-01-29 | End: 2019-01-29 | Stop reason: HOSPADM

## 2019-01-29 RX ORDER — LIDOCAINE HYDROCHLORIDE 20 MG/ML
INJECTION, SOLUTION INFILTRATION; PERINEURAL PRN
Status: DISCONTINUED | OUTPATIENT
Start: 2019-01-29 | End: 2019-01-29

## 2019-01-29 RX ORDER — ACETAMINOPHEN 325 MG/1
975 TABLET ORAL ONCE
Status: DISCONTINUED | OUTPATIENT
Start: 2019-01-29 | End: 2019-01-29 | Stop reason: HOSPADM

## 2019-01-29 RX ORDER — SODIUM CHLORIDE, SODIUM LACTATE, POTASSIUM CHLORIDE, CALCIUM CHLORIDE 600; 310; 30; 20 MG/100ML; MG/100ML; MG/100ML; MG/100ML
INJECTION, SOLUTION INTRAVENOUS CONTINUOUS
Status: DISCONTINUED | OUTPATIENT
Start: 2019-01-29 | End: 2019-01-29 | Stop reason: HOSPADM

## 2019-01-29 RX ORDER — EPHEDRINE SULFATE 50 MG/ML
INJECTION, SOLUTION INTRAMUSCULAR; INTRAVENOUS; SUBCUTANEOUS PRN
Status: DISCONTINUED | OUTPATIENT
Start: 2019-01-29 | End: 2019-01-29

## 2019-01-29 RX ORDER — LIDOCAINE HYDROCHLORIDE AND EPINEPHRINE 10; 10 MG/ML; UG/ML
INJECTION, SOLUTION INFILTRATION; PERINEURAL PRN
Status: DISCONTINUED | OUTPATIENT
Start: 2019-01-29 | End: 2019-01-29 | Stop reason: HOSPADM

## 2019-01-29 RX ORDER — ONDANSETRON 4 MG/1
4 TABLET, ORALLY DISINTEGRATING ORAL EVERY 30 MIN PRN
Status: DISCONTINUED | OUTPATIENT
Start: 2019-01-29 | End: 2019-01-29 | Stop reason: HOSPADM

## 2019-01-29 RX ORDER — FENTANYL CITRATE 50 UG/ML
INJECTION, SOLUTION INTRAMUSCULAR; INTRAVENOUS PRN
Status: DISCONTINUED | OUTPATIENT
Start: 2019-01-29 | End: 2019-01-29

## 2019-01-29 RX ORDER — MEPERIDINE HYDROCHLORIDE 50 MG/ML
12.5 INJECTION INTRAMUSCULAR; INTRAVENOUS; SUBCUTANEOUS
Status: DISCONTINUED | OUTPATIENT
Start: 2019-01-29 | End: 2019-01-29 | Stop reason: HOSPADM

## 2019-01-29 RX ORDER — SODIUM CHLORIDE, SODIUM LACTATE, POTASSIUM CHLORIDE, CALCIUM CHLORIDE 600; 310; 30; 20 MG/100ML; MG/100ML; MG/100ML; MG/100ML
INJECTION, SOLUTION INTRAVENOUS CONTINUOUS PRN
Status: DISCONTINUED | OUTPATIENT
Start: 2019-01-29 | End: 2019-01-29

## 2019-01-29 RX ORDER — NALOXONE HYDROCHLORIDE 0.4 MG/ML
.1-.4 INJECTION, SOLUTION INTRAMUSCULAR; INTRAVENOUS; SUBCUTANEOUS
Status: DISCONTINUED | OUTPATIENT
Start: 2019-01-29 | End: 2019-01-29 | Stop reason: HOSPADM

## 2019-01-29 RX ORDER — HYDRALAZINE HYDROCHLORIDE 20 MG/ML
2.5-5 INJECTION INTRAMUSCULAR; INTRAVENOUS EVERY 10 MIN PRN
Status: DISCONTINUED | OUTPATIENT
Start: 2019-01-29 | End: 2019-01-29 | Stop reason: HOSPADM

## 2019-01-29 RX ORDER — LABETALOL HYDROCHLORIDE 5 MG/ML
10 INJECTION, SOLUTION INTRAVENOUS
Status: DISCONTINUED | OUTPATIENT
Start: 2019-01-29 | End: 2019-01-29 | Stop reason: HOSPADM

## 2019-01-29 RX ORDER — CEFAZOLIN SODIUM 1 G/3ML
1 INJECTION, POWDER, FOR SOLUTION INTRAMUSCULAR; INTRAVENOUS SEE ADMIN INSTRUCTIONS
Status: DISCONTINUED | OUTPATIENT
Start: 2019-01-29 | End: 2019-01-29 | Stop reason: HOSPADM

## 2019-01-29 RX ORDER — ONDANSETRON 2 MG/ML
4 INJECTION INTRAMUSCULAR; INTRAVENOUS EVERY 30 MIN PRN
Status: DISCONTINUED | OUTPATIENT
Start: 2019-01-29 | End: 2019-01-29 | Stop reason: HOSPADM

## 2019-01-29 RX ORDER — DIMENHYDRINATE 50 MG/ML
25 INJECTION, SOLUTION INTRAMUSCULAR; INTRAVENOUS
Status: DISCONTINUED | OUTPATIENT
Start: 2019-01-29 | End: 2019-01-29 | Stop reason: HOSPADM

## 2019-01-29 RX ORDER — CEFAZOLIN SODIUM 2 G/100ML
2 INJECTION, SOLUTION INTRAVENOUS
Status: COMPLETED | OUTPATIENT
Start: 2019-01-29 | End: 2019-01-29

## 2019-01-29 RX ORDER — HYDROMORPHONE HYDROCHLORIDE 1 MG/ML
.3-.5 INJECTION, SOLUTION INTRAMUSCULAR; INTRAVENOUS; SUBCUTANEOUS EVERY 10 MIN PRN
Status: DISCONTINUED | OUTPATIENT
Start: 2019-01-29 | End: 2019-01-29 | Stop reason: HOSPADM

## 2019-01-29 RX ORDER — PROPOFOL 10 MG/ML
INJECTION, EMULSION INTRAVENOUS PRN
Status: DISCONTINUED | OUTPATIENT
Start: 2019-01-29 | End: 2019-01-29

## 2019-01-29 RX ORDER — ONDANSETRON 2 MG/ML
INJECTION INTRAMUSCULAR; INTRAVENOUS PRN
Status: DISCONTINUED | OUTPATIENT
Start: 2019-01-29 | End: 2019-01-29

## 2019-01-29 RX ORDER — SCOLOPAMINE TRANSDERMAL SYSTEM 1 MG/1
1 PATCH, EXTENDED RELEASE TRANSDERMAL
Status: DISCONTINUED | OUTPATIENT
Start: 2019-01-29 | End: 2019-01-29 | Stop reason: HOSPADM

## 2019-01-29 RX ADMIN — MIDAZOLAM 1 MG: 1 INJECTION INTRAMUSCULAR; INTRAVENOUS at 13:23

## 2019-01-29 RX ADMIN — CEFAZOLIN SODIUM 2 G: 2 INJECTION, SOLUTION INTRAVENOUS at 13:47

## 2019-01-29 RX ADMIN — SUGAMMADEX 130 MG: 100 INJECTION, SOLUTION INTRAVENOUS at 15:01

## 2019-01-29 RX ADMIN — FENTANYL CITRATE 50 MCG: 50 INJECTION, SOLUTION INTRAMUSCULAR; INTRAVENOUS at 13:34

## 2019-01-29 RX ADMIN — DEXAMETHASONE SODIUM PHOSPHATE 6 MG: 4 INJECTION, SOLUTION INTRA-ARTICULAR; INTRALESIONAL; INTRAMUSCULAR; INTRAVENOUS; SOFT TISSUE at 13:47

## 2019-01-29 RX ADMIN — FENTANYL CITRATE 25 MCG: 50 INJECTION, SOLUTION INTRAMUSCULAR; INTRAVENOUS at 14:48

## 2019-01-29 RX ADMIN — LIDOCAINE HYDROCHLORIDE 50 MG: 20 INJECTION, SOLUTION INFILTRATION; PERINEURAL at 13:36

## 2019-01-29 RX ADMIN — ONDANSETRON 4 MG: 2 INJECTION INTRAMUSCULAR; INTRAVENOUS at 14:52

## 2019-01-29 RX ADMIN — SCOPALAMINE 1 PATCH: 1 PATCH, EXTENDED RELEASE TRANSDERMAL at 14:08

## 2019-01-29 RX ADMIN — PROPOFOL 200 MG: 10 INJECTION, EMULSION INTRAVENOUS at 13:38

## 2019-01-29 RX ADMIN — Medication 5 MG: at 14:25

## 2019-01-29 RX ADMIN — MIDAZOLAM 1 MG: 1 INJECTION INTRAMUSCULAR; INTRAVENOUS at 13:30

## 2019-01-29 RX ADMIN — SODIUM CHLORIDE, POTASSIUM CHLORIDE, SODIUM LACTATE AND CALCIUM CHLORIDE: 600; 310; 30; 20 INJECTION, SOLUTION INTRAVENOUS at 13:23

## 2019-01-29 RX ADMIN — ROCURONIUM BROMIDE 50 MG: 10 INJECTION INTRAVENOUS at 13:40

## 2019-01-29 RX ADMIN — FENTANYL CITRATE 25 MCG: 50 INJECTION, SOLUTION INTRAMUSCULAR; INTRAVENOUS at 14:40

## 2019-01-29 ASSESSMENT — MIFFLIN-ST. JEOR: SCORE: 1505.75

## 2019-01-29 NOTE — OP NOTE
DATE OF SURGERY: 1/29/19     PREOPERATIVE DIAGNOSIS:  Intractable partial complex seizures     POSTOPERATIVE DIAGNOSIS:  Intractable partial complex seizures     PROCEDURE:  Vagus nerve stimulator battery replacement, Right      ATTENDING SURGEON:  Nate Carolina MD      RESIDENT SURGEON:  Patel Hall MD     ANESTHESIA:  General.      ESTIMATED BLOOD LOSS:  1  mL.      IMPLANTS: [email or web address] Sentiva 1000 SN [id number]     INDICATIONS FOR PROCEDURE:  Mr. Cronin is a 43 year old man with history of intractable complex partial seizure disorder that started at the age of 16. The patient's history is also notable for resection of left temporal arteriovenous malformation at age 11, left cavernous hemangioma s/p resection in 2002, and right frontal arteriovenous malformation hemorrhage in 2005. Patient has left sided permanent pacemaker due to an episode of ictal asystole for approximately 22 seconds in 2003. He is s/p vagal nerve stimulator but his battery has approached end of life and is indicated for replacement. Patient understands risk of the procedure, including bleeding, infection, wound complications, device malfunction. Patient agreed to proceed.     DESCRIPTION OF PROCEDURE:  The patient was marked and consented in the preoperative area.  The patient was brought to the operating room and transferred to the operative table.  General anesthesia was induced and the patient was intubated.  The patient's arms were tucked.  Prior incision near the right axilla was then marked.  The patient was prepped and draped in normal sterile fashion. 8 mL of 1% lidocaine was injected along the intended incision.  A timeout was called, confirming the patient's name, preoperative antibiotics being administered, laterality and procedure intended.      A #10 blade was used to open the skin as well as the underlying dermis.  Monopolar cautery was used to dissect down to the generator pocket.  The monopolar was then used to open the  pocket.  Once the pocket had been opened, the generator battery was mobilized and removed from the incision.  The monopolar was disconnected and the new VNS generator battery was brought to the field.  The old generator was removed from the VNS cable.  The connector was then cleaned using a clean Ray-Maximiliano.  The VNS cable was then inserted into the new battery generator and the set screw was locked into place.  The generator battery was interrogated and found to be in normal working condition. The generator battery was then inserted back into the pocket, ensuring to coil all excess cabling behind the device.  Once back in the pocket, we then turned our attention to closing.        The pocket was closed using 3-0 undyed Vicryl in a simple interrupted fashion.  The underlying adipose tissue layer was then also closed using 3-0 undyed Vicryl in a simple interrupted fashion. Dermis was closed using 3-0 Vicryl in an inverted interrupted fashion. Skin was closed in 4-0 monocryl running subcuticular fashion. The device was then interrogated again and found to be in normal working condition.  The wound was cleaned with a wet followed by a dry sponge.  A small ChloraPrep stick was used to clean the incision. Steristrips and primapore dressing was applied. The drapes were taken down.  General anesthesia was gently reversed and the patient was extubated.  The patient was transferred back to hospital bed and taken to the postoperative care area for further postoperative cares.      At the end of the case, all counts were correct x 2.  Dr. Carolina was present and scrubbed throughout the critical portions of the procedure.         MD Patel RICE MD

## 2019-01-29 NOTE — TELEPHONE ENCOUNTER
Since last visit he has had two suicidal ideations. On his last visit we did not change his antiepileptic drugs. However, he did get a vagus nerve stimulator implanted. He has followed with [facility] for psychiatric care.         Levetiracetam, 750 mg in the morning, 1500 mg at 4 p.m., and 1500 mg at 11 p.m.   Oxcarbazepine  900 mg t.i.d.   Gabapentin 1200 mg AM, 1200 mg noon, and 1200 mg PM  (started for RLS, patient has taken higher dose for some time, not sure of length)   Klonopin 2 -4 mg  (started 3/2013 for anxiety, takes one per day and two on weekend)  Phenytoin ER  100-100  Diazepam PRN for CPS lasting greater than 5 minutes or more then 2 in one hour.

## 2019-01-29 NOTE — ANESTHESIA CARE TRANSFER NOTE
Patient: Ammon WATTS [place]    Procedure(s):  Right Vagus Nerve Stimulator Battery Replacement    Diagnosis: Seizures  Diagnosis Additional Information: No value filed.    Anesthesia Type:   No value filed.     Note:  Airway :Face Mask  Patient transferred to:PACU  Comments: VSS. Breathing spontaneously at a regular rate with adequate tidal volumes and maintaining O2 sats on 6L facemask. Denies nausea or pain. No apparent complications from anesthesia.     Myla Leigh MD  [facility] Anesthesia Resident  Handoff Report: Identifed the Patient, Identified the Reponsible Provider, Reviewed the pertinent medical history, Discussed the surgical course, Reviewed Intra-OP anesthesia mangement and issues during anesthesia, Set expectations for post-procedure period and Allowed opportunity for questions and acknowledgement of understanding      Vitals: (Last set prior to Anesthesia Care Transfer)    CRNA VITALS  1/29/2019 1441 - 1/29/2019 1526      1/29/2019             Pulse:  79    Ht Rate:  79    SpO2:  91 %                Electronically Signed By: Myla Martinez MD  January 29, 2019  3:26 PM

## 2019-01-29 NOTE — PROGRESS NOTES
At 1625- pt took 1600 dose of seizure medications. Meds were from his home supply brought to the facility by his father.

## 2019-01-29 NOTE — DISCHARGE INSTRUCTIONS
Take it easy when you get home.  Remember, same day surgery DOES NOT MEAN SAME DAY RECOVERY!  Healing is a gradual process.  You will need some time to recover - you may be more tired than you realize at first.  Rest and relax for at least the first 24 hours at home.  You'll feel better and heal faster if you take good care of yourself.    [facility], [place]  Same-Day Surgery   Adult Discharge Orders & Instructions     For 24 hours after surgery    1. Get plenty of rest.  A responsible adult must stay with you for at least 24 hours after you leave the hospital.   2. Do not drive or use heavy equipment.  If you have weakness or tingling, don't drive or use heavy equipment until this feeling goes away.  3. Do not drink alcohol.  4. Avoid strenuous or risky activities.  Ask for help when climbing stairs.   5. You may feel lightheaded.  IF so, sit for a few minutes before standing.  Have someone help you get up.   6. If you have nausea (feel sick to your stomach): Drink only clear liquids such as apple juice, ginger ale, broth or 7-Up.  Rest may also help.  Be sure to drink enough fluids.  Move to a regular diet as you feel able.  7. You may have a slight fever. Call the doctor if your fever is over 100 F (37.7 C) (taken under the tongue) or lasts longer than 24 hours.  8. You may have a dry mouth, a sore throat, muscle aches or trouble sleeping.  These should go away after 24 hours.  9. Do not make important or legal decisions.   Call your doctor for any of the followin.  Signs of infection (fever, growing tenderness at the surgery site, a large amount of drainage or bleeding, severe pain, foul-smelling drainage, redness, swelling).    2. It has been over 8 to 10 hours since surgery and you are still not able to urinate (pass water).    3.  Headache for over 24 hours.    To contact a doctor, call Dr. Carolina's office @ [phone] or Dr. Carolina's clinic @ [phone] or:         [phone] and ask for the resident on call for          Neurosurgery  (answered 24 hours a day)- at night or on the weekend      Emergency Department:    [facility]: [phone]       (TTY for hearing impaired: [phone])      Scopolamine Patch- (Absorbed through the skin)    Prevents nausea and vomiting caused by motion sickness or anesthesia and surgery in adults.    Brand Name(s): Transderm Scop, Transderm-Scope  There may be other brand names for this medicine.    When This Medicine Should Not Be Used:  You should not use this medicine if you have had an allergic reaction to scopolomine, or if you have narrow angle glaucoma.    How to Dispose of This Medicine:    Fold the used patch in half with the sticky sides together. Throw any used patch away so that children or pets cannot get to it. You will also need to throw away old patches after the expiration date has passed.    Keep all medicine away from children and never share your medicine with anyone.    Drugs and Foods to Avoid:  Ask your doctor or pharmacist before using any other medicine, including over-the-counter medicines, vitamins, and herbal products.      Tell your doctor if you are using any medicines that make you sleepy. These include sleeping pills, cold and allergy medicine, narcotic pain relievers, and sedatives.     Do not drink alcohol while you are using this medicine.    Warnings While Using This Medicine:      Make sure your doctor knows if you are pregnant or breastfeeding, or if you have glaucoma, prostate problems, trouble urinating, blocked bowels, liver disease, kidney disease, or a history of seizures or mental illness.    This medicine can cause blurring of vision and other vision problems if it comes in contact with the eyes. This medicine may also cause problems with urination. If any of these reactions occur, remove the patch and call your doctor right away.    This medicine may make you dizzy or drowsy. Avoid  driving, using machines, or doing anything else that could be dangerous if you are not alert. If you plan to participate in underwater sports, this medicine may cause disorienting effects. If this is a concern for you, talk with your doctor.    This medicine may make you sweat less and cause your body to get too hot. Be careful in hot weather, when your are exercising, or if using sauna or whirlpool.    Make sure any doctor or dentist who treats you knows that you are using this medicine. This medicine may affect the results of certain medical tests.    Skin burns have been reported at the patch site in several patients wearing an aluminized transdermal system during a magnetic resonance imaging scan (MRI). Because Transderm Scop contains aluminum, it is recommended to remove the system before undergoing an MRI.    Possible Side Effects While Using This Medicine:  Call your doctor right away if you notice any of these side effects:      Allergic reaction: Itching or hives, swelling in your face or hands, swelling or tingling in your mouth or throat, chest tightness, trouble breathing.    Blurred vision,    Confusion or memory loss.    Fast,slow, or uneven heartbeat.    Lightheadedness, dizziness, drowsiness, or fainting.    Seeing, hearing, or feeling things that are not there.    Severe eye pain.    Trouble urinating.      If you notice these less serious side effects, talk with your doctor:      Dry mouth.    Dry, itchy, or red eyes.    Restlessness    Skin rash or redness.    If you notice other side effects that you think are caused by this medicine, tell your doctor immediately. Please remove scope patch from behind left ear within 24 hours of placement.

## 2019-01-29 NOTE — OR NURSING
No pre op labs ordered by Dr. Carolina. Writer asked Dr. Carreno (anesthesia) if he would like any pre op labs drawn and he declined, no further orders received.

## 2019-01-29 NOTE — BRIEF OP NOTE
[facility], [place]    Brief Operative Note    Pre-operative diagnosis: Intractable complex partial seizures  Post-operative diagnosis Intractable complex partial seizures  Procedure: Procedure(s):  Right Vagus Nerve Stimulator Battery Replacement  Surgeon: Surgeon(s) and Role:     * Nate Carolina MD - Primary     * Patel Hall MD - Resident - Assisting  Anesthesia: General   Estimated blood loss: Minimal  Drains: None  Specimens: * No specimens in log *  Findings:  Right vagal nerve stimulator battery replaced.  Complications: None.  Implants: See op note.

## 2019-01-29 NOTE — ANESTHESIA POSTPROCEDURE EVALUATION
Anesthesia POST Procedure Evaluation    Patient: Ammon Cronin   MRN:     [id number] Gender:   male   Age:    43 year old :      1975        Preoperative Diagnosis: Seizures   Procedure(s):  Right Vagus Nerve Stimulator Battery Replacement   Postop Comments: No value filed.       Anesthesia Type:  General    Reportable Event: NO     PAIN: Uncomplicated   Sign Out status: Comfortable, Well controlled pain     PONV: No PONV   Sign Out status:  No Nausea or Vomiting     Neuro/Psych: Uneventful perioperative course   Sign Out Status: Preoperative baseline; Age appropriate mentation     Airway/Resp.: Uneventful perioperative course   Sign Out Status: Non labored breathing, age appropriate RR; Resp. Status within EXPECTED Parameters     CV: Uneventful perioperative course   Sign Out status: Appropriate BP and perfusion indices; Appropriate HR/Rhythm     Disposition:   Sign Out in:  PACU  Disposition:  Phase II; Home  Recovery Course: Uneventful  Follow-Up: Not required           Last Anesthesia Record Vitals:  CRNA VITALS  2019 1441 - 2019 1541      2019             Pulse:  79    Ht Rate:  79    SpO2:  91 %          Last PACU/Preop Vitals:  Vitals:    19 1530 19 1545 19 1600   BP: 118/77 127/77 116/70   Pulse: 73  67   Resp: 22 21 20   Temp:  36.5  C (97.7  F)    SpO2: 98% 98% 94%         Electronically Signed By: Robbie Carreno MD, 2019, 4:04 PM

## 2019-01-30 ENCOUNTER — TELEPHONE (OUTPATIENT)
Dept: NEUROLOGY | Facility: CLINIC | Age: 44
End: 2019-01-30

## 2019-01-30 NOTE — TELEPHONE ENCOUNTER
I spoke to Dr. Lozano, Ammon's psychiatrist, who states that Isaias has suicidal thoughts.  He would like to start him on an antipsychotic agent.  Ammon will need antipsychotic, Dr Lowry recommended Abilify and Geodon.  Phenytoin has a drug interaction with Abilify, levels of Abilify may go down since he is on phenytoin.  I reviewed this drug interaction with Dr. Lozano.       AED MEDICATIONS:    1. Levetiracetam, 750 mg in the morning, 1500 mg at 4 p.m., and 1500 mg at 11 p.m.   2. Oxcarbazepine  900 mg t.i.d.   3. Gabapentin 1200 mg AM, 1200 mg noon, and 1200 mg PM  (started for RLS, patient has taken higher dose for some time, not sure of length)   5. Klonopin 2 -4 mg  (started 3/2013 for anxiety, takes one per day and two on weekend)  6. Phenytoin ER  100-100  7. Diazepam PRN for CPS lasting greater than 5 minutes or more then 2 in one hour.       Rosenda Claros

## 2019-01-31 LAB
MDC_IDC_LEAD_IMPLANT_DT: NORMAL
MDC_IDC_LEAD_IMPLANT_DT: NORMAL
MDC_IDC_LEAD_LOCATION: NORMAL
MDC_IDC_LEAD_LOCATION: NORMAL
MDC_IDC_LEAD_LOCATION_DETAIL_1: NORMAL
MDC_IDC_LEAD_LOCATION_DETAIL_1: NORMAL
MDC_IDC_LEAD_MFG: NORMAL
MDC_IDC_LEAD_MFG: NORMAL
MDC_IDC_LEAD_MODEL: NORMAL
MDC_IDC_LEAD_MODEL: NORMAL
MDC_IDC_LEAD_POLARITY_TYPE: NORMAL
MDC_IDC_LEAD_POLARITY_TYPE: NORMAL
MDC_IDC_LEAD_SERIAL: NORMAL
MDC_IDC_LEAD_SERIAL: NORMAL
MDC_IDC_LEAD_SPECIAL_FUNCTION: NORMAL
MDC_IDC_LEAD_SPECIAL_FUNCTION: NORMAL
MDC_IDC_MSMT_BATTERY_DTM: NORMAL
MDC_IDC_MSMT_BATTERY_IMPEDANCE: 210 OHM
MDC_IDC_MSMT_BATTERY_REMAINING_LONGEVITY: 147 MO
MDC_IDC_MSMT_BATTERY_STATUS: NORMAL
MDC_IDC_MSMT_BATTERY_VOLTAGE: 2.79 V
MDC_IDC_MSMT_LEADCHNL_RA_IMPEDANCE_VALUE: 461 OHM
MDC_IDC_MSMT_LEADCHNL_RA_PACING_THRESHOLD_AMPLITUDE: 0.5 V
MDC_IDC_MSMT_LEADCHNL_RA_PACING_THRESHOLD_AMPLITUDE: 0.5 V
MDC_IDC_MSMT_LEADCHNL_RA_PACING_THRESHOLD_PULSEWIDTH: 0.4 MS
MDC_IDC_MSMT_LEADCHNL_RA_PACING_THRESHOLD_PULSEWIDTH: 0.4 MS
MDC_IDC_MSMT_LEADCHNL_RA_SENSING_INTR_AMPL: 4 MV
MDC_IDC_MSMT_LEADCHNL_RV_IMPEDANCE_VALUE: 418 OHM
MDC_IDC_MSMT_LEADCHNL_RV_PACING_THRESHOLD_AMPLITUDE: 1.75 V
MDC_IDC_MSMT_LEADCHNL_RV_PACING_THRESHOLD_AMPLITUDE: 1.88 V
MDC_IDC_MSMT_LEADCHNL_RV_PACING_THRESHOLD_PULSEWIDTH: 0.4 MS
MDC_IDC_MSMT_LEADCHNL_RV_PACING_THRESHOLD_PULSEWIDTH: 0.4 MS
MDC_IDC_MSMT_LEADCHNL_RV_SENSING_INTR_AMPL: 5.6 MV
MDC_IDC_PG_IMPLANT_DTM: NORMAL
MDC_IDC_PG_MFG: NORMAL
MDC_IDC_PG_MODEL: NORMAL
MDC_IDC_PG_SERIAL: NORMAL
MDC_IDC_PG_TYPE: NORMAL
MDC_IDC_SESS_CLINIC_NAME: NORMAL
MDC_IDC_SESS_DTM: NORMAL
MDC_IDC_SESS_TYPE: NORMAL
MDC_IDC_SET_BRADY_AT_MODE_SWITCH_MODE: NORMAL
MDC_IDC_SET_BRADY_LOWRATE: 35 {BEATS}/MIN
MDC_IDC_SET_BRADY_MAX_TRACKING_RATE: 180 {BEATS}/MIN
MDC_IDC_SET_BRADY_MODE: NORMAL
MDC_IDC_SET_BRADY_PAV_DELAY_LOW: 150 MS
MDC_IDC_SET_BRADY_SAV_DELAY_LOW: 120 MS
MDC_IDC_SET_LEADCHNL_RA_PACING_AMPLITUDE: 1.5 V
MDC_IDC_SET_LEADCHNL_RA_PACING_ANODE_ELECTRODE_1: NORMAL
MDC_IDC_SET_LEADCHNL_RA_PACING_ANODE_LOCATION_1: NORMAL
MDC_IDC_SET_LEADCHNL_RA_PACING_CAPTURE_MODE: NORMAL
MDC_IDC_SET_LEADCHNL_RA_PACING_CATHODE_ELECTRODE_1: NORMAL
MDC_IDC_SET_LEADCHNL_RA_PACING_CATHODE_LOCATION_1: NORMAL
MDC_IDC_SET_LEADCHNL_RA_PACING_POLARITY: NORMAL
MDC_IDC_SET_LEADCHNL_RA_PACING_PULSEWIDTH: 0.4 MS
MDC_IDC_SET_LEADCHNL_RA_SENSING_ANODE_ELECTRODE_1: NORMAL
MDC_IDC_SET_LEADCHNL_RA_SENSING_ANODE_LOCATION_1: NORMAL
MDC_IDC_SET_LEADCHNL_RA_SENSING_CATHODE_ELECTRODE_1: NORMAL
MDC_IDC_SET_LEADCHNL_RA_SENSING_CATHODE_LOCATION_1: NORMAL
MDC_IDC_SET_LEADCHNL_RA_SENSING_POLARITY: NORMAL
MDC_IDC_SET_LEADCHNL_RA_SENSING_SENSITIVITY: 0.5 MV
MDC_IDC_SET_LEADCHNL_RV_PACING_AMPLITUDE: 3.75 V
MDC_IDC_SET_LEADCHNL_RV_PACING_ANODE_ELECTRODE_1: NORMAL
MDC_IDC_SET_LEADCHNL_RV_PACING_ANODE_LOCATION_1: NORMAL
MDC_IDC_SET_LEADCHNL_RV_PACING_CAPTURE_MODE: NORMAL
MDC_IDC_SET_LEADCHNL_RV_PACING_CATHODE_ELECTRODE_1: NORMAL
MDC_IDC_SET_LEADCHNL_RV_PACING_CATHODE_LOCATION_1: NORMAL
MDC_IDC_SET_LEADCHNL_RV_PACING_POLARITY: NORMAL
MDC_IDC_SET_LEADCHNL_RV_PACING_PULSEWIDTH: 0.4 MS
MDC_IDC_SET_LEADCHNL_RV_SENSING_ANODE_ELECTRODE_1: NORMAL
MDC_IDC_SET_LEADCHNL_RV_SENSING_ANODE_LOCATION_1: NORMAL
MDC_IDC_SET_LEADCHNL_RV_SENSING_CATHODE_ELECTRODE_1: NORMAL
MDC_IDC_SET_LEADCHNL_RV_SENSING_CATHODE_LOCATION_1: NORMAL
MDC_IDC_SET_LEADCHNL_RV_SENSING_POLARITY: NORMAL
MDC_IDC_SET_LEADCHNL_RV_SENSING_SENSITIVITY: 2 MV
MDC_IDC_SET_ZONE_DETECTION_INTERVAL: 333.33 MS
MDC_IDC_SET_ZONE_TYPE: NORMAL
MDC_IDC_SET_ZONE_TYPE: NORMAL
MDC_IDC_STAT_AT_BURDEN_PERCENT: 0 %
MDC_IDC_STAT_AT_DTM_END: NORMAL
MDC_IDC_STAT_AT_DTM_START: NORMAL
MDC_IDC_STAT_BRADY_AP_VP_PERCENT: 0 %
MDC_IDC_STAT_BRADY_AP_VS_PERCENT: 0 %
MDC_IDC_STAT_BRADY_AS_VP_PERCENT: 0 %
MDC_IDC_STAT_BRADY_AS_VS_PERCENT: 100 %
MDC_IDC_STAT_BRADY_DTM_END: NORMAL
MDC_IDC_STAT_BRADY_DTM_START: NORMAL
MDC_IDC_STAT_EPISODE_RECENT_COUNT: 0
MDC_IDC_STAT_EPISODE_RECENT_COUNT_DTM_END: NORMAL
MDC_IDC_STAT_EPISODE_RECENT_COUNT_DTM_START: NORMAL
MDC_IDC_STAT_EPISODE_TYPE: NORMAL

## 2019-02-07 ENCOUNTER — TELEPHONE (OUTPATIENT)
Dept: NEUROLOGY | Facility: CLINIC | Age: 44
End: 2019-02-07

## 2019-02-07 NOTE — TELEPHONE ENCOUNTER
Nurse received In-Basket message as follows:    nful   Received: Today   Message Contents   Harleen Jones sent to [facility]ATT [facility] Rn Pool             Caller: Ammon     Relationship to Patient: pt     Call Back Number: [phone]     Reason for Call: Pt would like to get neuropsych testing done. Please call back to let him know if it would be appropriate.      Nurse forwarded message to MD. Awaiting reply.

## 2019-02-07 NOTE — TELEPHONE ENCOUNTER
Received reply from MD as follows:    Rosenda Claros MD sent to Vic Cummins, RN             Okay, may order      Called patient left detailed voice mail with call back number and name, and  indicating I got his message, spoke with Dr. Claros and she felt it would be ok, asked for a couple of days and times that that would work out for him, indicated that the testing takes about 4 hrs.    Awaiting patient's reply.

## 2019-02-08 ENCOUNTER — TELEPHONE (OUTPATIENT)
Dept: NEUROLOGY | Facility: CLINIC | Age: 44
End: 2019-02-08

## 2019-02-08 DIAGNOSIS — Q28.3 CONGENITAL ANOMALY OF CEREBROVASCULAR SYSTEM: Primary | ICD-10-CM

## 2019-03-05 DIAGNOSIS — Z79.899 LONG TERM USE OF DRUG: ICD-10-CM

## 2019-03-05 DIAGNOSIS — G25.81 RESTLESS LEGS SYNDROME: ICD-10-CM

## 2019-03-05 DIAGNOSIS — G40.219 PARTIAL EPILEPSY WITH LOSS OF CONSCIOUSNESS, INTRACTABLE (H): ICD-10-CM

## 2019-03-05 DIAGNOSIS — G40.219 PARTIAL EPILEPSY WITH IMPAIRMENT OF CONSCIOUSNESS, INTRACTABLE (H): ICD-10-CM

## 2019-03-05 DIAGNOSIS — R25.1 TREMOR: ICD-10-CM

## 2019-03-05 DIAGNOSIS — G25.81 RESTLESS LEG SYNDROME: ICD-10-CM

## 2019-03-06 ENCOUNTER — OFFICE VISIT (OUTPATIENT)
Dept: NEUROLOGY | Facility: CLINIC | Age: 44
End: 2019-03-06
Payer: COMMERCIAL

## 2019-03-06 DIAGNOSIS — F09 MENTAL DISORDER DUE TO GENERAL MEDICAL CONDITION: ICD-10-CM

## 2019-03-06 DIAGNOSIS — R41.841 COGNITIVE COMMUNICATION DEFICIT: ICD-10-CM

## 2019-03-06 DIAGNOSIS — G40.919 EPILEPSY WITH ALTERED CONSCIOUSNESS WITH INTRACTABLE EPILEPSY (H): Primary | ICD-10-CM

## 2019-03-06 DIAGNOSIS — Q28.3 CONGENITAL ANOMALY OF CEREBROVASCULAR SYSTEM: ICD-10-CM

## 2019-03-06 RX ORDER — LEVETIRACETAM 750 MG/1
TABLET ORAL
Qty: 450 TABLET | Refills: 3 | OUTPATIENT
Start: 2019-03-06

## 2019-03-06 NOTE — LETTER
3/6/2019       RE: Ammon Cronin  : 1975   MRN: [id number]      Dear Colleague,    Thank you for referring your patient, Ammon Cronin, to the [facility] EPILEPSY [facility]ARE at [facility]. Please see a copy of my visit note below.    Patient was seen for neuropsychological evaluation at the request of Dr. Rosenda Claros, for the purposes of diagnostic clarification and treatment planning.  2 hrs 34 min of test administration and scoring were provided by this writer, Nedra Harrison.  Please see Dr. Merrick Bearden's report for a full interpretation of the findings.      Name: Ammon Cronin  MR#: [id number]  YOB: 1975  Date of Exam: 2019      Neuropsychology Laboratory  [facility]  [address] [place] [address] [place], [address]  [place] [address]  [phone]    NEUROPSYCHOLOGICAL EVALUATION    IDENTIFYING INFORMATION  Ammon Cronin is a 43 year old, right handed, disabled , with 13+ years of formal education. He was unaccompanied to the evaluation.    BACKGROUND INFORMATION / INTERVIEW FINDINGS    Records indicate that Mr. Cronin has a complicated neurologic and medical history. He was born five weeks premature. At age 11, he underwent resection of a left temporal AVM. He suffered his first seizure at age 16. 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 A presurgical workup through [facility] in  documented bilateral temporal seizure foci. He had a pacemaker placed in . In , he had a right frontal AVM hemorrhage. He had a VNS placed in May, 2012. He underwent replacement of the VNS in . He has three separate seizure types. The first seizures are simple partial seizures that consist of a premonition that something is going to happen. The second seizure type are complex partial seizures that are characterized by mumbling and lip-smacking. The third seizure type are partial  seizures that secondarily generalize. Please see Dr. Rosenda Claros s notes, most recently from November 9, 2018, for more details and background information. An MRI of his brain on March 6, 2012 documented resection cavities for cavernomas in the left temporal and right frontal regions. CT scans in 6/2017 documented the above noted abnormalities as well as dystrophic calcification in the posterior margin of the left temporal resection cavity. He was admitted to [facility] in mid-December, 2018 following suicidal ideation. His other medical history includes elbow joint deformity, tremor, sensorineural hearing loss, restless leg syndrome, asystole, and syncope, among other diagnoses. Concerns have been expressed about his cognition. The current evaluation was requested by Dr. Claros, in this context.    Of note, Mr. Cronin has completed multiple past neuropsychology exams under the direction of Dr. Alden Gomez. The first exam was completed on March 29, 2002. This examination documented evidence of left lateral temporal dysfunction, with an impairment in naming with a relative weakness in verbal fluency. He completed a [place] exam on July 11, 2002. This exam documented left hemisphere speech and language dominance. Memory functions were noted be well mediated in both hemispheres. The right hemisphere had exceptional verbal as well as visual recall capacity. Left hemisphere verbal memory was slightly below that for the right hemisphere, with excellent visual memory bilaterally. He completed a brief screening exam to assess for medication toxicity on January 2, 2004. The results from this exam documented a rather inconsistent pattern of deficits, with striking compromise in immediate auditory digit span, associative verbal fluency, and confrontation naming. There was greater impairment than seen in the 2002 evaluation. The results were not felt to be consistent with an increase in  toxicity, but rather increased compromise of left temporal mediated function. He then completed another comprehensive neuropsychology exam on September 23, 2009. This exam documented some evidence of generalized herbal dysfunction, mild in overall degree. There was suggestion of significant intellectual deterioration relative to prior exams. There is also indications of selectively severe dominant hemisphere dysfunction, particularly so for frontal regions. Notable weaknesses were in verbal reasoning, verbal fluency, and compromise and precision of language with word finding difficulties. There were also declines in measures of cognitive efficiency, perceptual information processing, vigilance, visual tracking, and psychomotor speed. Additionally, there were memory deficits that were felt to be potentially functionally determined.    On interview, Mr. Cronin reported that on average, he has two seizures per day. He noted that he may not always remember his seizures. He stated that his seizures tend to cluster, and he noted that he may have as many as 20 or 30 seizures in a day. He indicated that the seizures can be brief, and may only last a few seconds in duration. He reported that his last seizure occurred a couple of days before the exam. He stated that triggers for seizure onset include caffeine use, alcohol use, and prolonged physical activity.    Regarding cognition, Mr. Cronin reported that he had a change in his thinking and school performance following his 1985 AVM resection. He stated that he had increased difficulties with science, math, and reading. He reported that he had been an A student, but then had to work hard to get Bs in his classes. He indicated that during his marco a year in high school, he was in a more remedial math class. He stated his belief that with his other surgeries, he did not notice a change in his thinking. He reported that he now takes a little longer to process and follow  conversations. He indicated that he is able to do basic tasks just fine. He otherwise denied changes or difficulties with thinking. He reported that his wife might make mention that he becomes frustrated and angry more easily.    With respect to mental health, Mr. Cronin reported that his mood is fine. He reported that he has a psychiatrist that he has been seeing at [facility] for the past year. He reported that he sees this provider on a monthly basis. He is not currently under the care of a therapist, but has seen a therapist in the past. He reported that he is interested in establishing care with [facility]. He acknowledged that he had a psychiatric hospitalization in December, 2018. He stated that he had an argument with his daughter, and immediately afterwards had thoughts of suicide. He stated that since being discharged from the hospital, he has been staying in [place] with his parents. He reported that he comes back to the [facility] for his psychiatrist appointments. He is also being connected with . He denied current suicidal ideation.    Regarding other medical background, Mr. Cronin denied prior head injury. He reported that his sleep is good, and stated that he averages seven hours of sleep per night. He noted that he also takes naps in the morning after getting his kids off to school. He reported that he slept only five hours the night before the exam. He denied pain. Per records, his current medications include clonazepam, clonidine, diazepam, gabapentin, ibuprofen, levetiracetam, multivitamin, oxcarbazepine, paroxetine, phenytoin, and risperidone. He denied alcohol, tobacco, or illicit drug use. He denied past substance abuse.    As alluded to above, Mr. Cronin is currently staying with his parents in [place]. He stated that he and his wife are not , but his wife wants him to see a therapist and a  to ensure that his mental health is well  managed. He otherwise lives at home with his family. He manages his own basic daily activities and his own medications. His wife is responsible for their finances and she is primarily responsible for their meal preparation. He is not driving due to seizures. By way of background, the patient and his wife have been  for 18 years. They have a 14-year-old daughter and a 10-year-old son. Regarding educational background, he graduated from high school. He completed two years of coursework at the [employer] in [place], but did not earn a degree. 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 He last worked about eight years ago. He receives Social Security benefits.    BEHAVIORAL OBSERVATIONS  Mr. Cronin was polite and cooperative with the exam. His speech was notable for moderate difficulties with word finding, and mild dysarticulation, but was otherwise normal. Comprehension was normal. His thought processes were notable for marked tangentiality. He needed to be redirected to the topic of discussion during the interview, and redirected to testing during the examination. He made some mildly socially inappropriate comments. His mood was neutral with congruent affect. His effort was good. The current results are felt to be an accurate representation of his cognitive functioning.    RESULTS OF EXAM  His performances on measures of neuropsychological functioning were as follows:      He was fully oriented to time, place, and various aspects of personal information. He obtained passing scores on standalone and embedded metrics of cognitive performance validity. Auditory attention for digits was low average. Mental calculations were low average. Learning of words in a list format was average. Short delayed recall  of list words was average. 30 minute delayed recall of list words was average. Delayed recognition while list words was high average, and performed without error. Immediate memory for simple geometric figures was normal. Learning of faces was high average. Delayed recognition of faces was average. His drawing of a complicated geometric figure was performed below expectation, and was notable for a slow and piecemeal approach.  Short delayed recall of the figure was impaired. 30 minute delayed recall of the figure was impaired. Delayed recognition of the figure s elements was impaired. Visual problem-solving with blocks was average. Visual perceptual matching of faces was performed within normal limits. Comprehension of phrases and short stories was borderline impaired. Verbal associative fluency was severely impaired. Semantic verbal fluency was impaired. Naming to confrontation was impaired. Verbal abstract reasoning was borderline impaired. Speeded visual sequencing under focused attention was low average. A similar measure with a divided attention component was borderline impaired. Speeded word reading was borderline impaired. Speeded color naming was impaired. Speeded inhibition of an over-learned response was impaired. Speeded visual motor coding was low average. Speeded fine motor dexterity was low average for the dominant, right-hand, and borderline impaired for the left hand.    He endorsed items consistent with minimal symptoms of depression, and minimal symptoms of anxiety on self-report measures.    IMPRESSIONS  Mr. Cronin demonstrated weaknesses and deficits that are consistent with multifocal brain dysfunction. Most prominent among these weaknesses is suggestion of left lateral frontal and temporal dysfunction. Also noted is suggestion of bilateral frontal lobe compromise. Relative to his 2009 neuropsychology exam, the current results are somewhat mixed, but possibly suggestive of a subtle decline. As  has been documented, there was notable decline in his cognition in 2009 relative to his 2002 neuropsychology exam. In the current evaluation, he has profound deficits in aspects of expressive language, most particularly so for fluency, verbal reasoning, and naming. Weaknesses were also noted in attention, verbal reasoning, executive functioning, and bilateral psychomotor speed, most particularly so for the left hand. Verbal memory is relatively preserved, as are some aspects of nonverbal working memory, and visuoperception. There is some variability in nonverbal memory. He is not reporting elevated symptoms of depression or anxiety.    RECOMMENDATIONS  Preliminary results and feedback were provided to the patient on the date of the appointment, and all questions were answered.    1. Continued psychiatric care is strongly recommended.    2. Along similar lines, referral for psychotherapy services are recommended. One possible referral option would be [facility], with locations throughout the [facility] area. They can be reached by calling [phone]. Couples therapy could also be of benefit. He mentioned interest in being connected to a Religion therapist.     3. Follow-up neuropsychological evaluation could be considered in the future, if clinically indicated.    Merrick Bearden, Ph.D., L.P., ABPP-CN   / Licensed Psychologist [id number]  Department of Rehabilitation Medicine  Division of Adult Neuropsychology  [facility]    Time spent: One unit (65 minutes) neurobehavioral status exam including interview, clinical assessment of thinking, reasoning, and judgment by licensed and board-certified neuropsychologist (CPT 47743). One unit (60 minutes) neuropsychological testing evaluation by licensed and board-certified neuropsychologist, including integration of patient data, interpretation of standardized test results and clinical data, clinical decision-making, treatment  planning, report, and interactive feedback to the patient, first hour (CPT 12027). Two units (120 minutes) of neuropsychological testing evaluation by licensed and board-certified neuropsychologist, including integration of patient data, interpretation of standardized test results and clinical data, clinical decision-making, treatment planning, report, and interactive feedback to the patient, subsequent hours (CPT 79838). One unit (30 minutes) of psychological and neuropsychological test administration and scoring by technician, first 30 minutes (CPT 74624). Four units (126 minutes) psychological or neuropsychological test administration and scoring by technician, subsequent 30 minutes (CPT 44969). Diagnoses: G40.919, R41.841, F06.8.

## 2019-03-07 NOTE — PROGRESS NOTES
Patient was seen for neuropsychological evaluation at the request of Dr. Rosenda Claros, for the purposes of diagnostic clarification and treatment planning.  2 hrs 34 min of test administration and scoring were provided by this writer, Nedra Harrison.  Please see Dr. Merrick Bearden's report for a full interpretation of the findings.

## 2019-03-11 NOTE — PROGRESS NOTES
Name: Ammon Cronin  MR#: [id number]  YOB: 1975  Date of Exam: 03/06/2019      Neuropsychology Laboratory  [facility]  [address], [address]  [place] [address]  [phone]    NEUROPSYCHOLOGICAL EVALUATION    IDENTIFYING INFORMATION  Ammon Cronin is a 43 year old, right handed, disabled , with 13+ years of formal education. He was unaccompanied to the evaluation.    BACKGROUND INFORMATION / INTERVIEW FINDINGS    Records indicate that Mr. Cronin has a complicated neurologic and medical history. He was born five weeks premature. At age 11, he underwent resection of a left temporal AVM. He suffered his first seizure at age 16. 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 A presurgical workup through [facility] in 2002 documented bilateral temporal seizure foci. He had a pacemaker placed in 2003. In 2005, he had a right frontal AVM hemorrhage. He had a VNS placed in May, 2012. He underwent replacement of the VNS in January, 2019. He has three separate seizure types. The first seizures are simple partial seizures that consist of a premonition that something is going to happen. The second seizure type are complex partial seizures that are characterized by mumbling and lip-smacking. The third seizure type are partial seizures that secondarily generalize. Please see Dr. Rosenda Claros s notes, most recently from November 9, 2018, for more details and background information. An MRI of his brain on March 6, 2012 documented resection cavities for cavernomas in the left temporal and right frontal regions. CT scans in 6/2017 documented the above noted abnormalities as well as dystrophic calcification in the posterior margin of the left temporal resection cavity. He was admitted to [facility] in mid-December, 2018 following suicidal ideation. His other medical history includes elbow joint  deformity, tremor, sensorineural hearing loss, restless leg syndrome, asystole, and syncope, among other diagnoses. Concerns have been expressed about his cognition. The current evaluation was requested by Dr. Claros, in this context.    Of note, Mr. Cronin has completed multiple past neuropsychology exams under the direction of Dr. Alden Gomez. The first exam was completed on March 29, 2002. This examination documented evidence of left lateral temporal dysfunction, with an impairment in naming with a relative weakness in verbal fluency. He completed a [place] exam on July 11, 2002. This exam documented left hemisphere speech and language dominance. Memory functions were noted be well mediated in both hemispheres. The right hemisphere had exceptional verbal as well as visual recall capacity. Left hemisphere verbal memory was slightly below that for the right hemisphere, with excellent visual memory bilaterally. He completed a brief screening exam to assess for medication toxicity on January 2, 2004. The results from this exam documented a rather inconsistent pattern of deficits, with striking compromise in immediate auditory digit span, associative verbal fluency, and confrontation naming. There was greater impairment than seen in the 2002 evaluation. The results were not felt to be consistent with an increase in toxicity, but rather increased compromise of left temporal mediated function. He then completed another comprehensive neuropsychology exam on September 23, 2009. This exam documented some evidence of generalized herbal dysfunction, mild in overall degree. There was suggestion of significant intellectual deterioration relative to prior exams. There is also indications of selectively severe dominant hemisphere dysfunction, particularly so for frontal regions. Notable weaknesses were in verbal reasoning, verbal fluency, and compromise and precision of language with word finding difficulties. There were also  declines in measures of cognitive efficiency, perceptual information processing, vigilance, visual tracking, and psychomotor speed. Additionally, there were memory deficits that were felt to be potentially functionally determined.    On interview, Mr. Cronin reported that on average, he has two seizures per day. He noted that he may not always remember his seizures. He stated that his seizures tend to cluster, and he noted that he may have as many as 20 or 30 seizures in a day. He indicated that the seizures can be brief, and may only last a few seconds in duration. He reported that his last seizure occurred a couple of days before the exam. He stated that triggers for seizure onset include caffeine use, alcohol use, and prolonged physical activity.    Regarding cognition, Mr. Cronin reported that he had a change in his thinking and school performance following his 1985 AVM resection. He stated that he had increased difficulties with science, math, and reading. He reported that he had been an A student, but then had to work hard to get Bs in his classes. He indicated that during his marco a year in high school, he was in a more remedial math class. He stated his belief that with his other surgeries, he did not notice a change in his thinking. He reported that he now takes a little longer to process and follow conversations. He indicated that he is able to do basic tasks just fine. He otherwise denied changes or difficulties with thinking. He reported that his wife might make mention that he becomes frustrated and angry more easily.    With respect to mental health, Mr. Cronin reported that his mood is fine. He reported that he has a psychiatrist that he has been seeing at [facility] for the past year. He reported that he sees this provider on a monthly basis. He is not currently under the care of a therapist, but has seen a therapist in the past. He reported that he is interested in establishing care  with Orthodox therapist. He acknowledged that he had a psychiatric hospitalization in December, 2018. He stated that he had an argument with his daughter, and immediately afterwards had thoughts of suicide. He stated that since being discharged from the hospital, he has been staying in [place] with his parents. He reported that he comes back to the city for his psychiatrist appointments. He is also being connected with . He denied current suicidal ideation.    Regarding other medical background, Mr. Cronin denied prior head injury. He reported that his sleep is good, and stated that he averages seven hours of sleep per night. He noted that he also takes naps in the morning after getting his kids off to school. He reported that he slept only five hours the night before the exam. He denied pain. Per records, his current medications include clonazepam, clonidine, diazepam, gabapentin, ibuprofen, levetiracetam, multivitamin, oxcarbazepine, paroxetine, phenytoin, and risperidone. He denied alcohol, tobacco, or illicit drug use. He denied past substance abuse.    As alluded to above, Mr. Cronin is currently staying with his parents in [place]. He stated that he and his wife are not , but his wife wants him to see a therapist and a  to ensure that his mental health is well managed. He otherwise lives at home with his family. He manages his own basic daily activities and his own medications. His wife is responsible for their finances and she is primarily responsible for their meal preparation. He is not driving due to seizures. By way of background, the patient and his wife have been  for 18 years. They have a 14-year-old daughter and a 10-year-old son. Regarding educational background, he graduated from high school. He completed two years of coursework at the [employer] in [place], but did not earn a degree. 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 He last worked about eight years ago. He receives Social Security benefits.    BEHAVIORAL OBSERVATIONS  Mr. Cronin was polite and cooperative with the exam. His speech was notable for moderate difficulties with word finding, and mild dysarticulation, but was otherwise normal. Comprehension was normal. His thought processes were notable for marked tangentiality. He needed to be redirected to the topic of discussion during the interview, and redirected to testing during the examination. He made some mildly socially inappropriate comments. His mood was neutral with congruent affect. His effort was good. The current results are felt to be an accurate representation of his cognitive functioning.    RESULTS OF EXAM  His performances on measures of neuropsychological functioning were as follows:      He was fully oriented to time, place, and various aspects of personal information. He obtained passing scores on standalone and embedded metrics of cognitive performance validity. Auditory attention for digits was low average. Mental calculations were low average. Learning of words in a list format was average. Short delayed recall of list words was average. 30 minute delayed recall of list words was average. Delayed recognition while list words was high average, and performed without error. Immediate memory for simple geometric figures was normal. Learning of faces was high average. Delayed recognition of faces was average. His drawing of a complicated geometric figure was performed below expectation, and was notable for a slow and piecemeal approach.  Short delayed recall of the figure was impaired. 30 minute delayed recall of the figure was impaired. Delayed recognition of the figure s elements was impaired. Visual problem-solving with  blocks was average. Visual perceptual matching of faces was performed within normal limits. Comprehension of phrases and short stories was borderline impaired. Verbal associative fluency was severely impaired. Semantic verbal fluency was impaired. Naming to confrontation was impaired. Verbal abstract reasoning was borderline impaired. Speeded visual sequencing under focused attention was low average. A similar measure with a divided attention component was borderline impaired. Speeded word reading was borderline impaired. Speeded color naming was impaired. Speeded inhibition of an over-learned response was impaired. Speeded visual motor coding was low average. Speeded fine motor dexterity was low average for the dominant, right-hand, and borderline impaired for the left hand.    He endorsed items consistent with minimal symptoms of depression, and minimal symptoms of anxiety on self-report measures.    IMPRESSIONS  Mr. Cronin demonstrated weaknesses and deficits that are consistent with multifocal brain dysfunction. Most prominent among these weaknesses is suggestion of left lateral frontal and temporal dysfunction. Also noted is suggestion of bilateral frontal lobe compromise. Relative to his 2009 neuropsychology exam, the current results are somewhat mixed, but possibly suggestive of a subtle decline. As has been documented, there was notable decline in his cognition in 2009 relative to his 2002 neuropsychology exam. In the current evaluation, he has profound deficits in aspects of expressive language, most particularly so for fluency, verbal reasoning, and naming. Weaknesses were also noted in attention, verbal reasoning, executive functioning, and bilateral psychomotor speed, most particularly so for the left hand. Verbal memory is relatively preserved, as are some aspects of nonverbal working memory, and visuoperception. There is some variability in nonverbal memory. He is not reporting elevated symptoms of  depression or anxiety.    RECOMMENDATIONS  Preliminary results and feedback were provided to the patient on the date of the appointment, and all questions were answered.    1. Continued psychiatric care is strongly recommended.    2. Along similar lines, referral for psychotherapy services are recommended. One possible referral option would be [facility], with locations throughout the [facility] area. They can be reached by calling [phone]. Couples therapy could also be of benefit. He mentioned interest in being connected to a Sab[facility] therapist.     3. Follow-up neuropsychological evaluation could be considered in the future, if clinically indicated.    Merrick Bearden, Ph.D., L.P., ABPP-CN   / Licensed Psychologist [id number]  Department of Rehabilitation Medicine  Division of Adult Neuropsychology  [facility]    Time spent: One unit (65 minutes) neurobehavioral status exam including interview, clinical assessment of thinking, reasoning, and judgment by licensed and board-certified neuropsychologist (CPT 99546). One unit (60 minutes) neuropsychological testing evaluation by licensed and board-certified neuropsychologist, including integration of patient data, interpretation of standardized test results and clinical data, clinical decision-making, treatment planning, report, and interactive feedback to the patient, first hour (CPT 27744). Two units (120 minutes) of neuropsychological testing evaluation by licensed and board-certified neuropsychologist, including integration of patient data, interpretation of standardized test results and clinical data, clinical decision-making, treatment planning, report, and interactive feedback to the patient, subsequent hours (CPT 04809). One unit (30 minutes) of psychological and neuropsychological test administration and scoring by technician, first 30 minutes (CPT 75489). Four units (126 minutes) psychological or  neuropsychological test administration and scoring by technician, subsequent 30 minutes (CPT 37561). Diagnoses: G40.919, R41.841, F06.8.

## 2019-03-12 ENCOUNTER — OFFICE VISIT (OUTPATIENT)
Dept: NEUROLOGY | Facility: CLINIC | Age: 44
End: 2019-03-12
Payer: COMMERCIAL

## 2019-03-12 VITALS
DIASTOLIC BLOOD PRESSURE: 85 MMHG | TEMPERATURE: 98.7 F | SYSTOLIC BLOOD PRESSURE: 136 MMHG | WEIGHT: 142 LBS | HEART RATE: 78 BPM | BODY MASS INDEX: 22.92 KG/M2

## 2019-03-12 DIAGNOSIS — G25.81 RESTLESS LEGS SYNDROME: ICD-10-CM

## 2019-03-12 DIAGNOSIS — G25.81 RESTLESS LEG SYNDROME: ICD-10-CM

## 2019-03-12 DIAGNOSIS — Z79.899 LONG TERM USE OF DRUG: ICD-10-CM

## 2019-03-12 DIAGNOSIS — G40.219 PARTIAL EPILEPSY WITH IMPAIRMENT OF CONSCIOUSNESS, INTRACTABLE (H): ICD-10-CM

## 2019-03-12 DIAGNOSIS — G40.219 PARTIAL EPILEPSY WITH LOSS OF CONSCIOUSNESS, INTRACTABLE (H): ICD-10-CM

## 2019-03-12 DIAGNOSIS — R25.1 TREMOR: ICD-10-CM

## 2019-03-12 RX ORDER — ZIPRASIDONE HYDROCHLORIDE 20 MG/1
20 CAPSULE ORAL 2 TIMES DAILY WITH MEALS
COMMUNITY
Start: 2019-03-12 | End: 2019-07-30

## 2019-03-12 RX ORDER — PHENYTOIN SODIUM 100 MG/1
CAPSULE, EXTENDED RELEASE ORAL
Qty: 180 CAPSULE | Refills: 3 | Status: SHIPPED | OUTPATIENT
Start: 2019-03-12 | End: 2019-07-30

## 2019-03-12 RX ORDER — PAROXETINE 40 MG/1
40 TABLET, FILM COATED ORAL EVERY MORNING
COMMUNITY
Start: 2019-03-12 | End: 2020-08-20

## 2019-03-12 RX ORDER — GABAPENTIN 600 MG/1
TABLET ORAL
Qty: 180 TABLET | Refills: 5 | Status: SHIPPED | OUTPATIENT
Start: 2019-03-12 | End: 2019-07-30

## 2019-03-12 RX ORDER — LITHIUM CARBONATE 300 MG
TABLET ORAL
COMMUNITY
Start: 2019-03-12 | End: 2020-01-03

## 2019-03-12 ASSESSMENT — PAIN SCALES - GENERAL: PAINLEVEL: NO PAIN (0)

## 2019-03-12 NOTE — PROGRESS NOTES
[facility]    Patient:  Ammon Cronin  :  1975   Age:  43 year old   Today's Office Visit:  3/12/2019       SEIZURE HISTORY:   Copied forward:  First seizure was at the age of 16 (loss of awarenes). 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   Presurgically workup in  revealed bilateral independent seizure foci in the temporal lobes. Ictal asystole (, he developed chest pain, pallor, diaphoresis and nausea, was found to have a 22 second period of asystole.  Emergent cardiac pacemaker placed). MRI of the brain shows a cavernoma in the right frontal and a second one in the left temporal lobe and encephalomalacia.      INTERVAL HISTORY:  Came with [facility].  Moved out 2019 due psychiatric decompensation, he had suicidal ideations. Lived with parents and felt he got too many lectures, and  now living with friends week to week.  Overall they are trying to get support from medical assistance to obtain a  to define what resources are available to chat.  He continues to have several seizures per day that are complex partial seizures with impairment in awareness.  On his last inpatient psychiatric hospitalization is clonazepam was stopped therefore in 2019 he had a cluster of seizures.  His seizures have stabilized in the last 2 months.  When he was hospitalized in December to 2019 I spoke to Dr. Lozano, Ammon's psychiatrist, who states that Isaias has suicidal thoughts.  Psychiatrist made many changes to his seizure medications.      Prior to Admission medications    Medication Sig Start Date End Date Taking? Authorizing Provider   cloNIDine (CATAPRES) 0.1 MG tablet  7/10/18  Yes Reported, Patient   diazepam (VALIUM) 5 MG tablet TAKE 1 TAB AS NEEDED FOR SEIZURES >5MIN OR >3  SEIZURES/8HRS, MAY REPEAT ONCE, MAX 10MG/24HR, CALL 911 IF BREATHING PROBLEMS 10/23/17  Yes Rosenda Claros MD   FLUoxetine (PROZAC) 40 MG capsule Take 60 mg by mouth daily Started March 2013   Yes Reported, Patient   gabapentin (NEURONTIN) 600 MG tablet Take 2 tabs ( 1200 mg ) three times daily 6/26/18  Yes Rosenda Claros MD   gabapentin (NEURONTIN) 600 MG tablet Take 2 tab po TID 6/5/18  Yes Rosenda Claros MD   levETIRAcetam (KEPPRA) 750 MG tablet TAKE 1 TABLET IN THE MORNING, 2 TABLETS AT 4PM, AND 2 TABLETS AT 11PM 5/30/18  Yes Rosenda Claros MD   Multiple Vitamins-Minerals (MULTIVITAMIN & MINERAL PO) Take 1 tablet by mouth daily.   Yes Reported, Patient   OXcarbazepine (TRILEPTAL) 300 MG tablet TAKE 3 TABLETS THREE TIMES DAILY 2/20/18  Yes Rosenda Claros MD   phenytoin (DILANTIN) 100 MG CR capsule 1 CAPSULE TWICE A DAY 2/20/18  Yes Rosenda Claros MD   risperiDONE (RISPERDAL) 1 MG tablet Take 1 mg by mouth 2 times daily Pt wife says they are Tapering off   Yes Reported, Patient   diazepam (DIAZEPAM INTENSOL) 5 MG/ML solution Seizures greater than 5 minutes or cluster of more than 3 seizures in 8 hour period. May repeat dose once, no more than 6 mg in 24 hour period.  Patient not taking: Reported on 5/22/2018 2/3/14   Eric Cruz MD   levETIRAcetam (KEPPRA) 250 MG tablet Titrate as instructed to 1500 mg am  Patient not taking: Reported on 5/22/2018 2/20/18   Rosenda Claros MD         AED MEDICATIONS:    1. Levetiracetam, 750 mg in the morning, 1500 mg at 4 p.m., and 1500 mg at 11 p.m.   2. Oxcarbazepine  900 mg t.i.d.   3. Gabapentin 1200 mg AM, 1200 mg noon, and 1200 mg PM  (started for RLS, patient has taken higher dose for some time, not sure of length)   5. Klonopin stopped 1/2019  6. Phenytoin ER  100-100  7. Diazepam PRN for CPS lasting greater than 5 minutes or more then 2 in one hour.    MEDICATION NOTES/PRIOR ANTIEPILEPTIC DRUGS:    1.Lyrica (ineffective for seizure, max dose 1100mg per day)    2.Topamax: Two trials from 01/2008-05/2008 and the second trial on 05/19/2015.  Max dose was 200 mg per day.  CP max was 3.5 mg/L.  There was no change in seizure frequency.  The patient was compliant.  During the second trial, they tried it for mood stabilization; 50 mg was after 2 months discontinued because side effects were speech and word-finding difficulties.   3.Gabatril (ineffective, caused fatigue at 48mg/ day). One trial from 01/2004-04/2006.  Max dose 48 mg per day.  It was ineffective and was discontinued.  There was no increase in seizures when it was stopped.  Side effects were fatigue.   4.Zonegran  One trial from 12/2003.  Max dose 400 mg per day.  Zonegran was used in combination with Keppra and Trileptal.  Side effects were memory impairment, aggressiveness, fatigue, behavioral changes, cognitive impairment, which improved after Zonegran was discontinued.   5.Dilantin: One trial from ? to 03/08/2002.  Max dose 350 mg per day.  CP max and free Dilantin level of 1.9 mg/L.  Efficacy:  Unknown.  The drug was optimized.  Side effects were tiredness on 350 mg a day.     Assessment:  It looks like seizure control was better when the patient was on Dilantin; therefore, it could be retried.   6.Vimpat: One trial 07/2009-09/2009.  Max dose 400 mg per day.  CP max 5.3 mg/L.  It was discontinued after 2 months because the patient experienced dizziness, diplopia and ataxia.  7.Depakote: One trial from 01/2010 to 05/09/2016.  So far, seizures have not increased because we increased the gabapentin.  Also, the headaches have not gotten worse, but the hand tremor and the head tremor have immensely improved since Depakote was discontinued.  Max dose was 2500 mg per day.  CP max was up to 116/15.6 mg/L.  In the past, it decreased seizure frequency, and it was used together with levetiracetam, oxcarbazepine and gabapentin, and it was used before with oxcarbazepine, levetiracetam and Lyrica.   Assessment:  Depakote  could always be retried if we run into a problem.  8.lamotrigine: One trial from 07/06/2001.  Max dose 150 mg per day.  Used in combination with Dilantin.  There was no change in seizure frequency.  Side effects were increase in confusion and therefore questionable efficacy.  9.carbamazepine (tired, but no past info on details)  10.Levetiracetam: started in 2001. No major side effects. One trial from 07/2001 to present.  Max dose 3750 mg per day.  CP max 42.4 mg/L. Levetiracetam above 4000 mg per day caused slurred speech, double vision, unstable gait, severe side effects.   11. Oxcarbazepine:  One trial from 03/2002 to present.  Max dose 2700 mg per day.  CP max 43.2 mg/L.  It decreases seizures, and it looks like it is one of the best drugs the patient has tried.  increased oxcarbazepine from 2,400mg -> 2700 mg on 4/2013.   12.  Gabapentin:  One trial from 07/2012 to present.  Max dose is 3600 mg per day.  This drug was used more for pain and restless legs, but when Depakote was tapered, we increased the gabapentin, and it looks like the only problem we have is some weight gain with the gabapentin.  Gabapentin  increased 1200 mg three times a day 5/2016 with no change in seizure, however, RLS symptoms decreased  13.  Klonopin:  One trial started 03/2013.  Is used for anxiety, not for seizures.  It was started at 0.5 mg and optimized so far to 3 mg per day.   stopped 12/2018 and seizure increased 1/2019 and then they stabilized. This drug is used by a psychiatrist.  If we one day want to try ONFI in this patient, maybe then the Klonopin could be decreased again.   14. Vagus nerve stimulator setting: Not able to tolerate higher duty cycle (35) with 1.1 signal off time and 30 sec on time, seizure worsened, not able to tolerate higher current setting due to discomfort.     SOCIAL:   Michelle is 14, Mani is 12. He gets along with Mani and Michelle and he has issues. Social Security is approved.     REVIEW OF SYSTEMS:  Head  tremor, which bothers him.  He continues to have bilateral hand tremors on Depakote.  No nausea, vomiting, diarrhea.  No rash.  Vagus nerve stimulator setting are bother him (voice changes and throat discomfort).      EXAMINATION:  /85   Pulse 78   Temp 98.7  F (37.1  C)   Wt 142 lb (64.4 kg)   BMI 22.92 kg/m    Alert, orientated, speech is fluent, face is symmetric, No head/ hand tremor, no focal deficits noted.Gait is stable. Had seizure today, staring, unresponsive, not able to recall word, or repeat words, some lip smacking. Lasted < 60 seconds.     ASSESSMENT:    1. 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 Cavernoma intracranial   3. Memory deficits secondary to seizures and antiepileptic drugs    4. Anxiety/Depression with suicidal ideations 12/2018   5. Restless legs syndrome.  6. History of Migraines  7. Status post pacemaker placement 12/2003.       Discussion: Refractory multifocal epilepsy.  Etiology multiple cavernomas.  He  is status left temporal resection of AVM in 1985 (age 11), Right frontal hematoma 04/13/2006, treated with surgery.  Left-sided cavernous hemangioma 07/2002. Status post epilepsy surgery 05/29/2012.  Vagus nerve stimulator implanted on right and he has pacemaker on left.  Electrographically, he has multifocal epileptiform discharges and seizures arising from left and right temporal regions.     Antiepileptic drug discussion: Insurance coverage for new antiepileptic drug is not adequate, therefore he can not afford new AED. He is not able to tolerate higher doses of oxcarbazepine or phenytoin or levetiracetam. We will consider felbamate as the next agent. Other antiepileptic drug to consider are banzel, Brivaracetam, fycompa, but, if insurance coverage is not good and they are not able to afford these medications. Retrial with lamotrigine (tremors will worsen), Patient and wife declined Responsive Neurostimulation (Neuropace) and DBS or any brain surgery in the past years.  Vagus nerve stimulator: Interogatted today.     In regards to his psychiatric health.  He had suicidal ideations in 2018 in early 2019.  This required inpatient psychiatric hospitalization.  He is not currently living with his children and wife due to concerns of harm to her family.  His wife is trying to determine  that he may obtain to secure consistent housing for him.Anxiety and depression.  Managed by psychiatrist (Dr. Sawyer).  He was started on Geodon and lithium.        PLAN:   - Continue same antiepileptic drug. Then consider felbamate, felbamate may increase phenytoin levels.     1. Levetiracetam, 750 mg in the morning, 1500 mg at 4 p.m., and 1500 mg at 11 p.m.   2. Oxcarbazepine  900 mg t.i.d.   3. Gabapentin 1200 mg AM, 1200 mg noon, and 1200 mg PM  (started for RLS, patient has taken higher dose for some time, not sure of length)   5. Klonopin stopped 1/2019  6. Phenytoin ER  100-100  7. Diazepam PRN for CPS lasting greater than 5 minutes or more then 2 in one hour.    -Increased vagus nerve stimulator current  1.75 mA.     - Follow up 2 month with Harlan.    -Talk to  about group home living situations? Consider [facility] and group home.     -[employer] can provide letter of support for housing if needed    -Consider family and individual therapist    - Check antiepileptic drug for efficacy, toxicity, and side effects.      I spent 40 minutes with the patient. During this time counseling and coordination of care exceeded 50% of the visit time. I addressed all questions and concerns the patient raised in regards to his care.     REBEKAH CHRISTOPHER MD

## 2019-03-12 NOTE — PROGRESS NOTES
Patient was seen following his clinic visit meeting [facility]     [facility]. Settings as follows:-  Patient ID IDA, Model ID 1000, Serial # [id number], Implant date Jan 29 2019.    NORMAL SETTINGS:  Output Current 1.5 mA, set to 1.625mA  Pulse/signal Frequency 30 Hz,  Pulse Width 250 ìsec,  On Time 30 sec  Off Time 3.0 min,  Duty Cycle 16%.    MAGNET SETTINGS:  Magnet Output Current 2.25mA,  Magnet Pulse Width 500 ìsec,  Magnet On Time 60 sec.    AUTOSTIM SETTINGS:   AutoStim Current 0 mA  AutoStim Pulse Width   ìsec  AutoStim On Time   Sec.    CONFIGURATION SETTINGS:  Tachycardia Detection ON Set to OFF  Threshold for AutoStim 40%  Heartbeat Detection (sensitivity) 3    OFFICE VISIT    Days Since Previous Office Visit Jan 29 2019 to today      Avg.Stims per day % per day % therapy time since previous office visit   AutoStim  0       Magnet Stim  0       Normal Stim  403  100  15.41% on time   Total               Discussed with .  Decision was made to turn off the tachycardia detection to help preserve battery life, as the device is not set to auto stimulation, and  felt it is not applicable in Ammon's case.  Output current was increase by 0.125mA following a discussion with the patient and his wife, and separately with .  Patient was observed following the increase, and tolerated the changes without pain / discomfort, and no signs of coughing.

## 2019-03-12 NOTE — PATIENT INSTRUCTIONS
Talk to  about group home living situations? Consider [facility] and group home.     [employer] can provide letter of support and psychiatrist has to write letter also.     Consider family and individual therapist    Please get a copy of the labs      Rosenda Claros MD

## 2019-03-13 NOTE — PROGRESS NOTES
__ Orientation            Time          ___-0____        Place        ____2____        Personal Info.     ____4____    WAIS-IV   FSIQ____VCI_____PRI_____ WMI____PSI____     Raw    Age SS  __Similarities  __15___ ___5___  __Vocabulary  _______ _______  __Information  ______          ______  __Comprehension _______ _______  __Block Design  __32___ ___8___  __Matrix Reas.  _______ _______  __Visual Puzzles _______ _______  __Picture Comp. _______ _______  __Figure Weights _______ _______  __Digit Span  __ 19___ ___6___  __Arithmetic  __ 10___             ___7___  __L-N Sequencing _______ _______  __Symbol Search _______ _______  __Coding  __48___ ___6___  __Cancellation  _______ _______    __AVLT  Trial   1         2            3            4          5           B          6            _6_    _10_     _10_     _13     _14_      _4_     _12_      Raw  Zscore  Learning   _53___  .22     Short Delay   __12  .62  30 min. delayed recall  __12__   .64  Recognition hits   _15___    .71  Recognition false positives __0___            __Mariya/Harleen Complex Figure Test    Raw  T-score   %ile  Copy   _30.0__         -                 ?1  Imm. Recall _7.0__  ___<20___ __<1__  30  Delay _7.5__  ___<20___ __<1__  Recognition __17__   __27___ __1__  Time               _dc d @ 600 _                   ?1    __BVRT  (form C)  Copy E-10 rating ____/4     Raw  z-score  Correct  __8____ ___8____  Incorrect ___3____ ___3____    __COWA   Raw__8___ Tscore__14___   __Semantic Fluency/Animals   Raw = 9  Tscore = 19  __BNT   Raw = 40/60 Tscore = 24  __Complex Ideational Material   Raw = 10/12 Tscore = 34    __Trail Making Test   A =   32         Errors = 0        Tscore = 43    B =   98         Errors = 0        Tscore = 36  __Stroop                       Raw        Total             T-Score        Word = _69_      __69__            31        Color =  _41_      __41__             24        C/W   =  _22_     __ 22__               27    __Venuon Facial Discrimination   Raw = 47    Interp: WNL    __Grooved Pegboard   RH = 78        Tscore = 37      Drops = 0   LH = 97        TScore = 33      Drops = 0    __BDI-II   Raw__9___ Interp.__Minimal___   __BAI     Raw__3__ Interp. __Minimal___    __WMS-III    Faces1=40   SS=12   Faces2=35    SS=9    __DCT  E-Score=12

## 2019-04-10 DIAGNOSIS — G25.81 RESTLESS LEGS SYNDROME: ICD-10-CM

## 2019-04-10 DIAGNOSIS — G40.219 PARTIAL EPILEPSY WITH IMPAIRMENT OF CONSCIOUSNESS, INTRACTABLE (H): ICD-10-CM

## 2019-04-10 DIAGNOSIS — Z79.899 LONG TERM USE OF DRUG: ICD-10-CM

## 2019-04-10 DIAGNOSIS — G25.81 RESTLESS LEG SYNDROME: ICD-10-CM

## 2019-04-10 DIAGNOSIS — G40.219 PARTIAL EPILEPSY WITH LOSS OF CONSCIOUSNESS, INTRACTABLE (H): ICD-10-CM

## 2019-04-10 DIAGNOSIS — R25.1 TREMOR: ICD-10-CM

## 2019-04-10 RX ORDER — LEVETIRACETAM 750 MG/1
TABLET ORAL
Qty: 450 TABLET | Refills: 3 | OUTPATIENT
Start: 2019-04-10

## 2019-05-14 ENCOUNTER — OFFICE VISIT (OUTPATIENT)
Dept: NEUROLOGY | Facility: CLINIC | Age: 44
End: 2019-05-14
Payer: COMMERCIAL

## 2019-05-14 ENCOUNTER — TRANSFERRED RECORDS (OUTPATIENT)
Dept: HEALTH INFORMATION MANAGEMENT | Facility: CLINIC | Age: 44
End: 2019-05-14

## 2019-05-14 VITALS
WEIGHT: 134 LBS | TEMPERATURE: 98.8 F | DIASTOLIC BLOOD PRESSURE: 87 MMHG | SYSTOLIC BLOOD PRESSURE: 132 MMHG | HEART RATE: 67 BPM | BODY MASS INDEX: 21.63 KG/M2

## 2019-05-14 DIAGNOSIS — G25.81 RESTLESS LEG SYNDROME: ICD-10-CM

## 2019-05-14 DIAGNOSIS — G40.219 PARTIAL EPILEPSY WITH IMPAIRMENT OF CONSCIOUSNESS, INTRACTABLE (H): Primary | ICD-10-CM

## 2019-05-14 DIAGNOSIS — G40.219 PARTIAL EPILEPSY WITH LOSS OF CONSCIOUSNESS, INTRACTABLE (H): ICD-10-CM

## 2019-05-14 RX ORDER — DIAZEPAM 5 MG
TABLET ORAL
Qty: 30 TABLET | Refills: 1 | Status: SHIPPED | OUTPATIENT
Start: 2019-05-14 | End: 2019-07-30

## 2019-05-14 ASSESSMENT — PAIN SCALES - GENERAL: PAINLEVEL: NO PAIN (0)

## 2019-05-14 NOTE — PROGRESS NOTES
"  [facility] Progress Note    Patient:  Ammon Cronin  :  1975   Age:  43 year old   Today's Office Visit:  2019    Epilepsy Data:  Patient History  Primary Epileptologist/Provider: Rosenda Claros M.D.  Epilepsy Syndrome: Localization-related epilepsy unspecified  Epilepsy Syndrome Status: Final  Age of Onset: 16  Etiology  : Tumor  Other Relevant Dx/ Issues: Etiology likely cavernous hemangioma; acute hemorrhage 2005.  Born 5 weeks premature. Left temporal AVM with resection age 11.  Cardiac pacemaker  (bradycardia).  Hx of migraines.  Inpatient evaluations 3/02, ,  & .     Tests/Surgery History  Last EE2013  Last Neuropsych Testin2009  Last Case Management Conference: 3/21/2012  Epilepsy Surgery #1 Date: 11  Epilepsy Related Surgery #1 : Type: VNS implantation on RIGHT.  Seizure Record  Current Visit Date: 19  Previous Visit Date: 18  Months since last visit: 6.11  Seizure Type 1: Simple partial seizures unspecified  Description of Sz Type 1: \"hard to describe, feeling in the head, somthing is going to happen\"   Seizure Type 2: Complex partial seizures unspecified  Description of Sz Type 2: \"Brief\" ones consist of mumbling and not being very repsonsive at that time; duration 5-10 seconds.  \"Bigger\" ones consist of unresponsiveness, staring, confusion, lip smacking movements, may pick at objects or sort things, repetitive behavior.  Lasts few minutes.  # of Type 2 Seizure since last visit: 130  Freq. Type 2 / Month: 21.28  Seizure Type 3: Partial seizures with secondary generalization  -  with complex partial seizures evolving to generalized seizures            SEIZURE HISTORY:   Copied forward:  First seizure was at the age of 16 (loss of awarenes). 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 Patient has intractable localization-related epilepsy " with multiple seizure foci.   Presurgically workup in 2002 revealed bilateral independent seizure foci in the temporal lobes. Ictal asystole (2003, he developed chest pain, pallor, diaphoresis and nausea, was found to have a 22 second period of asystole.  Emergent cardiac pacemaker placed). MRI of the brain shows a cavernoma in the right frontal and a second one in the left temporal lobe and encephalomalacia.      INTERVAL HISTORY:  Came with Toby (dad),  Jasmyne is not here. He has on average 5 complex partial seizures per week and may cluster 1-2 times every 1 to 2 months.  Clusters consist of more than 3 seizures.  He typically gets Valium tablets for this.  In the past we have talked about getting Valium Intensol he does not like the taste of it.  Outside of this we spoke about deep brain stimulation in the anterior nucleus of the thalamus today.  Patient is reluctant to pursue any brain surgeries at this time.  He and his wife are getting a divorce.  His wife is already filed for divorce.  Unfortunately he has epilepsy and mental health disorders have put a strain on the marriage and family life.  He is living with his parents in [place] right now.  Overall he has had less stress in the last few months because he is adjusting to being  from his family.  Early on this is very overwhelming for him.  He is working with a psychiatrist and has started lithium and Geodon.  He states sometimes he gets rapid eye movements.  I encouraged him to take his medications after eating food.  These rapid eye movement started after starting lithium.  I have asked him to talk to psychiatry doctor about this.  Overall they are trying to get support from medical assistance to obtain a  to define what resources are available to chat.     Prior to Admission medications    Medication Sig Start Date End Date Taking? Authorizing Provider   diazepam (VALIUM) 5 MG tablet TAKE 1 TAB AS NEEDED FOR SEIZURES >5 MIN OR >3  SEIZURES/8 HRS. MAY REPEAT 1 TIME MAX 2 TABS/24HR, CALL 911 BREATHING PROBLEM 5/14/19  Yes Rosenda Claros MD   gabapentin (NEURONTIN) 600 MG tablet 2 tablets three times daily 3/12/19  Yes Rosenda Claros MD   ibuprofen (ADVIL/MOTRIN) 200 MG tablet Take 400 mg by mouth every 8 hours as needed for mild pain   Yes Unknown, Entered By History   levETIRAcetam (KEPPRA) 750 MG tablet TAKE 1 TABLET IN THE MORNING, 2 TABLETS AT 4PM, AND 2 TABLETS AT 11PM 9/5/18  Yes Rosenda Claros MD   lithium (ESKALITH) 300 MG tablet Take 300 mg in the morning and 600 mg in the evening. 3/12/19  Yes Rosenda Claros MD   Multiple Vitamins-Minerals (MULTIVITAMIN & MINERAL PO) Take 1 tablet by mouth daily.   Yes Reported, Patient   OXcarbazepine (TRILEPTAL) 300 MG tablet TAKE 3 TABLETS THREE TIMES DAILY 9/5/18  Yes Rosenda Claros MD   PARoxetine (PAXIL) 40 MG tablet Take 1 tablet (40 mg) by mouth every morning 3/12/19  Yes Rosenda Claros MD   phenytoin (DILANTIN) 100 MG capsule 1 CAPSULE TWICE A DAY 3/12/19  Yes Rosenda Claros MD   ziprasidone (GEODON) 20 MG capsule Take 1 capsule (20 mg) by mouth 2 times daily (with meals) 3/12/19  Yes Rosenda Claros MD         AED MEDICATIONS:    1. Levetiracetam, 750 mg in the morning, 1500 mg at 4 p.m., and 1500 mg at 11 p.m.   2. Oxcarbazepine  900 mg t.i.d.   3. Gabapentin 1200 mg AM, 1200 mg noon, and 1200 mg PM  (started for RLS, patient has taken higher dose for some time, not sure of length)   5. Klonopin stopped 1/2019  6. Phenytoin ER  100-100  7. Diazepam PRN for CPS lasting greater than 5 minutes or more then 2 in one hour.    MEDICATION NOTES/PRIOR ANTIEPILEPTIC DRUGS:    1.Lyrica (ineffective for seizure, max dose 1100mg per day)   2.Topamax: Two trials from 01/2008-05/2008 and the second trial on 05/19/2015.  Max dose was 200 mg per day.  CP max was 3.5 mg/L.  There was no change in seizure frequency.  The patient was compliant.  During the second trial, they tried it for mood stabilization; 50  mg was after 2 months discontinued because side effects were speech and word-finding difficulties.   3.Gabatril (ineffective, caused fatigue at 48mg/ day). One trial from 01/2004-04/2006.  Max dose 48 mg per day.  It was ineffective and was discontinued.  There was no increase in seizures when it was stopped.  Side effects were fatigue.   4.Zonegran  One trial from 12/2003.  Max dose 400 mg per day.  Zonegran was used in combination with Keppra and Trileptal.  Side effects were memory impairment, aggressiveness, fatigue, behavioral changes, cognitive impairment, which improved after Zonegran was discontinued.   5.Dilantin: One trial from ? to 03/08/2002.  Max dose 350 mg per day.  CP max and free Dilantin level of 1.9 mg/L.  Efficacy:  Unknown.  The drug was optimized.  Side effects were tiredness on 350 mg a day.     Assessment:  It looks like seizure control was better when the patient was on Dilantin; therefore, it could be retried.   6.Vimpat: One trial 07/2009-09/2009.  Max dose 400 mg per day.  CP max 5.3 mg/L.  It was discontinued after 2 months because the patient experienced dizziness, diplopia and ataxia.  7.Depakote: One trial from 01/2010 to 05/09/2016.  So far, seizures have not increased because we increased the gabapentin.  Also, the headaches have not gotten worse, but the hand tremor and the head tremor have immensely improved since Depakote was discontinued.  Max dose was 2500 mg per day.  CP max was up to 116/15.6 mg/L.  In the past, it decreased seizure frequency, and it was used together with levetiracetam, oxcarbazepine and gabapentin, and it was used before with oxcarbazepine, levetiracetam and Lyrica.   Assessment:  Depakote could always be retried if we run into a problem.  8.lamotrigine: One trial from 07/06/2001.  Max dose 150 mg per day.  Used in combination with Dilantin.  There was no change in seizure frequency.  Side effects were increase in confusion and therefore questionable  efficacy.  9.carbamazepine (tired, but no past info on details)  10.Levetiracetam: started in 2001. No major side effects. One trial from 07/2001 to present.  Max dose 3750 mg per day.  CP max 42.4 mg/L. Levetiracetam above 4000 mg per day caused slurred speech, double vision, unstable gait, severe side effects.   11. Oxcarbazepine:  One trial from 03/2002 to present.  Max dose 2700 mg per day.  CP max 43.2 mg/L.  It decreases seizures, and it looks like it is one of the best drugs the patient has tried.  increased oxcarbazepine from 2,400mg -> 2700 mg on 4/2013.   12.  Gabapentin:  One trial from 07/2012 to present.  Max dose is 3600 mg per day.  This drug was used more for pain and restless legs, but when Depakote was tapered, we increased the gabapentin, and it looks like the only problem we have is some weight gain with the gabapentin.  Gabapentin  increased 1200 mg three times a day 5/2016 with no change in seizure, however, RLS symptoms decreased  13.  Klonopin:  One trial started 03/2013.  Is used for anxiety, not for seizures.  It was started at 0.5 mg and optimized so far to 3 mg per day.   stopped 12/2018 and seizure increased 1/2019 and then they stabilized. This drug is used by a psychiatrist.  If we one day want to try ONFI in this patient, maybe then the Klonopin could be decreased again.   14. Vagus nerve stimulator setting: Not able to tolerate higher duty cycle (35) with 1.1 signal off time and 30 sec on time, seizure worsened, not able to tolerate higher current setting due to discomfort.     SOCIAL:   Michelle is 14, Mani is 12. He gets along with Mani and Michelle and he has issues. Social Security is approved.  Ammon and his wife Jasmyne are getting a divorce.  She filed for divorce spring 2019.  He has been  from his wife and children since January 2019.  [place] is [place]  He did start seeing a psychiatrist.  He is seeing a psychologist whom he does not  like.    REVIEW OF SYSTEMS:  Head tremor, which bothers him.  He continues to have bilateral hand tremors on Depakote.  No nausea, vomiting, diarrhea.  No rash.  Vagus nerve stimulator setting are bother him (voice changes and throat discomfort).      EXAMINATION:  /87 (BP Location: Left arm, Patient Position: Sitting, Cuff Size: Adult Regular)   Pulse 67   Temp 98.8  F (37.1  C) (Temporal)   Wt 134 lb (60.8 kg)   BMI 21.63 kg/m    Alert, orientated, speech is fluent, face is symmetric, No head/ hand tremor, no focal deficits noted.Gait is stable. Had seizure today, staring, unresponsive, not able to recall word, or repeat words, some lip smacking. Lasted < 60 seconds.     ASSESSMENT:    1. 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 Brain Cavernoma   3. Memory deficits secondary to seizures and antiepileptic drugs    4. Anxiety/Depression with suicidal ideations 12/2018   5. Restless legs syndrome.  6. History of Migraines  7. Asystole secondary to seizures. Status post pacemaker placement 12/2003.     Discussion: Refractory multifocal epilepsy.  Etiology multiple cavernomas.  He  is status left temporal resection of AVM in 1985 (age 11), Right frontal hematoma 04/13/2006, treated with surgery.  Left-sided cavernous hemangioma 07/2002. Status post epilepsy surgery 05/29/2012.  Vagus nerve stimulator implanted on right and he has pacemaker on left.  Electrographically, he has multifocal epileptiform discharges and seizures arising from left and right temporal regions.     Antiepileptic drug discussion: Insurance coverage for new antiepileptic drug is not adequate, therefore, brand-name medications are cost prohibitive.  He is not able to tolerate higher doses of oxcarbazepine or phenytoin or levetiracetam. We will consider felbamate as the next agent. Other antiepileptic drug to consider are banzel, Brivaracetam, fycompa, but, if insurance coverage is not good and they are not able to afford these  medications. Retrial with lamotrigine (tremors will worsen).  In the past years we have talked about epilepsy brain surgery.  He and family have declined.  I again revisited the idea of treating with deep brain stimulation.  I believe his seizure burden and emotional health as resulted in several personal psychosocial issues. Vagus nerve stimulator: Interogatted today and current was increased.     In regards to his psychiatric health.  he had suicidal ideations in 2018 in early 2019.  This required inpatient psychiatric hospitalization.  He is not currently living with his children and wife due to concerns of harm to the family.  His wife filed for divorce.  Managed by psychiatrist (Dr. Sawyer).  He was started on Geodon and lithium.  He has interpersonal differences with his current psychologist and I encouraged him to find a new one.         PLAN:   - Continue same antiepileptic drug. Then consider felbamate, felbamate may increase phenytoin levels.     1. Levetiracetam, 750 mg in the morning, 1500 mg at 4 p.m., and 1500 mg at 11 p.m.   2. Oxcarbazepine  900 mg t.i.d.   3. Gabapentin 1200 mg AM, 1200 mg noon, and 1200 mg PM  (started for RLS, patient has taken higher dose for some time, not sure of length)   5. Klonopin stopped 1/2019  6. Phenytoin ER  100-100  7. Diazepam PRN for CPS lasting greater than 5 minutes or more then 2 in one hour.    -Increased vagus nerve stimulator current  1.75 mA.     - Follow up 3 month with Har[facility].    -Consider individual therapist    - Check antiepileptic drug for efficacy, toxicity, and side effects.      I spent 55 minutes with the patient. During this time counseling and coordination of care exceeded 50% of the visit time. I addressed all questions and concerns the patient raised in regards to his care.     REBEKAH CHRISTOPHER MD

## 2019-05-14 NOTE — PROGRESS NOTES
[employer] Vagus Nerve Stimulator interrogated. Settings as follows:-  Patient ID vinnie, Model ID Sentiva , Serial # [id number], Implant date 1/29/19.    NORMAL SETTINGS:  Output Current 1.625 mA,  Pulse/signal Frequency 30 Hz,  Pulse Width 250 ìsec,  On Time 30 sec,  Off Time 3 min,  IFI no.    MAGNET SETTINGS:  Magnet Output Current 2.25mA,  Magnet Pulse Width 500 ìsec,  Magnet On Time 60 sec.    AUTOSTIM SETTINGS: Disabled  AutoStim Current 0 mA  AutoStim Pulse Width 0 ìsec  AutoStim On Time 0 Sec.    CONFIGURATION SETTINGS: Disabled  Tachycardia Detection: No  Threshold for AutoStim : 0  Heartbeat Detection (sensitivity) 0        Avg.Stims per day % per day % therapy time since previous office visit   AutoStim  0  0  0   Magnet Stim  0  0  0   Normal Stim  403.75 16  16   Total           Avg. # of inhibited AutoStims per day none    No magnet uses this interval. Patient states it is too strong and painful to use as rescue. No difficulties with coughing or pain with normal stimulation at 1.625. Normal current output increased to 1.75 today.     Consider reducing the magnet strength. Consider increasing duty cycle.     Alexandria Khan RN

## 2019-05-14 NOTE — LETTER
2019     RE: Ammon Cronin  : 1975   MRN: [id number]      Dear Colleague,    Thank you for referring your patient, Ammon Cronin, to the [facility][1] EPILEPSY CARE at [facility]. Please see a copy of my visit note below.    Evoz Vagus Nerve Stimulator interrogated. Settings as follows:-  Patient ID vinnie, Model ID Sentiva , Serial # [id number], Implant date 19.    NORMAL SETTINGS:  Output Current 1.625 mA,  Pulse/signal Frequency 30 Hz,  Pulse Width 250 ìsec,  On Time 30 sec,  Off Time 3 min,  IFI no.    MAGNET SETTINGS:  Magnet Output Current 2.25mA,  Magnet Pulse Width 500 ìsec,  Magnet On Time 60 sec.    AUTOSTIM SETTINGS: Disabled  AutoStim Current 0 mA  AutoStim Pulse Width 0 ìsec  AutoStim On Time 0 Sec.    CONFIGURATION SETTINGS: Disabled  Tachycardia Detection: No  Threshold for AutoStim : 0  Heartbeat Detection (sensitivity) 0        Avg.Stims per day % per day % therapy time since previous office visit   AutoStim  0  0  0   Magnet Stim  0  0  0   Normal Stim  403.75 16  16   Total           Avg. # of inhibited AutoStims per day none    No magnet uses this interval. Patient states it is too strong and painful to use as rescue. No difficulties with coughing or pain with normal stimulation at 1.625. Normal current output increased to 1.75 today.     Consider reducing the magnet strength. Consider increasing duty cycle.     Alexandria Khan RN             P/[facility][1] Epilepsy Care Progress Note    Patient:  Ammon Cronin  :  1975   Age:  43 year old   Today's Office Visit:  2019    Epilepsy Data:  Patient History  Primary Epileptologist/Provider: Rosenda Claros M.D.  Epilepsy Syndrome: Localization-related epilepsy unspecified  Epilepsy Syndrome Status: Final  Age of Onset: 16  Etiology  : Tumor  Other Relevant Dx/ Issues: Etiology likely cavernous hemangioma; acute hemorrhage 2005.  Born 5 weeks premature. Left temporal AVM with resection age  "11.  Cardiac pacemaker  (bradycardia).  Hx of migraines.  Inpatient evaluations 3/02, ,  & .     Tests/Surgery History  Last EE2013  Last Neuropsych Testin2009  Last Case Management Conference: 3/21/2012  Epilepsy Surgery #1 Date: 11  Epilepsy Related Surgery #1 : Type: VNS implantation on RIGHT.  Seizure Record  Current Visit Date: 19  Previous Visit Date: 18  Months since last visit: 6.11  Seizure Type 1: Simple partial seizures unspecified  Description of Sz Type 1: \"hard to describe, feeling in the head, somthing is going to happen\"   Seizure Type 2: Complex partial seizures unspecified  Description of Sz Type 2: \"Brief\" ones consist of mumbling and not being very repsonsive at that time; duration 5-10 seconds.  \"Bigger\" ones consist of unresponsiveness, staring, confusion, lip smacking movements, may pick at objects or sort things, repetitive behavior.  Lasts few minutes.  # of Type 2 Seizure since last visit: 130  Freq. Type 2 / Month: 21.  Seizure Type 3: Partial seizures with secondary generalization  -  with complex partial seizures evolving to generalized seizures            SEIZURE HISTORY:   Copied forward:  First seizure was at the age of 16 (loss of awarenes). 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   Presurgically workup in  revealed bilateral independent seizure foci in the temporal lobes. Ictal asystole (, he developed chest pain, pallor, diaphoresis and nausea, was found to have a 22 second period of asystole.  Emergent cardiac pacemaker placed). MRI of the brain shows a cavernoma in the right frontal and a second one in the left temporal lobe and encephalomalacia.      INTERVAL HISTORY:  Came with Toby (dad),  Jasmyne is not here. He has on average 5 complex " partial seizures per week and may cluster 1-2 times every 1 to 2 months.  Clusters consist of more than 3 seizures.  He typically gets Valium tablets for this.  In the past we have talked about getting Valium Intensol he does not like the taste of it.  Outside of this we spoke about deep brain stimulation in the anterior nucleus of the thalamus today.  Patient is reluctant to pursue any brain surgeries at this time.  He and his wife are getting a divorce.  His wife is already filed for divorce.  Unfortunately he has epilepsy and mental health disorders have put a strain on the marriage and family life.  He is living with his parents in [place] right now.  Overall he has had less stress in the last few months because he is adjusting to being  from his family.  Early on this is very overwhelming for him.  He is working with a psychiatrist and has started lithium and Geodon.  He states sometimes he gets rapid eye movements.  I encouraged him to take his medications after eating food.  These rapid eye movement started after starting lithium.  I have asked him to talk to psychiatry doctor about this.  Overall they are trying to get support from medical assistance to obtain a  to define what resources are available to chat.     Prior to Admission medications    Medication Sig Start Date End Date Taking? Authorizing Provider   diazepam (VALIUM) 5 MG tablet TAKE 1 TAB AS NEEDED FOR SEIZURES >5 MIN OR >3 SEIZURES/8 HRS. MAY REPEAT 1 TIME MAX 2 TABS/24HR, CALL 911 BREATHING PROBLEM 5/14/19  Yes Rosenda Claros MD   gabapentin (NEURONTIN) 600 MG tablet 2 tablets three times daily 3/12/19  Yes Rosenda Claros MD   ibuprofen (ADVIL/MOTRIN) 200 MG tablet Take 400 mg by mouth every 8 hours as needed for mild pain   Yes Unknown, Entered By History   levETIRAcetam (KEPPRA) 750 MG tablet TAKE 1 TABLET IN THE MORNING, 2 TABLETS AT 4PM, AND 2 TABLETS AT 11PM 9/5/18  Yes Rosenda Claros MD   lithium (ESKALITH) 300 MG  tablet Take 300 mg in the morning and 600 mg in the evening. 3/12/19  Yes Rosenda Claros MD   Multiple Vitamins-Minerals (MULTIVITAMIN & MINERAL PO) Take 1 tablet by mouth daily.   Yes Reported, Patient   OXcarbazepine (TRILEPTAL) 300 MG tablet TAKE 3 TABLETS THREE TIMES DAILY 9/5/18  Yes Rosenda Claros MD   PARoxetine (PAXIL) 40 MG tablet Take 1 tablet (40 mg) by mouth every morning 3/12/19  Yes Rosenda Claros MD   phenytoin (DILANTIN) 100 MG capsule 1 CAPSULE TWICE A DAY 3/12/19  Yes Rosenda Claros MD   ziprasidone (GEODON) 20 MG capsule Take 1 capsule (20 mg) by mouth 2 times daily (with meals) 3/12/19  Yes Rosenda Claros MD         AED MEDICATIONS:    1. Levetiracetam, 750 mg in the morning, 1500 mg at 4 p.m., and 1500 mg at 11 p.m.   2. Oxcarbazepine  900 mg t.i.d.   3. Gabapentin 1200 mg AM, 1200 mg noon, and 1200 mg PM  (started for RLS, patient has taken higher dose for some time, not sure of length)   5. Klonopin stopped 1/2019  6. Phenytoin ER  100-100  7. Diazepam PRN for CPS lasting greater than 5 minutes or more then 2 in one hour.    MEDICATION NOTES/PRIOR ANTIEPILEPTIC DRUGS:    1.Lyrica (ineffective for seizure, max dose 1100mg per day)   2.Topamax: Two trials from 01/2008-05/2008 and the second trial on 05/19/2015.  Max dose was 200 mg per day.  CP max was 3.5 mg/L.  There was no change in seizure frequency.  The patient was compliant.  During the second trial, they tried it for mood stabilization; 50 mg was after 2 months discontinued because side effects were speech and word-finding difficulties.   3.Gabatril (ineffective, caused fatigue at 48mg/ day). One trial from 01/2004-04/2006.  Max dose 48 mg per day.  It was ineffective and was discontinued.  There was no increase in seizures when it was stopped.  Side effects were fatigue.   4.Zonegran  One trial from 12/2003.  Max dose 400 mg per day.  Zonegran was used in combination with Keppra and Trileptal.  Side effects were memory impairment,  aggressiveness, fatigue, behavioral changes, cognitive impairment, which improved after Zonegran was discontinued.   5.Dilantin: One trial from ? to 03/08/2002.  Max dose 350 mg per day.  CP max and free Dilantin level of 1.9 mg/L.  Efficacy:  Unknown.  The drug was optimized.  Side effects were tiredness on 350 mg a day.     Assessment:  It looks like seizure control was better when the patient was on Dilantin; therefore, it could be retried.   6.Vimpat: One trial 07/2009-09/2009.  Max dose 400 mg per day.  CP max 5.3 mg/L.  It was discontinued after 2 months because the patient experienced dizziness, diplopia and ataxia.  7.Depakote: One trial from 01/2010 to 05/09/2016.  So far, seizures have not increased because we increased the gabapentin.  Also, the headaches have not gotten worse, but the hand tremor and the head tremor have immensely improved since Depakote was discontinued.  Max dose was 2500 mg per day.  CP max was up to 116/15.6 mg/L.  In the past, it decreased seizure frequency, and it was used together with levetiracetam, oxcarbazepine and gabapentin, and it was used before with oxcarbazepine, levetiracetam and Lyrica.   Assessment:  Depakote could always be retried if we run into a problem.  8.lamotrigine: One trial from 07/06/2001.  Max dose 150 mg per day.  Used in combination with Dilantin.  There was no change in seizure frequency.  Side effects were increase in confusion and therefore questionable efficacy.  9.carbamazepine (tired, but no past info on details)  10.Levetiracetam: started in 2001. No major side effects. One trial from 07/2001 to present.  Max dose 3750 mg per day.  CP max 42.4 mg/L. Levetiracetam above 4000 mg per day caused slurred speech, double vision, unstable gait, severe side effects.   11. Oxcarbazepine:  One trial from 03/2002 to present.  Max dose 2700 mg per day.  CP max 43.2 mg/L.  It decreases seizures, and it looks like it is one of the best drugs the patient has  tried.  increased oxcarbazepine from 2,400mg -> 2700 mg on 4/2013.   12.  Gabapentin:  One trial from 07/2012 to present.  Max dose is 3600 mg per day.  This drug was used more for pain and restless legs, but when Depakote was tapered, we increased the gabapentin, and it looks like the only problem we have is some weight gain with the gabapentin.  Gabapentin  increased 1200 mg three times a day 5/2016 with no change in seizure, however, RLS symptoms decreased  13.  Klonopin:  One trial started 03/2013.  Is used for anxiety, not for seizures.  It was started at 0.5 mg and optimized so far to 3 mg per day.   stopped 12/2018 and seizure increased 1/2019 and then they stabilized. This drug is used by a psychiatrist.  If we one day want to try ONFI in this patient, maybe then the Klonopin could be decreased again.   14. Vagus nerve stimulator setting: Not able to tolerate higher duty cycle (35) with 1.1 signal off time and 30 sec on time, seizure worsened, not able to tolerate higher current setting due to discomfort.     SOCIAL:   Michelle is 14, Mani is 12. He gets along with Mani and Michelle and he has issues. Social Security is approved.  Ammon and his wife Jasmyne are getting a divorce.  She filed for divorce spring 2019.  He has been  from his wife and children since January 2019.  He is currently living with his parents in [place].  He did start seeing a psychiatrist.  He is seeing a psychologist whom he does not like.    REVIEW OF SYSTEMS:  Head tremor, which bothers him.  He continues to have bilateral hand tremors on Depakote.  No nausea, vomiting, diarrhea.  No rash.  Vagus nerve stimulator setting are bother him (voice changes and throat discomfort).      EXAMINATION:  /87 (BP Location: Left arm, Patient Position: Sitting, Cuff Size: Adult Regular)   Pulse 67   Temp 98.8  F (37.1  C) (Temporal)   Wt 134 lb (60.8 kg)   BMI 21.63 kg/m     Alert, orientated, speech is fluent, face is symmetric, No  head/ hand tremor, no focal deficits noted.Gait is stable. Had seizure today, staring, unresponsive, not able to recall word, or repeat words, some lip smacking. Lasted < 60 seconds.     ASSESSMENT:    1. 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 Brain Cavernoma   3. Memory deficits secondary to seizures and antiepileptic drugs    4. Anxiety/Depression with suicidal ideations 12/2018   5. Restless legs syndrome.  6. History of Migraines  7. Asystole secondary to seizures. Status post pacemaker placement 12/2003.     Discussion: Refractory multifocal epilepsy.  Etiology multiple cavernomas.  He  is status left temporal resection of AVM in 1985 (age 11), Right frontal hematoma 04/13/2006, treated with surgery.  Left-sided cavernous hemangioma 07/2002. Status post epilepsy surgery 05/29/2012.  Vagus nerve stimulator implanted on right and he has pacemaker on left.  Electrographically, he has multifocal epileptiform discharges and seizures arising from left and right temporal regions.     Antiepileptic drug discussion: Insurance coverage for new antiepileptic drug is not adequate, therefore, brand-name medications are cost prohibitive.  He is not able to tolerate higher doses of oxcarbazepine or phenytoin or levetiracetam. We will consider felbamate as the next agent. Other antiepileptic drug to consider are banzel, Brivaracetam, fycompa, but, if insurance coverage is not good and they are not able to afford these medications. Retrial with lamotrigine (tremors will worsen).  In the past years we have talked about epilepsy brain surgery.  He and family have declined.  I again revisited the idea of treating with deep brain stimulation.  I believe his seizure burden and emotional health as resulted in several personal psychosocial issues. Vagus nerve stimulator: Interogatted today and current was increased.     In regards to his psychiatric health.  he had suicidal ideations in 2018 in early 2019.  This required inpatient  psychiatric hospitalization.  He is not currently living with his children and wife due to concerns of harm to the family.  His wife filed for divorce.  Managed by psychiatrist (Dr. Sawyer).  He was started on Geodon and lithium.  He has interpersonal differences with his current psychologist and I encouraged him to find a new one.         PLAN:   - Continue same antiepileptic drug. Then consider felbamate, felbamate may increase phenytoin levels.     1. Levetiracetam, 750 mg in the morning, 1500 mg at 4 p.m., and 1500 mg at 11 p.m.   2. Oxcarbazepine  900 mg t.i.d.   3. Gabapentin 1200 mg AM, 1200 mg noon, and 1200 mg PM  (started for RLS, patient has taken higher dose for some time, not sure of length)   5. Klonopin stopped 1/2019  6. Phenytoin ER  100-100  7. Diazepam PRN for CPS lasting greater than 5 minutes or more then 2 in one hour.    -Increased vagus nerve stimulator current  1.75 mA.     - Follow up 3 month with Harlan.    -Consider individual therapist    - Check antiepileptic drug for efficacy, toxicity, and side effects.      I spent 55 minutes with the patient. During this time counseling and coordination of care exceeded 50% of the visit time. I addressed all questions and concerns the patient raised in regards to his care.     REBEKAH CHRISTOPHER MD

## 2019-07-03 ENCOUNTER — TELEPHONE (OUTPATIENT)
Dept: NEUROLOGY | Facility: CLINIC | Age: 44
End: 2019-07-03

## 2019-07-03 NOTE — TELEPHONE ENCOUNTER
Ammon has to close his left eye so he can read what his phone says.  This has been occurring x 3 weeks.  This occurs for an hour a day every day.  Ammon continues to have seizures daily.  Plans to return to clinic as scheduled on 7/15/19 at 10:30 AM.

## 2019-07-03 NOTE — TELEPHONE ENCOUNTER
What is the concern that needs to be addressed by a nurse? Patient states he is having to close his left eye in order to read things on his phone. He has been doing this for 3 weeks and has been feeling dizzy daily for 3 weeks. Dizziness is lasting varying amounts depending on food. All of this happens late on in the day. States it is happening every day.    May a detailed message be left on voicemail? yes    Date of last office visit: has an apt coming up    Message routed to: АНДРЕЙ Sneed

## 2019-07-30 ENCOUNTER — OFFICE VISIT (OUTPATIENT)
Dept: NEUROLOGY | Facility: CLINIC | Age: 44
End: 2019-07-30
Payer: COMMERCIAL

## 2019-07-30 VITALS
WEIGHT: 128.8 LBS | TEMPERATURE: 98.8 F | SYSTOLIC BLOOD PRESSURE: 123 MMHG | DIASTOLIC BLOOD PRESSURE: 78 MMHG | BODY MASS INDEX: 20.79 KG/M2 | HEART RATE: 60 BPM

## 2019-07-30 DIAGNOSIS — R25.1 TREMOR: ICD-10-CM

## 2019-07-30 DIAGNOSIS — G25.81 RESTLESS LEGS SYNDROME: ICD-10-CM

## 2019-07-30 DIAGNOSIS — G40.219 PARTIAL EPILEPSY WITH LOSS OF CONSCIOUSNESS, INTRACTABLE (H): ICD-10-CM

## 2019-07-30 DIAGNOSIS — G25.81 RESTLESS LEG SYNDROME: ICD-10-CM

## 2019-07-30 DIAGNOSIS — Z79.899 LONG TERM USE OF DRUG: ICD-10-CM

## 2019-07-30 DIAGNOSIS — G40.219 PARTIAL EPILEPSY WITH IMPAIRMENT OF CONSCIOUSNESS, INTRACTABLE (H): Primary | ICD-10-CM

## 2019-07-30 RX ORDER — PHENYTOIN SODIUM 100 MG/1
CAPSULE, EXTENDED RELEASE ORAL
Qty: 180 CAPSULE | Refills: 3 | Status: SHIPPED | OUTPATIENT
Start: 2019-07-30 | End: 2020-01-30

## 2019-07-30 RX ORDER — DIAZEPAM 5 MG
TABLET ORAL
Qty: 30 TABLET | Refills: 1 | Status: SHIPPED | OUTPATIENT
Start: 2019-07-30 | End: 2020-11-01

## 2019-07-30 RX ORDER — OXCARBAZEPINE 300 MG/1
TABLET, FILM COATED ORAL
Qty: 755 TABLET | Refills: 3 | Status: SHIPPED | OUTPATIENT
Start: 2019-07-30 | End: 2019-08-06

## 2019-07-30 RX ORDER — GABAPENTIN 600 MG/1
TABLET ORAL
Qty: 180 TABLET | Refills: 5 | Status: SHIPPED | OUTPATIENT
Start: 2019-07-30 | End: 2020-01-30

## 2019-07-30 RX ORDER — LEVETIRACETAM 750 MG/1
TABLET ORAL
Qty: 450 TABLET | Refills: 3 | Status: SHIPPED | OUTPATIENT
Start: 2019-07-30 | End: 2020-01-30

## 2019-07-30 ASSESSMENT — PAIN SCALES - GENERAL: PAINLEVEL: NO PAIN (0)

## 2019-07-30 NOTE — LETTER
2019     RE: Ammon Cronin  : 1975   MRN: [id number]      Dear Colleague,    Thank you for referring your patient, Ammon Cronin, to the [facility] EPILEPSY CARE at [facility]. Please see a copy of my visit note below.    Odyssey Airliness Vagus Nerve Stimulator interrogated. Settings as follows:-  Patient ID IDA, Model ID Sentiva [id number], Serial # [id number], Implant date 2019.    NORMAL SETTINGS:  Output Current 1.75 mA,  Pulse/signal Frequency 30 Hz,  Pulse Width 250 ìsec,  On Time 30 sec,  Off Time 3 min,  Duty Cycle: 16%.    MAGNET SETTINGS:  Magnet Output Current 2.25mA,  Magnet Pulse Width 500 ìsec,  Magnet On Time 60 sec.    AUTOSTIM SETTINGS: disabled  AutoStim Current - mA  AutoStim Pulse Width - ìsec  AutoStim On Time - Sec.    CONFIGURATION SETTINGS:  Tachycardia Detection -  Threshold for AutoStim -  Heartbeat Detection (sensitivity) -      Patient reports a hoarse voice with stimulation and occasional cough, neither of which he perceives as bothersome.               [facility] Epilepsy Care Progress Note    Patient:  Ammon Cronin  :  1975   Age:  43 year old   Today's Office Visit:  2019    Epilepsy Data:  Patient History  Primary Epileptologist/Provider: Rosenda Claros M.D.  Epilepsy Syndrome: Localization-related epilepsy unspecified  Epilepsy Syndrome Status: Final  Age of Onset: 16  Etiology  : Tumor  Other Relevant Dx/ Issues: Etiology likely cavernous hemangioma; acute hemorrhage 2005.  Born 5 weeks premature. Left temporal AVM with resection age 11.  Cardiac pacemaker  (bradycardia).  Hx of migraines.  Inpatient evaluations 3/02, ,  & .     Tests/Surgery History  Last EE2013  Last Neuropsych Testin2009  Last Case Management Conference: 3/21/2012  Epilepsy Surgery #1 Date: 11  Epilepsy Related Surgery #1 : Type: VNS implantation on RIGHT.  Seizure Record  Current Visit Date: 19  Previous Visit  "Date: 05/14/19  Months since last visit: 2.53  Seizure Type 1: Simple partial seizures unspecified  Description of Sz Type 1: \"hard to describe, feeling in the head, somthing is going to happen\"   Seizure Type 2: Complex partial seizures unspecified  Description of Sz Type 2: \"Brief\" ones consist of mumbling and not being very repsonsive at that time; duration 5-10 seconds.  \"Bigger\" ones consist of unresponsiveness, staring, confusion, lip smacking movements, may pick at objects or sort things, repetitive behavior.  Lasts few minutes.  Seizure Type 3: Partial seizures with secondary generalization  -  with complex partial seizures evolving to generalized seizures            SEIZURE HISTORY:   Copied forward:  First seizure was at the age of 16 (loss of awarenes). He has a significant past medical history of left temporal AVM that was resected at age 11 and 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   Presurgically workup in 2002 revealed bilateral independent seizure foci in the temporal lobes. Ictal asystole (2003, he developed chest pain, pallor, diaphoresis and nausea, was found to have a 22 second period of asystole.  Emergent cardiac pacemaker placed). MRI of the brain shows a cavernoma in the right frontal and a second one in the left temporal lobe and encephalomalacia.      INTERVAL HISTORY:  Came with Toby (dad),  Jasmyne is not here.  He continues to have seizures daily.  He may go 2 or 3 days in a week without seizures.  All of her seizures are complex partial seizures in which she has staring with lipsmacking.  He is under a lot of stress with his current divorce.  He has loss nearly 15 pounds due to stress.  And he is running every day to manage his stress.  He is currently not seeing a psychologist for talk therapy and support.  I encouraged him to establish care with a local psychologist for behavioral modification to manage " current stress.  He is working with a psychiatrist and is taking anti-depressant medications.  His family has been very supportive during the divorce process.  He has not had any emergency room visits or hospitalizations.  He specifically states in the evening at 5 PM he has a lot of dizziness, double vision, drunk feeling.        Prior to Admission medications    Medication Sig Start Date End Date Taking? Authorizing Provider   diazepam (VALIUM) 5 MG tablet TAKE 1 TAB AS NEEDED FOR SEIZURES >5 MIN OR >3 SEIZURES/8 HRS. MAY REPEAT 1 TIME MAX 2 TABS/24HR, CALL 911 BREATHING PROBLEM 5/14/19  Yes Rosenda Claros MD   gabapentin (NEURONTIN) 600 MG tablet 2 tablets three times daily 3/12/19  Yes Rosenda Claros MD   ibuprofen (ADVIL/MOTRIN) 200 MG tablet Take 400 mg by mouth every 8 hours as needed for mild pain   Yes Unknown, Entered By History   levETIRAcetam (KEPPRA) 750 MG tablet TAKE 1 TABLET IN THE MORNING, 2 TABLETS AT 4PM, AND 2 TABLETS AT 11PM 9/5/18  Yes Rosenda Claros MD   lithium (ESKALITH) 300 MG tablet Take 300 mg in the morning and 600 mg in the evening. 3/12/19  Yes Rosenda Claros MD   Multiple Vitamins-Minerals (MULTIVITAMIN & MINERAL PO) Take 1 tablet by mouth daily.   Yes Reported, Patient   OXcarbazepine (TRILEPTAL) 300 MG tablet TAKE 3 TABLETS THREE TIMES DAILY 9/5/18  Yes Rosenda Claros MD   PARoxetine (PAXIL) 40 MG tablet Take 1 tablet (40 mg) by mouth every morning 3/12/19  Yes Rosenda Claros MD   phenytoin (DILANTIN) 100 MG capsule 1 CAPSULE TWICE A DAY 3/12/19  Yes Rosenda Claros MD   ziprasidone (GEODON) 20 MG capsule Take 1 capsule (20 mg) by mouth 2 times daily (with meals) 3/12/19  Yes Rosenda Claros MD         AED MEDICATIONS:    1. Levetiracetam, 750 mg in the morning, 1500 mg at 4 p.m., and 1500 mg at 11 p.m.   2. Oxcarbazepine  900 mg t.i.d.   3. Gabapentin 1200 mg AM, 1200 mg noon, and 1200 mg PM  (started for RLS, patient has taken higher dose for some time, not sure of length)   5.  Klonopin stopped 1/2019  6. Phenytoin ER  100-100  7. Diazepam PRN for CPS lasting greater than 5 minutes or more then 2 in one hour.    MEDICATION NOTES/PRIOR ANTIEPILEPTIC DRUGS:    1.Lyrica (ineffective for seizure, max dose 1100mg per day)   2.Topamax: Two trials from 01/2008-05/2008 and the second trial on 05/19/2015.  Max dose was 200 mg per day.  CP max was 3.5 mg/L.  There was no change in seizure frequency.  The patient was compliant.  During the second trial, they tried it for mood stabilization; 50 mg was after 2 months discontinued because side effects were speech and word-finding difficulties.   3.Gabatril (ineffective, caused fatigue at 48mg/ day). One trial from 01/2004-04/2006.  Max dose 48 mg per day.  It was ineffective and was discontinued.  There was no increase in seizures when it was stopped.  Side effects were fatigue.   4.Zonegran  One trial from 12/2003.  Max dose 400 mg per day.  Zonegran was used in combination with Keppra and Trileptal.  Side effects were memory impairment, aggressiveness, fatigue, behavioral changes, cognitive impairment, which improved after Zonegran was discontinued.   5.Dilantin: One trial from ? to 03/08/2002.  Max dose 350 mg per day.  CP max and free Dilantin level of 1.9 mg/L.  Efficacy:  Unknown.  The drug was optimized.  Side effects were tiredness on 350 mg a day.     Assessment:  It looks like seizure control was better when the patient was on Dilantin; therefore, it could be retried.   6.Vimpat: One trial 07/2009-09/2009.  Max dose 400 mg per day.  CP max 5.3 mg/L.  It was discontinued after 2 months because the patient experienced dizziness, diplopia and ataxia.  7.Depakote: One trial from 01/2010 to 05/09/2016.  So far, seizures have not increased because we increased the gabapentin.  Also, the headaches have not gotten worse, but the hand tremor and the head tremor have immensely improved since Depakote was discontinued.  Max dose was 2500 mg per day.  CP  max was up to 116/15.6 mg/L.  In the past, it decreased seizure frequency, and it was used together with levetiracetam, oxcarbazepine and gabapentin, and it was used before with oxcarbazepine, levetiracetam and Lyrica.   Assessment:  Depakote could always be retried if we run into a problem.  8.lamotrigine: One trial from 07/06/2001.  Max dose 150 mg per day.  Used in combination with Dilantin.  There was no change in seizure frequency.  Side effects were increase in confusion and therefore questionable efficacy.  9.carbamazepine (tired, but no past info on details)  10.Levetiracetam: started in 2001. No major side effects. One trial from 07/2001 to present.  Max dose 3750 mg per day.  CP max 42.4 mg/L. Levetiracetam above 4000 mg per day caused slurred speech, double vision, unstable gait, severe side effects.   11. Oxcarbazepine:  One trial from 03/2002 to present.  Max dose 2700 mg per day.  CP max 43.2 mg/L.  It decreases seizures, and it looks like it is one of the best drugs the patient has tried.  increased oxcarbazepine from 2,400mg -> 2700 mg on 4/2013.   12.  Gabapentin:  One trial from 07/2012 to present.  Max dose is 3600 mg per day.  This drug was used more for pain and restless legs, but when Depakote was tapered, we increased the gabapentin, and it looks like the only problem we have is some weight gain with the gabapentin.  Gabapentin  increased 1200 mg three times a day 5/2016 with no change in seizure, however, RLS symptoms decreased  13.  Klonopin:  One trial started 03/2013.  Is used for anxiety, not for seizures.  It was started at 0.5 mg and optimized so far to 3 mg per day.   stopped 12/2018 and seizure increased 1/2019 and then they stabilized. This drug is used by a psychiatrist.  If we one day want to try ONFI in this patient, maybe then the Klonopin could be decreased again.   14. Vagus nerve stimulator setting: Not able to tolerate higher duty cycle (35) with 1.1 signal off time and 30  sec on time, seizure worsened, not able to tolerate higher current setting due to discomfort.     SOCIAL:   Michelle is 14, Mani is 12. He gets along with Mani and Michelle and he has issues. Social Security is approved.  Ammon and his wife Jasmyne are getting a divorce he is living with his parents in [facility]..  She filed for divorce.  He has been  from his wife and children since January 2019.  He is currently living with his parents in [place].  He did start seeing a psychiatrist.      REVIEW OF SYSTEMS:  Head tremor, which bothers him.  He continues to have bilateral hand tremors on Depakote.  No nausea, vomiting, diarrhea.  No rash.  Vagus nerve stimulator setting are bother him (voice changes and throat discomfort).      EXAMINATION:  /78 (BP Location: Left arm, Patient Position: Sitting, Cuff Size: Adult Regular)   Pulse 60   Temp 98.8  F (37.1  C)   Wt 128 lb 12.8 oz (58.4 kg)   BMI 20.79 kg/m      Wt Readings from Last 4 Encounters:   07/30/19 128 lb 12.8 oz (58.4 kg)   05/14/19 134 lb (60.8 kg)   03/12/19 142 lb (64.4 kg)   01/29/19 148 lb 5.9 oz (67.3 kg)     Alert, orientated, speech is fluent, face is symmetric, No head/ hand tremor, no focal deficits noted.Gait is stable. Had seizure today, staring, unresponsive, not able to recall word, or repeat words, some lip smacking. Lasted < 60 seconds.     ASSESSMENT:    1. 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 Brain Cavernoma   3. Memory deficits secondary to seizures and antiepileptic drugs    4. Anxiety/Depression with suicidal ideations 12/2018   5. Restless legs syndrome.  6. History of Migraines  7. Asystole secondary to seizures. Status post pacemaker placement 12/2003.     Discussion: Refractory multifocal epilepsy.  Etiology multiple cavernomas.  He  is status left temporal resection of AVM in 1985 (age 11), Right frontal hematoma 04/13/2006, treated with surgery.  Left-sided cavernous hemangioma 07/2002. Status post  epilepsy surgery 05/29/2012.  Vagus nerve stimulator implanted on right and he has pacemaker on left.  Electrographically, he has multifocal epileptiform discharges and seizures arising from left and right temporal regions.     Currently patient has a high burden of seizures due to stress.  He has lost weight and this is due to stress.  With losing weight his antiseizure medications most likely increased he does have side effects of dizziness and double vision specifically at 6 PM at night.  I will lower his oxcarbazepine.  Additionally we will check labs.    Antiepileptic drug discussion: Insurance coverage for new antiepileptic drug is not adequate, therefore, brand-name medications are cost prohibitive.  He is not able to tolerate higher doses of oxcarbazepine or phenytoin or levetiracetam. We will consider felbamate as the next agent. Other antiepileptic drug to consider are banzel, Brivaracetam, fycompa, but, if insurance coverage is not good and they are not able to afford these medications. Retrial with lamotrigine (tremors will worsen).  In the past years we have talked about epilepsy brain surgery.  He and family have declined.  I again revisited the idea of treating with deep brain stimulation.  I believe his seizure burden and emotional health as resulted in several personal psychosocial issues. Vagus nerve stimulator: Interogatted today and current was increased.     In regards to his psychiatric health.  he had suicidal ideations in 2018 in early 2019.  This required inpatient psychiatric hospitalization.  He is not currently living with his children and wife due to concerns of harm to the family.  His wife filed for divorce.  Managed by psychiatrist (Dr. Sawyer).  Since her last visit he has not found a new psychologist that he enjoys working with.  He is under a tremendous amount of stress and would benefit from working with a psychologist to process everything that is going on.      PLAN:   -Lower  oxcarbazepine in afternoon. You will take oxcarbazepine 900 mg morning, 750 mg noon, and 900 mg pm. Seizures may worsen, but, he will have less side effect at 5 pm    Continue  These seizure medications   Levetiracetam, 750 mg in the morning, 1500 mg at 4 p.m., and 1500 mg at 11 p.m.   Gabapentin 1200 mg AM, 1200 mg noon, and 1200 mg PM  (started for RLS, patient has taken higher dose for some time, not sure of length)   Phenytoin ER  100-100  Diazepam as needed for seizures greater than 5 minutes or more then 2 in one hour.    -Increased vagus nerve stimulator current  From 1.75 mA -> 2.0 mA and Magnet current from 2.25 mA -> 2.5 mA.     - Follow up 3 month with Harlan.    - Strongly encouraged individual therapist in [place]    - Check antiepileptic drug for efficacy, toxicity, and side effects.      I spent 45 minutes with the patient. During this time counseling and coordination of care exceeded 50% of the visit time. I addressed all questions and concerns the patient raised in regards to his care.     REBEKAH CHRISTOPHER MD

## 2019-07-30 NOTE — PROGRESS NOTES
"[facility]ilepsy [facility] Progress Note    Patient:  Ammon Cronin  :  1975   Age:  43 year old   Today's Office Visit:  2019    Epilepsy Data:  Patient History  Primary Epileptologist/Provider: Rosenda Claros M.D.  Epilepsy Syndrome: Localization-related epilepsy unspecified  Epilepsy Syndrome Status: Final  Age of Onset: 16  Etiology  : Tumor  Other Relevant Dx/ Issues: Etiology likely cavernous hemangioma; acute hemorrhage 2005.  Born 5 weeks premature. Left temporal AVM with resection age 11.  Cardiac pacemaker  (bradycardia).  Hx of migraines.  Inpatient evaluations 3/02, ,  & .     Tests/Surgery History  Last EE2013  Last Neuropsych Testin2009  Last Case Management Conference: 3/21/2012  Epilepsy Surgery #1 Date: 11  Epilepsy Related Surgery #1 : Type: VNS implantation on RIGHT.  Seizure Record  Current Visit Date: 19  Previous Visit Date: 19  Months since last visit: 2.53  Seizure Type 1: Simple partial seizures unspecified  Description of Sz Type 1: \"hard to describe, feeling in the head, somthing is going to happen\"   Seizure Type 2: Complex partial seizures unspecified  Description of Sz Type 2: \"Brief\" ones consist of mumbling and not being very repsonsive at that time; duration 5-10 seconds.  \"Bigger\" ones consist of unresponsiveness, staring, confusion, lip smacking movements, may pick at objects or sort things, repetitive behavior.  Lasts few minutes.  Seizure Type 3: Partial seizures with secondary generalization  -  with complex partial seizures evolving to generalized seizures            SEIZURE HISTORY:   Copied forward:  First seizure was at the age of 16 (loss of awarenes). 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   Presurgically workup in  revealed " bilateral independent seizure foci in the temporal lobes. Ictal asystole (2003, he developed chest pain, pallor, diaphoresis and nausea, was found to have a 22 second period of asystole.  Emergent cardiac pacemaker placed). MRI of the brain shows a cavernoma in the right frontal and a second one in the left temporal lobe and encephalomalacia.      INTERVAL HISTORY:  Came with Toby (dad),  Jasmyne is not here.  He continues to have seizures daily.  He may go 2 or 3 days in a week without seizures.  All of her seizures are complex partial seizures in which she has staring with lipsmacking.  He is under a lot of stress with his current divorce.  He has loss nearly 15 pounds due to stress.  And he is running every day to manage his stress.  He is currently not seeing a psychologist for talk therapy and support.  I encouraged him to establish care with a local psychologist for behavioral modification to manage current stress.  He is working with a psychiatrist and is taking anti-depressant medications.  His family has been very supportive during the divorce process.  He has not had any emergency room visits or hospitalizations.  He specifically states in the evening at 5 PM he has a lot of dizziness, double vision, drunk feeling.        Prior to Admission medications    Medication Sig Start Date End Date Taking? Authorizing Provider   diazepam (VALIUM) 5 MG tablet TAKE 1 TAB AS NEEDED FOR SEIZURES >5 MIN OR >3 SEIZURES/8 HRS. MAY REPEAT 1 TIME MAX 2 TABS/24HR, CALL 911 BREATHING PROBLEM 5/14/19  Yes Rosenda Claros MD   gabapentin (NEURONTIN) 600 MG tablet 2 tablets three times daily 3/12/19  Yes Rosenda Claros MD   ibuprofen (ADVIL/MOTRIN) 200 MG tablet Take 400 mg by mouth every 8 hours as needed for mild pain   Yes Unknown, Entered By History   levETIRAcetam (KEPPRA) 750 MG tablet TAKE 1 TABLET IN THE MORNING, 2 TABLETS AT 4PM, AND 2 TABLETS AT 11PM 9/5/18  Yes Rosenda Claros MD   lithium (ESKALITH) 300 MG tablet Take 300  mg in the morning and 600 mg in the evening. 3/12/19  Yes Rosenda Claros MD   Multiple Vitamins-Minerals (MULTIVITAMIN & MINERAL PO) Take 1 tablet by mouth daily.   Yes Reported, Patient   OXcarbazepine (TRILEPTAL) 300 MG tablet TAKE 3 TABLETS THREE TIMES DAILY 9/5/18  Yes Rosenda Claros MD   PARoxetine (PAXIL) 40 MG tablet Take 1 tablet (40 mg) by mouth every morning 3/12/19  Yes Rosenda Claros MD   phenytoin (DILANTIN) 100 MG capsule 1 CAPSULE TWICE A DAY 3/12/19  Yes Rosenda Claros MD   ziprasidone (GEODON) 20 MG capsule Take 1 capsule (20 mg) by mouth 2 times daily (with meals) 3/12/19  Yes Rosenda Claros MD         AED MEDICATIONS:    1. Levetiracetam, 750 mg in the morning, 1500 mg at 4 p.m., and 1500 mg at 11 p.m.   2. Oxcarbazepine  900 mg t.i.d.   3. Gabapentin 1200 mg AM, 1200 mg noon, and 1200 mg PM  (started for RLS, patient has taken higher dose for some time, not sure of length)   5. Klonopin stopped 1/2019  6. Phenytoin ER  100-100  7. Diazepam PRN for CPS lasting greater than 5 minutes or more then 2 in one hour.    MEDICATION NOTES/PRIOR ANTIEPILEPTIC DRUGS:    1.Lyrica (ineffective for seizure, max dose 1100mg per day)   2.Topamax: Two trials from 01/2008-05/2008 and the second trial on 05/19/2015.  Max dose was 200 mg per day.  CP max was 3.5 mg/L.  There was no change in seizure frequency.  The patient was compliant.  During the second trial, they tried it for mood stabilization; 50 mg was after 2 months discontinued because side effects were speech and word-finding difficulties.   3.Gabatril (ineffective, caused fatigue at 48mg/ day). One trial from 01/2004-04/2006.  Max dose 48 mg per day.  It was ineffective and was discontinued.  There was no increase in seizures when it was stopped.  Side effects were fatigue.   4.Zonegran  One trial from 12/2003.  Max dose 400 mg per day.  Zonegran was used in combination with Keppra and Trileptal.  Side effects were memory impairment, aggressiveness,  fatigue, behavioral changes, cognitive impairment, which improved after Zonegran was discontinued.   5.Dilantin: One trial from ? to 03/08/2002.  Max dose 350 mg per day.  CP max and free Dilantin level of 1.9 mg/L.  Efficacy:  Unknown.  The drug was optimized.  Side effects were tiredness on 350 mg a day.     Assessment:  It looks like seizure control was better when the patient was on Dilantin; therefore, it could be retried.   6.Vimpat: One trial 07/2009-09/2009.  Max dose 400 mg per day.  CP max 5.3 mg/L.  It was discontinued after 2 months because the patient experienced dizziness, diplopia and ataxia.  7.Depakote: One trial from 01/2010 to 05/09/2016.  So far, seizures have not increased because we increased the gabapentin.  Also, the headaches have not gotten worse, but the hand tremor and the head tremor have immensely improved since Depakote was discontinued.  Max dose was 2500 mg per day.  CP max was up to 116/15.6 mg/L.  In the past, it decreased seizure frequency, and it was used together with levetiracetam, oxcarbazepine and gabapentin, and it was used before with oxcarbazepine, levetiracetam and Lyrica.   Assessment:  Depakote could always be retried if we run into a problem.  8.lamotrigine: One trial from 07/06/2001.  Max dose 150 mg per day.  Used in combination with Dilantin.  There was no change in seizure frequency.  Side effects were increase in confusion and therefore questionable efficacy.  9.carbamazepine (tired, but no past info on details)  10.Levetiracetam: started in 2001. No major side effects. One trial from 07/2001 to present.  Max dose 3750 mg per day.  CP max 42.4 mg/L. Levetiracetam above 4000 mg per day caused slurred speech, double vision, unstable gait, severe side effects.   11. Oxcarbazepine:  One trial from 03/2002 to present.  Max dose 2700 mg per day.  CP max 43.2 mg/L.  It decreases seizures, and it looks like it is one of the best drugs the patient has tried.  increased  oxcarbazepine from 2,400mg -> 2700 mg on 4/2013.   12.  Gabapentin:  One trial from 07/2012 to present.  Max dose is 3600 mg per day.  This drug was used more for pain and restless legs, but when Depakote was tapered, we increased the gabapentin, and it looks like the only problem we have is some weight gain with the gabapentin.  Gabapentin  increased 1200 mg three times a day 5/2016 with no change in seizure, however, RLS symptoms decreased  13.  Klonopin:  One trial started 03/2013.  Is used for anxiety, not for seizures.  It was started at 0.5 mg and optimized so far to 3 mg per day.   stopped 12/2018 and seizure increased 1/2019 and then they stabilized. This drug is used by a psychiatrist.  If we one day want to try ONFI in this patient, maybe then the Klonopin could be decreased again.   14. Vagus nerve stimulator setting: Not able to tolerate higher duty cycle (35) with 1.1 signal off time and 30 sec on time, seizure worsened, not able to tolerate higher current setting due to discomfort.     SOCIAL:   Michelle is 14, Mani is 12. He gets along with Mani and Michelle and he has issues. Social Security is approved.  Ammon and his wife Jasmyne are getting a divorce he is living with his parents in [facility]..  She filed for divorce.  He has been  from his wife and children since January 2019.  He is currently living with his parents in [place].  He did start seeing a psychiatrist.      REVIEW OF SYSTEMS:  Head tremor, which bothers him.  He continues to have bilateral hand tremors on Depakote.  No nausea, vomiting, diarrhea.  No rash.  Vagus nerve stimulator setting are bother him (voice changes and throat discomfort).      EXAMINATION:  /78 (BP Location: Left arm, Patient Position: Sitting, Cuff Size: Adult Regular)   Pulse 60   Temp 98.8  F (37.1  C)   Wt 128 lb 12.8 oz (58.4 kg)   BMI 20.79 kg/m     Wt Readings from Last 4 Encounters:   07/30/19 128 lb 12.8 oz (58.4 kg)   05/14/19 134  lb (60.8 kg)   03/12/19 142 lb (64.4 kg)   01/29/19 148 lb 5.9 oz (67.3 kg)     Alert, orientated, speech is fluent, face is symmetric, No head/ hand tremor, no focal deficits noted.Gait is stable. Had seizure today, staring, unresponsive, not able to recall word, or repeat words, some lip smacking. Lasted < 60 seconds.     ASSESSMENT:    1. 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 Brain Cavernoma   3. Memory deficits secondary to seizures and antiepileptic drugs    4. Anxiety/Depression with suicidal ideations 12/2018   5. Restless legs syndrome.  6. History of Migraines  7. Asystole secondary to seizures. Status post pacemaker placement 12/2003.     Discussion: Refractory multifocal epilepsy.  Etiology multiple cavernomas.  He  is status left temporal resection of AVM in 1985 (age 11), Right frontal hematoma 04/13/2006, treated with surgery.  Left-sided cavernous hemangioma 07/2002. Status post epilepsy surgery 05/29/2012.  Vagus nerve stimulator implanted on right and he has pacemaker on left.  Electrographically, he has multifocal epileptiform discharges and seizures arising from left and right temporal regions.     Currently patient has a high burden of seizures due to stress.  He has lost weight and this is due to stress.  With losing weight his antiseizure medications most likely increased he does have side effects of dizziness and double vision specifically at 6 PM at night.  I will lower his oxcarbazepine.  Additionally we will check labs.    Antiepileptic drug discussion: Insurance coverage for new antiepileptic drug is not adequate, therefore, brand-name medications are cost prohibitive.  He is not able to tolerate higher doses of oxcarbazepine or phenytoin or levetiracetam. We will consider felbamate as the next agent. Other antiepileptic drug to consider are banzel, Brivaracetam, fycompa, but, if insurance coverage is not good and they are not able to afford these medications. Retrial with lamotrigine  (tremors will worsen).  In the past years we have talked about epilepsy brain surgery.  He and family have declined.  I again revisited the idea of treating with deep brain stimulation.  I believe his seizure burden and emotional health as resulted in several personal psychosocial issues. Vagus nerve stimulator: Interogatted today and current was increased.     In regards to his psychiatric health.  he had suicidal ideations in 2018 in early 2019.  This required inpatient psychiatric hospitalization.  He is not currently living with his children and wife due to concerns of harm to the family.  His wife filed for divorce.  Managed by psychiatrist (Dr. Sawyer).  Since her last visit he has not found a new psychologist that he enjoys working with.  He is under a tremendous amount of stress and would benefit from working with a psychologist to process everything that is going on.      PLAN:   -Lower oxcarbazepine in afternoon. You will take oxcarbazepine 900 mg morning, 750 mg noon, and 900 mg pm. Seizures may worsen, but, he will have less side effect at 5 pm    Continue  These seizure medications   Levetiracetam, 750 mg in the morning, 1500 mg at 4 p.m., and 1500 mg at 11 p.m.   Gabapentin 1200 mg AM, 1200 mg noon, and 1200 mg PM  (started for RLS, patient has taken higher dose for some time, not sure of length)   Phenytoin ER  100-100  Diazepam as needed for seizures greater than 5 minutes or more then 2 in one hour.    -Increased vagus nerve stimulator current  From 1.75 mA -> 2.0 mA and Magnet current from 2.25 mA -> 2.5 mA.     - Follow up 3 month with Harlan.    - Strongly encouraged individual therapist in [place]    - Check antiepileptic drug for efficacy, toxicity, and side effects.      I spent 45 minutes with the patient. During this time counseling and coordination of care exceeded 50% of the visit time. I addressed all questions and concerns the patient raised in regards to his care.     REBEKAH CHRISTOPHER MD

## 2019-07-30 NOTE — PROGRESS NOTES
[employer] Vagus Nerve Stimulator interrogated. Settings as follows:-  Patient ID IDA, Model ID Sentiva 1000, Serial # [id number], Implant date 1/29/2019.    NORMAL SETTINGS:  Output Current 1.75 mA,  Pulse/signal Frequency 30 Hz,  Pulse Width 250 ìsec,  On Time 30 sec,  Off Time 3 min,  Duty Cycle: 16%.    MAGNET SETTINGS:  Magnet Output Current 2.25mA,  Magnet Pulse Width 500 ìsec,  Magnet On Time 60 sec.    AUTOSTIM SETTINGS: disabled  AutoStim Current - mA  AutoStim Pulse Width - ìsec  AutoStim On Time - Sec.    CONFIGURATION SETTINGS:  Tachycardia Detection -  Threshold for AutoStim -  Heartbeat Detection (sensitivity) -      Patient reports a hoarse voice with stimulation and occasional cough, neither of which he perceives as bothersome.

## 2019-07-30 NOTE — PATIENT INSTRUCTIONS
Lower oxcarbazepine in afternoon. You will take oxcarbazepine 900 mg morning, 750 mg noon, and 900 mg pm. Seizures may worsen, but, he will have less side effect at 5 pm    Continue  These seizure medications   Levetiracetam, 750 mg in the morning, 1500 mg at 4 p.m., and 1500 mg at 11 p.m.   Gabapentin 1200 mg AM, 1200 mg noon, and 1200 mg PM  (started for RLS, patient has taken higher dose for some time, not sure of length)   Phenytoin ER  100-100  Diazepam as needed for seizures greater than 5 minutes or more then 2 in one hour.    Please find a therapist you like near your parents home in [place]. Call 3 therapist and ask them some important questions on their style and then go see one of them. Find your good match.     Follow-up visit with Dr. Claros to be scheduled in 3month    Please have your blood work drawn today.  Please note that, depending on the blood work being drawn, it may take 1 to 3 weeks before all of the results have been received and reviewed.  If you are on Epic MyChart, the results will be electronically sent to you.,  The results may be sent in Epic MyChart without additional comments.  If you have concerns or questions about your labs please call our [facility] office at [phone].      Rosenda Claros MD

## 2019-08-02 ENCOUNTER — TELEPHONE (OUTPATIENT)
Dept: NEUROLOGY | Facility: CLINIC | Age: 44
End: 2019-08-02

## 2019-08-06 DIAGNOSIS — G25.81 RESTLESS LEGS SYNDROME: ICD-10-CM

## 2019-08-06 DIAGNOSIS — G40.219 PARTIAL EPILEPSY WITH IMPAIRMENT OF CONSCIOUSNESS, INTRACTABLE (H): ICD-10-CM

## 2019-08-06 DIAGNOSIS — G25.81 RESTLESS LEG SYNDROME: ICD-10-CM

## 2019-08-06 DIAGNOSIS — G40.219 PARTIAL EPILEPSY WITH LOSS OF CONSCIOUSNESS, INTRACTABLE (H): ICD-10-CM

## 2019-08-06 DIAGNOSIS — R25.1 TREMOR: ICD-10-CM

## 2019-08-06 DIAGNOSIS — Z79.899 LONG TERM USE OF DRUG: ICD-10-CM

## 2019-08-06 RX ORDER — OXCARBAZEPINE 300 MG/1
TABLET, FILM COATED ORAL
Qty: 755 TABLET | Refills: 3 | Status: SHIPPED | OUTPATIENT
Start: 2019-08-06 | End: 2020-01-30

## 2019-08-09 ENCOUNTER — TRANSFERRED RECORDS (OUTPATIENT)
Dept: HEALTH INFORMATION MANAGEMENT | Facility: CLINIC | Age: 44
End: 2019-08-09

## 2019-09-29 ENCOUNTER — HEALTH MAINTENANCE LETTER (OUTPATIENT)
Age: 44
End: 2019-09-29

## 2019-11-14 ENCOUNTER — TELEPHONE (OUTPATIENT)
Dept: NEUROLOGY | Facility: CLINIC | Age: 44
End: 2019-11-14

## 2019-11-14 NOTE — TELEPHONE ENCOUNTER
Ammon calls today to report he doesn't want Jasmyne (his current wife) to have access to any of Ammon's medical records - he does not want her to receive any information going forward.

## 2019-11-14 NOTE — TELEPHONE ENCOUNTER
What is the concern that needs to be addressed by a nurse? Patient would like a call back to discuss some stuff .Patient would not give me any other information.Except to take the     May a detailed message be left on voicemail? yes    Date of last office visit: 07/03/2019    Message routed to: [facility] RN POOL

## 2019-11-14 NOTE — TELEPHONE ENCOUNTER
Dicussed with Denisha Bowles,    Ammon would like to revoke all medical records and additional information. He does not want his wife having access to his records. Ammon is agreeable to send a letter revoking access to previous records.

## 2019-11-15 ENCOUNTER — MEDICAL CORRESPONDENCE (OUTPATIENT)
Dept: HEALTH INFORMATION MANAGEMENT | Facility: CLINIC | Age: 44
End: 2019-11-15

## 2019-12-18 ENCOUNTER — TELEPHONE (OUTPATIENT)
Dept: NEUROLOGY | Facility: CLINIC | Age: 44
End: 2019-12-18

## 2019-12-18 NOTE — TELEPHONE ENCOUNTER
What is the concern that needs to be addressed by a nurse?   Pt is currently going through a divorce and his  will probably be calling for information.   Patient would like a call back to discuss about this prior to them calling.     Note: Verbal ANDREIA mailed to patient home 12/18.     May a detailed message be left on voicemail? yes    Date of last office visit: 7/30/19    Message routed to: nurse

## 2019-12-19 NOTE — TELEPHONE ENCOUNTER
Patient currently going through a divorce.    Currently a [facility]  () is working to help with the dissolution. He is charged with deciding on the amount of parenting time assigned to each child.    Ammon wants to make sure that  is aware that the courts know that Ammon has a seizure disorder, but that he would like it if  does not recommend restrictions because of the epilepsy.    Patient also has an  who may be calling.    I discussed that a verbal release of information is needed for us to share information with callers. I explained that a VROI form was mailed out yesterday. I recommended they make a copy of the blank form so that if additional forms are needed for other parties, there will be reduced delay in turnaround.    Patient has no other questions at this time.    Will route the encounter to  as an FYI

## 2020-01-03 ENCOUNTER — TELEPHONE (OUTPATIENT)
Dept: NEUROLOGY | Facility: CLINIC | Age: 45
End: 2020-01-03

## 2020-01-03 NOTE — LETTER
1/3/2020     [facility]  [id number]  [place] [address]  Fax: [phone]    RE: Ammon Cronin    1975  [address] [facility] [address]      Current Medication List:    Managed by Dr. Rosenda Claros:  OXcarbazepine (TRILEPTAL) 300 MG tablet        Sig: TAKE 3 TABLETS MORNING, 2.5 TABLETS NOON, 3 TABLETS NIGHT     gabapentin (NEURONTIN) 600 MG tablet        Si tablets three times daily     levETIRAcetam (KEPPRA) 750 MG tablet        Sig: TAKE 1 TABLET IN THE MORNING, 2 TABLETS AT 4PM, AND 2 TABLETS AT 11PM     phenytoin (DILANTIN) 100 MG capsule        Si CAPSULE TWICE A DAY     diazepam (VALIUM) 5 MG tablet        Sig: TAKE 1 TAB AS NEEDED FOR SEIZURES >5 MIN OR >3 SEIZURES/8 HRS. MAY REPEAT 1 TIME MAX 2 TABS/24HR, CALL 911 BREATHING PROBLEM     Managed by other provider: Filemon Sawyer NP  PARoxetine (PAXIL) 40 MG tablet     --   Sig - Route: Take 1 tablet (40 mg) by mouth every morning - Oral       Sincerely,     Vasquez Khan RN  Epilepsy Nurse Coordinator

## 2020-01-03 NOTE — TELEPHONE ENCOUNTER
Call placed to the patient to inquire regarding his needs. Together, we developed his letter. I will mail it to his home address for him to submit with the form he has to go with it.     Letter placed in the mail for patient.

## 2020-01-03 NOTE — TELEPHONE ENCOUNTER
Caller: Ammon    Relationship to Patient: Self    Call Back Number: [phone]    Reason for Call: Patient needs a letter with name and , to also include names of medications and doses for his new insurance company.  Please mail to patient or call if needed.

## 2020-01-29 DIAGNOSIS — G25.81 RESTLESS LEG SYNDROME: ICD-10-CM

## 2020-01-29 DIAGNOSIS — R25.1 TREMOR: ICD-10-CM

## 2020-01-29 DIAGNOSIS — Z79.899 LONG TERM USE OF DRUG: ICD-10-CM

## 2020-01-29 DIAGNOSIS — G40.219 PARTIAL EPILEPSY WITH LOSS OF CONSCIOUSNESS, INTRACTABLE (H): ICD-10-CM

## 2020-01-29 DIAGNOSIS — G40.219 PARTIAL EPILEPSY WITH IMPAIRMENT OF CONSCIOUSNESS, INTRACTABLE (H): ICD-10-CM

## 2020-01-29 DIAGNOSIS — G25.81 RESTLESS LEGS SYNDROME: ICD-10-CM

## 2020-01-30 NOTE — TELEPHONE ENCOUNTER
gabapentin (NEURONTIN) 600 MG tablet  Last Written Prescription Date:  7/30/2019  Last Fill Quantity: 180,   # refills: 5  Last Office Visit : 7/30/2019  Future Office visit:  None  Routing refill request to provider for review/approval because:  Drug not on the G, P or  Health refill protocol or controlled substance      OXcarbazepine (TRILEPTAL) 300 MG tablet      Last Written Prescription Date:  8/6/2019  Last Fill Quantity: 755,   # refills: 3  Last Office Visit : 7/30/2019  Future Office visit:  None  Routing refill request to provider for review/approval because:  Drug not on the [facility], [facility] or  Health refill protocol or controlled substance    phenytoin (DILANTIN) 100 MG capsule      Last Written Prescription Date:  7/30/2019  Last Fill Quantity: 180,   # refills: 3  Last Office Visit : 7/30/2019  Future Office visit:  None  Routing refill request to provider for review/approval because:  Abnormal Lab:  Dilantin Level      Refer to clinic for review and refills        Dilantin level within therapeutic range in past 26 months    Recent Labs   Lab Test 08/09/19  1042   DILANTIN 8.0*             levETIRAcetam (KEPPRA) 750 MG tablet     Last Written Prescription Date:  7/30/20198  Last Fill Quantity: 450,   # refills: 3  Last Office Visit : 7/30/2019  Future Office visit:  None  Routing refill request to provider for review/approval because:  Pt was asked to follow up in 3 months.    No follow up visit noted.     Referring to clinic for review and refills for Pt care    Follow-up visit with Dr. Claros to be scheduled in 3month      7/30/2019 Note Dr. Harlan Tom RN  Central Triage Red Flags/Med Refills

## 2020-02-03 RX ORDER — GABAPENTIN 600 MG/1
TABLET ORAL
Qty: 540 TABLET | Refills: 0 | Status: SHIPPED | OUTPATIENT
Start: 2020-02-03 | End: 2020-04-27

## 2020-02-03 RX ORDER — PHENYTOIN SODIUM 100 MG/1
CAPSULE, EXTENDED RELEASE ORAL
Qty: 180 CAPSULE | Refills: 0 | Status: SHIPPED | OUTPATIENT
Start: 2020-02-03 | End: 2020-06-22

## 2020-02-03 RX ORDER — OXCARBAZEPINE 300 MG/1
TABLET, FILM COATED ORAL
Qty: 765 TABLET | Refills: 0 | Status: SHIPPED | OUTPATIENT
Start: 2020-02-03 | End: 2020-04-27

## 2020-02-03 RX ORDER — LEVETIRACETAM 750 MG/1
TABLET ORAL
Qty: 450 TABLET | Refills: 0 | Status: SHIPPED | OUTPATIENT
Start: 2020-02-03 | End: 2020-04-27

## 2020-02-03 NOTE — TELEPHONE ENCOUNTER
To address refill team concerns:-     GABAPENTIN is on the  [facility] ([facility]) medication refill protocol    OXCARBAZEPINE is on the [facility] medication refill protocol    Message sent to MD, she will see patient in clinic again, message sent to  asking for them to contact the patient to make an appointment.    Continue meds per MD plan

## 2020-02-05 ENCOUNTER — TELEPHONE (OUTPATIENT)
Dept: NEUROLOGY | Facility: CLINIC | Age: 45
End: 2020-02-05

## 2020-02-26 ENCOUNTER — OFFICE VISIT (OUTPATIENT)
Dept: NEUROLOGY | Facility: CLINIC | Age: 45
End: 2020-02-26
Payer: COMMERCIAL

## 2020-02-26 VITALS
WEIGHT: 132 LBS | BODY MASS INDEX: 21.31 KG/M2 | TEMPERATURE: 98.8 F | DIASTOLIC BLOOD PRESSURE: 79 MMHG | SYSTOLIC BLOOD PRESSURE: 113 MMHG | HEART RATE: 66 BPM

## 2020-02-26 DIAGNOSIS — G40.219 PARTIAL EPILEPSY WITH IMPAIRMENT OF CONSCIOUSNESS, INTRACTABLE (H): Primary | ICD-10-CM

## 2020-02-26 RX ORDER — CHLORAL HYDRATE 500 MG
1 CAPSULE ORAL DAILY
COMMUNITY

## 2020-02-26 ASSESSMENT — PAIN SCALES - GENERAL: PAINLEVEL: NO PAIN (0)

## 2020-02-26 NOTE — PROGRESS NOTES
"[facility] Progress Note    Patient:  Ammon Cronin  :  1975   Age:  44 year old   Today's Office Visit:  2020    Epilepsy Data:  Patient History  Primary Epileptologist/Provider: Rosenda Claros M.D.  Epilepsy Syndrome: Localization-related epilepsy unspecified  Epilepsy Syndrome Status: Final  Age of Onset: 16  Etiology  : Tumor  Other Relevant Dx/ Issues: Etiology likely cavernous hemangioma; acute hemorrhage 2005.  Born 5 weeks premature. Left temporal AVM with resection age 11.  Cardiac pacemaker  (bradycardia).  Hx of migraines.  Inpatient evaluations 3/02, ,  & .     Tests/Surgery History  Last EE2013  Last Neuropsych Testin2009  Last Case Management Conference: 3/21/2012  Epilepsy Surgery #1 Date: 11  Epilepsy Related Surgery #1 : Type: VNS implantation on RIGHT.  Seizure Record  Current Visit Date: 20  Previous Visit Date: 19  Months since last visit: 6.93  Seizure Type 1: Simple partial seizures unspecified  Description of Sz Type 1: \"hard to describe, feeling in the head, somthing is going to happen\"   Seizure Type 2: Complex partial seizures unspecified  Description of Sz Type 2: \"Brief\" ones consist of mumbling and not being very repsonsive at that time; duration 5-10 seconds.  \"Bigger\" ones consist of unresponsiveness, staring, confusion, lip smacking movements, may pick at objects or sort things, repetitive behavior.  Lasts few minutes.  # of Type 2 Seizure since last visit: 210  Freq. Type 2 / Month: 30.3  Seizure Type 3: Partial seizures with secondary generalization  -  with complex partial seizures evolving to generalized seizures  # of Type 3 Seizure since last visit: 0  Freq. Type 3 / Month: 0          SEIZURE HISTORY:   Copied forward:  First seizure was at the age of 16 (loss of awarenes). He has a significant past medical history of left temporal AVM that was resected at age 11 and right frontal AVM that hemorrhaged " 2005. 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   Presurgically workup in 2002 revealed bilateral independent seizure foci in the temporal lobes. Ictal asystole (2003, he developed chest pain, pallor, diaphoresis and nausea, was found to have a 22 second period of asystole.  Emergent cardiac pacemaker placed). MRI of the brain shows a cavernoma in the right frontal and a second one in the left temporal lobe and encephalomalacia.      INTERVAL HISTORY:  Came with Toby (dad),  Jasmyne is not here.  He continues to have seizures daily, up to 10 per month. Most of his seizure are 9am in the morning.  He may go 2 or 3 days in a week without seizures.  All of her seizures are complex partial seizures in which she has staring with lipsmacking. Seizure may last seconds to minutes.  He is running every day to manage his stress.  He is currently seeing a psychiatrist and has an upcoming apppintment for psychologist for talk therapy and support.  He is working with a psychiatrist and is taking anti-depressant medications.  His family has been very supportive during the divorce process.  He has not had any emergency room visits or hospitalizations.        Prior to Admission medications    Medication Sig Start Date End Date Taking? Authorizing Provider   diazepam (VALIUM) 5 MG tablet TAKE 1 TAB AS NEEDED FOR SEIZURES >5 MIN OR >3 SEIZURES/8 HRS. MAY REPEAT 1 TIME MAX 2 TABS/24HR, CALL 911 BREATHING PROBLEM 7/30/19  Yes Rosenda Claros MD   gabapentin (NEURONTIN) 600 MG tablet Take 2 tablets by mouth  three times daily 2/3/20  Yes Rosenda Claros MD   ibuprofen (ADVIL/MOTRIN) 200 MG tablet Take 400 mg by mouth every 8 hours as needed for mild pain   Yes Unknown, Entered By History   levETIRAcetam (KEPPRA) 750 MG tablet TAKE 1 TABLET IN THE MORNING, 2 TABLETS AT 4PM, AND 2 TABLETS AT 11PM 2/3/20  Yes Rosenda Claros MD   Multiple Vitamins-Minerals (MULTIVITAMIN & MINERAL PO) Take 1 tablet by mouth  daily.   Yes Reported, Patient   OXcarbazepine (TRILEPTAL) 300 MG tablet TAKE 3 TABLETS MORNING, 2.5 TABLETS NOON, 3 TABLETS NIGHT 2/3/20  Yes Rosenda Claros MD   PARoxetine (PAXIL) 40 MG tablet Take 1 tablet (40 mg) by mouth every morning 3/12/19  Yes Rosenda Claros MD   phenytoin (DILANTIN) 100 MG capsule 1 CAPSULE TWICE A DAY 2/3/20  Yes Rosenda Claros MD     AED MEDICATIONS:    1. Levetiracetam, 750 mg in the morning, 1500 mg at 4 p.m., and 1500 mg at 11 p.m.   2. Oxcarbazepine (300 mg tablet)  900 mg 10 am, 750 mg 4pm, and 900 mg pm    3. Gabapentin 1200 mg AM, 1200 mg 4 pm, and 1200 mg PM  (started for RLS, patient has taken higher dose for some time, not sure of length)   4. Phenytoin ER  100-100  5. Diazepam PRN for CPS lasting greater than 5 minutes or more then 2 in one hour.    MEDICATION NOTES/PRIOR ANTIEPILEPTIC DRUGS:    1.Lyrica (ineffective for seizure, max dose 1100mg per day)   2.Topamax: Two trials from 01/2008-05/2008 and the second trial on 05/19/2015.  Max dose was 200 mg per day.  CP max was 3.5 mg/L.  There was no change in seizure frequency.  The patient was compliant.  During the second trial, they tried it for mood stabilization; 50 mg was after 2 months discontinued because side effects were speech and word-finding difficulties.   3.Gabatril (ineffective, caused fatigue at 48mg/ day). One trial from 01/2004-04/2006.  Max dose 48 mg per day.  It was ineffective and was discontinued.  There was no increase in seizures when it was stopped.  Side effects were fatigue.   4.Zonegran  One trial from 12/2003.  Max dose 400 mg per day.  Zonegran was used in combination with Keppra and Trileptal.  Side effects were memory impairment, aggressiveness, fatigue, behavioral changes, cognitive impairment, which improved after Zonegran was discontinued.   5.Dilantin: One trial from ? to 03/08/2002.  Max dose 350 mg per day.  CP max and free Dilantin level of 1.9 mg/L.  Efficacy:  Unknown.  The drug was  optimized.  Side effects were tiredness on 350 mg a day.     Assessment:  It looks like seizure control was better when the patient was on Dilantin; therefore, it could be retried.   6.Vimpat: One trial 07/2009-09/2009.  Max dose 400 mg per day.  CP max 5.3 mg/L.  It was discontinued after 2 months because the patient experienced dizziness, diplopia and ataxia.  7.Depakote: One trial from 01/2010 to 05/09/2016.  So far, seizures have not increased because we increased the gabapentin.  Also, the headaches have not gotten worse, but the hand tremor and the head tremor have immensely improved since Depakote was discontinued.  Max dose was 2500 mg per day.  CP max was up to 116/15.6 mg/L.  In the past, it decreased seizure frequency, and it was used together with levetiracetam, oxcarbazepine and gabapentin, and it was used before with oxcarbazepine, levetiracetam and Lyrica.   Assessment:  Depakote could always be retried if we run into a problem.  8.lamotrigine: One trial from 07/06/2001.  Max dose 150 mg per day.  Used in combination with Dilantin.  There was no change in seizure frequency.  Side effects were increase in confusion and therefore questionable efficacy.  9.carbamazepine (tired, but no past info on details)  10.Levetiracetam: started in 2001. No major side effects. One trial from 07/2001 to present.  Max dose 3750 mg per day.  CP max 42.4 mg/L. Levetiracetam above 4000 mg per day caused slurred speech, double vision, unstable gait, severe side effects.   11. Oxcarbazepine:  One trial from 03/2002 to present.  Max dose 2700 mg per day.  CP max 43.2 mg/L.  It decreases seizures, and it looks like it is one of the best drugs the patient has tried.  increased oxcarbazepine from 2,400mg -> 2700 mg on 4/2013.   12.  Gabapentin:  One trial from 07/2012 to present.  Max dose is 3600 mg per day.  This drug was used more for pain and restless legs, but when Depakote was tapered, we increased the gabapentin, and it  looks like the only problem we have is some weight gain with the gabapentin.  Gabapentin  increased 1200 mg three times a day 5/2016 with no change in seizure, however, RLS symptoms decreased  13.  Klonopin:  One trial started 03/2013.  Is used for anxiety, not for seizures.  It was started at 0.5 mg and optimized so far to 3 mg per day.   stopped 12/2018 and seizure increased 1/2019 and then they stabilized. This drug is used by a psychiatrist.  If we one day want to try ONFI in this patient, maybe then the Klonopin could be decreased again.   14. Vagus nerve stimulator setting: Not able to tolerate higher duty cycle (35) with 1.1 signal off time and 30 sec on time, seizure worsened, not able to tolerate higher current setting due to discomfort.     SOCIAL:   Michelle is 15, Mani is 13. He gets along with Mani and Michelle and he has issues. Social Security is approved.  Ammon and his wife Jasmyne got a divorce 1/2019. He is . with his parents in [facility]..  She filed for divorce.  He has been  from his wife and children since January 2019.  He is currently living with his parents in [place].  He did start seeing a psychiatrist.      REVIEW OF SYSTEMS:  Head tremor, which bothers him.  He continues to have bilateral hand tremors on Depakote.  No nausea, vomiting, diarrhea.  No rash.  Vagus nerve stimulator setting are bother him (voice changes and throat discomfort).      EXAMINATION:  /79 (BP Location: Left arm, Patient Position: Sitting, Cuff Size: Adult Regular)   Pulse 66   Temp 98.8  F (37.1  C) (Temporal)   Wt 132 lb (59.9 kg)   BMI 21.31 kg/m     Wt Readings from Last 4 Encounters:   02/26/20 132 lb (59.9 kg)   07/30/19 128 lb 12.8 oz (58.4 kg)   05/14/19 134 lb (60.8 kg)   03/12/19 142 lb (64.4 kg)     Alert, orientated, speech is fluent, face is symmetric, No head/ hand tremor, no focal deficits noted.Gait is stable. Had seizure today, staring, unresponsive, not able to recall  word, or repeat words, some lip smacking. Lasted < 60 seconds.     ASSESSMENT:    1. 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 Brain Cavernoma   3. Memory deficits secondary to seizures and antiepileptic drugs    4. Anxiety/Depression with suicidal ideations 12/2018   5. Restless legs syndrome.  6. History of Migraines  7. Asystole secondary to seizures. Status post pacemaker placement 12/2003.     Discussion: Refractory multifocal epilepsy.  Etiology multiple cavernomas.  He  is status left temporal resection of AVM in 1985 (age 11), Right frontal hematoma 04/13/2006, treated with surgery.  Left-sided cavernous hemangioma 07/2002. Status post epilepsy surgery 05/29/2012.  Vagus nerve stimulator implanted on right and he has pacemaker on left.  Electrographically, he has multifocal epileptiform discharges and seizures arising from left and right temporal regions.     Currently patient has a high burden of seizures due to stress.  On the last visit we lowered oxcarbazepine in the afternoon and this did reduce episodes of dizziness and double vision in the evening.  On this visit we talked about introducing felbamate and weaning off of Dilantin since his levels are subtherapeutic.  I reviewed side effect profile of felbamate including aplastic anemia liver failure, weight loss and drug interactions with other seizure medications.  He would like to think about felbamate and we will discuss it on the next visit.     Antiepileptic drug discussion: Insurance coverage for new antiepileptic drug is not adequate, therefore, brand-name medications are cost prohibitive.  He is not able to tolerate higher doses of oxcarbazepine or phenytoin or levetiracetam. We will consider felbamate as the next agent. Other antiepileptic drug to consider are banzel, Brivaracetam, fycompa, but, if insurance coverage is not good and they are not able to afford these medications. Retrial with lamotrigine (tremors will worsen).  In the past  years we have talked about epilepsy brain surgery.  He and family have declined.  I again revisited the idea of treating with deep brain stimulation.  I believe his seizure burden and emotional health as resulted in several personal psychosocial issues. Vagus nerve stimulator: Interogatted today and current was increased.     In regards to his psychiatric health.  He is following with a psychiatrist and will see a psychologist in the next couple of months.      PLAN:   Continue same medications     1. Levetiracetam, 750 mg in the morning, 1500 mg at 4 p.m., and 1500 mg at 11 p.m.   2. Oxcarbazepine (300 mg tablet)  900 mg 10 am, 750 mg 4pm, and 900 mg pm    3. Gabapentin 1200 mg AM, 1200 mg 4 pm, and 1200 mg PM  (started for RLS, patient has taken higher dose for some time, not sure of length)   4. Phenytoin ER  100-100    Consider felbamate, we would wean off phenytoin     Follow up  3 months     - Strongly encouraged individual therapist in [place]    - Check antiepileptic drug for efficacy, toxicity, and side effects.      I spent 40 minutes with the patient. During this time counseling and coordination of care exceeded 50% of the visit time. I addressed all questions and concerns the patient raised in regards to his care.     REBEKAH CHRISTOPHER MD

## 2020-02-26 NOTE — LETTER
"2020     RE: Ammon Cronin  : 1975   MRN: [id number]      Dear Colleague,    Thank you for referring your patient, Ammon Cronin, to the [facility] EPILEPSY CARE at [facility]. Please see a copy of my visit note below.    [facility]    Patient:  Ammon Cronin  :  1975   Age:  44 year old   Today's Office Visit:  2020    Epilepsy Data:  Patient History  Primary Epileptologist/Provider: Rosenda Claros M.D.  Epilepsy Syndrome: Localization-related epilepsy unspecified  Epilepsy Syndrome Status: Final  Age of Onset: 16  Etiology  : Tumor  Other Relevant Dx/ Issues: Etiology likely cavernous hemangioma; acute hemorrhage 2005.  Born 5 weeks premature. Left temporal AVM with resection age 11.  Cardiac pacemaker  (bradycardia).  Hx of migraines.  Inpatient evaluations 3/02, ,  & .     Tests/Surgery History  Last EE2013  Last Neuropsych Testin2009  Last Case Management Conference: 3/21/2012  Epilepsy Surgery #1 Date: 11  Epilepsy Related Surgery #1 : Type: VNS implantation on RIGHT.  Seizure Record  Current Visit Date: 20  Previous Visit Date: 19  Months since last visit: 6.93  Seizure Type 1: Simple partial seizures unspecified  Description of Sz Type 1: \"hard to describe, feeling in the head, somthing is going to happen\"   Seizure Type 2: Complex partial seizures unspecified  Description of Sz Type 2: \"Brief\" ones consist of mumbling and not being very repsonsive at that time; duration 5-10 seconds.  \"Bigger\" ones consist of unresponsiveness, staring, confusion, lip smacking movements, may pick at objects or sort things, repetitive behavior.  Lasts few minutes.  # of Type 2 Seizure since last visit: 210  Freq. Type 2 / Month: 30.3  Seizure Type 3: Partial seizures with secondary generalization  -  with complex partial seizures evolving to generalized seizures  # of Type 3 Seizure " since last visit: 0  Freq. Type 3 / Month: 0          SEIZURE HISTORY:   Copied forward:  First seizure was at the age of 16 (loss of awarenes). He has a significant past medical history of left temporal AVM that was resected at age 11 and 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   Presurgically workup in 2002 revealed bilateral independent seizure foci in the temporal lobes. Ictal asystole (2003, he developed chest pain, pallor, diaphoresis and nausea, was found to have a 22 second period of asystole.  Emergent cardiac pacemaker placed). MRI of the brain shows a cavernoma in the right frontal and a second one in the left temporal lobe and encephalomalacia.      INTERVAL HISTORY:  Came with Toby (dad),  Jasmyne is not here.  He continues to have seizures daily, up to 10 per month. Most of his seizure are 9am in the morning.  He may go 2 or 3 days in a week without seizures.  All of her seizures are complex partial seizures in which she has staring with lipsmacking. Seizure may last seconds to minutes.  He is running every day to manage his stress.  He is currently seeing a psychiatrist and has an upcoming apppintment for psychologist for talk therapy and support.  He is working with a psychiatrist and is taking anti-depressant medications.  His family has been very supportive during the divorce process.  He has not had any emergency room visits or hospitalizations.        Prior to Admission medications    Medication Sig Start Date End Date Taking? Authorizing Provider   diazepam (VALIUM) 5 MG tablet TAKE 1 TAB AS NEEDED FOR SEIZURES >5 MIN OR >3 SEIZURES/8 HRS. MAY REPEAT 1 TIME MAX 2 TABS/24HR, CALL 911 BREATHING PROBLEM 7/30/19  Yes Rosenda Claros MD   gabapentin (NEURONTIN) 600 MG tablet Take 2 tablets by mouth  three times daily 2/3/20  Yes Rosenda Claros MD   ibuprofen (ADVIL/MOTRIN) 200 MG tablet Take 400 mg by mouth every 8 hours  as needed for mild pain   Yes Unknown, Entered By History   levETIRAcetam (KEPPRA) 750 MG tablet TAKE 1 TABLET IN THE MORNING, 2 TABLETS AT 4PM, AND 2 TABLETS AT 11PM 2/3/20  Yes Rosenda Claros MD   Multiple Vitamins-Minerals (MULTIVITAMIN & MINERAL PO) Take 1 tablet by mouth daily.   Yes Reported, Patient   OXcarbazepine (TRILEPTAL) 300 MG tablet TAKE 3 TABLETS MORNING, 2.5 TABLETS NOON, 3 TABLETS NIGHT 2/3/20  Yes Rosenda Claros MD   PARoxetine (PAXIL) 40 MG tablet Take 1 tablet (40 mg) by mouth every morning 3/12/19  Yes Rosenda Claros MD   phenytoin (DILANTIN) 100 MG capsule 1 CAPSULE TWICE A DAY 2/3/20  Yes Rosenda Claros MD     AED MEDICATIONS:    1. Levetiracetam, 750 mg in the morning, 1500 mg at 4 p.m., and 1500 mg at 11 p.m.   2. Oxcarbazepine (300 mg tablet)  900 mg 10 am, 750 mg 4pm, and 900 mg pm    3. Gabapentin 1200 mg AM, 1200 mg 4 pm, and 1200 mg PM  (started for RLS, patient has taken higher dose for some time, not sure of length)   4. Phenytoin ER  100-100  5. Diazepam PRN for CPS lasting greater than 5 minutes or more then 2 in one hour.    MEDICATION NOTES/PRIOR ANTIEPILEPTIC DRUGS:    1.Lyrica (ineffective for seizure, max dose 1100mg per day)   2.Topamax: Two trials from 01/2008-05/2008 and the second trial on 05/19/2015.  Max dose was 200 mg per day.  CP max was 3.5 mg/L.  There was no change in seizure frequency.  The patient was compliant.  During the second trial, they tried it for mood stabilization; 50 mg was after 2 months discontinued because side effects were speech and word-finding difficulties.   3.Gabatril (ineffective, caused fatigue at 48mg/ day). One trial from 01/2004-04/2006.  Max dose 48 mg per day.  It was ineffective and was discontinued.  There was no increase in seizures when it was stopped.  Side effects were fatigue.   4.Zonegran  One trial from 12/2003.  Max dose 400 mg per day.  Zonegran was used in combination with Keppra and Trileptal.  Side effects were memory  impairment, aggressiveness, fatigue, behavioral changes, cognitive impairment, which improved after Zonegran was discontinued.   5.Dilantin: One trial from ? to 03/08/2002.  Max dose 350 mg per day.  CP max and free Dilantin level of 1.9 mg/L.  Efficacy:  Unknown.  The drug was optimized.  Side effects were tiredness on 350 mg a day.     Assessment:  It looks like seizure control was better when the patient was on Dilantin; therefore, it could be retried.   6.Vimpat: One trial 07/2009-09/2009.  Max dose 400 mg per day.  CP max 5.3 mg/L.  It was discontinued after 2 months because the patient experienced dizziness, diplopia and ataxia.  7.Depakote: One trial from 01/2010 to 05/09/2016.  So far, seizures have not increased because we increased the gabapentin.  Also, the headaches have not gotten worse, but the hand tremor and the head tremor have immensely improved since Depakote was discontinued.  Max dose was 2500 mg per day.  CP max was up to 116/15.6 mg/L.  In the past, it decreased seizure frequency, and it was used together with levetiracetam, oxcarbazepine and gabapentin, and it was used before with oxcarbazepine, levetiracetam and Lyrica.   Assessment:  Depakote could always be retried if we run into a problem.  8.lamotrigine: One trial from 07/06/2001.  Max dose 150 mg per day.  Used in combination with Dilantin.  There was no change in seizure frequency.  Side effects were increase in confusion and therefore questionable efficacy.  9.carbamazepine (tired, but no past info on details)  10.Levetiracetam: started in 2001. No major side effects. One trial from 07/2001 to present.  Max dose 3750 mg per day.  CP max 42.4 mg/L. Levetiracetam above 4000 mg per day caused slurred speech, double vision, unstable gait, severe side effects.   11. Oxcarbazepine:  One trial from 03/2002 to present.  Max dose 2700 mg per day.  CP max 43.2 mg/L.  It decreases seizures, and it looks like it is one of the best drugs the  patient has tried.  increased oxcarbazepine from 2,400mg -> 2700 mg on 4/2013.   12.  Gabapentin:  One trial from 07/2012 to present.  Max dose is 3600 mg per day.  This drug was used more for pain and restless legs, but when Depakote was tapered, we increased the gabapentin, and it looks like the only problem we have is some weight gain with the gabapentin.  Gabapentin  increased 1200 mg three times a day 5/2016 with no change in seizure, however, RLS symptoms decreased  13.  Klonopin:  One trial started 03/2013.  Is used for anxiety, not for seizures.  It was started at 0.5 mg and optimized so far to 3 mg per day.   stopped 12/2018 and seizure increased 1/2019 and then they stabilized. This drug is used by a psychiatrist.  If we one day want to try ONFI in this patient, maybe then the Klonopin could be decreased again.   14. Vagus nerve stimulator setting: Not able to tolerate higher duty cycle (35) with 1.1 signal off time and 30 sec on time, seizure worsened, not able to tolerate higher current setting due to discomfort.     SOCIAL:   Michelle is 15, Mani is 13. He gets along with Mani and Michelle and he has issues. Social Security is approved.  Ammon and his wife Jasmyen got a divorce 1/2019. He is . with his parents in [facility]..  She filed for divorce.  He has been  from his wife and children since January 2019.  He is currently living with his parents in [place].  He did start seeing a psychiatrist.      REVIEW OF SYSTEMS:  Head tremor, which bothers him.  He continues to have bilateral hand tremors on Depakote.  No nausea, vomiting, diarrhea.  No rash.  Vagus nerve stimulator setting are bother him (voice changes and throat discomfort).      EXAMINATION:  /79 (BP Location: Left arm, Patient Position: Sitting, Cuff Size: Adult Regular)   Pulse 66   Temp 98.8  F (37.1  C) (Temporal)   Wt 132 lb (59.9 kg)   BMI 21.31 kg/m      Wt Readings from Last 4 Encounters:   02/26/20 132 lb  (59.9 kg)   07/30/19 128 lb 12.8 oz (58.4 kg)   05/14/19 134 lb (60.8 kg)   03/12/19 142 lb (64.4 kg)     Alert, orientated, speech is fluent, face is symmetric, No head/ hand tremor, no focal deficits noted.Gait is stable. Had seizure today, staring, unresponsive, not able to recall word, or repeat words, some lip smacking. Lasted < 60 seconds.     ASSESSMENT:    1. 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 Brain Cavernoma   3. Memory deficits secondary to seizures and antiepileptic drugs    4. Anxiety/Depression with suicidal ideations 12/2018   5. Restless legs syndrome.  6. History of Migraines  7. Asystole secondary to seizures. Status post pacemaker placement 12/2003.     Discussion: Refractory multifocal epilepsy.  Etiology multiple cavernomas.  He  is status left temporal resection of AVM in 1985 (age 11), Right frontal hematoma 04/13/2006, treated with surgery.  Left-sided cavernous hemangioma 07/2002. Status post epilepsy surgery 05/29/2012.  Vagus nerve stimulator implanted on right and he has pacemaker on left.  Electrographically, he has multifocal epileptiform discharges and seizures arising from left and right temporal regions.     Currently patient has a high burden of seizures due to stress.  On the last visit we lowered oxcarbazepine in the afternoon and this did reduce episodes of dizziness and double vision in the evening.  On this visit we talked about introducing felbamate and weaning off of Dilantin since his levels are subtherapeutic.  I reviewed side effect profile of felbamate including aplastic anemia liver failure, weight loss and drug interactions with other seizure medications.  He would like to think about felbamate and we will discuss it on the next visit.     Antiepileptic drug discussion: Insurance coverage for new antiepileptic drug is not adequate, therefore, brand-name medications are cost prohibitive.  He is not able to tolerate higher doses of oxcarbazepine or phenytoin or  levetiracetam. We will consider felbamate as the next agent. Other antiepileptic drug to consider are banzel, Brivaracetam, fycompa, but, if insurance coverage is not good and they are not able to afford these medications. Retrial with lamotrigine (tremors will worsen).  In the past years we have talked about epilepsy brain surgery.  He and family have declined.  I again revisited the idea of treating with deep brain stimulation.  I believe his seizure burden and emotional health as resulted in several personal psychosocial issues. Vagus nerve stimulator: Interogatted today and current was increased.     In regards to his psychiatric health.  He is following with a psychiatrist and will see a psychologist in the next couple of months.      PLAN:   Continue same medications     1. Levetiracetam, 750 mg in the morning, 1500 mg at 4 p.m., and 1500 mg at 11 p.m.   2. Oxcarbazepine (300 mg tablet)  900 mg 10 am, 750 mg 4pm, and 900 mg pm    3. Gabapentin 1200 mg AM, 1200 mg 4 pm, and 1200 mg PM  (started for RLS, patient has taken higher dose for some time, not sure of length)   4. Phenytoin ER  100-100    Consider felbamate, we would wean off phenytoin     Follow up  3 months     - Strongly encouraged individual therapist in [place]    - Check antiepileptic drug for efficacy, toxicity, and side effects.      I spent 40 minutes with the patient. During this time counseling and coordination of care exceeded 50% of the visit time. I addressed all questions and concerns the patient raised in regards to his care.     REBEKAH CHRISTOPHER MD

## 2020-03-12 ENCOUNTER — TRANSFERRED RECORDS (OUTPATIENT)
Dept: HEALTH INFORMATION MANAGEMENT | Facility: CLINIC | Age: 45
End: 2020-03-12

## 2020-03-15 ENCOUNTER — HEALTH MAINTENANCE LETTER (OUTPATIENT)
Age: 45
End: 2020-03-15

## 2020-04-27 DIAGNOSIS — Z79.899 LONG TERM USE OF DRUG: ICD-10-CM

## 2020-04-27 DIAGNOSIS — R25.1 TREMOR: ICD-10-CM

## 2020-04-27 DIAGNOSIS — G25.81 RESTLESS LEG SYNDROME: ICD-10-CM

## 2020-04-27 DIAGNOSIS — G40.219 PARTIAL EPILEPSY WITH IMPAIRMENT OF CONSCIOUSNESS, INTRACTABLE (H): ICD-10-CM

## 2020-04-27 DIAGNOSIS — G40.219 PARTIAL EPILEPSY WITH LOSS OF CONSCIOUSNESS, INTRACTABLE (H): ICD-10-CM

## 2020-04-27 DIAGNOSIS — G25.81 RESTLESS LEGS SYNDROME: ICD-10-CM

## 2020-04-27 RX ORDER — OXCARBAZEPINE 300 MG/1
TABLET, FILM COATED ORAL
Qty: 765 TABLET | Refills: 3 | Status: SHIPPED | OUTPATIENT
Start: 2020-04-27 | End: 2021-03-09

## 2020-04-27 RX ORDER — GABAPENTIN 600 MG/1
1200 TABLET ORAL 3 TIMES DAILY
Qty: 540 TABLET | Refills: 3 | Status: SHIPPED | OUTPATIENT
Start: 2020-04-27 | End: 2020-09-10

## 2020-04-27 RX ORDER — LEVETIRACETAM 750 MG/1
TABLET ORAL
Qty: 450 TABLET | Refills: 3 | Status: SHIPPED | OUTPATIENT
Start: 2020-04-27 | End: 2021-03-09

## 2020-04-27 NOTE — TELEPHONE ENCOUNTER
levETIRAcetam (KEPPRA) 750 MG tablet       Last Written Prescription Date:  2/3/20  Last Fill Quantity:450  # refills: 0    OXcarbazepine (TRILEPTAL) 300 MG tablet    Last Written Prescription Date: 2/3/20  Last Fill Quantity: 765,   # refills: 0    gabapentin (NEURONTIN) 600 MG tablet  Last Written Prescription Date:  2/3/20  Last Fill Quantity: 540,   # refills: 0    Last Office Visit : 2/26/2003-[id number]-lb 3 months  Future Office visit:  6/30/20    Refilled for 12 months per protocol.

## 2020-04-29 ENCOUNTER — TELEPHONE (OUTPATIENT)
Dept: NEUROLOGY | Facility: CLINIC | Age: 45
End: 2020-04-29

## 2020-04-29 NOTE — TELEPHONE ENCOUNTER
Patient requests that we only discuss his medical information with the following people:     Matteo Sawyer  [phone]   [facility]  [place]    Biib Brown  [phone]  [facility] [address]  [phone]  [place], [place]    Toby Cronin  [phone]  [place], [place]    Patient's written request will be scanned into the chart.

## 2020-06-22 DIAGNOSIS — G25.81 RESTLESS LEG SYNDROME: ICD-10-CM

## 2020-06-22 DIAGNOSIS — G25.81 RESTLESS LEGS SYNDROME: ICD-10-CM

## 2020-06-22 DIAGNOSIS — G40.219 PARTIAL EPILEPSY WITH LOSS OF CONSCIOUSNESS, INTRACTABLE (H): ICD-10-CM

## 2020-06-22 DIAGNOSIS — G40.219 PARTIAL EPILEPSY WITH IMPAIRMENT OF CONSCIOUSNESS, INTRACTABLE (H): ICD-10-CM

## 2020-06-22 DIAGNOSIS — R25.1 TREMOR: ICD-10-CM

## 2020-06-22 DIAGNOSIS — Z79.899 LONG TERM USE OF DRUG: ICD-10-CM

## 2020-06-22 NOTE — TELEPHONE ENCOUNTER
phenytoin (DILANTIN) 100 MG capsule      Last Written Prescription Date:  2/3/2020  Last Fill Quantity: 180,   # refills: 0  Last Office Visit : 2/26/2020  Future Office visit:  6/30/2020    Routing refill request to provider for review/approval because:  Drug not on the [facility], [facility] or [facility] refill protocol or controlled substance      Sherin Tom RN  Central Triage Red Flags/Med Refills

## 2020-06-23 RX ORDER — PHENYTOIN SODIUM 100 MG/1
100 CAPSULE, EXTENDED RELEASE ORAL 2 TIMES DAILY
Qty: 180 CAPSULE | Refills: 1 | Status: SHIPPED | OUTPATIENT
Start: 2020-06-23 | End: 2021-02-08

## 2020-06-30 ENCOUNTER — VIRTUAL VISIT (OUTPATIENT)
Dept: NEUROLOGY | Facility: CLINIC | Age: 45
End: 2020-06-30
Payer: COMMERCIAL

## 2020-06-30 DIAGNOSIS — G40.219 PARTIAL EPILEPSY WITH IMPAIRMENT OF CONSCIOUSNESS, INTRACTABLE (H): Primary | ICD-10-CM

## 2020-06-30 RX ORDER — DESVENLAFAXINE 50 MG/1
25 TABLET, FILM COATED, EXTENDED RELEASE ORAL DAILY
COMMUNITY
Start: 2020-05-27

## 2020-06-30 ASSESSMENT — PATIENT HEALTH QUESTIONNAIRE - PHQ9: SUM OF ALL RESPONSES TO PHQ QUESTIONS 1-9: 6

## 2020-06-30 NOTE — PROGRESS NOTES
"Ammon Cronin is a 44 year old male who is being evaluated via a billable video visit.      The patient has been notified of following:     \"This video visit will be conducted via a call between you and your physician/provider. We have found that certain health care needs can be provided without the need for an in-person physical exam.  This service lets us provide the care you need with a video conversation.  If a prescription is necessary we can send it directly to your pharmacy.  If lab work is needed we can place an order for that and you can then stop by our lab to have the test done at a later time.    Video visits are billed at different rates depending on your insurance coverage.  Please reach out to your insurance provider with any questions.    If during the course of the call the physician/provider feels a video visit is not appropriate, you will not be charged for this service.\"    Patient has given verbal consent for Video visit? Yes  How would you like to obtain your AVS? [place]  Patient would like the video invitation sent by: Send to e-mail at: [email or web address]  Will anyone else be joining your video visit? No, in the room with the patient.        Thank you  Renu Licona LPN      Video-Visit Details    Type of service:  Video Visit    Video Start Time: 10:55 AM  Video End Time: 11:15 AM    Originating Location (pt. Location): Home    Distant Location (provider location):  [facility] EPILEPSY CARE     Platform used for Video Visit: Jack Claros MD    INTERVAL HISTORY:  He continues to have high burden of seizures, 10 seizure per week. He has daily seizures.  Most of his seizure are 9am in the morning.  He may go 1-2 in a week without seizures.  All of her seizures are complex partial seizures in which she has staring with lipsmacking. Seizure may last seconds to minutes.  He is running every day to manage his stress.  He is currently seeing a talk therapist weekly and a psychiatrist. "  He is working with a psychiatrist and is taking anti-depressant medications.  His family has been very supportive during the divorce process.  He has not had any emergency room visits or hospitalizations.  He has depressive symptoms, low appetite, has weight lost, dizziness and double vision with antiepileptic drug. He weight 125 pounds and eats twice a day.      Review of system: See HPI    AED MEDICATIONS:    1. Levetiracetam, 750 mg in the morning, 1500 mg at 4 p.m., and 1500 mg at 11 p.m.   2. Oxcarbazepine (300 mg tablet)  900 mg 10 am, 750 mg 4pm, and 900 mg pm    3. Gabapentin 1200 mg AM, 1200 mg 4 pm, and 1200 mg PM  (started for RLS, patient has taken higher dose for some time, not sure of length)   4. Phenytoin ER  100-100  5. Diazepam PRN for CPS lasting greater than 5 minutes or more then 2 in one hour.      Exam: Via Video: Alert, orientated, speech is fluent, face symmetric, tongue midline, extra ocular movements in tact, no pronator drip, arm circumduction is symmetric, finger to nose normal.     ASSESSMENT:    1. 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 Brain Cavernoma   3. Memory deficits secondary to seizures and antiepileptic drugs    4. Anxiety/Depression with suicidal ideations 12/2018   5. Restless legs syndrome.  6. History of Migraines  7. Asystole secondary to seizures. Status post pacemaker placement 12/2003.      Discussion: Refractory multifocal epilepsy.  Etiology multiple cavernomas.  He  is status left temporal resection of AVM in 1985 (age 11), Right frontal hematoma 04/13/2006, treated with surgery.  Left-sided cavernous hemangioma 07/2002. Status post epilepsy surgery 05/29/2012.  Vagus nerve stimulator implanted on right and he has pacemaker on left.  Electrographically, he has multifocal epileptiform discharges and seizures arising from left and right temporal regions.      Currently patient continues to have a high burden of seizures.  He has rare dizziness and double vision in the evening.  On this visit we talked about introducing felbamate or fycompa and weaning off of Dilantin since his levels are subtherapeutic.  He will check with insurance company on coverage. I reviewed side effect profile of felbamate including aplastic anemia liver failure, weight loss and drug interactions with other seizure medications.  He would like to think about felbamate and we will discuss it on the next visit.      Antiepileptic drug discussion: [employer] Insurance coverage for new antiepileptic drug was not adequate, he now has [employer].  He is not able to tolerate higher doses of oxcarbazepine or phenytoin or levetiracetam. We will consider felbamate as the next agent. Other antiepileptic drug to consider are banzel, Brivaracetam, fycompa, but, if insurance coverage is not good and they are not able to afford these medications. Retrial with lamotrigine (tremors will worsen).  In the past years we have talked about epilepsy brain surgery and he declined.  Vagus nerve stimulator to be interogatted next visit.       In regards to his psychiatric health.  He is following with a psychiatrist and will see a psychologist.     PLAN:   Continue same medications      1. Levetiracetam, 750 mg in the morning, 1500 mg at 4 p.m., and 1500 mg at 11 p.m.   2. Oxcarbazepine (300 mg tablet)  900 mg 10 am, 750 mg 4pm, and 900 mg pm    3. Gabapentin 1200 mg AM, 1200 mg 4 pm, and 1200 mg PM  (started for RLS, patient has taken higher dose for some time, not sure of length)   4. Phenytoin ER  100-100     Consider felbamate, we would try to wean off phenytoin      Follow up  1-2 months     Rosenda Claros MD

## 2020-06-30 NOTE — LETTER
"2020       RE: Ammon Cronin  : 1975   MRN: [id number]      Dear Colleague,    Thank you for referring your patient, Ammon Cronin, to the [facility] EPILEPSY CARE at [facility]. Please see a copy of my visit note below.    Ammon Cronin is a 44 year old male who is being evaluated via a billable video visit.      The patient has been notified of following:     \"This video visit will be conducted via a call between you and your physician/provider. We have found that certain health care needs can be provided without the need for an in-person physical exam.  This service lets us provide the care you need with a video conversation.  If a prescription is necessary we can send it directly to your pharmacy.  If lab work is needed we can place an order for that and you can then stop by our lab to have the test done at a later time.    Video visits are billed at different rates depending on your insurance coverage.  Please reach out to your insurance provider with any questions.    If during the course of the call the physician/provider feels a video visit is not appropriate, you will not be charged for this service.\"    Patient has given verbal consent for Video visit? Yes  How would you like to obtain your AVS? [facility]  Patient would like the video invitation sent by: Send to e-mail at: [email or web address]  Will anyone else be joining your video visit? No, in the room with the patient.        Thank you  Renu Licona LPN      Video-Visit Details    Type of service:  Video Visit    Video Start Time: 10:55 AM  Video End Time: 11:15 AM    Originating Location (pt. Location): Home    Distant Location (provider location):  [facility] EPILEPSY CARE     Platform used for Video Visit: Jack Claros MD    INTERVAL HISTORY:  He continues to have high burden of seizures, 10 seizure per week. He has daily seizures.  Most of his seizure are 9am in the morning.  He may go " 1-2 in a week without seizures.  All of her seizures are complex partial seizures in which she has staring with lipsmacking. Seizure may last seconds to minutes.  He is running every day to manage his stress.  He is currently seeing a talk therapist weekly and a psychiatrist.  He is working with a psychiatrist and is taking anti-depressant medications.  His family has been very supportive during the divorce process.  He has not had any emergency room visits or hospitalizations.  He has depressive symptoms, low appetite, has weight lost, dizziness and double vision with antiepileptic drug. He weight 125 pounds and eats twice a day.      Review of system: See HPI    AED MEDICATIONS:    1. Levetiracetam, 750 mg in the morning, 1500 mg at 4 p.m., and 1500 mg at 11 p.m.   2. Oxcarbazepine (300 mg tablet)  900 mg 10 am, 750 mg 4pm, and 900 mg pm    3. Gabapentin 1200 mg AM, 1200 mg 4 pm, and 1200 mg PM  (started for RLS, patient has taken higher dose for some time, not sure of length)   4. Phenytoin ER  100-100  5. Diazepam PRN for CPS lasting greater than 5 minutes or more then 2 in one hour.      Exam: Via Video: Alert, orientated, speech is fluent, face symmetric, tongue midline, extra ocular movements in tact, no pronator drip, arm circumduction is symmetric, finger to nose normal.     ASSESSMENT:    1. 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 Brain Cavernoma   3. Memory deficits secondary to seizures and antiepileptic drugs    4. Anxiety/Depression with suicidal ideations 12/2018   5. Restless legs syndrome.  6. History of Migraines  7. Asystole secondary to seizures. Status post pacemaker placement 12/2003.      Discussion: Refractory multifocal epilepsy.  Etiology multiple cavernomas.  He  is status left temporal resection of AVM in 1985 (age 11), Right frontal hematoma 04/13/2006, treated with surgery.  Left-sided cavernous hemangioma 07/2002. Status post epilepsy surgery  05/29/2012.  Vagus nerve stimulator implanted on right and he has pacemaker on left.  Electrographically, he has multifocal epileptiform discharges and seizures arising from left and right temporal regions.      Currently patient continues to have a high burden of seizures. He has rare dizziness and double vision in the evening.  On this visit we talked about introducing felbamate or fycompa and weaning off of Dilantin since his levels are subtherapeutic.  He will check with insurance company on coverage. I reviewed side effect profile of felbamate including aplastic anemia liver failure, weight loss and drug interactions with other seizure medications.  He would like to think about felbamate and we will discuss it on the next visit.      Antiepileptic drug discussion: [employer] Insurance coverage for new antiepileptic drug was not adequate, he now has [employer].  He is not able to tolerate higher doses of oxcarbazepine or phenytoin or levetiracetam. We will consider felbamate as the next agent. Other antiepileptic drug to consider are banzel, Brivaracetam, fycompa, but, if insurance coverage is not good and they are not able to afford these medications. Retrial with lamotrigine (tremors will worsen).  In the past years we have talked about epilepsy brain surgery and he declined.  Vagus nerve stimulator to be interogatted next visit.       In regards to his psychiatric health.  He is following with a psychiatrist and will see a psychologist.     PLAN:   Continue same medications      1. Levetiracetam, 750 mg in the morning, 1500 mg at 4 p.m., and 1500 mg at 11 p.m.   2. Oxcarbazepine (300 mg tablet)  900 mg 10 am, 750 mg 4pm, and 900 mg pm    3. Gabapentin 1200 mg AM, 1200 mg 4 pm, and 1200 mg PM  (started for RLS, patient has taken higher dose for some time, not sure of length)   4. Phenytoin ER  100-100     Consider felbamate, we would try to wean off phenytoin      Follow up  1-2 months     Rosenda Claros MD

## 2020-08-20 ENCOUNTER — OFFICE VISIT (OUTPATIENT)
Dept: NEUROLOGY | Facility: CLINIC | Age: 45
End: 2020-08-20
Payer: COMMERCIAL

## 2020-08-20 VITALS
BODY MASS INDEX: 19.92 KG/M2 | TEMPERATURE: 98.6 F | HEART RATE: 76 BPM | DIASTOLIC BLOOD PRESSURE: 76 MMHG | SYSTOLIC BLOOD PRESSURE: 124 MMHG | WEIGHT: 123.4 LBS

## 2020-08-20 DIAGNOSIS — G40.219 PARTIAL EPILEPSY WITH IMPAIRMENT OF CONSCIOUSNESS, INTRACTABLE (H): Primary | ICD-10-CM

## 2020-08-20 RX ORDER — FELBAMATE 400 MG/1
TABLET ORAL
Qty: 120 TABLET | Refills: 4 | Status: SHIPPED | OUTPATIENT
Start: 2020-08-20 | End: 2020-09-10

## 2020-08-20 ASSESSMENT — PATIENT HEALTH QUESTIONNAIRE - PHQ9: SUM OF ALL RESPONSES TO PHQ QUESTIONS 1-9: 7

## 2020-08-20 NOTE — PATIENT INSTRUCTIONS
Continue same seizure medications   1. Levetiracetam, 750 mg in the morning, 1500 mg at 4 p.m., and 1500 mg at 11 p.m.   2. Oxcarbazepine (300 mg tablet)  900 mg 10 am, 750 mg 4pm, and 900 mg pm    3. Gabapentin 1200 mg AM, 1200 mg 4 pm, and 1200 mg PM      Start felbamate     Week 1 - 9/7/2020: felbamate 400 mg morning. Continue phenytoin  100 mg twice a day (you may have more side effects)  Week 2 - 9/14/2020: felbamate 800 mg morning. Decrease phenytoin 100 mg at night   Week 3 - 9/21/20: felbamate 800 mg morning and 400 mg 4 pm. Continue phenytoin 100 mg at night   Week 4 - 9/28/20: onward: felbamate 800 mg morning and 800 mg 4 pm. Stop phenytoin.       Felbamate: Adverse reactions include decreased appetite, vomiting, insomnia, nausea, dizziness, somnolence, and headache. Many patients report increased alertness with the drug. Two rare but very serious effects include aplastic anemia and hepatic (liver) failure. The risk of aplastic anemia is between 1:3,600 and 1:5,000, of which 30% of cases are fatal. The risk of hepatic failure is between 1:24,000 to 1:34,000, of which 40% of cases are fatal.    Eat mor healthy food    Blood Check:  Send lab results to [facility] so we are alerted the labs are completed.               Blood Draw           Week 2 -  9/14/2020   Lab draw CBC and CMP. Go to nearby lab. All seizure drug levels.     Week 4   Lab draw CBC and CMP. Go to nearby lab.        Week 6   Lab draw CBC and CMP. Go to nearby lab.        Week 8   Lab draw CBC and CMP. Go to nearby lab.        Week 10   Lab draw CBC and CMP. Go to nearby lab.        Week 12  (month 3)    Lab draw CBC and CMP.Go to nearby lab.        Week 16  (month 4)    Lab draw CBC and CMP. Go to nearby lab.       Week 20  (month 5)     Lab draw CBC and CMP.Go to nearby lab.       Week 24  (month 6)    Lab draw CBC and CMP. Go to nearby lab.            Video with Harlan week 3 and 6.     Rosenda Claros MD

## 2020-08-20 NOTE — LETTER
"2020       RE: Ammon Cronin  : 1975   MRN: [id number]      Dear Colleague,    Thank you for referring your patient, Ammon Cronin, to the [facility] EPILEPSY CARE at [facility]. Please see a copy of my visit note below.    [facility]    Patient:  Ammon Cronin  :  1975   Age:  45 year old   Today's Office Visit:  2020    Epilepsy Data:  Patient History  Primary Epileptologist/Provider: Rosenda Claros M.D.  Epilepsy Syndrome: Localization-related epilepsy unspecified  Epilepsy Syndrome Status: Final  Age of Onset: 16  Etiology  : Tumor  Other Relevant Dx/ Issues: Etiology likely cavernous hemangioma; acute hemorrhage 2005.  Born 5 weeks premature. Left temporal AVM with resection age 11.  Cardiac pacemaker  (bradycardia).  Hx of migraines.  Inpatient evaluations 3/02, ,  & .     Tests/Surgery History  Last EE2013  Last Neuropsych Testin2009  Last Case Management Conference: 3/21/2012  Epilepsy Surgery #1 Date: 11  Epilepsy Related Surgery #1 : Type: VNS implantation on RIGHT.  Seizure Record  Current Visit Date: 20  Previous Visit Date: 20  Months since last visit: 1.68  Seizure Type 1: Simple partial seizures unspecified  Description of Sz Type 1: \"hard to describe, feeling in the head, somthing is going to happen\"   Seizure Type 2: Complex partial seizures unspecified  Description of Sz Type 2: \"Brief\" ones consist of mumbling and not being very repsonsive at that time; duration 5-10 seconds.  \"Bigger\" ones consist of unresponsiveness, staring, confusion, lip smacking movements, may pick at objects or sort things, repetitive behavior.  Lasts few minutes.  # of Type 2 Seizure since last visit: 60  Freq. Type 2 / Month: 35.71  Seizure Type 3: Partial seizures with secondary generalization  -  with complex partial seizures evolving to generalized seizures  # of Type 3 Seizure " since last visit: 0  Freq. Type 3 / Month: 0         INTERVAL HISTORY:  Came with dad. He continues to have high burden of seizures, 8-10 seizure per week. He has daily seizures (staring 3-5 minutes, sometimes after the seizure he can talk but he is not 100% at baseline for hours). Most of his seizure are 9am in the morning.  He may go 1-2 days per week without seizures.  All of her seizures are complex partial seizures in which she has staring with lipsmacking. Seizure may last seconds to minutes.     He is running every day to manage his stress.  He is currently seeing a talk therapist weekly and a psychiatrist.  He is working with a psychiatrist and is taking anti-depressant medications.  His family has been very supportive during the divorce process. He may have a seizure most time when he runs, he has a medical alert bractlet, gps device on apple watch and dad can monitor him in case he gets lost.     He has not had any emergency room visits or hospitalizations.  He has depressive symptoms, low appetite, has weight lost, dizziness and double vision with antiepileptic drug. He weight 125 pounds and eats twice a day.      Review of system: See HPI    AED MEDICATIONS:    1. Levetiracetam, 750 mg in the morning, 1500 mg at 4 p.m., and 1500 mg at 11 p.m.   2. Oxcarbazepine (300 mg tablet)  900 mg 10 am, 750 mg 4pm, and 900 mg pm    3. Gabapentin 1200 mg AM, 1200 mg 4 pm, and 1200 mg PM  (started for RLS, patient has taken higher dose for some time, not sure of length)   4. Phenytoin ER  100-100  5. Diazepam PRN for CPS lasting greater than 5 minutes or more then 2 in one hour.      /76   Pulse 76   Temp 98.6  F (37  C)   Wt 123 lb 6.4 oz (56 kg)   BMI 19.92 kg/m    Wt Readings from Last 4 Encounters:   08/20/20 123 lb 6.4 oz (56 kg)   02/26/20 132 lb (59.9 kg)   07/30/19 128 lb 12.8 oz (58.4 kg)   05/14/19 134 lb (60.8 kg)        Alert, orientated, speech is fluent, face symmetric, tongue midline, extra  ocular movements in tact, no pronator drip, arm circumduction is symmetric, finger to nose normal.     ASSESSMENT:    1. 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 Brain Cavernoma   3. Memory deficits secondary to seizures and antiepileptic drugs    4. Anxiety/Depression with suicidal ideations 12/2018   5. Restless legs syndrome.  6. History of Migraines  7. Asystole secondary to seizures. Status post pacemaker placement 12/2003.      Discussion: Refractory multifocal epilepsy.  Etiology multiple cavernomas.  He  is status left temporal resection of AVM in 1985 (age 11), Right frontal hematoma 04/13/2006, treated with surgery.  Left-sided cavernous hemangioma 07/2002. Status post epilepsy surgery 05/29/2012.  Vagus nerve stimulator implanted on right and he has pacemaker on left.  Electrographically, he has multifocal epileptiform discharges and seizures arising from left and right temporal regions.      Currently patient continues to have a high burden of seizures. He has rare dizziness and double vision in the evening.  On this visit we talked about introducing felbamate or fycompa and weaning off of Dilantin since his levels are subtherapeutic.  He will check with insurance company on coverage. I reviewed side effect profile of felbamate including aplastic anemia liver failure, weight loss and drug interactions with other seizure medications.  He would like to think about felbamate and we will discuss it on the next visit.      Antiepileptic drug discussion: [employer] Insurance coverage for new antiepileptic drug was not adequate, he now has [employer].  He is not able to tolerate higher doses of oxcarbazepine or phenytoin or levetiracetam. We will consider felbamate as the next agent. Other antiepileptic drug to consider are banzel, Brivaracetam, fycompa, but, if insurance coverage is not good and they are not able to afford these medications. Retrial with lamotrigine (tremors  will worsen).  In the past years we have talked about epilepsy brain surgery and he declined.  Vagus nerve stimulator to be interogatted next visit.       In regards to his psychiatric health.  He is following with a psychiatrist and will see a psychologist.     PLAN:     Continue same seizure medications   1. Levetiracetam, 750 mg in the morning, 1500 mg at 4 p.m., and 1500 mg at 11 p.m.   2. Oxcarbazepine (300 mg tablet)  900 mg 10 am, 750 mg 4pm, and 900 mg pm    3. Gabapentin 1200 mg AM, 1200 mg 4 pm, and 1200 mg PM      Start felbamate     Week 1 - 9/7/2020: felbamate 400 mg morning. Continue phenytoin  100 mg twice a day (you may have more side effects)  Week 2 - 9/14/2020: felbamate 800 mg morning. Decrease phenytoin 100 mg at night   Week 3 - 9/21/20: felbamate 800 mg morning and 400 mg 4 pm. Continue phenytoin 100 mg at night   Week 4 - 9/28/20: onward: felbamate 800 mg morning and 800 mg 4 pm. Stop phenytoin.       Felbamate: Adverse reactions include decreased appetite, vomiting, insomnia, nausea, dizziness, somnolence, and headache. Many patients report increased alertness with the drug. Two rare but very serious effects include aplastic anemia and hepatic (liver) failure. The risk of aplastic anemia is between 1:3,600 and 1:5,000, of which 30% of cases are fatal. The risk of hepatic failure is between 1:24,000 to 1:34,000, of which 40% of cases are fatal.    Eat mor healthy food    Blood Check:  Send lab results to [facility] so we are alerted the labs are completed.               Blood Draw           Week 2 -  9/14/2020   Lab draw CBC and CMP. Go to nearby lab. All seizure drug levels.     Week 4   Lab draw CBC and CMP. Go to nearby lab.        Week 6   Lab draw CBC and CMP. Go to nearby lab.        Week 8   Lab draw CBC and CMP. Go to nearby lab.        Week 10   Lab draw CBC and CMP. Go to nearby lab.        Week 12  (month 3)    Lab draw CBC and CMP.Go to nearby lab.        Week 16  (month 4)    Lab  "draw CBC and CMP. Go to nearby lab.       Week 20  (month 5)     Lab draw CBC and CMP.Go to nearby lab.       Week 24  (month 6)    Lab draw CBC and CMP. Go to nearby lab.            Video with Harlan week 3 and 6.     Vagus nerve stimulator interrogated by nurse. Battery life is %.     Rosenda Claros MD     I spent 45 minutes with the patient. During this time medical history data collection, counseling, and coordination of care exceeded 50% of the visit time. I addressed all questions the patient/caregiver raised in regards to the patient's medical care. This note was created with voice recognition software. Inadvertent grammatical errors, typographical errors, and \"sound a like\" substitutions may occur due to limitations of the software.  Read the note carefully and apply context when erroneous substitutions have occurred. Thank you.     Rosenda Claros MD          08/20/20 1300   Device Information   MD Rosenda Claros   Implant Date 01/29/19   Model Number Sentive    Serial Number [id number]   Patient ID IDA   Parameters   VNS Interrogation Incoming Parameters   Date 08/20/20   Output Current (mA) 2.0   Signal Frequency (Hz) 30   Pulse Width (Mu sec) 250   Signal Time On (sec) 30   Signal Time Off 5   Magnet Current (mA) 2.5   Magnet Time On (sec) 60   Magnet Pulse Width (Mu sec) 500   Diagnostics   Output Current (OK/Low) ok   Current Delivered (mA) 2.0   Impedance Value (Ohms) 3101   Battery Status Indicator (Green/Yellow/Red, %) %     "

## 2020-08-20 NOTE — PROGRESS NOTES
"[facility] Progress Note    Patient:  Ammon Cronin  :  1975   Age:  45 year old   Today's Office Visit:  2020    Epilepsy Data:  Patient History  Primary Epileptologist/Provider: Rosenda Claros M.D.  Epilepsy Syndrome: Localization-related epilepsy unspecified  Epilepsy Syndrome Status: Final  Age of Onset: 16  Etiology  : Tumor  Other Relevant Dx/ Issues: Etiology likely cavernous hemangioma; acute hemorrhage 2005.  Born 5 weeks premature. Left temporal AVM with resection age 11.  Cardiac pacemaker  (bradycardia).  Hx of migraines.  Inpatient evaluations 3/02, ,  & .     Tests/Surgery History  Last EE2013  Last Neuropsych Testin2009  Last Case Management Conference: 3/21/2012  Epilepsy Surgery #1 Date: 11  Epilepsy Related Surgery #1 : Type: VNS implantation on RIGHT.  Seizure Record  Current Visit Date: 20  Previous Visit Date: 20  Months since last visit: 1.68  Seizure Type 1: Simple partial seizures unspecified  Description of Sz Type 1: \"hard to describe, feeling in the head, somthing is going to happen\"   Seizure Type 2: Complex partial seizures unspecified  Description of Sz Type 2: \"Brief\" ones consist of mumbling and not being very repsonsive at that time; duration 5-10 seconds.  \"Bigger\" ones consist of unresponsiveness, staring, confusion, lip smacking movements, may pick at objects or sort things, repetitive behavior.  Lasts few minutes.  # of Type 2 Seizure since last visit: 60  Freq. Type 2 / Month: 35.71  Seizure Type 3: Partial seizures with secondary generalization  -  with complex partial seizures evolving to generalized seizures  # of Type 3 Seizure since last visit: 0  Freq. Type 3 / Month: 0         INTERVAL HISTORY:  Came with dad. He continues to have high burden of seizures, 8-10 seizure per week. He has daily seizures (staring 3-5 minutes, sometimes after the seizure he can talk but he is not 100% at baseline for " hours). Most of his seizure are 9am in the morning.  He may go 1-2 days per week without seizures.  All of her seizures are complex partial seizures in which she has staring with lipsmacking. Seizure may last seconds to minutes.     He is running every day to manage his stress.  He is currently seeing a talk therapist weekly and a psychiatrist.  He is working with a psychiatrist and is taking anti-depressant medications.  His family has been very supportive during the divorce process. He may have a seizure most time when he runs, he has a medical alert bractlet, gps device on apple watch and dad can monitor him in case he gets lost.     He has not had any emergency room visits or hospitalizations.  He has depressive symptoms, low appetite, has weight lost, dizziness and double vision with antiepileptic drug. He weight 125 pounds and eats twice a day.      Review of system: See HPI    AED MEDICATIONS:    1. Levetiracetam, 750 mg in the morning, 1500 mg at 4 p.m., and 1500 mg at 11 p.m.   2. Oxcarbazepine (300 mg tablet)  900 mg 10 am, 750 mg 4pm, and 900 mg pm    3. Gabapentin 1200 mg AM, 1200 mg 4 pm, and 1200 mg PM  (started for RLS, patient has taken higher dose for some time, not sure of length)   4. Phenytoin ER  100-100  5. Diazepam PRN for CPS lasting greater than 5 minutes or more then 2 in one hour.      /76   Pulse 76   Temp 98.6  F (37  C)   Wt 123 lb 6.4 oz (56 kg)   BMI 19.92 kg/m    Wt Readings from Last 4 Encounters:   08/20/20 123 lb 6.4 oz (56 kg)   02/26/20 132 lb (59.9 kg)   07/30/19 128 lb 12.8 oz (58.4 kg)   05/14/19 134 lb (60.8 kg)        Alert, orientated, speech is fluent, face symmetric, tongue midline, extra ocular movements in tact, no pronator drip, arm circumduction is symmetric, finger to nose normal.     ASSESSMENT:    1. 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 Brain Cavernoma   3. Memory deficits secondary to seizures and  antiepileptic drugs    4. Anxiety/Depression with suicidal ideations 12/2018   5. Restless legs syndrome.  6. History of Migraines  7. Asystole secondary to seizures. Status post pacemaker placement 12/2003.      Discussion: Refractory multifocal epilepsy.  Etiology multiple cavernomas.  He  is status left temporal resection of AVM in 1985 (age 11), Right frontal hematoma 04/13/2006, treated with surgery.  Left-sided cavernous hemangioma 07/2002. Status post epilepsy surgery 05/29/2012.  Vagus nerve stimulator implanted on right and he has pacemaker on left.  Electrographically, he has multifocal epileptiform discharges and seizures arising from left and right temporal regions.      Currently patient continues to have a high burden of seizures. He has rare dizziness and double vision in the evening.  On this visit we talked about introducing felbamate or fycompa and weaning off of Dilantin since his levels are subtherapeutic.  He will check with insurance company on coverage. I reviewed side effect profile of felbamate including aplastic anemia liver failure, weight loss and drug interactions with other seizure medications.  He would like to think about felbamate and we will discuss it on the next visit.      Antiepileptic drug discussion: [employer] Insurance coverage for new antiepileptic drug was not adequate, he now has BCBS.  He is not able to tolerate higher doses of oxcarbazepine or phenytoin or levetiracetam. We will consider felbamate as the next agent. Other antiepileptic drug to consider are banzel, Brivaracetam, fycompa, but, if insurance coverage is not good and they are not able to afford these medications. Retrial with lamotrigine (tremors will worsen).  In the past years we have talked about epilepsy brain surgery and he declined.  Vagus nerve stimulator to be interogatted next visit.       In regards to his psychiatric health.  He is following with a psychiatrist and will see a psychologist.     PLAN:      Continue same seizure medications   1. Levetiracetam, 750 mg in the morning, 1500 mg at 4 p.m., and 1500 mg at 11 p.m.   2. Oxcarbazepine (300 mg tablet)  900 mg 10 am, 750 mg 4pm, and 900 mg pm    3. Gabapentin 1200 mg AM, 1200 mg 4 pm, and 1200 mg PM      Start felbamate     Week 1 - 9/7/2020: felbamate 400 mg morning. Continue phenytoin  100 mg twice a day (you may have more side effects)  Week 2 - 9/14/2020: felbamate 800 mg morning. Decrease phenytoin 100 mg at night   Week 3 - 9/21/20: felbamate 800 mg morning and 400 mg 4 pm. Continue phenytoin 100 mg at night   Week 4 - 9/28/20: onward: felbamate 800 mg morning and 800 mg 4 pm. Stop phenytoin.       Felbamate: Adverse reactions include decreased appetite, vomiting, insomnia, nausea, dizziness, somnolence, and headache. Many patients report increased alertness with the drug. Two rare but very serious effects include aplastic anemia and hepatic (liver) failure. The risk of aplastic anemia is between 1:3,600 and 1:5,000, of which 30% of cases are fatal. The risk of hepatic failure is between 1:24,000 to 1:34,000, of which 40% of cases are fatal.    Eat mor healthy food    Blood Check:  Send lab results to [facility] so we are alerted the labs are completed.               Blood Draw           Week 2 -  9/14/2020   Lab draw CBC and CMP. Go to nearby lab. All seizure drug levels.     Week 4   Lab draw CBC and CMP. Go to nearby lab.        Week 6   Lab draw CBC and CMP. Go to nearby lab.        Week 8   Lab draw CBC and CMP. Go to nearby lab.        Week 10   Lab draw CBC and CMP. Go to nearby lab.        Week 12  (month 3)    Lab draw CBC and CMP.Go to nearby lab.        Week 16  (month 4)    Lab draw CBC and CMP. Go to nearby lab.       Week 20  (month 5)     Lab draw CBC and CMP.Go to nearby lab.       Week 24  (month 6)    Lab draw CBC and CMP. Go to nearby lab.            Video with Harlan week 3 and 6.     Vagus nerve stimulator interrogated by nurse. Battery  "life is %.     Rosenda Claros MD     I spent 45 minutes with the patient. During this time medical history data collection, counseling, and coordination of care exceeded 50% of the visit time. I addressed all questions the patient/caregiver raised in regards to the patient's medical care. This note was created with voice recognition software. Inadvertent grammatical errors, typographical errors, and \"sound a like\" substitutions may occur due to limitations of the software.  Read the note carefully and apply context when erroneous substitutions have occurred. Thank you.     Rosenda Claros MD     "

## 2020-08-20 NOTE — PROGRESS NOTES
08/20/20 1300   Device Information   MD Herzog Harlan   Implant Date 01/29/19   Model Number Sentive    Serial Number [id number]   Patient ID IDA   Parameters   VNS Interrogation Incoming Parameters   Date 08/20/20   Output Current (mA) 2.0   Signal Frequency (Hz) 30   Pulse Width (Mu sec) 250   Signal Time On (sec) 30   Signal Time Off 5   Magnet Current (mA) 2.5   Magnet Time On (sec) 60   Magnet Pulse Width (Mu sec) 500   Diagnostics   Output Current (OK/Low) ok   Current Delivered (mA) 2.0   Impedance Value (Ohms) 3101   Battery Status Indicator (Green/Yellow/Red, %) %

## 2020-08-25 ENCOUNTER — TELEPHONE (OUTPATIENT)
Dept: NEUROLOGY | Facility: CLINIC | Age: 45
End: 2020-08-25

## 2020-08-25 NOTE — TELEPHONE ENCOUNTER
What is the concern that needs to be addressed by a nurse? Patient states he doesn't want to take felbamate (FELBATOL) 400 MG tablet because it's too expensive. Please call him with an alternative medication.     May a detailed message be left on voicemail? Yes     Date of last office visit: 8/20/20    Message routed to: [facility] RN Pool

## 2020-08-26 NOTE — TELEPHONE ENCOUNTER
Call received from patient.  He was quoted over $600 for a three month supply. Search of Good Rx showed the price could be lowered to ~$411, however as patient also takes other medications, this would still be too high for him to afford.    Patient did not get the prescription filled.    Will contact  for further recommendations

## 2020-08-27 NOTE — TELEPHONE ENCOUNTER
Discussed with .  She will need to perform further evaluation on future options.  At this time, patient should continue on the medications as he was taking prior to the last clinic visit

## 2020-08-27 NOTE — TELEPHONE ENCOUNTER
Call placed to patient.  Plan discussed.  Follow up reviewed with patient.  Patient noted he is starting to look for jobs, as since his divorce he is living in [place] whereas his children live in the [place] area, thus he is not caring for his children on a daily basis.    No other questions at this time    Patient was instructed to call if he had further questions or concerns

## 2020-09-03 ENCOUNTER — TELEPHONE (OUTPATIENT)
Dept: NEUROLOGY | Facility: CLINIC | Age: 45
End: 2020-09-03

## 2020-09-03 NOTE — TELEPHONE ENCOUNTER
What is the concern that needs to be addressed by a nurse? Patient would like a call back to because he has question on his medication and would like to know in terms of getting back to work.    May a detailed message be left on voicemail? yes    Date of last office visit: 60/30/2020    Message routed to: [facility] RN POOL

## 2020-09-03 NOTE — LETTER
9/3/2020       RE: Ammon Cronin  [address]  [place] [address]          To Whom It May Concern:       should not work in the following conditions:    Avoid any activities that might lead to self-injury or injury of others following any event with impaired awareness or impaired motor control.   These activities include, but are not limited to:    Driving    Care of children or vulnerable adults without continuous supervision of an adult who is fully capable    operating power, cutting, or other tools,     Any equipment without functioning safety guards    handling firearms,     exposure to heights from which he might fall,    exposure to vessels with hot cooking oil or water,    Work around or in water or fluids in which a person could drown.          Rosenda Claros MD

## 2020-09-04 NOTE — TELEPHONE ENCOUNTER
Call returned to patient.    He is hoping to find work in his local area ([facility])  He is working with a  about getting back in to the work force.  The counselor would like recommendations about work to be avoided.    Will discuss with , compile a letter,  and send to patient, he will also access it through [employer].

## 2020-09-10 ENCOUNTER — VIRTUAL VISIT (OUTPATIENT)
Dept: NEUROLOGY | Facility: CLINIC | Age: 45
End: 2020-09-10
Payer: COMMERCIAL

## 2020-09-10 DIAGNOSIS — Z79.899 LONG TERM USE OF DRUG: ICD-10-CM

## 2020-09-10 DIAGNOSIS — G40.219 PARTIAL EPILEPSY WITH LOSS OF CONSCIOUSNESS, INTRACTABLE (H): ICD-10-CM

## 2020-09-10 DIAGNOSIS — G25.81 RESTLESS LEG SYNDROME: ICD-10-CM

## 2020-09-10 DIAGNOSIS — R25.1 TREMOR: ICD-10-CM

## 2020-09-10 DIAGNOSIS — G40.219 PARTIAL EPILEPSY WITH IMPAIRMENT OF CONSCIOUSNESS, INTRACTABLE (H): ICD-10-CM

## 2020-09-10 DIAGNOSIS — G25.81 RESTLESS LEGS SYNDROME: ICD-10-CM

## 2020-09-10 RX ORDER — GABAPENTIN 300 MG/1
CAPSULE ORAL
Qty: 90 CAPSULE | Refills: 1 | Status: SHIPPED | OUTPATIENT
Start: 2020-09-10 | End: 2020-10-15

## 2020-09-10 RX ORDER — GABAPENTIN 600 MG/1
TABLET ORAL
Qty: 450 TABLET | Refills: 3 | Status: SHIPPED | OUTPATIENT
Start: 2020-09-10 | End: 2021-03-09

## 2020-09-10 NOTE — LETTER
"9/10/2020       RE: Ammon Cronin  : 1975   MRN: [id number]      Dear Colleague,    Thank you for referring your patient, Ammon Cronin, to the [facility] at [facility]. Please see a copy of my visit note below.    Ammon Cronin is a 45 year old male who is being evaluated via a billable video visit.      The patient has been notified of following:     \"This video visit will be conducted via a call between you and your physician/provider. We have found that certain health care needs can be provided without the need for an in-person physical exam.  This service lets us provide the care you need with a video conversation.  If a prescription is necessary we can send it directly to your pharmacy.  If lab work is needed we can place an order for that and you can then stop by our lab to have the test done at a later time.    Video visits are billed at different rates depending on your insurance coverage.  Please reach out to your insurance provider with any questions.    If during the course of the call the physician/provider feels a video visit is not appropriate, you will not be charged for this service.\"    Patient has given verbal consent for Video visit? Yes  How would you like to obtain your AVS? MyChart  If you are dropped from the video visit, the video invite should be resent to: Send to e-mail at: [email or web address]  Will anyone else be joining your video visit? No        Video-Visit Details    Type of service:  Video Visit    Video Start Time: 12:47 PM  Video End Time: 1:15 pm     Originating Location (pt. Location): Home    Distant Location (provider location):  [facility]     Platform used for Video Visit: Jack Claros MD    [facility] Progress Note    Patient:  Ammon Cronin  :  1975   Age:  45 year old   Today's Visit:  9/10/2020       INTERVAL HISTORY:  He is alone for video visit. He did not start " felbamate because generic was $411 - $600 for 3 month supply and he can not afford. Seizure burden is the same.  He continues to have high burden of seizures, 8-10 seizure per week. He has daily seizures (staring 3-5 minutes, sometimes after the seizure he can talk but he is not 100% at baseline for hours). Most of his seizure are 9am in the morning.  He may go 1-2 days per week without seizures.  All of his seizures are complex partial seizures in which she has staring with lipsmacking. Seizure may last seconds to minutes.     He has some hearing loss in right ear and he saw a ENT (he was told his second brain surgery may have caused hearing loss).     He has not had any emergency room visits or hospitalizations.  He has depressive symptoms, low appetite, has weight lost, dizziness and double vision with antiepileptic drug.      Review of system: See HPI    AED MEDICATIONS:    1. Levetiracetam, 750 mg in the morning, 1500 mg at 4 p.m., and 1500 mg at 11 p.m.   2. Oxcarbazepine (300 mg tablet)  900 mg 10 am, 750 mg 4pm, and 900 mg pm    3. Gabapentin 1200 mg AM, 1200 mg 4 pm, and 1200 mg PM  (started for RLS, patient has taken higher dose for some time, not sure of length)   4. Phenytoin ER  100-100  5. Diazepam PRN for CPS lasting greater than 5 minutes or more then 2 in one hour.       AED MEDICATIONS:    1. Levetiracetam, 750 mg in the morning, 1500 mg at 4 p.m., and 1500 mg at 11 p.m.   2. Oxcarbazepine  900 mg t.i.d.   3. Gabapentin 1200 mg AM, 1200 mg noon, and 1200 mg PM  (started for RLS, patient has taken higher dose for some time, not sure of length)   5. Klonopin stopped 1/2019  6. Phenytoin ER  100-100  7. Diazepam PRN for CPS lasting greater than 5 minutes or more then 2 in one hour.     MEDICATION NOTES/PRIOR ANTIEPILEPTIC DRUGS:    1.Lyrica (ineffective for seizure, max dose 1100mg per day)   2.Topamax: Two trials from 01/2008-05/2008 and the second trial on 05/19/2015.  Max dose was 200 mg per day.   CP max was 3.5 mg/L.  There was no change in seizure frequency.  The patient was compliant.  During the second trial, they tried it for mood stabilization; 50 mg was after 2 months discontinued because side effects were speech and word-finding difficulties.   3.Gabatril (ineffective, caused fatigue at 48mg/ day). One trial from 01/2004-04/2006.  Max dose 48 mg per day.  It was ineffective and was discontinued.  There was no increase in seizures when it was stopped.  Side effects were fatigue.   4.Zonegran  One trial from 12/2003.  Max dose 400 mg per day.  Zonegran was used in combination with Keppra and Trileptal.  Side effects were memory impairment, aggressiveness, fatigue, behavioral changes, cognitive impairment, which improved after Zonegran was discontinued.   5.Dilantin: One trial from ? to 03/08/2002.  Max dose 350 mg per day.  CP max and free Dilantin level of 1.9 mg/L.  Efficacy:  Unknown.  The drug was optimized.  Side effects were tiredness on 350 mg a day.     Assessment:  It looks like seizure control was better when the patient was on Dilantin; therefore, it could be retried.   6.Vimpat: One trial 07/2009-09/2009.  Max dose 400 mg per day.  CP max 5.3 mg/L.  It was discontinued after 2 months because the patient experienced dizziness, diplopia and ataxia.  7.Depakote: One trial from 01/2010 to 05/09/2016.  So far, seizures have not increased because we increased the gabapentin.  Also, the headaches have not gotten worse, but the hand tremor and the head tremor have immensely improved since Depakote was discontinued.  Max dose was 2500 mg per day.  CP max was up to 116/15.6 mg/L.  In the past, it decreased seizure frequency, and it was used together with levetiracetam, oxcarbazepine and gabapentin, and it was used before with oxcarbazepine, levetiracetam and Lyrica.   Assessment:  Depakote could always be retried if we run into a problem.  8.lamotrigine: One trial from 07/06/2001.  Max dose 150 mg  per day.  Used in combination with Dilantin.  There was no change in seizure frequency.  Side effects were increase in confusion and therefore questionable efficacy.  9.carbamazepine (tired, but no past info on details)  10.Levetiracetam: started in 2001. No major side effects. One trial from 07/2001 to present.  Max dose 3750 mg per day.  CP max 42.4 mg/L. Levetiracetam above 4000 mg per day caused slurred speech, double vision, unstable gait, severe side effects.   11. Oxcarbazepine:  One trial from 03/2002 to present.  Max dose 2700 mg per day.  CP max 43.2 mg/L.  It decreases seizures, and it looks like it is one of the best drugs the patient has tried.  increased oxcarbazepine from 2,400mg -> 2700 mg on 4/2013.   12.  Gabapentin:  One trial from 07/2012 to present.  Max dose is 3600 mg per day.  This drug was used more for pain and restless legs, but when Depakote was tapered, we increased the gabapentin, and it looks like the only problem we have is some weight gain with the gabapentin.  Gabapentin  increased 1200 mg three times a day 5/2016 with no change in seizure, however, RLS symptoms decreased  13.  Klonopin:  One trial started 03/2013.  Is used for anxiety, not for seizures.  It was started at 0.5 mg and optimized so far to 3 mg per day.   stopped 12/2018 and seizure increased 1/2019 and then they stabilized. This drug is used by a psychiatrist.  If we one day want to try ONFI in this patient, maybe then the Klonopin could be decreased again.   14. Vagus nerve stimulator setting: Not able to tolerate higher duty cycle (35) with 1.1 signal off time and 30 sec on time, seizure worsened, not able to tolerate higher current setting due to discomfort.     There were no vitals taken for this visit.  Wt Readings from Last 4 Encounters:   08/20/20 123 lb 6.4 oz (56 kg)   02/26/20 132 lb (59.9 kg)   07/30/19 128 lb 12.8 oz (58.4 kg)   05/14/19 134 lb (60.8 kg)        Alert, orientated, speech is fluent, face  symmetric, tongue midline, extra ocular movements in tact, no pronator drip, no focal weakness.     ASSESSMENT:    1. 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 Brain Cavernoma   3. Memory deficits secondary to seizures and antiepileptic drugs    4. Anxiety/Depression with suicidal ideations 12/2018   5. Restless legs syndrome.  6. History of Migraines  7. Asystole secondary to seizures. Status post pacemaker placement 12/2003.      Discussion: Refractory multifocal epilepsy.  Etiology multiple cavernomas.  He  is status left temporal resection of AVM in 1985 (age 11), Right frontal hematoma 04/13/2006, treated with surgery.  Left-sided cavernous hemangioma 07/2002. Status post epilepsy surgery 05/29/2012.  Vagus nerve stimulator implanted on right and he has pacemaker on left.  Electrographically, he has multifocal epileptiform discharges and seizures arising from left and right temporal regions.      Currently patient continues to have a high burden of seizures. He has rare dizziness and double vision in the evening.  On this visit we talked cost of antiepileptic drug. His insurance company has a high co pay for several antiepileptic drug and this is cost prohibitive for him.  Antiepileptic drug discussion: [employer] Insurance coverage for new antiepileptic drug was not adequate, he now has [employer].  He is not able to tolerate higher doses of oxcarbazepine or phenytoin or levetiracetam. His insurance co pay for  felbamate  ($400-600) is cost prohibitive. I suspect his copay for brand antiepileptic drug Banzel, Brivaracetam, fycompa, may also be cost prohibitive. Retrial with lamotrigine (tremors will worsen).  In the past years we have talked about epilepsy brain surgery (DEEP BRAIN STIMULATION DEVICE ) and he declined.       He has a lot of fatigue and its not clear if  gabapentin  Is helpful, we will reduce day time dose and monitor seizures. In regards to his psychiatric  health.  He is following with a psychiatrist and will see a psychologist.     PLAN:     Continue same seizure medications   Levetiracetam, 750 mg in the morning, 1500 mg at 4 p.m., and 1500 mg at 11 p.m.   Oxcarbazepine (300 mg tablet)  900 mg 10 am, 750 mg 4pm, and 900 mg pm    Phenytoin  100 mg twice a day     Reduce  Gabapentin 900 mg 10 AM, 1200 mg 4 pm, and 1200 mg 11 PM  to reduce fatigue during the day.     Video with Harlan week 6.     Vagus nerve stimulator interrogated Summer 2020. Battery life is %.     His ENT would like brain MRI (I told him vagus nerve stimulator has to turned off and he should check with cardiologist about pacemaker).     Rosenda Claros MD

## 2020-09-10 NOTE — PROGRESS NOTES
"Ammon Cronin is a 45 year old male who is being evaluated via a billable video visit.      The patient has been notified of following:     \"This video visit will be conducted via a call between you and your physician/provider. We have found that certain health care needs can be provided without the need for an in-person physical exam.  This service lets us provide the care you need with a video conversation.  If a prescription is necessary we can send it directly to your pharmacy.  If lab work is needed we can place an order for that and you can then stop by our lab to have the test done at a later time.    Video visits are billed at different rates depending on your insurance coverage.  Please reach out to your insurance provider with any questions.    If during the course of the call the physician/provider feels a video visit is not appropriate, you will not be charged for this service.\"    Patient has given verbal consent for Video visit? Yes  How would you like to obtain your AVS? MyChart  If you are dropped from the video visit, the video invite should be resent to: Send to e-mail at: [email or web address]  Will anyone else be joining your video visit? No        Video-Visit Details    Type of service:  Video Visit    Video Start Time: 12:47 PM  Video End Time: 1:15 pm     Originating Location (pt. Location): Home    Distant Location (provider location):  [facility] EPILEPSY [facility]     Platform used for Video Visit: Jack Claros MD    [facility] Progress Note    Patient:  Ammon Cronin  :  1975   Age:  45 year old   Today's Visit:  9/10/2020       INTERVAL HISTORY:  He is alone for video visit. He did not start felbamate because generic was $411 - $600 for 3 month supply and he can not afford. Seizure burden is the same.  He continues to have high burden of seizures, 8-10 seizure per week. He has daily seizures (staring 3-5 minutes, sometimes after the seizure he can talk but he " is not 100% at baseline for hours). Most of his seizure are 9am in the morning.  He may go 1-2 days per week without seizures.  All of his seizures are complex partial seizures in which she has staring with lipsmacking. Seizure may last seconds to minutes.     He has some hearing loss in right ear and he saw a ENT (he was told his second brain surgery may have caused hearing loss).     He has not had any emergency room visits or hospitalizations.  He has depressive symptoms, low appetite, has weight lost, dizziness and double vision with antiepileptic drug.      Review of system: See HPI    AED MEDICATIONS:    1. Levetiracetam, 750 mg in the morning, 1500 mg at 4 p.m., and 1500 mg at 11 p.m.   2. Oxcarbazepine (300 mg tablet)  900 mg 10 am, 750 mg 4pm, and 900 mg pm    3. Gabapentin 1200 mg AM, 1200 mg 4 pm, and 1200 mg PM  (started for RLS, patient has taken higher dose for some time, not sure of length)   4. Phenytoin ER  100-100  5. Diazepam PRN for CPS lasting greater than 5 minutes or more then 2 in one hour.       AED MEDICATIONS:    1. Levetiracetam, 750 mg in the morning, 1500 mg at 4 p.m., and 1500 mg at 11 p.m.   2. Oxcarbazepine  900 mg t.i.d.   3. Gabapentin 1200 mg AM, 1200 mg noon, and 1200 mg PM  (started for RLS, patient has taken higher dose for some time, not sure of length)   5. Klonopin stopped 1/2019  6. Phenytoin ER  100-100  7. Diazepam PRN for CPS lasting greater than 5 minutes or more then 2 in one hour.     MEDICATION NOTES/PRIOR ANTIEPILEPTIC DRUGS:    1.Lyrica (ineffective for seizure, max dose 1100mg per day)   2.Topamax: Two trials from 01/2008-05/2008 and the second trial on 05/19/2015.  Max dose was 200 mg per day.  CP max was 3.5 mg/L.  There was no change in seizure frequency.  The patient was compliant.  During the second trial, they tried it for mood stabilization; 50 mg was after 2 months discontinued because side effects were speech and word-finding difficulties.   3.Gabatril  (ineffective, caused fatigue at 48mg/ day). One trial from 01/2004-04/2006.  Max dose 48 mg per day.  It was ineffective and was discontinued.  There was no increase in seizures when it was stopped.  Side effects were fatigue.   4.Zonegran  One trial from 12/2003.  Max dose 400 mg per day.  Zonegran was used in combination with Keppra and Trileptal.  Side effects were memory impairment, aggressiveness, fatigue, behavioral changes, cognitive impairment, which improved after Zonegran was discontinued.   5.Dilantin: One trial from ? to 03/08/2002.  Max dose 350 mg per day.  CP max and free Dilantin level of 1.9 mg/L.  Efficacy:  Unknown.  The drug was optimized.  Side effects were tiredness on 350 mg a day.     Assessment:  It looks like seizure control was better when the patient was on Dilantin; therefore, it could be retried.   6.Vimpat: One trial 07/2009-09/2009.  Max dose 400 mg per day.  CP max 5.3 mg/L.  It was discontinued after 2 months because the patient experienced dizziness, diplopia and ataxia.  7.Depakote: One trial from 01/2010 to 05/09/2016.  So far, seizures have not increased because we increased the gabapentin.  Also, the headaches have not gotten worse, but the hand tremor and the head tremor have immensely improved since Depakote was discontinued.  Max dose was 2500 mg per day.  CP max was up to 116/15.6 mg/L.  In the past, it decreased seizure frequency, and it was used together with levetiracetam, oxcarbazepine and gabapentin, and it was used before with oxcarbazepine, levetiracetam and Lyrica.   Assessment:  Depakote could always be retried if we run into a problem.  8.lamotrigine: One trial from 07/06/2001.  Max dose 150 mg per day.  Used in combination with Dilantin.  There was no change in seizure frequency.  Side effects were increase in confusion and therefore questionable efficacy.  9.carbamazepine (tired, but no past info on details)  10.Levetiracetam: started in 2001. No major side  effects. One trial from 07/2001 to present.  Max dose 3750 mg per day.  CP max 42.4 mg/L. Levetiracetam above 4000 mg per day caused slurred speech, double vision, unstable gait, severe side effects.   11. Oxcarbazepine:  One trial from 03/2002 to present.  Max dose 2700 mg per day.  CP max 43.2 mg/L.  It decreases seizures, and it looks like it is one of the best drugs the patient has tried.  increased oxcarbazepine from 2,400mg -> 2700 mg on 4/2013.   12.  Gabapentin:  One trial from 07/2012 to present.  Max dose is 3600 mg per day.  This drug was used more for pain and restless legs, but when Depakote was tapered, we increased the gabapentin, and it looks like the only problem we have is some weight gain with the gabapentin.  Gabapentin  increased 1200 mg three times a day 5/2016 with no change in seizure, however, RLS symptoms decreased  13.  Klonopin:  One trial started 03/2013.  Is used for anxiety, not for seizures.  It was started at 0.5 mg and optimized so far to 3 mg per day.   stopped 12/2018 and seizure increased 1/2019 and then they stabilized. This drug is used by a psychiatrist.  If we one day want to try ONFI in this patient, maybe then the Klonopin could be decreased again.   14. Vagus nerve stimulator setting: Not able to tolerate higher duty cycle (35) with 1.1 signal off time and 30 sec on time, seizure worsened, not able to tolerate higher current setting due to discomfort.     There were no vitals taken for this visit.  Wt Readings from Last 4 Encounters:   08/20/20 123 lb 6.4 oz (56 kg)   02/26/20 132 lb (59.9 kg)   07/30/19 128 lb 12.8 oz (58.4 kg)   05/14/19 134 lb (60.8 kg)        Alert, orientated, speech is fluent, face symmetric, tongue midline, extra ocular movements in tact, no pronator drip, no focal weakness.     ASSESSMENT:    1. 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 Brain Cavernoma   3. Memory deficits secondary to seizures and  antiepileptic drugs    4. Anxiety/Depression with suicidal ideations 12/2018   5. Restless legs syndrome.  6. History of Migraines  7. Asystole secondary to seizures. Status post pacemaker placement 12/2003.      Discussion: Refractory multifocal epilepsy.  Etiology multiple cavernomas.  He  is status left temporal resection of AVM in 1985 (age 11), Right frontal hematoma 04/13/2006, treated with surgery.  Left-sided cavernous hemangioma 07/2002. Status post epilepsy surgery 05/29/2012.  Vagus nerve stimulator implanted on right and he has pacemaker on left.  Electrographically, he has multifocal epileptiform discharges and seizures arising from left and right temporal regions.      Currently patient continues to have a high burden of seizures. He has rare dizziness and double vision in the evening.  On this visit we talked cost of antiepileptic drug. His insurance company has a high co pay for several antiepileptic drug and this is cost prohibitive for him.  Antiepileptic drug discussion: [employer] coverage for new antiepileptic drug was not adequate, he now has [employer].  He is not able to tolerate higher doses of oxcarbazepine or phenytoin or levetiracetam. His insurance co pay for  felbamate  ($400-600) is cost prohibitive. I suspect his copay for brand antiepileptic drug Banzel, Brivaracetam, fycompa, may also be cost prohibitive. Retrial with lamotrigine (tremors will worsen).  In the past years we have talked about epilepsy brain surgery (DEEP BRAIN STIMULATION DEVICE ) and he declined.       He has a lot of fatigue and its not clear if  gabapentin  Is helpful, we will reduce day time dose and monitor seizures. In regards to his psychiatric health.  He is following with a psychiatrist and will see a psychologist.     PLAN:     Continue same seizure medications   Levetiracetam, 750 mg in the morning, 1500 mg at 4 p.m., and 1500 mg at 11 p.m.   Oxcarbazepine (300 mg tablet)  900 mg 10 am, 750 mg 4pm, and 900  mg pm    Phenytoin  100 mg twice a day     Reduce  Gabapentin 900 mg 10 AM, 1200 mg 4 pm, and 1200 mg 11 PM  to reduce fatigue during the day.     Video with Harlan week 6.     Vagus nerve stimulator interrogated Summer 2020. Battery life is %.     His ENT would like brain MRI (I told him vagus nerve stimulator has to turned off and he should check with cardiologist about pacemaker).     Rosenda Claros MD

## 2020-09-28 ENCOUNTER — TELEPHONE (OUTPATIENT)
Dept: NEUROLOGY | Facility: CLINIC | Age: 45
End: 2020-09-28

## 2020-09-28 NOTE — TELEPHONE ENCOUNTER
What is the concern that needs to be addressed by a nurse?   Having possible side effects from gabapentin.     May a detailed message be left on voicemail? yes    Date of last office visit: 9/10/20    Message routed to: nurse

## 2020-10-13 ENCOUNTER — VIRTUAL VISIT (OUTPATIENT)
Dept: NEUROLOGY | Facility: CLINIC | Age: 45
End: 2020-10-13
Payer: COMMERCIAL

## 2020-10-13 DIAGNOSIS — G40.219 PARTIAL EPILEPSY WITH IMPAIRMENT OF CONSCIOUSNESS, INTRACTABLE (H): Primary | ICD-10-CM

## 2020-10-13 NOTE — PROGRESS NOTES
"Ammon Cronin is a 45 year old male who is being evaluated via a billable video visit.      The patient has been notified of following:     \"This video visit will be conducted via a call between you and your physician/provider. We have found that certain health care needs can be provided without the need for an in-person physical exam.  This service lets us provide the care you need with a video conversation.  If a prescription is necessary we can send it directly to your pharmacy.  If lab work is needed we can place an order for that and you can then stop by our lab to have the test done at a later time.    Video visits are billed at different rates depending on your insurance coverage.  Please reach out to your insurance provider with any questions.    If during the course of the call the physician/provider feels a video visit is not appropriate, you will not be charged for this service.\"    Patient has given verbal consent for Video visit? Yes  How would you like to obtain your AVS? MyChart  If you are dropped from the video visit, the video invite should be resent to: Send to e-mail at: [email or web address]  Will anyone else be joining your video visit? Yes: Possibly . How would they like to receive their invitation? Other e-mail: N/A        Video-Visit Details    Type of service:  Video Visit    Video Start Time: 10:48 AM  Video End Time: 11:20pm    Originating Location (pt. Location): Home    Distant Location (provider location):  [facility] EPILEPSY [facility]     Platform used for Video Visit: Jack Claros MD    [facility]psy [facility] Progress Note    Patient:  Ammon Cronin  :  1975   Age:  45 year old   Today's Visit:  10/13/2020       INTERVAL HISTORY:  He is joined with video visit with his dad. Pharmacy student joined, but, I completed entire visit and note. Ammon lowered  gabapentin as advised and he continues to have fatigue. Seizure frequency have increased for 24 hours and now back to " "baseline. He has 3-4 seizure per day. Toby states he has maybe 2 seizure per day and may have zero seizure some days. Overall, they think seizure frequency is the same as before 8-10 seizure per week.  He had seizure cluster for 24 hours post stopping, toby states he \"was in seizure state for one year, he took seizure rescue med\". The 1st 24 hours post stopping  Gabapentin he had 12 seizure and a few seizure were greater than 20 minutes. Now he is back to baseline.      He has 8-10 seizure per week (staring, impaired awareness, mumbling, seizures are under 5 minutes, most seizure are less than 10 seconds). Most of his seizure are 9am in the morning.  He may go 1-2 days per week without seizures.  All of his seizures are complex partial seizures in which she has staring with lipsmacking. Seizure may last seconds to 1 minute.     He has not had any emergency room visits or hospitalizations.  He has depressive symptoms, low appetite, has weight lost, dizziness and double vision with antiepileptic drug.      Review of system: Rare intermittent dizziness and double vision.  He continues to have fatigue.  He continues to have memory deficits.  Hearing loss.    AED MEDICATIONS:    1. Levetiracetam, 750 mg in the morning, 1500 mg at 4 p.m., and 1500 mg at 11 p.m.   2. Oxcarbazepine (300 mg tablet)  900 mg 10 am, 750 mg 4pm, and 900 mg pm    3. Gabapentin 900 mg AM, 1200 mg 4 pm, and 1200 mg PM  (started for RLS, patient has taken higher dose for some time, not sure of length)   4. Phenytoin ER  100-100  5. Diazepam PRN for CPS lasting greater than 5 minutes or more then 2 in one hour.       PAST AED MEDICATIONS:    1. Levetiracetam, 750 mg in the morning, 1500 mg at 4 p.m., and 1500 mg at 11 p.m.   2. Oxcarbazepine  900 mg t.i.d.   3. Gabapentin 1200 mg AM, 1200 mg noon, and 1200 mg PM  (started for RLS, patient has taken higher dose for some time, not sure of length)   5. Klonopin stopped 1/2019  6. Phenytoin ER  100-100  7. " Diazepam PRN for CPS lasting greater than 5 minutes or more then 2 in one hour.  8. Felbamate not tired because generic was $411 - $600 for 3 month supply and he can not afford     MEDICATION NOTES/PRIOR ANTIEPILEPTIC DRUGS:    1.Lyrica (ineffective for seizure, max dose 1100mg per day)   2.Topamax: Two trials from 01/2008-05/2008 and the second trial on 05/19/2015.  Max dose was 200 mg per day.  CP max was 3.5 mg/L.  There was no change in seizure frequency.  The patient was compliant.  During the second trial, they tried it for mood stabilization; 50 mg was after 2 months discontinued because side effects were speech and word-finding difficulties.   3.Gabatril (ineffective, caused fatigue at 48mg/ day). One trial from 01/2004-04/2006.  Max dose 48 mg per day.  It was ineffective and was discontinued.  There was no increase in seizures when it was stopped.  Side effects were fatigue.   4.Zonegran  One trial from 12/2003.  Max dose 400 mg per day.  Zonegran was used in combination with Keppra and Trileptal.  Side effects were memory impairment, aggressiveness, fatigue, behavioral changes, cognitive impairment, which improved after Zonegran was discontinued.   5.Dilantin: One trial from ? to 03/08/2002.  Max dose 350 mg per day.  CP max and free Dilantin level of 1.9 mg/L.  Efficacy:  Unknown.  The drug was optimized.  Side effects were tiredness on 350 mg a day.     Assessment:  It looks like seizure control was better when the patient was on Dilantin; therefore, it could be retried.   6.Vimpat: One trial 07/2009-09/2009.  Max dose 400 mg per day.  CP max 5.3 mg/L.  It was discontinued after 2 months because the patient experienced dizziness, diplopia and ataxia.  7.Depakote: One trial from 01/2010 to 05/09/2016.  So far, seizures have not increased because we increased the gabapentin.  Also, the headaches have not gotten worse, but the hand tremor and the head tremor have immensely improved since Depakote was  discontinued.  Max dose was 2500 mg per day.  CP max was up to 116/15.6 mg/L.  In the past, it decreased seizure frequency, and it was used together with levetiracetam, oxcarbazepine and gabapentin, and it was used before with oxcarbazepine, levetiracetam and Lyrica.   Assessment:  Depakote could always be retried if we run into a problem.  8.lamotrigine: One trial from 07/06/2001.  Max dose 150 mg per day.  Used in combination with Dilantin.  There was no change in seizure frequency.  Side effects were increase in confusion and therefore questionable efficacy.  9.carbamazepine (tired, but no past info on details)  10.Levetiracetam: started in 2001. No major side effects. One trial from 07/2001 to present.  Max dose 3750 mg per day.  CP max 42.4 mg/L. Levetiracetam above 4000 mg per day caused slurred speech, double vision, unstable gait, severe side effects.   11. Oxcarbazepine:  One trial from 03/2002 to present.  Max dose 2700 mg per day.  CP max 43.2 mg/L.  It decreases seizures, and it looks like it is one of the best drugs the patient has tried.  increased oxcarbazepine from 2,400mg -> 2700 mg on 4/2013.   12.  Gabapentin:  One trial from 07/2012 to present.  Max dose is 3600 mg per day.  This drug was used more for pain and restless legs, but when Depakote was tapered, we increased the gabapentin, and it looks like the only problem we have is some weight gain with the gabapentin.  Gabapentin  increased 1200 mg three times a day 5/2016 with no change in seizure, however, RLS symptoms decreased  13.  Klonopin:  One trial started 03/2013.  Is used for anxiety, not for seizures.  It was started at 0.5 mg and optimized so far to 3 mg per day.   stopped 12/2018 and seizure increased 1/2019 and then they stabilized. This drug is used by a psychiatrist.  If we one day want to try ONFI in this patient, maybe then the Klonopin could be decreased again.   14. Vagus nerve stimulator setting: Not able to tolerate higher  duty cycle (35) with 1.1 signal off time and 30 sec on time, seizure worsened, not able to tolerate higher current setting due to discomfort.     There were no vitals taken for this visit.  Wt Readings from Last 4 Encounters:   08/20/20 123 lb 6.4 oz (56 kg)   02/26/20 132 lb (59.9 kg)   07/30/19 128 lb 12.8 oz (58.4 kg)   05/14/19 134 lb (60.8 kg)       EXAM: Alert, orientated, speech is fluent, face symmetric, tongue midline, extra ocular movements in tact, no pronator drip, no focal weakness.     ASSESSMENT:    1. 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 Brain Cavernoma   3. Memory deficits secondary to seizures and antiepileptic drugs    4. Anxiety/Depression with suicidal ideations 12/2018   5. Restless legs syndrome.  6. History of Migraines  7. Asystole secondary to seizures. Status post pacemaker placement 12/2003.      Discussion: Refractory multifocal epilepsy.  Etiology multiple cavernomas.  He is status left temporal resection of AVM in 1985 (age 11), Right frontal hematoma 04/13/2006, treated with surgery.  Left-sided cavernous hemangioma 07/2002. Status post epilepsy surgery 05/29/2012.  Vagus nerve stimulator implanted on right and he has pacemaker on left. Electrographically, he has multifocal epileptiform discharges and seizures arising from left and right temporal regions.      Currently patient continues to have a high burden of seizures, however his seizures are at baseline at 8-10 seizures per week all of his seizures are focal impaired seizures and no generalized tonic-clonic seizures.  He does follow seizure precautions to ensure physical safety. On this visit we talked about emotional health and working with mental health care team.  He does have established care with a psychologist and psychiatrist.    In regards to antiseizure medications, he is not able to tolerate higher doses of oxcarbazepine or phenytoin or levetiracetam. His insurance co pay  for  felbamate  ($400-600) is cost prohibitive and I suspect his copay for brand antiepileptic drug Banzel, Brivaracetam, fycompa, may also be cost prohibitive. Retrial with lamotrigine (tremors will worsen).  In the past years we have talked about epilepsy brain surgery (DEEP BRAIN STIMULATION DEVICE ) and he declined.          PLAN:     Continue same seizure medications   Levetiracetam, 750 mg in the morning, 1500 mg at 4 p.m., and 1500 mg at 11 p.m.   Oxcarbazepine (300 mg tablet)  900 mg 10 am, 750 mg 4pm, and 900 mg pm    Phenytoin  100 mg twice a day   Gabapentin 900 mg 10 AM, 1200 mg 4 pm, and 1200 mg 11 PM      Video with Harlan 4 months or sooner. [facility] to have [facility]     Vagus nerve stimulator interrogated Summer 2020. Battery life is %.     No MRI per cardiology due to pacemaker    Rosenda Claros MD

## 2020-10-13 NOTE — LETTER
"10/13/2020       RE: Ammon Cronin  : 1975   MRN: [id number]      Dear Colleague,    Thank you for referring your patient, Ammon Cronin, to the [facility] EPILEPSY CARE at [facility]. Please see a copy of my visit note below.    Ammon Cronin is a 45 year old male who is being evaluated via a billable video visit.      The patient has been notified of following:     \"This video visit will be conducted via a call between you and your physician/provider. We have found that certain health care needs can be provided without the need for an in-person physical exam.  This service lets us provide the care you need with a video conversation.  If a prescription is necessary we can send it directly to your pharmacy.  If lab work is needed we can place an order for that and you can then stop by our lab to have the test done at a later time.    Video visits are billed at different rates depending on your insurance coverage.  Please reach out to your insurance provider with any questions.    If during the course of the call the physician/provider feels a video visit is not appropriate, you will not be charged for this service.\"    Patient has given verbal consent for Video visit? Yes  How would you like to obtain your AVS? MyChart  If you are dropped from the video visit, the video invite should be resent to: Send to e-mail at: [email or web address]  Will anyone else be joining your video visit? Yes: Possibly . How would they like to receive their invitation? Other e-mail: N/A        Video-Visit Details    Type of service:  Video Visit    Video Start Time: 10:48 AM  Video End Time: 11:20pm    Originating Location (pt. Location): Home    Distant Location (provider location):  [facility] EPILEPSY CARE     Platform used for Video Visit: Jack Claros MD    [facility] Epilepsy Care Progress Note    Patient:  Ammon Cronin  :  1975   Age:  45 year old   Today's Visit:  " "10/13/2020       INTERVAL HISTORY:  He is joined with video visit with his dad. Pharmacy student joined, but, I completed entire visit and note. Ammon lowered  gabapentin as advised and he continues to have fatigue. Seizure frequency have increased for 24 hours and now back to baseline. He has 3-4 seizure per day. Toby states he has maybe 2 seizure per day and may have zero seizure some days. Overall, they think seizure frequency is the same as before 8-10 seizure per week.  He had seizure cluster for 24 hours post stopping, toby states he \"was in seizure state for one year, he took seizure rescue med\". The 1st 24 hours post stopping  Gabapentin he had 12 seizure and a few seizure were greater than 20 minutes. Now he is back to baseline.      He has 8-10 seizure per week (staring, impaired awareness, mumbling, seizures are under 5 minutes, most seizure are less than 10 seconds). Most of his seizure are 9am in the morning.  He may go 1-2 days per week without seizures.  All of his seizures are complex partial seizures in which she has staring with lipsmacking. Seizure may last seconds to 1 minute.     He has not had any emergency room visits or hospitalizations.  He has depressive symptoms, low appetite, has weight lost, dizziness and double vision with antiepileptic drug.      Review of system: Rare intermittent dizziness and double vision.  He continues to have fatigue.  He continues to have memory deficits.  Hearing loss.    AED MEDICATIONS:    1. Levetiracetam, 750 mg in the morning, 1500 mg at 4 p.m., and 1500 mg at 11 p.m.   2. Oxcarbazepine (300 mg tablet)  900 mg 10 am, 750 mg 4pm, and 900 mg pm    3. Gabapentin 900 mg AM, 1200 mg 4 pm, and 1200 mg PM  (started for RLS, patient has taken higher dose for some time, not sure of length)   4. Phenytoin ER  100-100  5. Diazepam PRN for CPS lasting greater than 5 minutes or more then 2 in one hour.       PAST AED MEDICATIONS:    1. Levetiracetam, 750 mg in the " morning, 1500 mg at 4 p.m., and 1500 mg at 11 p.m.   2. Oxcarbazepine  900 mg t.i.d.   3. Gabapentin 1200 mg AM, 1200 mg noon, and 1200 mg PM  (started for RLS, patient has taken higher dose for some time, not sure of length)   5. Klonopin stopped 1/2019  6. Phenytoin ER  100-100  7. Diazepam PRN for CPS lasting greater than 5 minutes or more then 2 in one hour.  8. Felbamate not tired because generic was $411 - $600 for 3 month supply and he can not afford     MEDICATION NOTES/PRIOR ANTIEPILEPTIC DRUGS:    1.Lyrica (ineffective for seizure, max dose 1100mg per day)   2.Topamax: Two trials from 01/2008-05/2008 and the second trial on 05/19/2015.  Max dose was 200 mg per day.  CP max was 3.5 mg/L.  There was no change in seizure frequency.  The patient was compliant.  During the second trial, they tried it for mood stabilization; 50 mg was after 2 months discontinued because side effects were speech and word-finding difficulties.   3.Gabatril (ineffective, caused fatigue at 48mg/ day). One trial from 01/2004-04/2006.  Max dose 48 mg per day.  It was ineffective and was discontinued.  There was no increase in seizures when it was stopped.  Side effects were fatigue.   4.Zonegran  One trial from 12/2003.  Max dose 400 mg per day.  Zonegran was used in combination with Keppra and Trileptal.  Side effects were memory impairment, aggressiveness, fatigue, behavioral changes, cognitive impairment, which improved after Zonegran was discontinued.   5.Dilantin: One trial from ? to 03/08/2002.  Max dose 350 mg per day.  CP max and free Dilantin level of 1.9 mg/L.  Efficacy:  Unknown.  The drug was optimized.  Side effects were tiredness on 350 mg a day.     Assessment:  It looks like seizure control was better when the patient was on Dilantin; therefore, it could be retried.   6.Vimpat: One trial 07/2009-09/2009.  Max dose 400 mg per day.  CP max 5.3 mg/L.  It was discontinued after 2 months because the patient experienced  dizziness, diplopia and ataxia.  7.Depakote: One trial from 01/2010 to 05/09/2016.  So far, seizures have not increased because we increased the gabapentin.  Also, the headaches have not gotten worse, but the hand tremor and the head tremor have immensely improved since Depakote was discontinued.  Max dose was 2500 mg per day.  CP max was up to 116/15.6 mg/L.  In the past, it decreased seizure frequency, and it was used together with levetiracetam, oxcarbazepine and gabapentin, and it was used before with oxcarbazepine, levetiracetam and Lyrica.   Assessment:  Depakote could always be retried if we run into a problem.  8.lamotrigine: One trial from 07/06/2001.  Max dose 150 mg per day.  Used in combination with Dilantin.  There was no change in seizure frequency.  Side effects were increase in confusion and therefore questionable efficacy.  9.carbamazepine (tired, but no past info on details)  10.Levetiracetam: started in 2001. No major side effects. One trial from 07/2001 to present.  Max dose 3750 mg per day.  CP max 42.4 mg/L. Levetiracetam above 4000 mg per day caused slurred speech, double vision, unstable gait, severe side effects.   11. Oxcarbazepine:  One trial from 03/2002 to present.  Max dose 2700 mg per day.  CP max 43.2 mg/L.  It decreases seizures, and it looks like it is one of the best drugs the patient has tried.  increased oxcarbazepine from 2,400mg -> 2700 mg on 4/2013.   12.  Gabapentin:  One trial from 07/2012 to present.  Max dose is 3600 mg per day.  This drug was used more for pain and restless legs, but when Depakote was tapered, we increased the gabapentin, and it looks like the only problem we have is some weight gain with the gabapentin.  Gabapentin  increased 1200 mg three times a day 5/2016 with no change in seizure, however, RLS symptoms decreased  13.  Klonopin:  One trial started 03/2013.  Is used for anxiety, not for seizures.  It was started at 0.5 mg and optimized so far to 3 mg  per day.   stopped 12/2018 and seizure increased 1/2019 and then they stabilized. This drug is used by a psychiatrist.  If we one day want to try ONFI in this patient, maybe then the Klonopin could be decreased again.   14. Vagus nerve stimulator setting: Not able to tolerate higher duty cycle (35) with 1.1 signal off time and 30 sec on time, seizure worsened, not able to tolerate higher current setting due to discomfort.     There were no vitals taken for this visit.  Wt Readings from Last 4 Encounters:   08/20/20 123 lb 6.4 oz (56 kg)   02/26/20 132 lb (59.9 kg)   07/30/19 128 lb 12.8 oz (58.4 kg)   05/14/19 134 lb (60.8 kg)       EXAM: Alert, orientated, speech is fluent, face symmetric, tongue midline, extra ocular movements in tact, no pronator drip, no focal weakness.     ASSESSMENT:    1. 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 Brain Cavernoma   3. Memory deficits secondary to seizures and antiepileptic drugs    4. Anxiety/Depression with suicidal ideations 12/2018   5. Restless legs syndrome.  6. History of Migraines  7. Asystole secondary to seizures. Status post pacemaker placement 12/2003.      Discussion: Refractory multifocal epilepsy.  Etiology multiple cavernomas.  He is status left temporal resection of AVM in 1985 (age 11), Right frontal hematoma 04/13/2006, treated with surgery.  Left-sided cavernous hemangioma 07/2002. Status post epilepsy surgery 05/29/2012.  Vagus nerve stimulator implanted on right and he has pacemaker on left. Electrographically, he has multifocal epileptiform discharges and seizures arising from left and right temporal regions.      Currently patient continues to have a high burden of seizures, however his seizures are at baseline at 8-10 seizures per week all of his seizures are focal impaired seizures and no generalized tonic-clonic seizures.  He does follow seizure precautions to ensure physical safety. On this visit we talked  about emotional health and working with mental health care team.  He does have established care with a psychologist and psychiatrist.    In regards to antiseizure medications, he is not able to tolerate higher doses of oxcarbazepine or phenytoin or levetiracetam. His insurance co pay for  felbamate  ($400-600) is cost prohibitive and I suspect his copay for brand antiepileptic drug Banzel, Brivaracetam, fycompa, may also be cost prohibitive. Retrial with lamotrigine (tremors will worsen).  In the past years we have talked about epilepsy brain surgery (DEEP BRAIN STIMULATION DEVICE ) and he declined.          PLAN:     Continue same seizure medications   Levetiracetam, 750 mg in the morning, 1500 mg at 4 p.m., and 1500 mg at 11 p.m.   Oxcarbazepine (300 mg tablet)  900 mg 10 am, 750 mg 4pm, and 900 mg pm    Phenytoin  100 mg twice a day   Gabapentin 900 mg 10 AM, 1200 mg 4 pm, and 1200 mg 11 PM      Video with Harlan 4 months or sooner. Please call [facility] if he continues to have prolonged seizure     Vagus nerve stimulator interrogated Summer 2020. Battery life is %.     No MRI per cardiology due to pacemaker    Rosenda Claros MD

## 2020-10-15 DIAGNOSIS — G40.219 PARTIAL EPILEPSY WITH IMPAIRMENT OF CONSCIOUSNESS, INTRACTABLE (H): ICD-10-CM

## 2020-10-15 RX ORDER — GABAPENTIN 300 MG/1
CAPSULE ORAL
Qty: 90 CAPSULE | Refills: 11 | Status: SHIPPED | OUTPATIENT
Start: 2020-10-15 | End: 2021-03-09

## 2020-10-15 NOTE — TELEPHONE ENCOUNTER
gabapentin (NEURONTIN) 300 MG capsule  Last Written Prescription Date:  9/10/2020  Last Fill Quantity: 90,   # refills: 1  Last Office Visit : 10/13/2020  Future Office visit:  None  Routing refill request to provider for review/approval because:  Drug not on the [facility], [facility] or [facility] refill protocol or controlled substance      Sherin Tom RN  Central Triage Red Flags/Med Refills

## 2020-10-20 RX ORDER — GABAPENTIN 300 MG/1
CAPSULE ORAL
Qty: 90 CAPSULE | Refills: 11 | OUTPATIENT
Start: 2020-10-20

## 2020-10-20 NOTE — TELEPHONE ENCOUNTER
Pending Prescriptions:                       Disp   Refills    gabapentin (NEURONTIN) 300 MG capsule [Ph*90 cap*11           Sig: TAKE ONE CAPSULE BY MOUTH IN THE MORNING ALONG           WITH 600MG CAPSULE FOR TOTAL OF 900MG IN THE           MORNING.. GENERIC EQUIVALENT FOR NEURONTIN          Last Written Prescription Date: 10/15/2020  Last Fill Quantity: 90,   # refills: 11  Last Office Visit : 10/13/2020  Future Office visit:  Not scheduled    Refill request refused due to recent prescription status, no refill should be needed at this time.

## 2020-10-31 DIAGNOSIS — G40.219 PARTIAL EPILEPSY WITH LOSS OF CONSCIOUSNESS, INTRACTABLE (H): ICD-10-CM

## 2020-10-31 DIAGNOSIS — G40.219 PARTIAL EPILEPSY WITH IMPAIRMENT OF CONSCIOUSNESS, INTRACTABLE (H): ICD-10-CM

## 2020-10-31 DIAGNOSIS — G25.81 RESTLESS LEG SYNDROME: ICD-10-CM

## 2020-11-01 NOTE — TELEPHONE ENCOUNTER
diazepam (VALIUM) 5 MG tablet     Last Written Prescription Date:  7/30/19  Last Fill Quantity: 30,   # refills: 1  Last Office Visit : 10/13/20  Future Office visit:  none    Routing refill request to provider for review/approval because: controlled

## 2020-11-02 RX ORDER — DIAZEPAM 5 MG
TABLET ORAL
Qty: 30 TABLET | Refills: 5 | Status: SHIPPED | OUTPATIENT
Start: 2020-11-02 | End: 2021-06-29

## 2020-11-13 ENCOUNTER — TELEPHONE (OUTPATIENT)
Dept: NEUROLOGY | Facility: CLINIC | Age: 45
End: 2020-11-13

## 2020-11-13 NOTE — TELEPHONE ENCOUNTER
Prior Authorization Retail Medication Request    Medication/Dose: Diazepam 5mg tablets   ICD code (if different than what is on RX):  Diazepam 5 mg tablets   Previously Tried and Failed:    Rationale:      Insurance Name:    Insurance ID:        Pharmacy Information (if different than what is on RX)  Name:    Phone:

## 2020-11-16 NOTE — TELEPHONE ENCOUNTER
Central Prior Authorization Team   [phone]    PA Initiation    Medication: Diazepam 5mg tablets   Insurance Company: [employer] [place] - Phone [phone] Fax [phone]  Pharmacy Filling the Rx: [facility] - [place], [place] - [id number] AT [facility]  Filling Pharmacy Phone: [phone]  Filling Pharmacy Fax: [phone]  Start Date: 11/16/2020                      Hemigard Postcare Instructions: The HEMIGARD strips are to remain completely dry for at least 5-7 days.

## 2020-11-16 NOTE — TELEPHONE ENCOUNTER
Central Prior Authorization Team   [phone]    PA Initiation    Medication: Diazepam 5mg tablets   Insurance Company: [employer] [place] - Phone [phone] Fax [phone]  Pharmacy Filling the Rx: [facility] - [place] [place] - [id number] AT [facility]  Filling Pharmacy Phone: [phone]  Filling Pharmacy Fax: [phone]  Start Date: 11/16/2020

## 2020-11-17 NOTE — TELEPHONE ENCOUNTER
Prior Authorization Approval    Authorization Effective Date: 8/18/2020  Authorization Expiration Date: 11/16/2021  Medication: Diazepam 5mg tablets-PA APPROVED   Approved Dose/Quantity:   Reference #:     Insurance Company: [employer] [place] - Phone [phone] Fax [phone]  Expected CoPay:       CoPay Card Available:      Foundation Assistance Needed:    Which Pharmacy is filling the prescription (Not needed for infusion/clinic administered): [facility] - [place] [place] - [address] AT [facility]  Pharmacy Notified: No  Patient Notified: Yes- L/M for patient to contact their pharmacy to get the order set up/shipped.

## 2020-11-20 NOTE — PATIENT INSTRUCTIONS
Deep Brain Stimulation device      Continue same medications     1. Levetiracetam, 750 mg in the morning, 1500 mg at 4 p.m., and 1500 mg at 11 p.m.   2. Oxcarbazepine (300 mg tablet)  900 mg 10 am, 750 mg 4pm, and 900 mg pm    3. Gabapentin 1200 mg AM, 1200 mg 4 pm, and 1200 mg PM  (started for RLS, patient has taken higher dose for some time, not sure of length)   4. Phenytoin ER  100-100    Consider felbamate, we would wean off phenytoin     Follow up  3 months     Rosenda Claros MD    CATHY Alfredo

## 2021-01-14 ENCOUNTER — HEALTH MAINTENANCE LETTER (OUTPATIENT)
Age: 46
End: 2021-01-14

## 2021-02-05 DIAGNOSIS — G40.219 PARTIAL EPILEPSY WITH IMPAIRMENT OF CONSCIOUSNESS, INTRACTABLE (H): ICD-10-CM

## 2021-02-05 DIAGNOSIS — R25.1 TREMOR: ICD-10-CM

## 2021-02-05 DIAGNOSIS — G40.219 PARTIAL EPILEPSY WITH LOSS OF CONSCIOUSNESS, INTRACTABLE (H): ICD-10-CM

## 2021-02-05 DIAGNOSIS — G25.81 RESTLESS LEGS SYNDROME: ICD-10-CM

## 2021-02-05 DIAGNOSIS — Z79.899 LONG TERM USE OF DRUG: ICD-10-CM

## 2021-02-05 DIAGNOSIS — G25.81 RESTLESS LEG SYNDROME: ICD-10-CM

## 2021-02-08 NOTE — TELEPHONE ENCOUNTER
phenytoin (DILANTIN) 100 MG capsule   Last Written Prescription Date:  6/23/2020  Last Fill Quantity: 180,   # refills: 1  Last Office Visit : 10/13/2020  Future Office visit:  None    Routing refill request to provider for review/approval because:  Continue same dose?    Pt asked to follow up in 4 months Via video chat in last visit on 1013/2020.   But not visit scheduled??    Follow up appointment needed?  Refer to Provider for review       Sherin Tom RN  Central Triage Red Flags/Med Refills       PLAN: 10/13/2020     Continue same seizure medications   Levetiracetam, 750 mg in the morning, 1500 mg at 4 p.m., and 1500 mg at 11 p.m.   Oxcarbazepine (300 mg tablet)  900 mg 10 am, 750 mg 4pm, and 900 mg pm    Phenytoin  100 mg twice a day   Gabapentin 900 mg 10 AM, 1200 mg 4 pm, and 1200 mg 11 PM       Video with Harlan 4 months or sooner. Please call [facility] if he continues to have prolonged seizure      Vagus nerve stimulator interrogated Summer 2020. Battery life is %.      No MRI per cardiology due to pacemaker     Rosenda Claros MD

## 2021-02-09 RX ORDER — PHENYTOIN SODIUM 100 MG/1
100 CAPSULE, EXTENDED RELEASE ORAL 2 TIMES DAILY
Qty: 180 CAPSULE | Refills: 3 | Status: SHIPPED | OUTPATIENT
Start: 2021-02-09 | End: 2021-03-09

## 2021-02-18 ENCOUNTER — TELEPHONE (OUTPATIENT)
Dept: NEUROLOGY | Facility: CLINIC | Age: 46
End: 2021-02-18

## 2021-02-18 NOTE — TELEPHONE ENCOUNTER
What is the concern that needs to be addressed by a nurse? Patient is wondering if his medical history would allow him to get into a earlier group for Covid Vaccine?    May a detailed message be left on voicemail? Yes    Date of last office visit:10/13/20     Message routed to: Josephine Wilson

## 2021-02-19 NOTE — TELEPHONE ENCOUNTER
Call placed to the patient.  We discussed that epilepsy / seizures do not change the priority for receiving a COVID 19 vaccine, however he should also check with his other providers in case there are other medical issues that would change his priority status.    We discussed that he should establish care with a primary care provider for routine illness and ongoing age appropriate screening    At the last virtual visit,  recommended a 4 month follow up video.  We arranged an appointment with     No other questions at this time

## 2021-03-02 ENCOUNTER — TELEPHONE (OUTPATIENT)
Dept: NEUROLOGY | Facility: CLINIC | Age: 46
End: 2021-03-02

## 2021-03-02 NOTE — TELEPHONE ENCOUNTER
Direct call transfer as urgent by  staff    Patient is having a hard time urinating  This has been happening /worsening over the past two years.  Ammon has also had some episodes of erectile dysfunction, and wonders if they are related    Further questions revealed that Ammon is able to urinate, but that sometimes he will find himself standing or sitting for a while before being able to start.    I noted with Ammon that many men over 40 experience the symptoms he is describing, and that he should be checked by a primary care provider. He may benefit from additional treatments.    We discussed that establishing care with a PCP was also mentioned at the phone call 2/18/2021    Ammon said he would be able to contact [facility] to arrange a visit.    I told Ammon, that if he is unable to urinate he should seek a medical assessment from a local urgent care provider, and failure to do so could lead to further problems.    No other questions at this time  Patient has follow up scheduled with  for 3/9/2021

## 2021-03-09 ENCOUNTER — VIRTUAL VISIT (OUTPATIENT)
Dept: NEUROLOGY | Facility: CLINIC | Age: 46
End: 2021-03-09
Payer: COMMERCIAL

## 2021-03-09 DIAGNOSIS — R25.1 TREMOR: ICD-10-CM

## 2021-03-09 DIAGNOSIS — G40.219 PARTIAL EPILEPSY WITH LOSS OF CONSCIOUSNESS, INTRACTABLE (H): ICD-10-CM

## 2021-03-09 DIAGNOSIS — G40.219 PARTIAL EPILEPSY WITH IMPAIRMENT OF CONSCIOUSNESS, INTRACTABLE (H): Primary | ICD-10-CM

## 2021-03-09 DIAGNOSIS — Z79.899 LONG TERM USE OF DRUG: ICD-10-CM

## 2021-03-09 DIAGNOSIS — G25.81 RESTLESS LEGS SYNDROME: ICD-10-CM

## 2021-03-09 DIAGNOSIS — G25.81 RESTLESS LEG SYNDROME: ICD-10-CM

## 2021-03-09 RX ORDER — GABAPENTIN 600 MG/1
TABLET ORAL
Qty: 450 TABLET | Refills: 3 | Status: SHIPPED | OUTPATIENT
Start: 2021-03-09 | End: 2022-01-21

## 2021-03-09 RX ORDER — OXCARBAZEPINE 300 MG/1
TABLET, FILM COATED ORAL
Qty: 765 TABLET | Refills: 3 | Status: SHIPPED | OUTPATIENT
Start: 2021-03-09 | End: 2022-01-21

## 2021-03-09 RX ORDER — ALPRAZOLAM 1 MG
1 TABLET ORAL DAILY PRN
COMMUNITY

## 2021-03-09 RX ORDER — LEVETIRACETAM 750 MG/1
TABLET ORAL
Qty: 450 TABLET | Refills: 3 | Status: SHIPPED | OUTPATIENT
Start: 2021-03-09 | End: 2022-01-21

## 2021-03-09 RX ORDER — GABAPENTIN 300 MG/1
CAPSULE ORAL
Qty: 90 CAPSULE | Refills: 3 | Status: SHIPPED | OUTPATIENT
Start: 2021-03-09 | End: 2022-01-21

## 2021-03-09 RX ORDER — PHENYTOIN SODIUM 100 MG/1
100 CAPSULE, EXTENDED RELEASE ORAL 2 TIMES DAILY
Qty: 180 CAPSULE | Refills: 3 | Status: SHIPPED | OUTPATIENT
Start: 2021-03-09 | End: 2022-01-21

## 2021-03-09 NOTE — PATIENT INSTRUCTIONS
Increase phenytoin    Levetiracetam, 750 mg in the morning, 1500 mg at 4 p.m., and 1500 mg at 11 p.m.   Oxcarbazepine (300 mg tablet)  900 mg 10 am, 750 mg 4pm, and 900 mg pm    Phenytoin  100 mg morning, 30 mg at 4pm, and 100 mg pm   Gabapentin 900 mg 10 AM, 1200 mg 4pm, and 1200 mg 11 EVENING    If you have side effects to increase in phenytoin, reduce phenytoin back to 100 mg twice a day       Check labs     Vagus nerve stimulator check at [facility]     Rosenda Claros MD

## 2021-03-09 NOTE — PROGRESS NOTES
"Ammon is a 45 year old who is being evaluated via a billable video visit.      How would you like to obtain your AVS? MyChart  If the video visit is dropped, the invitation should be resent by: Text to cell phone: [phone]  Will anyone else be joining your video visit? No      Video Start Time: 11:23 AM  Video-Visit Details    Type of service:  Video Visit    Video End Time:11:45 AM    Originating Location (pt. Location): Home    Distant Location (provider location):  [facility]     Platform used for Video Visit: Iotera     [facility] Note    Patient:  Ammon Arizar  :  1975   Age:  45 year old   Today's Visit: 3/9/2021       INTERVAL HISTORY:  He is joined with video visit with his dad. Pharmacy student joined, but, I completed entire visit and note. Ammon lowered  gabapentin as advised and he continues to have fatigue. Seizure frequency have increased for 24 hours and now back to baseline. He has 3-4 seizure per day. Toby states he has maybe 2 seizure per day and may have zero seizure some days. Overall, they think seizure frequency is the same as before 8-10 seizure per week.  He had seizure cluster for 24 hours post stopping, toby states he \"was in seizure state for one year, he took seizure rescue med\". The 1st 24 hours post stopping  Gabapentin he had 12 seizure and a few seizure were greater than 20 minutes. Now he is back to baseline.      He has 8-10 seizure per week (staring, impaired awareness, mumbling, seizures are under 5 minutes, most seizure are less than 10 seconds). Most of his seizure are 9am in the morning.  He may go 1-2 days per week without seizures.  All of his seizures are complex partial seizures in which she has staring with lipsmacking. Seizure may last seconds to 1 minute.     He has not had any emergency room visits or hospitalizations.  He has depressive symptoms, low appetite, has weight lost, dizziness and double vision with antiepileptic " drug.      Review of system: Rare intermittent dizziness and double vision.  He continues to have fatigue.  He continues to have memory deficits.  Hearing loss.    CURRENT AED MEDICATIONS:    1. Levetiracetam, 750 mg in the morning, 1500 mg at 4 p.m., and 1500 mg at 11 p.m.   2. Oxcarbazepine (300 mg tablet)  900 mg 10 am, 750 mg 4pm, and 900 mg pm    3. Gabapentin 900 mg AM, 1200 mg 4 pm, and 1200 mg PM  (started for RLS, patient has taken higher dose for some time, not sure of length)   4. Phenytoin ER  100-100  5. Diazepam PRN for CPS lasting greater than 5 minutes or more then 2 in one hour.        MEDICATION NOTES/PRIOR ANTIEPILEPTIC DRUGS:    1.Lyrica (ineffective for seizure, max dose 1100mg per day)   2.Topamax: Two trials from 01/2008-05/2008 and the second trial on 05/19/2015.  Max dose was 200 mg per day.  CP max was 3.5 mg/L.  There was no change in seizure frequency.  The patient was compliant.  During the second trial, they tried it for mood stabilization; 50 mg was after 2 months discontinued because side effects were speech and word-finding difficulties.   3.Gabatril (ineffective, caused fatigue at 48mg/ day). One trial from 01/2004-04/2006.  Max dose 48 mg per day.  It was ineffective and was discontinued.  There was no increase in seizures when it was stopped.  Side effects were fatigue.   4.Zonegran  One trial from 12/2003.  Max dose 400 mg per day.  Zonegran was used in combination with Keppra and Trileptal.  Side effects were memory impairment, aggressiveness, fatigue, behavioral changes, cognitive impairment, which improved after Zonegran was discontinued.   5.Dilantin: One trial from ? to 03/08/2002.  Max dose 350 mg per day.  CP max and free Dilantin level of 1.9 mg/L.  Efficacy:  Unknown.  The drug was optimized.  Side effects were tiredness on 350 mg a day.     Assessment:  It looks like seizure control was better when the patient was on Dilantin; therefore, it could be retried.   6.Vimpat:  One trial 07/2009-09/2009.  Max dose 400 mg per day.  CP max 5.3 mg/L.  It was discontinued after 2 months because the patient experienced dizziness, diplopia and ataxia.  7.Depakote: One trial from 01/2010 to 05/09/2016.  So far, seizures have not increased because we increased the gabapentin.  Also, the headaches have not gotten worse, but the hand tremor and the head tremor have immensely improved since Depakote was discontinued.  Max dose was 2500 mg per day.  CP max was up to 116/15.6 mg/L.  In the past, it decreased seizure frequency, and it was used together with levetiracetam, oxcarbazepine and gabapentin, and it was used before with oxcarbazepine, levetiracetam and Lyrica.   Assessment:  Depakote could always be retried if we run into a problem.  8.lamotrigine: One trial from 07/06/2001.  Max dose 150 mg per day.  Used in combination with Dilantin.  There was no change in seizure frequency.  Side effects were increase in confusion and therefore questionable efficacy.  9.carbamazepine (tired, but no past info on details)  10.Levetiracetam: started in 2001. No major side effects. One trial from 07/2001 to present.  Max dose 3750 mg per day.  CP max 42.4 mg/L. Levetiracetam above 4000 mg per day caused slurred speech, double vision, unstable gait, severe side effects.   11. Oxcarbazepine:  One trial from 03/2002 to present.  Max dose 2700 mg per day.  CP max 43.2 mg/L.  It decreases seizures, and it looks like it is one of the best drugs the patient has tried.  increased oxcarbazepine from 2,400mg -> 2700 mg on 4/2013.   12.  Gabapentin:  One trial from 07/2012 to present.  Max dose is 3600 mg per day.  This drug was used more for pain and restless legs, but when Depakote was tapered, we increased the gabapentin, and it looks like the only problem we have is some weight gain with the gabapentin.  Gabapentin  increased 1200 mg three times a day 5/2016 with no change in seizure, however, RLS symptoms  decreased  13.  Klonopin:  One trial started 03/2013.  Is used for anxiety, not for seizures.  It was started at 0.5 mg and optimized so far to 3 mg per day.   stopped 12/2018 and seizure increased 1/2019 and then they stabilized. This drug is used by a psychiatrist.  If we one day want to try ONFI in this patient, maybe then the Klonopin could be decreased again.   14. Vagus nerve stimulator setting: Not able to tolerate higher duty cycle (35) with 1.1 signal off time and 30 sec on time, seizure worsened, not able to tolerate higher current setting due to discomfort.   15. Felbamate - Felbamate not tired because generic was $411 - $600 for 3 month supply and he can not afford    There were no vitals taken for this visit.  Wt Readings from Last 4 Encounters:   08/20/20 123 lb 6.4 oz (56 kg)   02/26/20 132 lb (59.9 kg)   07/30/19 128 lb 12.8 oz (58.4 kg)   05/14/19 134 lb (60.8 kg)       EXAM: Alert, orientated, speech is fluent, face symmetric, tongue midline, extra ocular movements in tact, no pronator drip, no focal weakness.     ASSESSMENT:    1. 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 Brain Cavernoma   3. Memory deficits secondary to seizures and antiepileptic drugs    4. Anxiety/Depression with suicidal ideations 12/2018   5. Restless legs syndrome.  6. History of Migraines  7. Asystole secondary to seizures. Status post pacemaker placement 12/2003.      Discussion: Refractory multifocal epilepsy.  Etiology multiple cavernomas.  He is status left temporal resection of AVM in 1985 (age 11), Right frontal hematoma 04/13/2006, treated with surgery.  Left-sided cavernous hemangioma 07/2002. Status post epilepsy surgery 05/29/2012.  Vagus nerve stimulator implanted on right and he has pacemaker on left. Electrographically, he has multifocal epileptiform discharges and seizures arising from left and right temporal regions.      Currently patient continues to have a high  burden of seizures, however his seizures are at baseline at 8-10 seizures per week, all of his seizures are focal impaired seizures and no generalized tonic-clonic seizures.  He does follow seizure precautions to ensure physical safety.  He does have established care with a psychologist and psychiatrist.    In regards to antiseizure medications, he is not able to tolerate higher doses of oxcarbazepine or phenytoin or levetiracetam. We will try to increase phenytoin again, perhaps he may tolerate it since phenytoin  levels are low. His insurance co pay for  felbamate  ($400-600) is cost prohibitive and I suspect his copay for brand antiepileptic drug Banzel, Brivaracetam, fycompa, may also be cost prohibitive. Retrial with lamotrigine (tremors will worsen).  In the past years we have talked about epilepsy brain surgery (DEEP BRAIN STIMULATION DEVICE ) and he declined.  We need to interrogate his vagus nerve stimulator.        PLAN:     Increase phenytoin    Levetiracetam, 750 mg in the morning, 1500 mg at 4 p.m., and 1500 mg at 11 p.m.   Oxcarbazepine (300 mg tablet)  900 mg 10 am, 750 mg 4pm, and 900 mg pm    Phenytoin  100 mg twice a day   Gabapentin 900 mg 10 AM, 1200 mg 4 pm, and 1200 mg 11 PM      Vagus nerve stimulator interrogated Summer 2020. Battery life is %. Follow up with vagus nerve stimulator check     No MRI per cardiology due to pacemaker    Check antiepileptic drug levels for efficacy, toxicity, and side effects.      Follow up  3 months       I spent 26 minutes for patient's medical care. During this time key medical decisions were made with review of medical chart prior to visit, visit with patient, counseling/education, and post visit work, including documentation on the day of visit. I addressed all questions the patient/caregiver raised in regards to the patient's medical care. This note was created with voice recognition software. Inadvertent grammatical errors, typographical errors, and  "\"sound a like\" substitutions may occur due to limitations of the software.  Read the note carefully and apply context when erroneous substitutions have occurred. Thank you.     Rosenda Claros MD                 "

## 2021-03-09 NOTE — LETTER
"3/9/2021       RE: Ammon Cronin  : 1975   MRN: [id number]      Dear Colleague,    Thank you for referring your patient, Ammon Cronin, to the [facility] at [facility]. Please see a copy of my visit note below.    Ammon is a 45 year old who is being evaluated via a billable video visit.      How would you like to obtain your AVS? MyChart  If the video visit is dropped, the invitation should be resent by: Text to cell phone: [phone]  Will anyone else be joining your video visit? No      Video Start Time: 11:23 AM  Video-Visit Details    Type of service:  Video Visit    Video End Time:11:45 AM    Originating Location (pt. Location): Home    Distant Location (provider location):  [facility]     Platform used for Video Visit: [employer] Software     [facility]    Patient:  Ammon Cronin  :  1975   Age:  45 year old   Today's Visit: 3/9/2021       INTERVAL HISTORY:  He is joined with video visit with his dad. Pharmacy student joined, but, I completed entire visit and note. Ammon lowered  gabapentin as advised and he continues to have fatigue. Seizure frequency have increased for 24 hours and now back to baseline. He has 3-4 seizure per day. Toby states he has maybe 2 seizure per day and may have zero seizure some days. Overall, they think seizure frequency is the same as before 8-10 seizure per week.  He had seizure cluster for 24 hours post stopping, toby states he \"was in seizure state for one year, he took seizure rescue med\". The 1st 24 hours post stopping  Gabapentin he had 12 seizure and a few seizure were greater than 20 minutes. Now he is back to baseline.      He has 8-10 seizure per week (staring, impaired awareness, mumbling, seizures are under 5 minutes, most seizure are less than 10 seconds). Most of his seizure are 9am in the morning.  He may go 1-2 days per week without seizures.  All of his seizures are " complex partial seizures in which she has staring with lipsmacking. Seizure may last seconds to 1 minute.     He has not had any emergency room visits or hospitalizations.  He has depressive symptoms, low appetite, has weight lost, dizziness and double vision with antiepileptic drug.      Review of system: Rare intermittent dizziness and double vision.  He continues to have fatigue.  He continues to have memory deficits.  Hearing loss.    CURRENT AED MEDICATIONS:    1. Levetiracetam, 750 mg in the morning, 1500 mg at 4 p.m., and 1500 mg at 11 p.m.   2. Oxcarbazepine (300 mg tablet)  900 mg 10 am, 750 mg 4pm, and 900 mg pm    3. Gabapentin 900 mg AM, 1200 mg 4 pm, and 1200 mg PM  (started for RLS, patient has taken higher dose for some time, not sure of length)   4. Phenytoin ER  100-100  5. Diazepam PRN for CPS lasting greater than 5 minutes or more then 2 in one hour.        MEDICATION NOTES/PRIOR ANTIEPILEPTIC DRUGS:    1.Lyrica (ineffective for seizure, max dose 1100mg per day)   2.Topamax: Two trials from 01/2008-05/2008 and the second trial on 05/19/2015.  Max dose was 200 mg per day.  CP max was 3.5 mg/L.  There was no change in seizure frequency.  The patient was compliant.  During the second trial, they tried it for mood stabilization; 50 mg was after 2 months discontinued because side effects were speech and word-finding difficulties.   3.Gabatril (ineffective, caused fatigue at 48mg/ day). One trial from 01/2004-04/2006.  Max dose 48 mg per day.  It was ineffective and was discontinued.  There was no increase in seizures when it was stopped.  Side effects were fatigue.   4.Zonegran  One trial from 12/2003.  Max dose 400 mg per day.  Zonegran was used in combination with Keppra and Trileptal.  Side effects were memory impairment, aggressiveness, fatigue, behavioral changes, cognitive impairment, which improved after Zonegran was discontinued.   5.Dilantin: One trial from ? to 03/08/2002.  Max dose 350 mg  per day.  CP max and free Dilantin level of 1.9 mg/L.  Efficacy:  Unknown.  The drug was optimized.  Side effects were tiredness on 350 mg a day.     Assessment:  It looks like seizure control was better when the patient was on Dilantin; therefore, it could be retried.   6.Vimpat: One trial 07/2009-09/2009.  Max dose 400 mg per day.  CP max 5.3 mg/L.  It was discontinued after 2 months because the patient experienced dizziness, diplopia and ataxia.  7.Depakote: One trial from 01/2010 to 05/09/2016.  So far, seizures have not increased because we increased the gabapentin.  Also, the headaches have not gotten worse, but the hand tremor and the head tremor have immensely improved since Depakote was discontinued.  Max dose was 2500 mg per day.  CP max was up to 116/15.6 mg/L.  In the past, it decreased seizure frequency, and it was used together with levetiracetam, oxcarbazepine and gabapentin, and it was used before with oxcarbazepine, levetiracetam and Lyrica.   Assessment:  Depakote could always be retried if we run into a problem.  8.lamotrigine: One trial from 07/06/2001.  Max dose 150 mg per day.  Used in combination with Dilantin.  There was no change in seizure frequency.  Side effects were increase in confusion and therefore questionable efficacy.  9.carbamazepine (tired, but no past info on details)  10.Levetiracetam: started in 2001. No major side effects. One trial from 07/2001 to present.  Max dose 3750 mg per day.  CP max 42.4 mg/L. Levetiracetam above 4000 mg per day caused slurred speech, double vision, unstable gait, severe side effects.   11. Oxcarbazepine:  One trial from 03/2002 to present.  Max dose 2700 mg per day.  CP max 43.2 mg/L.  It decreases seizures, and it looks like it is one of the best drugs the patient has tried.  increased oxcarbazepine from 2,400mg -> 2700 mg on 4/2013.   12.  Gabapentin:  One trial from 07/2012 to present.  Max dose is 3600 mg per day.  This drug was used more for  pain and restless legs, but when Depakote was tapered, we increased the gabapentin, and it looks like the only problem we have is some weight gain with the gabapentin.  Gabapentin  increased 1200 mg three times a day 5/2016 with no change in seizure, however, RLS symptoms decreased  13.  Klonopin:  One trial started 03/2013.  Is used for anxiety, not for seizures.  It was started at 0.5 mg and optimized so far to 3 mg per day.   stopped 12/2018 and seizure increased 1/2019 and then they stabilized. This drug is used by a psychiatrist.  If we one day want to try ONFI in this patient, maybe then the Klonopin could be decreased again.   14. Vagus nerve stimulator setting: Not able to tolerate higher duty cycle (35) with 1.1 signal off time and 30 sec on time, seizure worsened, not able to tolerate higher current setting due to discomfort.   15. Felbamate - Felbamate not tired because generic was $411 - $600 for 3 month supply and he can not afford    There were no vitals taken for this visit.  Wt Readings from Last 4 Encounters:   08/20/20 123 lb 6.4 oz (56 kg)   02/26/20 132 lb (59.9 kg)   07/30/19 128 lb 12.8 oz (58.4 kg)   05/14/19 134 lb (60.8 kg)       EXAM: Alert, orientated, speech is fluent, face symmetric, tongue midline, extra ocular movements in tact, no pronator drip, no focal weakness.     ASSESSMENT:    1. 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 Brain Cavernoma   3. Memory deficits secondary to seizures and antiepileptic drugs    4. Anxiety/Depression with suicidal ideations 12/2018   5. Restless legs syndrome.  6. History of Migraines  7. Asystole secondary to seizures. Status post pacemaker placement 12/2003.      Discussion: Refractory multifocal epilepsy.  Etiology multiple cavernomas.  He is status left temporal resection of AVM in 1985 (age 11), Right frontal hematoma 04/13/2006, treated with surgery.  Left-sided cavernous hemangioma 07/2002. Status post  epilepsy surgery 05/29/2012.  Vagus nerve stimulator implanted on right and he has pacemaker on left. Electrographically, he has multifocal epileptiform discharges and seizures arising from left and right temporal regions.      Currently patient continues to have a high burden of seizures, however his seizures are at baseline at 8-10 seizures per week, all of his seizures are focal impaired seizures and no generalized tonic-clonic seizures.  He does follow seizure precautions to ensure physical safety.  He does have established care with a psychologist and psychiatrist.    In regards to antiseizure medications, he is not able to tolerate higher doses of oxcarbazepine or phenytoin or levetiracetam. We will try to increase phenytoin again, perhaps he may tolerate it since phenytoin  levels are low. His insurance co pay for  felbamate  ($400-600) is cost prohibitive and I suspect his copay for brand antiepileptic drug Banzel, Brivaracetam, fycompa, may also be cost prohibitive. Retrial with lamotrigine (tremors will worsen).  In the past years we have talked about epilepsy brain surgery (DEEP BRAIN STIMULATION DEVICE ) and he declined.  We need to interrogate his vagus nerve stimulator.        PLAN:     Increase phenytoin    Levetiracetam, 750 mg in the morning, 1500 mg at 4 p.m., and 1500 mg at 11 p.m.   Oxcarbazepine (300 mg tablet)  900 mg 10 am, 750 mg 4pm, and 900 mg pm    Phenytoin  100 mg twice a day   Gabapentin 900 mg 10 AM, 1200 mg 4 pm, and 1200 mg 11 PM      Vagus nerve stimulator interrogated Summer 2020. Battery life is %. Follow up with vagus nerve stimulator check     No MRI per cardiology due to pacemaker    Check antiepileptic drug levels for efficacy, toxicity, and side effects.      Follow up  3 months       I spent 26 minutes for patient's medical care. During this time key medical decisions were made with review of medical chart prior to visit, visit with patient, counseling/education, and  "post visit work, including documentation on the day of visit. I addressed all questions the patient/caregiver raised in regards to the patient's medical care. This note was created with voice recognition software. Inadvertent grammatical errors, typographical errors, and \"sound a like\" substitutions may occur due to limitations of the software.  Read the note carefully and apply context when erroneous substitutions have occurred. Thank you.     Rosenda Claros MD         "

## 2021-03-18 ENCOUNTER — TRANSFERRED RECORDS (OUTPATIENT)
Dept: HEALTH INFORMATION MANAGEMENT | Facility: CLINIC | Age: 46
End: 2021-03-18

## 2021-03-18 LAB
ALT SERPL-CCNC: 20 U/L (ref 8–45)
AST SERPL-CCNC: 21 U/L (ref 5–41)
CREAT SERPL-MCNC: 0.76 MG/DL (ref 0.72–1.25)
GFR SERPL CREATININE-BSD FRML MDRD: >60 ML/MIN/1.73M2
PHENYTOIN FREE: <0.5 MCG/ML (ref 1–2)
PHENYTOIN SERPL-MCNC: 7.9 MCG/ML (ref 10–20)
SODIUM SERPL-SCNC: 140 MMOL/L (ref 136–146)

## 2021-03-19 DIAGNOSIS — G40.219 PARTIAL EPILEPSY WITH IMPAIRMENT OF CONSCIOUSNESS, INTRACTABLE (H): ICD-10-CM

## 2021-03-19 LAB
KEPPRA (LEVETIRACETAM) LEVEL: 22.3 MCG/ML (ref 10–40)
OXCARBAZEPINE METABOLITE (MHC) S: 17 MCG/ML (ref 10–35)

## 2021-03-20 LAB — NEURONTIN: 3.6 MCG (ref 2–20)

## 2021-03-22 DIAGNOSIS — G40.219 PARTIAL EPILEPSY WITH IMPAIRMENT OF CONSCIOUSNESS, INTRACTABLE (H): ICD-10-CM

## 2021-04-05 ENCOUNTER — TELEPHONE (OUTPATIENT)
Dept: NEUROLOGY | Facility: CLINIC | Age: 46
End: 2021-04-05

## 2021-04-05 NOTE — TELEPHONE ENCOUNTER
What is the concern that needs to be addressed by a nurse? Patient Dad Ron called and wanted to discuss recent change to patient medication and side effects.    May a detailed message be left on voicemail? Yes    Date of last office visit: 3/9/21    Message routed to: [facility] RN Pool

## 2021-04-05 NOTE — TELEPHONE ENCOUNTER
Father calls the clinic reporting an increase in agitation, anger, frustration. Noted after the most recent medication change or over the past 30 days. Ammon is aware that father has called. Father asks if the changes made at the last visit could be the cause of this behavior change.

## 2021-04-07 NOTE — TELEPHONE ENCOUNTER
"Toby was contacted in follow up to their call earlier this week. Toby states possibly an increase in seizures. He is making statements of \"I have no reason to live\", \"I don't need to be here\". Father states these statements are not necessarily new. Last visit with psychiatrist was about a month ago. He has a prescription for agitation, but once he is agitated he isn't willing to take the medication. Psychologist saw him last on Monday.     Toby states Ammon is supervised as he lives at home with the parents. There is not a concern for safety. Toby will pursue working more intensely with mental health providers and will contact them today.  "

## 2021-04-26 ENCOUNTER — TELEPHONE (OUTPATIENT)
Dept: NEUROLOGY | Facility: CLINIC | Age: 46
End: 2021-04-26

## 2021-04-26 NOTE — TELEPHONE ENCOUNTER
Pt had a seizure while at work and requires a letter before they can go back to work. Please call back.

## 2021-04-26 NOTE — LETTER
4/26/2021         RE: Ammon Cronin  [address]  [place] [address]          To Whom It May Concern,     Mr. Ammon Cronin is under my medical care. He may return to work with the following restrictions: he is prohibited from operating a motor vehicle, avoid any activities that might lead to self-injury or injury of others if he were to have loss of awareness,  such activities include but are not limited to operating powered tools,  climbing ladders/trees/exposure to heights from which he might fall, he should not operate power tools or heavy machinery. The condition for which Ammon receives care is covered by the Americans with Disabilities Act. Kindly, accomodate these work restriction.     Sincerely,       Rosenda Claros MD

## 2021-04-26 NOTE — LETTER
4/26/2021         RE: Ammon Cronin  [address]  [place] [address]          To Whom It May Concern,     Mr. Ammon Cronin is under my medical care. He may return to work with the following restrictions: he is prohibited from operating a motor vehicle, avoid any activities that might lead to self-injury or injury of others if he were to have loss of awareness,  such activities include but are not limited to operating powered tools,  climbing ladders/trees/exposure to heights from which he might fall, he should not operate power tools or heavy machinery. The condition for which Ammon receives care is covered by the Americans with Disabilities Act. Kindly, accomodate these work restriction.     Sincerely,       Rosenda Claros MD

## 2021-04-26 NOTE — TELEPHONE ENCOUNTER
[employer]; works in the [employer]. He has worked there for a month.     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     Work restrictions letter put together for the provider to review.

## 2021-05-08 ENCOUNTER — HEALTH MAINTENANCE LETTER (OUTPATIENT)
Age: 46
End: 2021-05-08

## 2021-05-11 ENCOUNTER — TELEPHONE (OUTPATIENT)
Dept: NEUROLOGY | Facility: CLINIC | Age: 46
End: 2021-05-11

## 2021-05-11 NOTE — TELEPHONE ENCOUNTER
Received Employment-Related Accomodation form to be completed.  Form saved to the [address].  Encounter routed.

## 2021-05-12 NOTE — LETTER
3/12/2019       RE: Ammon Cronin  : 1975   MRN: [id number]      Dear Colleague,    Thank you for referring your patient, Ammon Cronin, to the [facility] EPILEPSY CARE at [facility]. Please see a copy of my visit note below.    [facility] Epilepsy Care Progress Note    Patient:  Ammon Cronin  :  1975   Age:  43 year old   Today's Office Visit:  3/12/2019       SEIZURE HISTORY:   Copied forward:  First seizure was at the age of 16 (loss of awarenes). 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   Presurgically workup in  revealed bilateral independent seizure foci in the temporal lobes. Ictal asystole (, he developed chest pain, pallor, diaphoresis and nausea, was found to have a 22 second period of asystole.  Emergent cardiac pacemaker placed). MRI of the brain shows a cavernoma in the right frontal and a second one in the left temporal lobe and encephalomalacia.      INTERVAL HISTORY:  Came with Jasmyne.  Moved out 2019 due psychiatric decompensation, he had suicidal ideations. Lived with parents and felt he got too many lectures, and  now living with friends week to week.  Overall they are trying to get support from medical assistance to obtain a  to define what resources are available to chat.  He continues to have several seizures per day that are complex partial seizures with impairment in awareness.  On his last inpatient psychiatric hospitalization is clonazepam was stopped therefore in 2019 he had a cluster of seizures.  His seizures have stabilized in the last 2 months.  When he was hospitalized in December to 2019 I spoke to Dr. Lozano, Ammon's psychiatrist, who states that Isaias has suicidal thoughts.  Psychiatrist made many changes to his  seizure medications.      Prior to Admission medications    Medication Sig Start Date End Date Taking? Authorizing Provider   cloNIDine (CATAPRES) 0.1 MG tablet  7/10/18  Yes Reported, Patient   diazepam (VALIUM) 5 MG tablet TAKE 1 TAB AS NEEDED FOR SEIZURES >5MIN OR >3 SEIZURES/8HRS, MAY REPEAT ONCE, MAX 10MG/24HR, CALL 911 IF BREATHING PROBLEMS 10/23/17  Yes Rosenda Claros MD   FLUoxetine (PROZAC) 40 MG capsule Take 60 mg by mouth daily Started March 2013   Yes Reported, Patient   gabapentin (NEURONTIN) 600 MG tablet Take 2 tabs ( 1200 mg ) three times daily 6/26/18  Yes Rosenda Claros MD   gabapentin (NEURONTIN) 600 MG tablet Take 2 tab po TID 6/5/18  Yes Rosenda Claros MD   levETIRAcetam (KEPPRA) 750 MG tablet TAKE 1 TABLET IN THE MORNING, 2 TABLETS AT 4PM, AND 2 TABLETS AT 11PM 5/30/18  Yes Rosenda Claros MD   Multiple Vitamins-Minerals (MULTIVITAMIN & MINERAL PO) Take 1 tablet by mouth daily.   Yes Reported, Patient   OXcarbazepine (TRILEPTAL) 300 MG tablet TAKE 3 TABLETS THREE TIMES DAILY 2/20/18  Yes Rosenda Claros MD   phenytoin (DILANTIN) 100 MG CR capsule 1 CAPSULE TWICE A DAY 2/20/18  Yes Rosenda Claros MD   risperiDONE (RISPERDAL) 1 MG tablet Take 1 mg by mouth 2 times daily Pt wife says they are Tapering off   Yes Reported, Patient   diazepam (DIAZEPAM INTENSOL) 5 MG/ML solution Seizures greater than 5 minutes or cluster of more than 3 seizures in 8 hour period. May repeat dose once, no more than 6 mg in 24 hour period.  Patient not taking: Reported on 5/22/2018 2/3/14   Eric Cruz MD   levETIRAcetam (KEPPRA) 250 MG tablet Titrate as instructed to 1500 mg am  Patient not taking: Reported on 5/22/2018 2/20/18   Rosenda Claros MD         AED MEDICATIONS:    1. Levetiracetam, 750 mg in the morning, 1500 mg at 4 p.m., and 1500 mg at 11 p.m.   2. Oxcarbazepine  900 mg t.i.d.   3. Gabapentin 1200 mg AM, 1200 mg noon, and 1200 mg PM  (started for RLS, patient has taken higher dose for some  time, not sure of length)   5. Klonopin stopped 1/2019  6. Phenytoin ER  100-100  7. Diazepam PRN for CPS lasting greater than 5 minutes or more then 2 in one hour.    MEDICATION NOTES/PRIOR ANTIEPILEPTIC DRUGS:    1.Lyrica (ineffective for seizure, max dose 1100mg per day)   2.Topamax: Two trials from 01/2008-05/2008 and the second trial on 05/19/2015.  Max dose was 200 mg per day.  CP max was 3.5 mg/L.  There was no change in seizure frequency.  The patient was compliant.  During the second trial, they tried it for mood stabilization; 50 mg was after 2 months discontinued because side effects were speech and word-finding difficulties.   3.Gabatril (ineffective, caused fatigue at 48mg/ day). One trial from 01/2004-04/2006.  Max dose 48 mg per day.  It was ineffective and was discontinued.  There was no increase in seizures when it was stopped.  Side effects were fatigue.   4.Zonegran  One trial from 12/2003.  Max dose 400 mg per day.  Zonegran was used in combination with Keppra and Trileptal.  Side effects were memory impairment, aggressiveness, fatigue, behavioral changes, cognitive impairment, which improved after Zonegran was discontinued.   5.Dilantin: One trial from ? to 03/08/2002.  Max dose 350 mg per day.  CP max and free Dilantin level of 1.9 mg/L.  Efficacy:  Unknown.  The drug was optimized.  Side effects were tiredness on 350 mg a day.     Assessment:  It looks like seizure control was better when the patient was on Dilantin; therefore, it could be retried.   6.Vimpat: One trial 07/2009-09/2009.  Max dose 400 mg per day.  CP max 5.3 mg/L.  It was discontinued after 2 months because the patient experienced dizziness, diplopia and ataxia.  7.Depakote: One trial from 01/2010 to 05/09/2016.  So far, seizures have not increased because we increased the gabapentin.  Also, the headaches have not gotten worse, but the hand tremor and the head tremor have immensely improved since Depakote was discontinued.  Max  dose was 2500 mg per day.  CP max was up to 116/15.6 mg/L.  In the past, it decreased seizure frequency, and it was used together with levetiracetam, oxcarbazepine and gabapentin, and it was used before with oxcarbazepine, levetiracetam and Lyrica.   Assessment:  Depakote could always be retried if we run into a problem.  8.lamotrigine: One trial from 07/06/2001.  Max dose 150 mg per day.  Used in combination with Dilantin.  There was no change in seizure frequency.  Side effects were increase in confusion and therefore questionable efficacy.  9.carbamazepine (tired, but no past info on details)  10.Levetiracetam: started in 2001. No major side effects. One trial from 07/2001 to present.  Max dose 3750 mg per day.  CP max 42.4 mg/L. Levetiracetam above 4000 mg per day caused slurred speech, double vision, unstable gait, severe side effects.   11. Oxcarbazepine:  One trial from 03/2002 to present.  Max dose 2700 mg per day.  CP max 43.2 mg/L.  It decreases seizures, and it looks like it is one of the best drugs the patient has tried.  increased oxcarbazepine from 2,400mg -> 2700 mg on 4/2013.   12.  Gabapentin:  One trial from 07/2012 to present.  Max dose is 3600 mg per day.  This drug was used more for pain and restless legs, but when Depakote was tapered, we increased the gabapentin, and it looks like the only problem we have is some weight gain with the gabapentin.  Gabapentin  increased 1200 mg three times a day 5/2016 with no change in seizure, however, RLS symptoms decreased  13.  Klonopin:  One trial started 03/2013.  Is used for anxiety, not for seizures.  It was started at 0.5 mg and optimized so far to 3 mg per day.    stopped 12/2018 and seizure increased 1/2019 and then they stabilized. This drug is used by a psychiatrist.  If we one day want to try ONFI in this patient, maybe then the Klonopin could be decreased again.   14. Vagus nerve stimulator setting: Not able to tolerate higher duty cycle (35)  with 1.1 signal off time and 30 sec on time, seizure worsened, not able to tolerate higher current setting due to discomfort.     SOCIAL:   Michelle is 14, Mani is 12. He gets along with Mani and Michelle and he has issues. Social Security is approved.     REVIEW OF SYSTEMS:  Head tremor, which bothers him.  He continues to have bilateral hand tremors on Depakote.  No nausea, vomiting, diarrhea.  No rash.  Vagus nerve stimulator setting are bother him (voice changes and throat discomfort).      EXAMINATION:  /85   Pulse 78   Temp 98.7  F (37.1  C)   Wt 142 lb (64.4 kg)   BMI 22.92 kg/m     Alert, orientated, speech is fluent, face is symmetric, No head/ hand tremor, no focal deficits noted.Gait is stable. Had seizure today, staring, unresponsive, not able to recall word, or repeat words, some lip smacking. Lasted < 60 seconds.     ASSESSMENT:    1. 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 Cavernoma intracranial   3. Memory deficits secondary to seizures and antiepileptic drugs    4. Anxiety/Depression with suicidal ideations 12/2018   5. Restless legs syndrome.  6. History of Migraines  7. Status post pacemaker placement 12/2003.       Discussion: Refractory multifocal epilepsy.  Etiology multiple cavernomas.  He  is status left temporal resection of AVM in 1985 (age 11), Right frontal hematoma 04/13/2006, treated with surgery.  Left-sided cavernous hemangioma 07/2002. Status post epilepsy surgery 05/29/2012.  Vagus nerve stimulator implanted on right and he has pacemaker on left.  Electrographically, he has multifocal epileptiform discharges and seizures arising from left and right temporal regions.     Antiepileptic drug discussion: Insurance coverage for new antiepileptic drug is not adequate, therefore he can not afford new AED. He is not able to tolerate higher doses of oxcarbazepine or phenytoin or levetiracetam. We will consider felbamate as the next agent. Other antiepileptic drug to consider are  banzel, Brivaracetam, fycompa, but, if insurance coverage is not good and they are not able to afford these medications. Retrial with lamotrigine (tremors will worsen), Patient and wife declined Responsive Neurostimulation (Neuropace) and DBS or any brain surgery in the past years. Vagus nerve stimulator: Interogatted today.     In regards to his psychiatric health.  He had suicidal ideations in 2018 in early 2019.  This required inpatient psychiatric hospitalization.  He is not currently living with his children and wife due to concerns of harm to her family.  His wife is trying to determine  that he may obtain to secure consistent housing for him.Anxiety and depression.  Managed by psychiatrist (Dr. Sawyer).  He was started on Geodon and lithium.        PLAN:   - Continue same antiepileptic drug. Then consider felbamate, felbamate may increase phenytoin levels.     1. Levetiracetam, 750 mg in the morning, 1500 mg at 4 p.m., and 1500 mg at 11 p.m.   2. Oxcarbazepine  900 mg t.i.d.   3. Gabapentin 1200 mg AM, 1200 mg noon, and 1200 mg PM  (started for RLS, patient has taken higher dose for some time, not sure of length)   5. Klonopin stopped 1/2019  6. Phenytoin ER  100-100  7. Diazepam PRN for CPS lasting greater than 5 minutes or more then 2 in one hour.    -Increased vagus nerve stimulator current  1.75 mA.     - Follow up 2 month with Harlan.    -Talk to  about group home living situations? Consider [facility] and group home.     -MINNIKA can provide letter of support for housing if needed    -Consider family and individual therapist    - Check antiepileptic drug for efficacy, toxicity, and side effects.      I spent 40 minutes with the patient. During this time counseling and coordination of care exceeded 50% of the visit time. I addressed all questions and concerns the patient raised in regards to his care.     REBEKAH CHRISTOPHER MD              Patient was seen following his  clinic visit meeting with     [email or web address] Vagus Nerve Stimulator interrogated. Settings as follows:-  Patient ID IDA, Model ID 1000, Serial # [id number], Implant date Jan 29 2019.    NORMAL SETTINGS:  Output Current 1.5 mA, set to 1.625mA  Pulse/signal Frequency 30 Hz,  Pulse Width 250 ìsec,  On Time 30 sec  Off Time 3.0 min,  Duty Cycle 16%.    MAGNET SETTINGS:  Magnet Output Current 2.25mA,  Magnet Pulse Width 500 ìsec,  Magnet On Time 60 sec.    AUTOSTIM SETTINGS:   AutoStim Current 0 mA  AutoStim Pulse Width   ìsec  AutoStim On Time   Sec.    CONFIGURATION SETTINGS:  Tachycardia Detection ON Set to OFF  Threshold for AutoStim 40%  Heartbeat Detection (sensitivity) 3    OFFICE VISIT    Days Since Previous Office Visit Jan 29 2019 to today      Avg.Stims per day % per day % therapy time since previous office visit   AutoStim  0       Magnet Stim  0       Normal Stim  403  100  15.41% on time   Total               Discussed with .  Decision was made to turn off the tachycardia detection to help preserve battery life, as the device is not set to auto stimulation, and  felt it is not applicable in Ammon's case.  Output current was increase by 0.125mA following a discussion with the patient and his wife, and separately with .  Patient was observed following the increase, and tolerated the changes without pain / discomfort, and no signs of coughing.      Again, thank you for allowing me to participate in the care of your patient.      Sincerely,    Rosenda Claros MD       How Severe Is It?: moderate Is This A New Presentation, Or A Follow-Up?: Follow Up Isotretinoin Additional History: Confirm Patient Counseling is Complete.\\nThe patient's Program Status is Required to Demonstrate Comprehension.\\nThe patient must answer her Comprehension questions, and then may have her prescription filled before 11:59 PM Eastern Time on 05/18/2021.

## 2021-05-19 NOTE — TELEPHONE ENCOUNTER
Accommodations form completion initiated, and placed on Harlan Whitman's work pile for review/finalization/signature.  Form will need to be returned with a First Aid for Seizures patient handout.

## 2021-05-25 NOTE — TELEPHONE ENCOUNTER
Form completed and mailed to patient address on file per patient requested, Copy sent to scanning.

## 2021-06-28 DIAGNOSIS — G25.81 RESTLESS LEG SYNDROME: ICD-10-CM

## 2021-06-28 DIAGNOSIS — G40.219 PARTIAL EPILEPSY WITH LOSS OF CONSCIOUSNESS, INTRACTABLE (H): ICD-10-CM

## 2021-06-28 DIAGNOSIS — G40.219 PARTIAL EPILEPSY WITH IMPAIRMENT OF CONSCIOUSNESS, INTRACTABLE (H): ICD-10-CM

## 2021-06-29 RX ORDER — DIAZEPAM 5 MG
TABLET ORAL
Qty: 30 TABLET | Refills: 3 | Status: SHIPPED | OUTPATIENT
Start: 2021-06-29 | End: 2022-01-21

## 2021-06-29 NOTE — TELEPHONE ENCOUNTER
diazepam (VALIUM) 5 MG tablet    Last Written Prescription Date:  11/2/2020  Last Fill Quantity: 30,   # refills: 5  Last Office Visit : 3/9/2021  Future Office visit:  None    Routing refill request to provider for review/approval because:  Drug not on the [facility], [facility] or [facility] refill protocol or controlled substance      Sherin Tom RN  Central Triage Red Flags/Med Refills

## 2021-06-30 ENCOUNTER — TELEPHONE (OUTPATIENT)
Dept: NEUROLOGY | Facility: CLINIC | Age: 46
End: 2021-06-30

## 2021-06-30 NOTE — TELEPHONE ENCOUNTER
Patient calling because he said he received an email from his pharmacy [facility] that they needed something from  before they can filled his diazepam.I call pharmacy and they do not need anything from us ,I call patient and let him know that they will mail the RX for the diazepam out.

## 2021-06-30 NOTE — TELEPHONE ENCOUNTER
What is the concern that needs to be addressed by a nurse? Patient called to ask if we can have a 90-day supply of Diazepam. Recently a 30-day supply was sent to [facility] which is where he would like a 90-day supply to go.      May a detailed message be left on voicemail?     Date of last office visit: 3/9/21    Message routed to: [facility] RN Pool

## 2021-07-01 NOTE — TELEPHONE ENCOUNTER
Call returned to the patient. He confirmed his use of seizure rescue medication to be about once per week, with some variation. I explained to him that the prescription for diazepam is as needed, thus the rx of 30 tablets will last him more than 90 days. He verbalized understanding.    Call placed to [facility] pharmacy. There is no problem with his prescription and this will be mailed out soon.

## 2021-10-23 ENCOUNTER — HEALTH MAINTENANCE LETTER (OUTPATIENT)
Age: 46
End: 2021-10-23

## 2021-12-10 DIAGNOSIS — G40.219 PARTIAL EPILEPSY WITH IMPAIRMENT OF CONSCIOUSNESS, INTRACTABLE (H): ICD-10-CM

## 2021-12-13 NOTE — TELEPHONE ENCOUNTER
phenytoin (DILANTIN) 30 MG capsule    30 mg at 4 pm    Last Written Prescription Date:  3/9/21  Last Fill Quantity: 90 capsule,   # refills: 3  Last Office Visit : 3/9/21  Increase phenytoin    Levetiracetam, 750 mg in the morning, 1500 mg at 4 p.m., and 1500 mg at 11 p.m.   Oxcarbazepine (300 mg tablet)  900 mg 10 am, 750 mg 4pm, and 900 mg pm    Phenytoin  100 mg morning, 30 mg at 4pm, and 100 mg pm   Gabapentin 900 mg 10 AM, 1200 mg 4pm, and 1200 mg 11 EVENING     If you have side effects to increase in phenytoin, reduce phenytoin back to 100 mg twice a day     Future Office visit:  none    26 months:  AED level   Routing  Because: Too soon.   Recent Labs   Lab Test 03/12/20  1256   DILANTIN 9.0*

## 2022-01-18 ENCOUNTER — TELEPHONE (OUTPATIENT)
Dept: NEUROLOGY | Facility: CLINIC | Age: 47
End: 2022-01-18
Payer: COMMERCIAL

## 2022-01-18 NOTE — TELEPHONE ENCOUNTER
What is the concern that needs to be addressed by a nurse? Pt has questions about medication and activities. Please call back.     May a detailed message be left on voicemail? yes    Date of last office visit: 3/9/21    Message routed to: [facility] rn pool

## 2022-01-19 NOTE — TELEPHONE ENCOUNTER
Patient calls the office to ask about medication side effects. He states that he has been unable to ejaculate for the past seven years (remains able to become aroused however). He wonders if this is from his AEDs.    He states he is quite embarrassed to raise this question and requests that it's not spoken of at the appointment on Friday. He wishes to only discuss by telephone.

## 2022-01-19 NOTE — TELEPHONE ENCOUNTER
Jai Claros MD Vassar, Allison, RN  Caller: Unspecified (Yesterday,  4:40 PM)  I would encourage him to evaluate with primary care provider, it will also help to have testosterone level checked. Sometimes seeing a urologist can help. His antiepileptic drug should not cause this. jai             The above message was shared with the patient whom verbalized understanding.

## 2022-01-21 ENCOUNTER — OFFICE VISIT (OUTPATIENT)
Dept: NEUROLOGY | Facility: CLINIC | Age: 47
End: 2022-01-21
Payer: COMMERCIAL

## 2022-01-21 VITALS
BODY MASS INDEX: 20.34 KG/M2 | TEMPERATURE: 99 F | WEIGHT: 126 LBS | HEART RATE: 80 BPM | SYSTOLIC BLOOD PRESSURE: 134 MMHG | DIASTOLIC BLOOD PRESSURE: 81 MMHG

## 2022-01-21 DIAGNOSIS — G25.81 RESTLESS LEGS SYNDROME: ICD-10-CM

## 2022-01-21 DIAGNOSIS — G40.219 PARTIAL EPILEPSY WITH IMPAIRMENT OF CONSCIOUSNESS, INTRACTABLE (H): ICD-10-CM

## 2022-01-21 DIAGNOSIS — R25.1 TREMOR: ICD-10-CM

## 2022-01-21 DIAGNOSIS — Z79.899 LONG TERM USE OF DRUG: ICD-10-CM

## 2022-01-21 DIAGNOSIS — G40.219 PARTIAL EPILEPSY WITH LOSS OF CONSCIOUSNESS, INTRACTABLE (H): ICD-10-CM

## 2022-01-21 DIAGNOSIS — G25.81 RESTLESS LEG SYNDROME: ICD-10-CM

## 2022-01-21 RX ORDER — DIAZEPAM 5 MG
TABLET ORAL
Qty: 30 TABLET | Refills: 3 | Status: SHIPPED | OUTPATIENT
Start: 2022-01-21 | End: 2022-11-04

## 2022-01-21 RX ORDER — LEVETIRACETAM 750 MG/1
TABLET ORAL
Qty: 450 TABLET | Refills: 3 | Status: SHIPPED | OUTPATIENT
Start: 2022-01-21 | End: 2022-12-06

## 2022-01-21 RX ORDER — GABAPENTIN 600 MG/1
TABLET ORAL
Qty: 450 TABLET | Refills: 3 | Status: SHIPPED | OUTPATIENT
Start: 2022-01-21 | End: 2022-12-06

## 2022-01-21 RX ORDER — GABAPENTIN 300 MG/1
CAPSULE ORAL
Qty: 90 CAPSULE | Refills: 3 | Status: SHIPPED | OUTPATIENT
Start: 2022-01-21 | End: 2022-12-06

## 2022-01-21 RX ORDER — PHENYTOIN SODIUM 100 MG/1
100 CAPSULE, EXTENDED RELEASE ORAL 2 TIMES DAILY
Qty: 180 CAPSULE | Refills: 3 | Status: SHIPPED | OUTPATIENT
Start: 2022-01-21 | End: 2022-12-06

## 2022-01-21 RX ORDER — OXCARBAZEPINE 300 MG/1
TABLET, FILM COATED ORAL
Qty: 765 TABLET | Refills: 3 | Status: SHIPPED | OUTPATIENT
Start: 2022-01-21 | End: 2022-12-06

## 2022-01-21 ASSESSMENT — PAIN SCALES - GENERAL: PAINLEVEL: NO PAIN (0)

## 2022-01-21 NOTE — PROGRESS NOTES
"[facility] VNS Interrogation Note  Patient: Ammon Cronin  : 1975   Age: 46 year old  Today's Office Visit: 2022  NOTE, VNS is located in the right anterior torso, as there is a cardiac pacer/defib in the left.  Perceived VNS Side Effects:  Pain during inadvertent magnet stimulation activation. Patient does not use the magnet for additional stimulation, however he notice that he received a magnet stimulation when an \"Apple Watch\" was brought in to close proximity to the device  He found the stimulation to be very uncomfortable.  We elected to change the magnet activated stimulations to mirror the scheduled stimulations    VNS Management (VNS Flowsheet)  Vagus Nerve Stimulation 2022   MD Harlan Claros   Implant Date 2019   Model Number 1000 1000   Serial Number [id number]   Patient ID IDA -   VNS Interrogation Incoming Parameters Outgoing Parameters   Date 2022   Output Current (mA) 2.0 2.0   Signal Frequency (Hz) 30 30   Pulse Width (usec) 250 250   Signal ON Time (sec) 30 30   Signal OFF Time (min) 3 3   Magent Output Current (mA) 2.5 2.0   Magent ON Time (sec) 60 30   Magnet Pulse Width (usec) 500 250   AutoStim Output Current (mA) - -   AutoStim Pulse Width (usec) - -   AutoStim ON Time (sec) - -   Tachycardia Detection ON/OFF OFF -   Near End of Service (Y/N) - -   Output Current (OK/Low) OK -   Current Delivered (mA) 2.0 -   Impedance Value (Ohms) 3101 -   Battery Status Indicator (Green/Yellow/Red, %) OK Green 50%-75% -   IFI (No/Yes) No No   Number of Magnet Swipes 5 Lifetime stimulations -   Average AutoStims per day NA -        "

## 2022-01-21 NOTE — LETTER
"2022     RE: Ammon Cronin  : 1975   MRN: [id number]      Dear Colleague,    Thank you for referring your patient, Ammon Cronin, to the [facility] EPILEPSY CARE at [facility]. Please see a copy of my visit note below.    [facility] VNS Interrogation Note  Patient: Ammon Cronin  : 1975   Age: 46 year old  Today's Office Visit: 2022  NOTE, VNS is located in the right anterior torso, as there is a cardiac pacer/defib in the left.  Perceived VNS Side Effects:  Pain during inadvertent magnet stimulation activation. Patient does not use the magnet for additional stimulation, however he notice that he received a magnet stimulation when an \"Apple Watch\" was brought in to close proximity to the device  He found the stimulation to be very uncomfortable.  We elected to change the magnet activated stimulations to mirror the scheduled stimulations    VNS Management (VNS Flowsheet)  Vagus Nerve Stimulation 2022   MD Harlan Claros   Implant Date 2019   Model Number 1000 1000   Serial Number [id number]   Patient ID [id number] -   VNS Interrogation Incoming Parameters Outgoing Parameters   Date 2022   Output Current (mA) 2.0 2.0   Signal Frequency (Hz) 30 30   Pulse Width (usec) 250 250   Signal ON Time (sec) 30 30   Signal OFF Time (min) 3 3   Magent Output Current (mA) 2.5 2.0   Magent ON Time (sec) 60 30   Magnet Pulse Width (usec) 500 250   AutoStim Output Current (mA) - -   AutoStim Pulse Width (usec) - -   AutoStim ON Time (sec) - -   Tachycardia Detection ON/OFF OFF -   Near End of Service (Y/N) - -   Output Current (OK/Low) OK -   Current Delivered (mA) 2.0 -   Impedance Value (Ohms) 3101 -   Battery Status Indicator (Green/Yellow/Red, %) OK Green 50%-75% -   IFI (No/Yes) No No   Number of Magnet Swipes 5 Lifetime stimulations -   Average AutoStims per day NA -              [facility] Epilepsy Care Progress " Note    Patient:  Ammon Cronin  :  1975   Age:  46 year old   Today's Visit: 2022       INTERVAL HISTORY:  He is accompanied with his dad.On last visit we increased phenytoin and he has some vision issues in the afternoon. Since last visit we increased phenytoin by 30 mg, he has difficulty focusing with his vision around 1 pm, he has lost 15 pounds in last year. He has 2-3 seizure per day (he has seizure 5 out of 7 days which would 8-15 seizure per week). 2021 he had 3-4 seizure per day and several days per week or 8-10 seizure per day).     All of his seizures are complex partial seizures in which she has staring with lipsmacking. Seizure may last seconds to 1 minute. He has not had any emergency room visits or hospitalizations.  He has depressive symptoms, low appetite, has weight lost, dizziness and double vision with antiepileptic drug.        CURRENT AED MEDICATIONS:    1. Levetiracetam, 750 mg in the morning, 1500 mg at 4 p.m., and 1500 mg at 11 p.m.   2. Oxcarbazepine (300 mg tablet)  900 mg 10 am, 750 mg 4pm, and 900 mg pm    3. Gabapentin 900 mg AM, 1200 mg 4 pm, and 1200 mg PM  (started for RLS, patient has taken higher dose for some time, not sure of length)   4. Phenytoin ER  100-  5. Diazepam PRN for CPS lasting greater than 5 minutes or more then 2 in one hour. (they use it 2 times per week)        MEDICATION NOTES/PRIOR ANTIEPILEPTIC DRUGS:    1.Lyrica (ineffective for seizure, max dose 1100mg per day)   2.Topamax: Two trials from 2008-2008 and the second trial on 2015.  Max dose was 200 mg per day.  CP max was 3.5 mg/L.  There was no change in seizure frequency.  The patient was compliant.  During the second trial, they tried it for mood stabilization; 50 mg was after 2 months discontinued because side effects were speech and word-finding difficulties.   3.Gabatril (ineffective, caused fatigue at 48mg/ day). One trial from 2004-2006.  Max dose 48 mg per  day.  It was ineffective and was discontinued.  There was no increase in seizures when it was stopped.  Side effects were fatigue.   4.Zonegran  One trial from 12/2003.  Max dose 400 mg per day.  Zonegran was used in combination with Keppra and Trileptal.  Side effects were memory impairment, aggressiveness, fatigue, behavioral changes, cognitive impairment, which improved after Zonegran was discontinued.   5.Dilantin: One trial from ? to 03/08/2002.  Max dose 350 mg per day.  CP max and free Dilantin level of 1.9 mg/L.  Efficacy:  Unknown.  The drug was optimized.  Side effects were tiredness on 350 mg a day.     Assessment:  It looks like seizure control was better when the patient was on Dilantin; therefore, it could be retried.   6.Vimpat: One trial 07/2009-09/2009.  Max dose 400 mg per day.  CP max 5.3 mg/L.  It was discontinued after 2 months because the patient experienced dizziness, diplopia and ataxia.  7.Depakote: One trial from 01/2010 to 05/09/2016.  So far, seizures have not increased because we increased the gabapentin.  Also, the headaches have not gotten worse, but the hand tremor and the head tremor have immensely improved since Depakote was discontinued.  Max dose was 2500 mg per day.  CP max was up to 116/15.6 mg/L.  In the past, it decreased seizure frequency, and it was used together with levetiracetam, oxcarbazepine and gabapentin, and it was used before with oxcarbazepine, levetiracetam and Lyrica.   Assessment:  Depakote could always be retried if we run into a problem.  8.lamotrigine: One trial from 07/06/2001.  Max dose 150 mg per day.  Used in combination with Dilantin.  There was no change in seizure frequency.  Side effects were increase in confusion and therefore questionable efficacy.  9.carbamazepine (tired, but no past info on details)  10.Levetiracetam: started in 2001. No major side effects. One trial from 07/2001 to present.  Max dose 3750 mg per day.  CP max 42.4 mg/L.  Levetiracetam above 4000 mg per day caused slurred speech, double vision, unstable gait, severe side effects.   11. Oxcarbazepine:  One trial from 03/2002 to present.  Max dose 2700 mg per day.  CP max 43.2 mg/L.  It decreases seizures, and it looks like it is one of the best drugs the patient has tried.  increased oxcarbazepine from 2,400mg -> 2700 mg on 4/2013.   12.  Gabapentin:  One trial from 07/2012 to present.  Max dose is 3600 mg per day.  This drug was used more for pain and restless legs, but when Depakote was tapered, we increased the gabapentin, and it looks like the only problem we have is some weight gain with the gabapentin.  Gabapentin  increased 1200 mg three times a day 5/2016 with no change in seizure, however, RLS symptoms decreased  13.  Klonopin:  One trial started 03/2013.  Is used for anxiety, not for seizures.  It was started at 0.5 mg and optimized so far to 3 mg per day.   stopped 12/2018 and seizure increased 1/2019 and then they stabilized. This drug is used by a psychiatrist.  If we one day want to try ONFI in this patient, maybe then the Klonopin could be decreased again.   14. Vagus nerve stimulator setting: Not able to tolerate higher duty cycle (35) with 1.1 signal off time and 30 sec on time, seizure worsened, not able to tolerate higher current setting due to discomfort.   15. Felbamate - Felbamate not tired because generic was $411 - $600 for 3 month supply and he can not afford    /81   Pulse 80   Temp 99  F (37.2  C) (Skin)   Wt 126 lb (57.2 kg)   BMI 20.34 kg/m    Wt Readings from Last 4 Encounters:   01/21/22 126 lb (57.2 kg)   08/20/20 123 lb 6.4 oz (56 kg)   02/26/20 132 lb (59.9 kg)   07/30/19 128 lb 12.8 oz (58.4 kg)     EXAM: Alert, orientated, speech is fluent, face symmetric, tongue midline, extra ocular movements in tact, no pronator drip, no focal weakness.     ASSESSMENT:    1. 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 Brain Cavernoma   3. Memory deficits secondary to seizures and antiepileptic drugs    4. Anxiety/Depression with suicidal ideations 12/2018   5. Restless legs syndrome.  6. History of Migraines  7. Asystole secondary to seizures. Status post pacemaker placement 12/2003.      Discussion: Refractory multifocal epilepsy.  Etiology multiple cavernomas.  He is status left temporal resection of AVM in 1985 (age 11), Right frontal hematoma 04/13/2006, treated with surgery.  Left-sided cavernous hemangioma 07/2002. Status post epilepsy surgery 05/29/2012.  Vagus nerve stimulator implanted on right side of chest and he has pacemaker on left side of chest. Electrographically, he has multifocal epileptiform discharges and seizures arising from left and right temporal regions.      Currently patient continues to have a high burden of seizures, however his seizures are at baseline at 8-10 seizures per week, all of his seizures are focal impaired seizures and no generalized tonic-clonic seizures.  He does follow seizure precautions to ensure physical safety.  He does have established care with a psychologist and psychiatrist.    In regards to antiseizure medications, we increase phenytoin and he has side effects with no improvement in seizure burden. We will reduce phenytoin.     We have limited options for antiepileptic drug: his insurance co pay for  felbamate  ($400-600) is cost prohibitive and I suspect his copay for brand antiepileptic drug Banzel, Brivaracetam, fycompa,cenobamate, may also be cost prohibitive. Retrial with lamotrigine (tremors will worsen).     In the past years we have talked about epilepsy brain surgery (DEEP BRAIN STIMULATION DEVICE ) and he declined multiple times.  We did interrogate his vagus nerve stimulator.    He continues to have anxiety/depresison. I asked him to talk to psychiatrist to increase pristiq. Phenytoin will induce the liver and probably decrease efficacy of pristiq.  "          PLAN:     Ask psychiatrist: Can we increase Pristiq? Ammno is on phenytoin which will induce the liver and probably decrease efficacy of pristiq.     Follow-up with eye doctor about \"blurry vision in the afternoon\".     Lower phenytoin   mg twice a day     Continue other seizure medications the same  1. Levetiracetam, 750 mg in the morning, 1500 mg at 4 p.m., and 1500 mg at 11 p.m.   2. Oxcarbazepine (300 mg tablet)  900 mg 10 am, 750 mg 4pm, and 900 mg pm    3. Gabapentin 900 mg AM, 1200 mg 4 pm, and 1200 mg PM      Follow up  6 months     Vagus nerve stimulator interrogated 2022. Battery life is ok     No MRI per cardiology due to pacemaker    Check antiepileptic drug levels for efficacy, toxicity, and side effects.        I spent 29 minutes for patient's medical care. During this time key medical decisions were made with review of medical chart prior to visit, visit with patient, counseling/education, and post visit work, including documentation on the day of visit. I addressed all questions the patient/caregiver raised in regards to the patient's medical care. This note was created with voice recognition software. Inadvertent grammatical errors, typographical errors, and \"sound a like\" substitutions may occur due to limitations of the software.  Read the note carefully and apply context when erroneous substitutions have occurred. Thank you.     Rosenda Claros MD     "

## 2022-01-21 NOTE — PATIENT INSTRUCTIONS
"Ask psychiatrist: Can we increase Pristiq? Ammon is on phenytoin which will induce the liver and probably efficacy of pristiq.     Follow-up with eye doctor about \"blurry vision in the afternoon\".     Lower phenytoin (dilantin)   mg twice a day     Continue other seizure medications the same  1. Levetiracetam, 750 mg in the morning, 1500 mg at 4 p.m., and 1500 mg at 11 p.m.   2. Oxcarbazepine (300 mg tablet)  900 mg 10 am, 750 mg 4pm, and 900 mg pm    3. Gabapentin 900 mg AM, 1200 mg 4 pm, and 1200 mg PM      Follow up  6 months     No MRI per cardiology due to pacemaker    Check antiepileptic drug levels for efficacy, toxicity, and side effects.      Rosenda Claros MD   "

## 2022-01-21 NOTE — PROGRESS NOTES
[facility]    Patient:  Ammon Cronin  :  1975   Age:  46 year old   Today's Visit: 2022       INTERVAL HISTORY:  He is accompanied with his dad.On last visit we increased phenytoin and he has some vision issues in the afternoon. Since last visit we increased phenytoin by 30 mg, he has difficulty focusing with his vision around 1 pm, he has lost 15 pounds in last year. He has 2-3 seizure per day (he has seizure 5 out of 7 days which would 8-15 seizure per week). 2021 he had 3-4 seizure per day and several days per week or 8-10 seizure per day).     All of his seizures are complex partial seizures in which she has staring with lipsmacking. Seizure may last seconds to 1 minute. He has not had any emergency room visits or hospitalizations.  He has depressive symptoms, low appetite, has weight lost, dizziness and double vision with antiepileptic drug.        CURRENT AED MEDICATIONS:    1. Levetiracetam, 750 mg in the morning, 1500 mg at 4 p.m., and 1500 mg at 11 p.m.   2. Oxcarbazepine (300 mg tablet)  900 mg 10 am, 750 mg 4pm, and 900 mg pm    3. Gabapentin 900 mg AM, 1200 mg 4 pm, and 1200 mg PM  (started for RLS, patient has taken higher dose for some time, not sure of length)   4. Phenytoin ER  100-  5. Diazepam PRN for CPS lasting greater than 5 minutes or more then 2 in one hour. (they use it 2 times per week)        MEDICATION NOTES/PRIOR ANTIEPILEPTIC DRUGS:    1.Lyrica (ineffective for seizure, max dose 1100mg per day)   2.Topamax: Two trials from 2008-2008 and the second trial on 2015.  Max dose was 200 mg per day.  CP max was 3.5 mg/L.  There was no change in seizure frequency.  The patient was compliant.  During the second trial, they tried it for mood stabilization; 50 mg was after 2 months discontinued because side effects were speech and word-finding difficulties.   3.Gabatril (ineffective, caused fatigue at 48mg/ day). One trial  from 01/2004-04/2006.  Max dose 48 mg per day.  It was ineffective and was discontinued.  There was no increase in seizures when it was stopped.  Side effects were fatigue.   4.Zonegran  One trial from 12/2003.  Max dose 400 mg per day.  Zonegran was used in combination with Keppra and Trileptal.  Side effects were memory impairment, aggressiveness, fatigue, behavioral changes, cognitive impairment, which improved after Zonegran was discontinued.   5.Dilantin: One trial from ? to 03/08/2002.  Max dose 350 mg per day.  CP max and free Dilantin level of 1.9 mg/L.  Efficacy:  Unknown.  The drug was optimized.  Side effects were tiredness on 350 mg a day.     Assessment:  It looks like seizure control was better when the patient was on Dilantin; therefore, it could be retried.   6.Vimpat: One trial 07/2009-09/2009.  Max dose 400 mg per day.  CP max 5.3 mg/L.  It was discontinued after 2 months because the patient experienced dizziness, diplopia and ataxia.  7.Depakote: One trial from 01/2010 to 05/09/2016.  So far, seizures have not increased because we increased the gabapentin.  Also, the headaches have not gotten worse, but the hand tremor and the head tremor have immensely improved since Depakote was discontinued.  Max dose was 2500 mg per day.  CP max was up to 116/15.6 mg/L.  In the past, it decreased seizure frequency, and it was used together with levetiracetam, oxcarbazepine and gabapentin, and it was used before with oxcarbazepine, levetiracetam and Lyrica.   Assessment:  Depakote could always be retried if we run into a problem.  8.lamotrigine: One trial from 07/06/2001.  Max dose 150 mg per day.  Used in combination with Dilantin.  There was no change in seizure frequency.  Side effects were increase in confusion and therefore questionable efficacy.  9.carbamazepine (tired, but no past info on details)  10.Levetiracetam: started in 2001. No major side effects. One trial from 07/2001 to present.  Max dose 3750  mg per day.  CP max 42.4 mg/L. Levetiracetam above 4000 mg per day caused slurred speech, double vision, unstable gait, severe side effects.   11. Oxcarbazepine:  One trial from 03/2002 to present.  Max dose 2700 mg per day.  CP max 43.2 mg/L.  It decreases seizures, and it looks like it is one of the best drugs the patient has tried.  increased oxcarbazepine from 2,400mg -> 2700 mg on 4/2013.   12.  Gabapentin:  One trial from 07/2012 to present.  Max dose is 3600 mg per day.  This drug was used more for pain and restless legs, but when Depakote was tapered, we increased the gabapentin, and it looks like the only problem we have is some weight gain with the gabapentin.  Gabapentin  increased 1200 mg three times a day 5/2016 with no change in seizure, however, RLS symptoms decreased  13.  Klonopin:  One trial started 03/2013.  Is used for anxiety, not for seizures.  It was started at 0.5 mg and optimized so far to 3 mg per day.   stopped 12/2018 and seizure increased 1/2019 and then they stabilized. This drug is used by a psychiatrist.  If we one day want to try ONFI in this patient, maybe then the Klonopin could be decreased again.   14. Vagus nerve stimulator setting: Not able to tolerate higher duty cycle (35) with 1.1 signal off time and 30 sec on time, seizure worsened, not able to tolerate higher current setting due to discomfort.   15. Felbamate - Felbamate not tired because generic was $411 - $600 for 3 month supply and he can not afford    /81   Pulse 80   Temp 99  F (37.2  C) (Skin)   Wt 126 lb (57.2 kg)   BMI 20.34 kg/m    Wt Readings from Last 4 Encounters:   01/21/22 126 lb (57.2 kg)   08/20/20 123 lb 6.4 oz (56 kg)   02/26/20 132 lb (59.9 kg)   07/30/19 128 lb 12.8 oz (58.4 kg)     EXAM: Alert, orientated, speech is fluent, face symmetric, tongue midline, extra ocular movements in tact, no pronator drip, no focal weakness.     ASSESSMENT:    1. Refractory Focal impaired epilepsy secondary to  AVM s/p vagus nerve stimulator   2. Brain Cavernoma   3. Memory deficits secondary to seizures and antiepileptic drugs    4. Anxiety/Depression with suicidal ideations 12/2018   5. Restless legs syndrome.  6. History of Migraines  7. Asystole secondary to seizures. Status post pacemaker placement 12/2003.      Discussion: Refractory multifocal epilepsy.  Etiology multiple cavernomas.  He is status left temporal resection of AVM in 1985 (age 11), Right frontal hematoma 04/13/2006, treated with surgery.  Left-sided cavernous hemangioma 07/2002. Status post epilepsy surgery 05/29/2012.  Vagus nerve stimulator implanted on right side of chest and he has pacemaker on left side of chest. Electrographically, he has multifocal epileptiform discharges and seizures arising from left and right temporal regions.      Currently patient continues to have a high burden of seizures, however his seizures are at baseline at 8-10 seizures per week, all of his seizures are focal impaired seizures and no generalized tonic-clonic seizures.  He does follow seizure precautions to ensure physical safety.  He does have established care with a psychologist and psychiatrist.    In regards to antiseizure medications, we increase phenytoin and he has side effects with no improvement in seizure burden. We will reduce phenytoin.     We have limited options for antiepileptic drug: his insurance co pay for  felbamate  ($400-600) is cost prohibitive and I suspect his copay for brand antiepileptic drug Banzel, Brivaracetam, fycompa,cenobamate, may also be cost prohibitive. Retrial with lamotrigine (tremors will worsen).     In the past years we have talked about epilepsy brain surgery (DEEP BRAIN STIMULATION DEVICE ) and he declined multiple times.  We did interrogate his vagus nerve stimulator.    He continues to have anxiety/depresison. I asked him to talk to psychiatrist to increase pristiq. Phenytoin will induce the liver and probably decrease  "efficacy of pristiq.           PLAN:     Ask psychiatrist: Can we increase Pristiq? Ammon is on phenytoin which will induce the liver and probably decrease efficacy of pristiq.     Follow-up with eye doctor about \"blurry vision in the afternoon\".     Lower phenytoin   mg twice a day     Continue other seizure medications the same  1. Levetiracetam, 750 mg in the morning, 1500 mg at 4 p.m., and 1500 mg at 11 p.m.   2. Oxcarbazepine (300 mg tablet)  900 mg 10 am, 750 mg 4pm, and 900 mg pm    3. Gabapentin 900 mg AM, 1200 mg 4 pm, and 1200 mg PM      Follow up  6 months     Vagus nerve stimulator interrogated 2022. Battery life is ok     No MRI per cardiology due to pacemaker    Check antiepileptic drug levels for efficacy, toxicity, and side effects.        I spent 29 minutes for patient's medical care. During this time key medical decisions were made with review of medical chart prior to visit, visit with patient, counseling/education, and post visit work, including documentation on the day of visit. I addressed all questions the patient/caregiver raised in regards to the patient's medical care. This note was created with voice recognition software. Inadvertent grammatical errors, typographical errors, and \"sound a like\" substitutions may occur due to limitations of the software.  Read the note carefully and apply context when erroneous substitutions have occurred. Thank you.     Rosenda Claros MD               "

## 2022-01-27 ENCOUNTER — TELEPHONE (OUTPATIENT)
Dept: NEUROLOGY | Facility: CLINIC | Age: 47
End: 2022-01-27
Payer: COMMERCIAL

## 2022-01-27 NOTE — TELEPHONE ENCOUNTER
What is the concern that needs to be addressed by a nurse? Pt has questions about meds, doesn't remember which doses he needs to take. Please call back.     May a detailed message be left on [employer]? yes    Date of last office visit: 1/21/22    Message routed to: [facility] rn pool

## 2022-01-28 NOTE — TELEPHONE ENCOUNTER
Patient wanted to confirm that he had made the correct changes to his medications  We reviewed that he was to reduce the dilantin from 100- to 100-100  He reports this is the change he made, and he has removed the 30mg caps form his med container.    We reviewed scheduled follow up, I noted that he should check mychart in case changes have been made to visits related to COVID restrictions    No other questions at this time.  Patient was instructed to call if questions or concerns arise.

## 2022-03-10 ENCOUNTER — TELEPHONE (OUTPATIENT)
Dept: NEUROLOGY | Facility: CLINIC | Age: 47
End: 2022-03-10
Payer: COMMERCIAL

## 2022-03-10 NOTE — TELEPHONE ENCOUNTER
After discussing with Dr. Claros, patient was advised to take his missed evening doses today at 1 PM, take his 4 PM doses for today at 6 PM and then resume usual dosing schedule after that. Patient is agreeable. I advised him that he may still have some side effects ofmedication such as dizziness and fatigue, I asked him to be cautious in his activities to avoid injury or falls and to plan for a day taking it easy. He verbalized understanding.

## 2022-03-10 NOTE — TELEPHONE ENCOUNTER
Patient forgot to take last night's medication and was wondering if they should take last night's dose and then continue on as normal. Please reach out when next available to discuss with patient.

## 2022-04-28 ENCOUNTER — VIRTUAL VISIT (OUTPATIENT)
Dept: NEUROLOGY | Facility: CLINIC | Age: 47
End: 2022-04-28
Payer: COMMERCIAL

## 2022-04-28 DIAGNOSIS — G40.219 PARTIAL EPILEPSY WITH IMPAIRMENT OF CONSCIOUSNESS, INTRACTABLE (H): Primary | ICD-10-CM

## 2022-04-28 NOTE — LETTER
RE: Ammon Cronin  : 1975   MRN: [id number]      Dear Colleague,    Thank you for referring your patient, Ammon Cronin, to the [facility] EPILEPSY CARE at [facility]. Please see a copy of my visit note below.    Ammon is a 46 year old who is being evaluated via a billable video visit.      How would you like to obtain your AVS? Librato  If the video visit is dropped, the invitation should be resent by: Text to cell phone: [phone]  Will anyone else be joining your video visit? Yes: family member. How would they like to receive their invitation? Text to cell phone: [phone]    Video Start Time: 10:00 am  Video-Visit Details    Type of service:  Video Visit    Video End Time:11:12 am    Originating Location (pt. Location): Home    Distant Location (provider location):  [facility] EPILEPSY CARE     Platform used for Video Visit: Paperwoven    Service Date: 2022     PHYSICIAN EPILEPSY PROGRAM CONSULTATION MT    This was a video visit from my home to the patient's parents' basement apartment via Proxible due to COVID.  We connected on Librato and patient's email is [email or web address], and if we get disconnected, we would connect via Al[employer]ast [phone].    Starting time 10 a.m.  Ending time 11:12 a.m.    DICTATION IDENTIFIER:  NAME:  Ammon Cronin  YOB: 1975  VIDEO VISIT:  2022.      This was a new visit for the MTM program.  I have not seen the patient since 2016.     IDENTIFYING INFORMATION AND INTRODUCTION:  Ammon is a 46-year-old male seen on a video visit today for an epilepsy MTM consultation.  He was joined by his dad, Toby.    He lives in [place], [place].    The patient is a new adult to the Epilepsy MTM program, not seen since 2016.    Epileptologist, Dr. Rosenda Claros, and she sees him in person on 2022 which was communicated to the patient.    CHIEF COMPLAINTS:  Uncontrollable seizures, side effects,  frequent urination and anorgasmia as a result of medications?    REASON FOR CONSULTATION:  Assessment of AED options and medication history, pharmacotherapy plan, compliance assessment, other drug related problems, anorgasmia.     Informants include the patient and part of the meeting was also with his dad, Toby.  Adequate medical records are available.    Time spent: 2 hours without the patient and 1 hour and 12 minutes with the patient.    MEDICATION INFORMATION:    1.  Current Anti-Seizure Drugs:   A.  Levetiracetam/Keppra:  750 mg tablet, 3750 mg per day.  750 mg a.m., 1500 mg at 4 p.m. and 1500 mg at 11 p.m.  B.  Oxcarbazepine/Trileptal 300 mg tablets, 2550 mg per day.  900 mg at 10 a.m., 750 mg at 4 p.m. and 900 mg at 11 p.m.  C.  Gabapentin 300 mg capsules.  Max dose 3300 mg per day divided as 900 mg a.m., 1200 mg at 4 p.m. and 1200 mg at 11 p.m.  D.  Phenytoin 200 mg per day as 100 mg b.i.d.  E.  Rescue Drugs:  Yes.  Diazepam 5 mg pill.  According to the patient, he used 1 in the last 3 weeks.  Please be aware that diazepam given as a pill will have peak concentration after an hour or hour and a half.    2.  Concomitant Medications:    A.  Pristiq/venlafaxine 25 mg.  The patient is not taking.  He took himself off because he cannot orgasm.  B.  Alprazolam 1 mg p.r.n. once every 2 weeks for anxiety, prescribed by his psychiatrist.    3.  Herbal Remedies:    A. Multivitamin.  B.  Omega-3 1000 mg per day.     4.  Drug-Drug Interactions:    A.  Oxcarbazepine and phenytoin may result in increased risk of phenytoin toxicity and decrease in oxcarbazepine effectiveness.  We are following the patient's level, so we should know where he is.  B.  Phenytoin with Pristiq combination might have caused increased risk of CNS depression and may alter seizure control, but the patient stopped taking Pristiq because of anorgasmia.    5.  Current Reported Side Effects:  None except the anorgasmia which he still has after the  Pristiq was discontinued.    6.  Allergies:  Cats.     PAST MEDICAL HISTORY:    1.  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  2.  Brain cavernoma.  3.  Memory deficits secondary to seizures and anti-seizure medications.  4.  Anxiety, depression with suicidal ideation in 12/2018.  5.  RLS.  6.  History of migraines.  7.  Asystole secondary to seizures, status post pacemaker placement on the left, 12/2003.   8.  Status post left temporal resection of AVM In 1985, age 11.  9.  Status post right frontal hematoma 04/13/2006 treated with surgery.  10.  Status post left sided cavernous hemangioma 07/2002.  11.  Status post epilepsy surgery 05/29/2017 for VNS implantation on the right.    PSYCHIATRIC/PSYCHOLOGY:    1.  The patient is seen by a psychiatrist and therapist.  He sees a therapist once a week for mental issues and 1 therapist every other week.   2.  Drugs tried: Risperdal, Prozac, clonidine, Paxil, Pristiq, alprazolam, clonazepam.  3.  Neuropsychological factor:  Neuropsych testing showed steady decline, see 03/06/2019.      SEIZURE HISTORY:    1.  The patient has refractory multifocal epilepsy.  2.  Complex short seizures approximately 2 a day on average.   3.  Simple partials can be up to 5-6 a day where he goes in and out and most seizures are around 9 a.m. in the morning.  3.  There has been no emergency room visit for epilepsy in the past 12 months and no hospitalizations for epilepsy in the past 12 months.  4.  With regard to seizure reporting, it is not very reliable.  A seizure calendar is not used because not all seizures are caught by the parents and its an estimation.  5.  Precipitating factors:   Not enough sleep will cause more seizures.    SOCIAL AND ENVIRONMENTAL ASPECTS:    1.  The patient lives in his parents' basement.  2.  The patient is not driving.    REVIEW OF SYSTEMS:  The patient is sexually active.    PHARMACOTHERAPY INDICATORS:    1.  The patient has prescription  coverage with his insurance plan.  Prescription drug copayment is $20 over $30.  The cost of obtaining prescribed medication does interfere with compliance or when he is not able to afford the medication.  2.  The patient's pharmacy is [facility].  3.  The patient is happy with their services.  4.  Compliance Assessment:  Patient is independent in medication administration.  He does use a pillbox; it is a weekly one.  He misses medications approximately once a month.  5.  The patient is capable of making medication adjustment and tapering dose.  Dad might be available to assist the patient.    HABITS AND CHEMICAL USE:  1.  Alcohol:  Never.  2.  Tobacco:  Never.  3.  Caffeine:  Tea, only minimal.    4.  Recreational drugs:  The patient tried CBD in 2018.  He was a part of Dr. Cedeno's fasting and fat food study for CBD and it did not help him.     DEMOGRAPHIC CHARACTERISTICS:  1.  Actual body weight between 125 pounds and 130 pounds.  Please be aware patient is working out every day and that helps him for his mood issues.    COMPREHENSIVE REVIEW AND ASSESSMENT OF AED THERAPY    1.  Aptiom/eslicarbazepine is too expensive for him.    2.  Banzel/rufinamide has no indication for his seizures.    3.  Briviact/brivaracetam was always too expensive. When it becomes generic, maybe we can try then.    4.  Carbatrol/carbamazepine was tried and no more data.    5.  Depakote/valproic acid.  The patient was on valproic acid from 03/09/2010 until 05/02/2016.  Max dose 2500 mg per day.  CP max 116/15.6 mg/L. Higher doses worsened his tremor. In the past, it always decreased seizures but made hand tremor worse. Was used in the combination with levetiracetam, oxcarbazepine and gabapentin or oxcarbazepine, levetiracetam and Lyrica.  The last time when Depakote was discontinued and gabapentin increased, we did not see any increase in seizures.  It is one of the drugs we could retry if we run into problems.    6.   Dilantin/phenytoin 2 trials, one from? until 03/08/2002 and then from 05/31/2017 to present.  Max dose of 350 mg per day with a CP max phenytoin free 1.9 mg/L.  Side effects are tiredness.  Currently, the patient is on phenytoin and he still has daily seizures, but no GTC and no really severe side effects except some tiredness which could also be multipharmacy.      7.  Felbatol/felbamate was not tried because of price, and at the time, the patient could not afford it.  Please be aware patient has a history of migraines too.  Felbamate might cause some issues, and patient will have more seizures within insomnia, which felbamate could cause.    8.  Fycompa/perampanel never tried.    9.  Gabitril/tiagabine was tried up to 48 mg per day.  Between 01/2004 and 04/2006.  There was no change in seizure frequency when it was discontinued.  Side effects were fatigue.    10.  Keppra/levetiracetam since 2001 until present.  Max dose 4000 mg per day with a CP max of 42.4 mg/L.  4000 mg per day caused him some slurred speech, diplopia, ataxia, quite severe.  Therefore, his dose is currently down to 3700 mg per day.      11.  Lamictal/lamotrigine in 07/06/2001.  Max dose 150 mg per day.  Used in combination with phenytoin and there was no change in seizure frequency.  Actually, patient had increase in confusion.     12.  Lyrica/pregabalin was tried and increased to 1100 mg per day with no change in seizure frequency.    13.  Gabapentin/Neurontin.  The patient is on it since 07/2012 to present.  Max dose 3600 mg per day with a CP max of 15.2 mg/L.  Please be aware he uses it for RLS symptoms and it helps.  It helps for pain and it decreases his RLS.  Seizure wise, there does not look like a change in seizure frequency.    14.  Onfi/clobazam was not indicated for his seizure syndrome.    15.  Phenobarbital barbiturates never tried.    16.  Topamax/topiramate: Patient had 2 trials from 01/2008 until 05/2008 and from 05/10/2015.  He  was only on 50 mg per day and the patient had speech and word-finding problems. During the first trial, he went up to 200 mg per day with a CP max of 3.5 mg/L, not effective for seizure control.    17.  Tranxene/clorazepate never tried.    18.  Trileptal oxcarbazepine:  Patient is on oxcarbazepine since 03/2002 to present.  Max dose 2700 mg per day.  CP max 45.3 mg/L.  Oxcarbazepine definitely decreases seizures and it appears it is the best drug ever tried.    19.  Vimpat/lacosamide from 07/2009 until 09/2009.  Max dose 400 mg per day with a CP max of 5.3 mg/L.  It was discontinued after 2 months because the patient became dizzy, ataxia, double vision and no change in seizure frequency.    20.  Zarontin/ethosuximide not indicated for his seizure syndrome.    21.  Zonegran/zonisamide from 12/2003 until?  Max dose 400 mg per day. Used in the combination with levetiracetam and oxcarbazepine. Patient's behavior changed.  He had cognitive impairment and memory problems, aggressiveness.  Seizure frequency did not change.  Those side effects improved after zonisamide was discontinued.    22.  Xcopri/cenobamate  was never tried.    23.  Benzodiazepines.  The patient uses diazepam p.r.n. for stopping seizures and the patient used clonazepam up to 4 mg a day for anxiety in 2013 to 2018. When it was stopped in 2018, there was some increase in seizure frequency, but he stabilized in 01/2019.    RECOMMENDATION AND PLAN:     The following recommendations are for the physician providing epilepsy medical management.   1.  The patient's main concern today was frequent urination and anorgasmia.  The patient took himself off Pristiq because he thought that he could not get orgasm and finish was a result of the Pristiq.  It does not seem that Pristiq is issue.  Recommendation:  I told the patient to make an appointment with Urology to rule out other medical issues and also to look at his frequent urination.    2.  The patient needs  written handout of how to make up missed medication protocol because he misses minimum once per month  his medications.    3.  The patient lost 15 pounds which got him into supratherapeutic levels and caused side effects.    4.  Future options: We have a problem with the patient that his insurance will not pay for a lot of his medications, but the patient never had FELBAMATE ,Fycompa, Xcopri or barbiturate trial.  Most of the newer meds are to expensive or not indicated for him like Onfi.      5.  For retrial option, see above.    Thank you for the opportunity to participate in the care of this patient.    Kadi Leos, Fadi  Pharmaceutical Care Coordinator  D: 05/11/2022   T: 05/11/2022   MT: [email or web address]  Document: [id number]

## 2022-04-28 NOTE — PROGRESS NOTES
Ammon is a 46 year old who is being evaluated via a billable video visit.      How would you like to obtain your AVS? MyChart  If the video visit is dropped, the invitation should be resent by: Text to cell phone: [phone]  Will anyone else be joining your video visit? Yes: family member. How would they like to receive their invitation? Text to cell phone: [phone]      Video Start Time: 10:00 am  Video-Visit Details    Type of service:  Video Visit    Video End Time:11:12 am    Originating Location (pt. Location): Home    Distant Location (provider location):  [facility] EPILEPSY CARE     Platform used for Video Visit: Piqora

## 2022-05-11 NOTE — PROGRESS NOTES
Service Date: 04/28/2022     PHYSICIAN EPILEPSY PROGRAM CONSULTATION MT    This was a video visit from my home to the patient's parents' basement apartment via [employer] due to COVID.  We connected on [employer] and patient's email is [email or web address], and if we get disconnected, we would connect via [employer] [phone].    Starting time 10 a.m.  Ending time 11:12 a.m.    DICTATION IDENTIFIER:  NAME:  Ammon Cronin  YOB: 1975  VIDEO VISIT:  04/28/2022.      This was a new visit for the MTM program.  I have not seen the patient since 06/02/2016.     IDENTIFYING INFORMATION AND INTRODUCTION:  Ammon is a 46-year-old male seen on a video visit today for an epilepsy MT consultation.  He was joined by his dad, Toby.    He lives in [place], [place].    The patient is a new adult to the Epilepsy MTM program, not seen since 06/02/2016.    Epileptologist, Dr. Rosenda Claros, and she sees him in person on 07/21/2022 which was communicated to the patient.    CHIEF COMPLAINTS:  Uncontrollable seizures, side effects, frequent urination and anorgasmia as a result of medications?    REASON FOR CONSULTATION:  Assessment of AED options and medication history, pharmacotherapy plan, compliance assessment, other drug related problems, anorgasmia.     Informants include the patient and part of the meeting was also with his dad, Toby.  Adequate medical records are available.    Time spent: 2 hours without the patient and 1 hour and 12 minutes with the patient.    MEDICATION INFORMATION:      1.  Current Anti-Seizure Drugs:   A.  Levetiracetam/Keppra:  750 mg tablet, 3750 mg per day.  750 mg a.m., 1500 mg at 4 p.m. and 1500 mg at 11 p.m.  B.  Oxcarbazepine/Trileptal 300 mg tablets, 2550 mg per day.  900 mg at 10 a.m., 750 mg at 4 p.m. and 900 mg at 11 p.m.  C.  Gabapentin 300 mg capsules.  Max dose 3300 mg per day divided as 900 mg a.m., 1200 mg at 4 p.m. and 1200 mg at 11 p.m.  D.  Phenytoin 200 mg per day as 100 mg  b.i.d.  E.  Rescue Drugs:  Yes.  Diazepam 5 mg pill.  According to the patient, he used 1 in the last 3 weeks.  Please be aware that diazepam given as a pill will have peak concentration after an hour or hour and a half.    2.  Concomitant Medications:    A.  Pristiq/venlafaxine 25 mg.  The patient is not taking.  He took himself off because he cannot orgasm.  B.  Alprazolam 1 mg p.r.n. once every 2 weeks for anxiety, prescribed by his psychiatrist.    3.  Herbal Remedies:    A. Multivitamin.  B.  Omega-3 1000 mg per day.     4.  Drug-Drug Interactions:    A.  Oxcarbazepine and phenytoin may result in increased risk of phenytoin toxicity and decrease in oxcarbazepine effectiveness.  We are following the patient's level, so we should know where he is.  B.  Phenytoin with Pristiq combination might have caused increased risk of CNS depression and may alter seizure control, but the patient stopped taking Pristiq because of anorgasmia.    5.  Current Reported Side Effects:  None except the anorgasmia which he still has after the Pristiq was discontinued.    6.  Allergies:  Cats.     PAST MEDICAL HISTORY:    1.  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  2.  Brain cavernoma.  3.  Memory deficits secondary to seizures and anti-seizure medications.  4.  Anxiety, depression with suicidal ideation in 12/2018.  5.  RLS.  6.  History of migraines.  7.  Asystole secondary to seizures, status post pacemaker placement on the left, 12/2003.   8.  Status post left temporal resection of AVM In 1985, age 11.  9.  Status post right frontal hematoma 04/13/2006 treated with surgery.  10.  Status post left sided cavernous hemangioma 07/2002.  11.  Status post epilepsy surgery 05/29/2017 for VNS implantation on the right.    PSYCHIATRIC/PSYCHOLOGY:    1.  The patient is seen by a psychiatrist and therapist.  He sees a therapist once a week for mental issues and 1 therapist every other week.   2.  Drugs tried:  Risperdal, Prozac, clonidine, Paxil, Pristiq, alprazolam, clonazepam.  3.  Neuropsychological factor:  Neuropsych testing showed steady decline, see 03/06/2019.      SEIZURE HISTORY:    1.  The patient has refractory multifocal epilepsy.  2.  Complex short seizures approximately 2 a day on average.   3.  Simple partials can be up to 5-6 a day where he goes in and out and most seizures are around 9 a.m. in the morning.  3.  There has been no emergency room visit for epilepsy in the past 12 months and no hospitalizations for epilepsy in the past 12 months.  4.  With regard to seizure reporting, it is not very reliable.  A seizure calendar is not used because not all seizures are caught by the parents and its an estimation.  5.  Precipitating factors:   Not enough sleep will cause more seizures.    SOCIAL AND ENVIRONMENTAL ASPECTS:    1.  The patient lives in his parents' basement.  2.  The patient is not driving.    REVIEW OF SYSTEMS:  The patient is sexually active.    PHARMACOTHERAPY INDICATORS:    1.  The patient has prescription coverage with his insurance plan.  Prescription drug copayment is $20 over $30.  The cost of obtaining prescribed medication does interfere with compliance or when he is not able to afford the medication.  2.  The patient's pharmacy is [facility].  3.  The patient is happy with their services.  4.  Compliance Assessment:  Patient is independent in medication administration.  He does use a pillbox; it is a weekly one.  He misses medications approximately once a month.  5.  The patient is capable of making medication adjustment and tapering dose.  Dad might be available to assist the patient.    HABITS AND CHEMICAL USE:  1.  Alcohol:  Never.  2.  Tobacco:  Never.  3.  Caffeine:  Tea, only minimal.    4.  Recreational drugs:  The patient tried CBD in 2018.  He was a part of Dr. Cedeno's fasting and fat food study for CBD and it did not help him.     DEMOGRAPHIC  CHARACTERISTICS:  1.  Actual body weight between 125 pounds and 130 pounds.  Please be aware patient is working out every day and that helps him for his mood issues.    COMPREHENSIVE REVIEW AND ASSESSMENT OF AED THERAPY    1.  Aptiom/eslicarbazepine is too expensive for him.    2.  Banzel/rufinamide has no indication for his seizures.    3.  Briviact/brivaracetam was always too expensive. When it becomes generic, maybe we can try then.    4.  Carbatrol/carbamazepine was tried and no more data.    5.  Depakote/valproic acid.  The patient was on valproic acid from 03/09/2010 until 05/02/2016.  Max dose 2500 mg per day.  CP max 116/15.6 mg/L. Higher doses worsened his tremor. In the past, it always decreased seizures but made hand tremor worse. Was used in the combination with levetiracetam, oxcarbazepine and gabapentin or oxcarbazepine, levetiracetam and Lyrica.  The last time when Depakote was discontinued and gabapentin increased, we did not see any increase in seizures.  It is one of the drugs we could retry if we run into problems.    6.  Dilantin/phenytoin 2 trials, one from? until 03/08/2002 and then from 05/31/2017 to present.  Max dose of 350 mg per day with a CP max phenytoin free 1.9 mg/L.  Side effects are tiredness.  Currently, the patient is on phenytoin and he still has daily seizures, but no GTC and no really severe side effects except some tiredness which could also be multipharmacy.      7.  Felbatol/felbamate was not tried because of price, and at the time, the patient could not afford it.  Please be aware patient has a history of migraines too.  Felbamate might cause some issues, and patient will have more seizures within insomnia, which felbamate could cause.    8.  Fycompa/perampanel never tried.    9.  Gabitril/tiagabine was tried up to 48 mg per day.  Between 01/2004 and 04/2006.  There was no change in seizure frequency when it was discontinued.  Side effects were fatigue.    10.   Keppra/levetiracetam since 2001 until present.  Max dose 4000 mg per day with a CP max of 42.4 mg/L.  4000 mg per day caused him some slurred speech, diplopia, ataxia, quite severe.  Therefore, his dose is currently down to 3700 mg per day.      11.  Lamictal/lamotrigine in 07/06/2001.  Max dose 150 mg per day.  Used in combination with phenytoin and there was no change in seizure frequency.  Actually, patient had increase in confusion.     12.  Lyrica/pregabalin was tried and increased to 1100 mg per day with no change in seizure frequency.    13.  Gabapentin/Neurontin.  The patient is on it since 07/2012 to present.  Max dose 3600 mg per day with a CP max of 15.2 mg/L.  Please be aware he uses it for RLS symptoms and it helps.  It helps for pain and it decreases his RLS.  Seizure wise, there does not look like a change in seizure frequency.    14.  Onfi/clobazam was not indicated for his seizure syndrome.    15.  Phenobarbital barbiturates never tried.    16.  Topamax/topiramate: Patient had 2 trials from 01/2008 until 05/2008 and from 05/10/2015.  He was only on 50 mg per day and the patient had speech and word-finding problems. During the first trial, he went up to 200 mg per day with a CP max of 3.5 mg/L, not effective for seizure control.    17.  Tranxene/clorazepate never tried.    18.  Trileptal oxcarbazepine:  Patient is on oxcarbazepine since 03/2002 to present.  Max dose 2700 mg per day.  CP max 45.3 mg/L.  Oxcarbazepine definitely decreases seizures and it appears it is the best drug ever tried.    19.  Vimpat/lacosamide from 07/2009 until 09/2009.  Max dose 400 mg per day with a CP max of 5.3 mg/L.  It was discontinued after 2 months because the patient became dizzy, ataxia, double vision and no change in seizure frequency.    20.  Zarontin/ethosuximide not indicated for his seizure syndrome.    21.  Zonegran/zonisamide from 12/2003 until?  Max dose 400 mg per day. Used in the combination with  levetiracetam and oxcarbazepine. Patient's behavior changed.  He had cognitive impairment and memory problems, aggressiveness.  Seizure frequency did not change.  Those side effects improved after zonisamide was discontinued.    22.  Xcopri/cenobamate  was never tried.    23.  Benzodiazepines.  The patient uses diazepam p.r.n. for stopping seizures and the patient used clonazepam up to 4 mg a day for anxiety in  to . When it was stopped in 2018, there was some increase in seizure frequency, but he stabilized in 2019.    RECOMMENDATION AND PLAN:     The following recommendations are for the physician providing epilepsy medical management.   1.  The patient's main concern today was frequent urination and anorgasmia.  The patient took himself off Pristiq because he thought that he could not get orgasm and finish was a result of the Pristiq.  It does not seem that Pristiq is issue.  Recommendation:  I told the patient to make an appointment with Urology to rule out other medical issues and also to look at his frequent urination.    2.  The patient needs written handout of how to make up missed medication protocol because he misses minimum once per month  his medications.    3.  The patient lost 15 pounds which got him into supratherapeutic levels and caused side effects.    4.  Future options: We have a problem with the patient that his insurance will not pay for a lot of his medications, but the patient never had FELBAMATE ,Fycompa, Xcopri or barbiturate trial.  Most of the newer meds are to expensive or not indicated for him like Onfi.      5.  For retrial option, see above.    Thank you for the opportunity to participate in the care of this patient.    Kadi Leos, Fadi  Pharmaceutical Care Coordinator    Kadi Leos PharmD        D: 2022   T: 2022   MT: kelsey    Name:     YUE OLSEN  MRN:      [id number]        Account:      [id number]   :      1975           Service  Date: 04/28/2022       Document: [id number]

## 2022-06-04 ENCOUNTER — HEALTH MAINTENANCE LETTER (OUTPATIENT)
Age: 47
End: 2022-06-04

## 2022-08-05 ENCOUNTER — ALLIED HEALTH/NURSE VISIT (OUTPATIENT)
Dept: NEUROLOGY | Facility: CLINIC | Age: 47
End: 2022-08-05
Payer: COMMERCIAL

## 2022-08-05 ENCOUNTER — VIRTUAL VISIT (OUTPATIENT)
Dept: NEUROLOGY | Facility: CLINIC | Age: 47
End: 2022-08-05
Payer: COMMERCIAL

## 2022-08-05 DIAGNOSIS — G40.219 PARTIAL EPILEPSY WITH IMPAIRMENT OF CONSCIOUSNESS, INTRACTABLE (H): Primary | ICD-10-CM

## 2022-08-05 RX ORDER — BUSPIRONE HYDROCHLORIDE 15 MG/1
15 TABLET ORAL DAILY
COMMUNITY
Start: 2022-08-02

## 2022-08-05 NOTE — PROGRESS NOTES
Ammon is a 46 year old who is being evaluated via a billable video visit.      How would you like to obtain your AVS? MyChart  If the video visit is dropped, the invitation should be resent by: Other e-mail: In clinic  Will anyone else be joining your video visit? Yes: parents. How would they like to receive their invitation? Text to cell phone: same        Video-Visit Details    Video Start Time: 3:42 PM    Type of service:  Video Visit    Video End Time:4:26 PM    Originating Location (pt. Location): [facility][1]    Distant Location (provider location):  [facility][1] EPILEPSY CARE     Platform used for Video Visit: [facility][1] Epilepsy Care Progress Note    Patient:  Ammon Cronin  :  1975   Age:  46 year old   Today's Visit: 2022       INTERVAL HISTORY:  He is accompanied with his dad and mom. They think he averages 1-2 focal seizures with impairment in awareness per day. Some days he has more seizure depending on the day. On current antiepileptic drug, patient denies experiencing dizziness, rare double vision more in the evening, no nausea, no vomiting, no abdominal pain, no mood changes, no ER visits, no hospitalizations, and had no significant fall with trauma.      He is feeling upset, broke up with girlfriend.       Seizure frequency: focal seizures with impairment in awareness with staring with lipsmacking  2022 - 1-2 focal seizures with impairment in awareness per day    - 2-3 seizure per day    - vagus nerve stimulator placement    - 3-4 seizure per week   Age 16 - seizure started   Age 11 - left temporal lobe tumor identified and had resection (tumor was AVM)      CURRENT AED MEDICATIONS:    1. Levetiracetam, 750 mg in the morning, 1500 mg at 4 p.m., and 1500 mg at 11 p.m.   2. Oxcarbazepine (300 mg tablet)  900 mg 10 am, 750 mg 4pm, and 900 mg pm    3. Gabapentin 900 mg AM, 1200 mg 4 pm, and 1200 mg PM  (started for RLS, patient has taken higher dose for some time, not sure  of length)   4. Phenytoin ER  100-100  5. Diazepam PRN for CPS lasting greater than 5 minutes or more then 2 in one hour. (they use it 2 times per week)        MEDICATION NOTES/PRIOR ANTIEPILEPTIC DRUGS:    1.Lyrica (ineffective for seizure, max dose 1100mg per day)   2.Topamax: Two trials from 01/2008-05/2008 and the second trial on 05/19/2015.  Max dose was 200 mg per day.  CP max was 3.5 mg/L.  There was no change in seizure frequency.  The patient was compliant.  During the second trial, they tried it for mood stabilization; 50 mg was after 2 months discontinued because side effects were speech and word-finding difficulties.   3.Gabatril (ineffective, caused fatigue at 48mg/ day). One trial from 01/2004-04/2006.  Max dose 48 mg per day.  It was ineffective and was discontinued.  There was no increase in seizures when it was stopped.  Side effects were fatigue.   4.Zonegran  One trial from 12/2003.  Max dose 400 mg per day.  Zonegran was used in combination with Keppra and Trileptal.  Side effects were memory impairment, aggressiveness, fatigue, behavioral changes, cognitive impairment, which improved after Zonegran was discontinued.   5.Dilantin: One trial from ? to 03/08/2002.  Max dose 350 mg per day.  CP max and free Dilantin level of 1.9 mg/L.  Efficacy:  Unknown.  The drug was optimized.  Side effects were tiredness on 350 mg a day.     Assessment:  It looks like seizure control was better when the patient was on Dilantin; therefore, it could be retried.   6.Vimpat: One trial 07/2009-09/2009.  Max dose 400 mg per day.  CP max 5.3 mg/L.  It was discontinued after 2 months because the patient experienced dizziness, diplopia and ataxia.  7.Depakote: One trial from 01/2010 to 05/09/2016.  So far, seizures have not increased because we increased the gabapentin.  Also, the headaches have not gotten worse, but the hand tremor and the head tremor have immensely improved since Depakote was discontinued.  Max dose  was 2500 mg per day.  CP max was up to 116/15.6 mg/L.  In the past, it decreased seizure frequency, and it was used together with levetiracetam, oxcarbazepine and gabapentin, and it was used before with oxcarbazepine, levetiracetam and Lyrica.   Assessment:  Depakote could always be retried if we run into a problem.  8.lamotrigine: One trial from 07/06/2001.  Max dose 150 mg per day.  Used in combination with Dilantin.  There was no change in seizure frequency.  Side effects were increase in confusion and therefore questionable efficacy.  9.carbamazepine (tired, but no past info on details)  10.Levetiracetam: started in 2001. No major side effects. One trial from 07/2001 to present.  Max dose 3750 mg per day.  CP max 42.4 mg/L. Levetiracetam above 4000 mg per day caused slurred speech, double vision, unstable gait, severe side effects.   11. Oxcarbazepine:  One trial from 03/2002 to present.  Max dose 2700 mg per day.  CP max 43.2 mg/L.  It decreases seizures, and it looks like it is one of the best drugs the patient has tried.  increased oxcarbazepine from 2,400mg -> 2700 mg on 4/2013.   12.  Gabapentin:  One trial from 07/2012 to present.  Max dose is 3600 mg per day.  This drug was used more for pain and restless legs, but when Depakote was tapered, we increased the gabapentin, and it looks like the only problem we have is some weight gain with the gabapentin.  Gabapentin  increased 1200 mg three times a day 5/2016 with no change in seizure, however, RLS symptoms decreased  13.  Klonopin:  One trial started 03/2013.  Is used for anxiety, not for seizures.  It was started at 0.5 mg and optimized so far to 3 mg per day.   stopped 12/2018 and seizure increased 1/2019 and then they stabilized. This drug is used by a psychiatrist.  If we one day want to try ONFI in this patient, maybe then the Klonopin could be decreased again.   14. Vagus nerve stimulator setting: Not able to tolerate higher duty cycle (35) with 1.1  signal off time and 30 sec on time, seizure worsened, not able to tolerate higher current setting due to discomfort.   15. Felbamate - Felbamate not tired because generic was $411 - $600 for 3 month supply and he can not afford    There were no vitals taken for this visit.  Wt Readings from Last 4 Encounters:   01/21/22 126 lb (57.2 kg)   08/20/20 123 lb 6.4 oz (56 kg)   02/26/20 132 lb (59.9 kg)   07/30/19 128 lb 12.8 oz (58.4 kg)     EXAM: Alert, orientated, speech is fluent, face symmetric, tongue midline, extra ocular movements in tact, no pronator drip, no focal weakness.     ASSESSMENT:    1. 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 Brain Cavernoma   3. Memory deficits secondary to seizures and antiepileptic drugs    4. Anxiety/Depression with suicidal ideations 12/2018   5. Restless legs syndrome.  6. History of Migraines  7. Asystole secondary to seizures. Status post pacemaker placement 12/2003.      Discussion: Refractory multifocal epilepsy.  Etiology multiple cavernomas.  He is status left temporal resection of AVM in 1985 (age 11), Right frontal hematoma 04/13/2006, treated with surgery.  Left-sided cavernous hemangioma 07/2002. Status post epilepsy surgery 05/29/2012.  Vagus nerve stimulator implanted on right side of chest and he has pacemaker on left side of chest. Electrographically, he has multifocal epileptiform discharges and seizures arising from left and right temporal regions.      Currently patient continues to have a high burden of seizures, however his seizures are at baseline at 8-10 seizures per week, all of his seizures are focal impaired seizures and no generalized tonic-clonic seizures.  He does follow seizure precautions to ensure physical safety.  He does have established care with a psychologist and psychiatrist.    In regards to  antiseizure medications, we reviewed felbamate and he declined at this time. We also talked about Deep Brain Stimulation device  And Responsive Neurostimulation (Neuropace) and he declined.     We have limited options for antiepileptic drug: his insurance co pay for  felbamate  ($400-600) is cost prohibitive and I suspect his copay for brand antiepileptic drug Banzel, Brivaracetam, fycompa,cenobamate, may also be cost prohibitive. Retrial with lamotrigine (tremors will worsen).     In the past years we have talked about epilepsy brain surgery (DEEP BRAIN STIMULATION DEVICE ) and he declined multiple times.  We did interrogate his vagus nerve stimulator.    He continues to have anxiety/depresison. I asked him to talk to psychiatrist and therapist.         PLAN:     Follow-up with psychiatrist and therapist as able     Continue other seizure medications the same  1. Levetiracetam, 750 mg in the morning, 1500 mg at 4 p.m., and 1500 mg at 11 p.m.   2. Oxcarbazepine (300 mg tablet)  900 mg 10 am, 750 mg 4pm, and 900 mg pm    3. Gabapentin 900 mg AM, 1200 mg 4 pm, and 1200 mg PM  (started for RLS, patient has taken higher dose for some time, not sure of length)   4. Phenytoin ER  100-100  5. Diazepam PRN for CPS lasting greater than 5 minutes or more then 2 in one hour. (they use it 2 times per week)    Follow up  4 months     Vagus nerve stimulator interrogated 2022. Battery life is ok     No MRI per cardiology due to pacemaker    Check antiepileptic drug levels for efficacy, toxicity, and side effects.        I spent 45 minutes for patient's medical care. During this time key medical decisions were made with review of medical chart prior to visit, visit with patient, counseling/education, and post visit work, including documentation on the day of visit. I addressed all questions the patient/caregiver raised in regards to the patient's medical care. This note was created with voice recognition software. Inadvertent  "grammatical errors, typographical errors, and \"sound a like\" substitutions may occur due to limitations of the software.  Read the note carefully and apply context when erroneous substitutions have occurred. Thank you.     Rosenda Claros MD               "

## 2022-08-05 NOTE — PATIENT INSTRUCTIONS
Continue with same seizure medications     Follow-up with psychiatrist and therapist as able     Continue other seizure medications the same  1. Levetiracetam, 750 mg in the morning, 1500 mg at 4 p.m., and 1500 mg at 11 p.m.   2. Oxcarbazepine (300 mg tablet)  900 mg 10 am, 750 mg 4pm, and 900 mg pm    3. Gabapentin 900 mg AM, 1200 mg 4 pm, and 1200 mg PM  (started for RLS, patient has taken higher dose for some time, not sure of length)   4. Phenytoin ER  100-100  5. Diazepam PRN for CPS lasting greater than 5 minutes or more then 2 in one hour. (they use it 2 times per week)    Follow up  4 months     Vagus nerve stimulator interrogated 2022. Battery life is ok       Responsive Neurostimulation (Neuropace) - [email or web address]  - look at patient ambassador stories     Felbamate: Adverse reactions include decreased appetite, vomiting, insomnia, nausea, dizziness, somnolence, and headache. Many patients report increased alertness with the drug. Two rare but very serious effects include aplastic anemia and hepatic (liver) failure. The risk of aplastic anemia is between 1:3,600 and 1:5,000, of which 30% of cases are fatal. The risk of hepatic failure is between 1:24,000 to 1:34,000, of which 40% of cases are fatal.    Positive of felbamate: energy, good seizure med, and you have not tired this med.    Epilepsy Surgery Evaluation FOR YOUR INFORMATION:   Nurse education on epilepsy surgery   Pre surgical evaluation consist of Video EEG monitoring (5-10 days at [facility]).   Evaluation with psychiatry for mental health care.  We will need psychiatric clearance prior to brain surgery.   Brain PET scan with ambulatory EEG   Neuro psychiatry test   WADA test (if on zonisamide or topiramate you need to stop these medications as instructed by KALPES[facility] prior to WADA)   Doctor Case Management Conference, consist of a multi-disciplinary medical team, to determine plan of care for epilepsy surgery.   If we  decide to proceed with invasive brain EEG monitoring, you will need to complete epilepsy surgery nurse education and visit with neurosurgeon   Schedule hospital admission for invasive EEG monitoring, this will require a 1-2 week hospitalization in the intensive care unit. At this time we will record multiple seizure to define seizure onset zone.   A second Doctor Case Management Conference to determine plan of care for epilepsy surgery   10. Medical clearance and psychiatry clearance   11. Epilepsy Surgery       Rosenda Claros MD

## 2022-08-05 NOTE — LETTER
2022       RE: Ammon Cronin  : 1975   MRN: [id number]      Dear Colleague,    Thank you for referring your patient, Ammon Cronin, to the [facility] EPILEPSY CARE at [facility]. Please see a copy of my visit note below.    Ammon is a 46 year old who is being evaluated via a billable video visit.      How would you like to obtain your AVS? MyChart  If the video visit is dropped, the invitation should be resent by: Other e-mail: In clinic  Will anyone else be joining your video visit? Yes: parents. How would they like to receive their invitation? Text to cell phone: same        Video-Visit Details    Video Start Time: 3:42 PM    Type of service:  Video Visit    Video End Time:4:26 PM    Originating Location (pt. Location): [facility]    Distant Location (provider location):  [facility] EPILEPSY CARE     Platform used for Video Visit: [facility] Epilepsy Care Progress Note    Patient:  Ammon Cronin  :  1975   Age:  46 year old   Today's Visit: 2022       INTERVAL HISTORY:  He is accompanied with his dad and mom. They think he averages 1-2 focal seizures with impairment in awareness per day. Some days he has more seizure depending on the day. On current antiepileptic drug, patient denies experiencing dizziness, rare double vision more in the evening, no nausea, no vomiting, no abdominal pain, no mood changes, no ER visits, no hospitalizations, and had no significant fall with trauma.      He is feeling upset, broke up with girlfriend.       Seizure frequency: focal seizures with impairment in awareness with staring with lipsmacking  2022 - 1-2 focal seizures with impairment in awareness per day    - 2-3 seizure per day    - vagus nerve stimulator placement    - 3-4 seizure per week   Age 16 - seizure started   Age 11 - left temporal lobe tumor identified and had resection (tumor was AVM)      CURRENT AED MEDICATIONS:    1.  Levetiracetam, 750 mg in the morning, 1500 mg at 4 p.m., and 1500 mg at 11 p.m.   2. Oxcarbazepine (300 mg tablet)  900 mg 10 am, 750 mg 4pm, and 900 mg pm    3. Gabapentin 900 mg AM, 1200 mg 4 pm, and 1200 mg PM  (started for RLS, patient has taken higher dose for some time, not sure of length)   4. Phenytoin ER  100-100  5. Diazepam PRN for CPS lasting greater than 5 minutes or more then 2 in one hour. (they use it 2 times per week)        MEDICATION NOTES/PRIOR ANTIEPILEPTIC DRUGS:    1.Lyrica (ineffective for seizure, max dose 1100mg per day)   2.Topamax: Two trials from 01/2008-05/2008 and the second trial on 05/19/2015.  Max dose was 200 mg per day.  CP max was 3.5 mg/L.  There was no change in seizure frequency.  The patient was compliant.  During the second trial, they tried it for mood stabilization; 50 mg was after 2 months discontinued because side effects were speech and word-finding difficulties.   3.Gabatril (ineffective, caused fatigue at 48mg/ day). One trial from 01/2004-04/2006.  Max dose 48 mg per day.  It was ineffective and was discontinued.  There was no increase in seizures when it was stopped.  Side effects were fatigue.   4.Zonegran  One trial from 12/2003.  Max dose 400 mg per day.  Zonegran was used in combination with Keppra and Trileptal.  Side effects were memory impairment, aggressiveness, fatigue, behavioral changes, cognitive impairment, which improved after Zonegran was discontinued.   5.Dilantin: One trial from ? to 03/08/2002.  Max dose 350 mg per day.  CP max and free Dilantin level of 1.9 mg/L.  Efficacy:  Unknown.  The drug was optimized.  Side effects were tiredness on 350 mg a day.     Assessment:  It looks like seizure control was better when the patient was on Dilantin; therefore, it could be retried.   6.Vimpat: One trial 07/2009-09/2009.  Max dose 400 mg per day.  CP max 5.3 mg/L.  It was discontinued after 2 months because the patient experienced dizziness, diplopia and  ataxia.  7.Depakote: One trial from 01/2010 to 05/09/2016.  So far, seizures have not increased because we increased the gabapentin.  Also, the headaches have not gotten worse, but the hand tremor and the head tremor have immensely improved since Depakote was discontinued.  Max dose was 2500 mg per day.  CP max was up to 116/15.6 mg/L.  In the past, it decreased seizure frequency, and it was used together with levetiracetam, oxcarbazepine and gabapentin, and it was used before with oxcarbazepine, levetiracetam and Lyrica.   Assessment:  Depakote could always be retried if we run into a problem.  8.lamotrigine: One trial from 07/06/2001.  Max dose 150 mg per day.  Used in combination with Dilantin.  There was no change in seizure frequency.  Side effects were increase in confusion and therefore questionable efficacy.  9.carbamazepine (tired, but no past info on details)  10.Levetiracetam: started in 2001. No major side effects. One trial from 07/2001 to present.  Max dose 3750 mg per day.  CP max 42.4 mg/L. Levetiracetam above 4000 mg per day caused slurred speech, double vision, unstable gait, severe side effects.   11. Oxcarbazepine:  One trial from 03/2002 to present.  Max dose 2700 mg per day.  CP max 43.2 mg/L.  It decreases seizures, and it looks like it is one of the best drugs the patient has tried.  increased oxcarbazepine from 2,400mg -> 2700 mg on 4/2013.   12.  Gabapentin:  One trial from 07/2012 to present.  Max dose is 3600 mg per day.  This drug was used more for pain and restless legs, but when Depakote was tapered, we increased the gabapentin, and it looks like the only problem we have is some weight gain with the gabapentin.  Gabapentin  increased 1200 mg three times a day 5/2016 with no change in seizure, however, RLS symptoms decreased  13.  Klonopin:  One trial started 03/2013.  Is used for anxiety, not for seizures.  It was started at 0.5 mg and optimized so far to 3 mg per day.   stopped  12/2018 and seizure increased 1/2019 and then they stabilized. This drug is used by a psychiatrist.  If we one day want to try ONFI in this patient, maybe then the Klonopin could be decreased again.   14. Vagus nerve stimulator setting: Not able to tolerate higher duty cycle (35) with 1.1 signal off time and 30 sec on time, seizure worsened, not able to tolerate higher current setting due to discomfort.   15. Felbamate - Felbamate not tired because generic was $411 - $600 for 3 month supply and he can not afford    There were no vitals taken for this visit.  Wt Readings from Last 4 Encounters:   01/21/22 126 lb (57.2 kg)   08/20/20 123 lb 6.4 oz (56 kg)   02/26/20 132 lb (59.9 kg)   07/30/19 128 lb 12.8 oz (58.4 kg)     EXAM: Alert, orientated, speech is fluent, face symmetric, tongue midline, extra ocular movements in tact, no pronator drip, no focal weakness.     ASSESSMENT:    1. 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 Brain Cavernoma   3. Memory deficits secondary to seizures and antiepileptic drugs    4. Anxiety/Depression with suicidal ideations 12/2018   5. Restless legs syndrome.  6. History of Migraines  7. Asystole secondary to seizures. Status post pacemaker placement 12/2003.      Discussion: Refractory multifocal epilepsy.  Etiology multiple cavernomas.  He is status left temporal resection of AVM in 1985 (age 11), Right frontal hematoma 04/13/2006, treated with surgery.  Left-sided cavernous hemangioma 07/2002. Status post epilepsy surgery 05/29/2012.  Vagus nerve stimulator implanted on right side of chest and he has pacemaker on left side of chest. Electrographically, he has multifocal epileptiform discharges and seizures arising from left and right temporal regions.      Currently patient continues to have a high burden of seizures, however his  seizures are at baseline at 8-10 seizures per week, all of his seizures are focal impaired seizures and no generalized tonic-clonic seizures.  He does follow seizure precautions to ensure physical safety.  He does have established care with a psychologist and psychiatrist.    In regards to antiseizure medications, we reviewed felbamate and he declined at this time. We also talked about Deep Brain Stimulation device  And Responsive Neurostimulation (Neuropace) and he declined.     We have limited options for antiepileptic drug: his insurance co pay for  felbamate  ($400-600) is cost prohibitive and I suspect his copay for brand antiepileptic drug Banzel, Brivaracetam, fycompa,cenobamate, may also be cost prohibitive. Retrial with lamotrigine (tremors will worsen).     In the past years we have talked about epilepsy brain surgery (DEEP BRAIN STIMULATION DEVICE ) and he declined multiple times.  We did interrogate his vagus nerve stimulator.    He continues to have anxiety/depresison. I asked him to talk to psychiatrist and therapist.         PLAN:     Follow-up with psychiatrist and therapist as able     Continue other seizure medications the same  1. Levetiracetam, 750 mg in the morning, 1500 mg at 4 p.m., and 1500 mg at 11 p.m.   2. Oxcarbazepine (300 mg tablet)  900 mg 10 am, 750 mg 4pm, and 900 mg pm    3. Gabapentin 900 mg AM, 1200 mg 4 pm, and 1200 mg PM  (started for RLS, patient has taken higher dose for some time, not sure of length)   4. Phenytoin ER  100-100  5. Diazepam PRN for CPS lasting greater than 5 minutes or more then 2 in one hour. (they use it 2 times per week)    Follow up  4 months     Vagus nerve stimulator interrogated 2022. Battery life is ok     No MRI per cardiology due to pacemaker    Check antiepileptic drug levels for efficacy, toxicity, and side effects.        I spent 45 minutes for patient's medical care. During this time key medical decisions were made with review of medical chart  "prior to visit, visit with patient, counseling/education, and post visit work, including documentation on the day of visit. I addressed all questions the patient/caregiver raised in regards to the patient's medical care. This note was created with voice recognition software. Inadvertent grammatical errors, typographical errors, and \"sound a like\" substitutions may occur due to limitations of the software.  Read the note carefully and apply context when erroneous substitutions have occurred. Thank you.     Rosenda Claros MD     "

## 2022-08-05 NOTE — PROGRESS NOTES
[facility] VNS interrogation Note  Patient: Ammon Cronin  : 1975   Age: 46 year old  Today's Office Visit: 2022  Primary [facility] Provider: Rosenda Claros M.D.  Perceived VNS Side Effects:  None    VNS interrogated prior to a virtual visit today with Dr.Sima RBYAN Claros    VNS Management (VNS Flowsheet)  Vagus Nerve Stimulation 2022   MD Harlan Claros   Implant Date 2019   Model Number 1000 1000   Serial Number [id number]   Patient ID - [id number]   VNS Interrogation Outgoing Parameters -   Date 2022   Output Current (mA) 2.0 2.0   Signal Frequency (Hz) 30 30   Pulse Width (usec) 250 250   Signal ON Time (sec) 30 30   Signal OFF Time (min) 3 3   Magent Output Current (mA) 2.0 2.0   Magent ON Time (sec) 30 30   Magnet Pulse Width (usec) 250 250   AutoStim Output Current (mA) - -   AutoStim Pulse Width (usec) - -   AutoStim ON Time (sec) - -   Tachycardia Detection ON/OFF - OFF   Heartbeat Detection Sensitivity (1-5) - -   Perform Verify Heartbeat Detection (y/n) - N/A   Threshold for AutoStim (%) - -   Near End of Service (Y/N) - -   Output Current (OK/Low) - OK   Current Delivered (mA) - 2   Impedance Value (Ohms) - 3012   Battery Status Indicator (Green/Yellow/Red, %) - green 50%-75%   IFI (No/Yes) No No   Number of Magnet Swipes - -   Average AutoStims per day - -

## 2022-09-02 ENCOUNTER — TELEPHONE (OUTPATIENT)
Dept: NEUROLOGY | Facility: CLINIC | Age: 47
End: 2022-09-02

## 2022-09-02 NOTE — TELEPHONE ENCOUNTER
Ammon Cronin is calling requesting a call back, patient states he and Dr. Claros discussed a surgery at his last visit on 08/05/22. Patient would like more information regarding this surgery mailed to his home so that he may make a decision whether he wants it or not.

## 2022-09-02 NOTE — TELEPHONE ENCOUNTER
Call placed to patient and information provided for online information about Neuropace RNS and [employer] DBS    Will mail out some information so that patient has something to refer to.

## 2022-09-27 ENCOUNTER — TELEPHONE (OUTPATIENT)
Dept: NEUROLOGY | Facility: CLINIC | Age: 47
End: 2022-09-27

## 2022-09-27 NOTE — TELEPHONE ENCOUNTER
Need new RX for reshipment of 112 600MG tablet of Gabapentin due to destroyed during shipment. Just 1 time RX needed for re-shipment. Said they can take verbal order from nurse. Call [phone]  Press opt 1 for pharmacist mention #[id number]

## 2022-09-28 NOTE — TELEPHONE ENCOUNTER
Called and spoke to pharmacist. She confirmed patient reported that 1 bottle of gabapentin 600 mg was crushed during shipment and they need to send another with 112 pills.    Verbal order given to approve reshipment of 112 tabs of gabapentin 600mg (600 in AM, 1200 noon, 1200 at night)    Cheyenne Mitchell PA-C

## 2022-10-10 ENCOUNTER — HEALTH MAINTENANCE LETTER (OUTPATIENT)
Age: 47
End: 2022-10-10

## 2022-11-03 DIAGNOSIS — G40.219 PARTIAL EPILEPSY WITH IMPAIRMENT OF CONSCIOUSNESS, INTRACTABLE (H): ICD-10-CM

## 2022-11-03 DIAGNOSIS — G40.219 PARTIAL EPILEPSY WITH LOSS OF CONSCIOUSNESS, INTRACTABLE (H): ICD-10-CM

## 2022-11-03 DIAGNOSIS — G25.81 RESTLESS LEG SYNDROME: ICD-10-CM

## 2022-11-04 RX ORDER — DIAZEPAM 5 MG
TABLET ORAL
Qty: 30 TABLET | Refills: 3 | Status: SHIPPED | OUTPATIENT
Start: 2022-11-04

## 2022-12-06 ENCOUNTER — OFFICE VISIT (OUTPATIENT)
Dept: NEUROLOGY | Facility: CLINIC | Age: 47
End: 2022-12-06
Payer: COMMERCIAL

## 2022-12-06 VITALS
SYSTOLIC BLOOD PRESSURE: 129 MMHG | HEART RATE: 71 BPM | HEIGHT: 66 IN | WEIGHT: 130 LBS | TEMPERATURE: 98.6 F | DIASTOLIC BLOOD PRESSURE: 76 MMHG | BODY MASS INDEX: 20.89 KG/M2

## 2022-12-06 DIAGNOSIS — G25.81 RESTLESS LEG SYNDROME: ICD-10-CM

## 2022-12-06 DIAGNOSIS — Z79.899 LONG TERM USE OF DRUG: ICD-10-CM

## 2022-12-06 DIAGNOSIS — G25.81 RESTLESS LEGS SYNDROME: ICD-10-CM

## 2022-12-06 DIAGNOSIS — R25.1 TREMOR: ICD-10-CM

## 2022-12-06 DIAGNOSIS — G40.219 PARTIAL EPILEPSY WITH LOSS OF CONSCIOUSNESS, INTRACTABLE (H): ICD-10-CM

## 2022-12-06 DIAGNOSIS — G40.219 PARTIAL EPILEPSY WITH IMPAIRMENT OF CONSCIOUSNESS, INTRACTABLE (H): ICD-10-CM

## 2022-12-06 RX ORDER — GABAPENTIN 300 MG/1
CAPSULE ORAL
Qty: 90 CAPSULE | Refills: 3 | Status: SHIPPED | OUTPATIENT
Start: 2022-12-06

## 2022-12-06 RX ORDER — PHENYTOIN SODIUM 100 MG/1
100 CAPSULE, EXTENDED RELEASE ORAL 2 TIMES DAILY
Qty: 180 CAPSULE | Refills: 3 | Status: SHIPPED | OUTPATIENT
Start: 2022-12-06

## 2022-12-06 RX ORDER — GABAPENTIN 600 MG/1
TABLET ORAL
Qty: 450 TABLET | Refills: 3 | Status: SHIPPED | OUTPATIENT
Start: 2022-12-06

## 2022-12-06 RX ORDER — LEVETIRACETAM 750 MG/1
TABLET ORAL
Qty: 450 TABLET | Refills: 3 | Status: SHIPPED | OUTPATIENT
Start: 2022-12-06

## 2022-12-06 RX ORDER — OXCARBAZEPINE 300 MG/1
TABLET, FILM COATED ORAL
Qty: 765 TABLET | Refills: 3 | Status: SHIPPED | OUTPATIENT
Start: 2022-12-06

## 2022-12-06 NOTE — PATIENT INSTRUCTIONS
Consider Responsive Neurostimulation (Neuropace), will need hospital admission for epilepsy surgery evaluation     [facility] nurse education on epilepsy surgery process for Responsive Neurostimulation (Neuropace)     Continue other seizure medications the same  1. Levetiracetam, 750 mg in the morning, 1500 mg at 4 p.m., and 1500 mg at 11 p.m.   2. Oxcarbazepine (300 mg tablet)  900 mg 10 am, 750 mg 4pm, and 900 mg pm    3. Gabapentin 900 mg AM, 1200 mg 4 pm, and 1200 mg PM  (started for RLS, patient has taken higher dose for some time, not sure of length)   4. Phenytoin ER  100-100  5. Diazepam PRN for CPS lasting greater than 5 minutes or more then 2 in one hour. (they use it 2 times per week)    Rosenda Claros MD

## 2022-12-06 NOTE — PROGRESS NOTES
[facility]    Patient:  Ammon Cronin  :  1975   Age:  47 year old   Today's Office Visit:  2022      INTERVAL HISTORY:  He is accompanied with his dad. He went to ER 2022 and he had a seizure while running.  They think he averages 1-2 focal seizures with impairment in awareness per day. Some days he has more seizure depending on the day. He is working with therapist weekly and does have emotional health team. On current antiepileptic drug, patient denies experiencing dizziness, rare double vision more in the evening, no nausea, no vomiting, no abdominal pain, no mood changes, no ER visits, no hospitalizations, and had no significant fall with trauma.        Social: Works at [employer], lives with parents.     Seizure frequency: focal seizures with impairment in awareness with staring with lipsmacking  2022: 1-2 seizure per day focal seizures with impairment in awareness, he may clusters with up to 7 seizure per day.   2022 - 1-2 focal seizures with impairment in awareness per day    - 2-3 seizure per day    - vagus nerve stimulator placement    - 3-4 seizure per week   Age 16 - seizure started   Age 11 - left temporal lobe tumor identified and had resection (tumor was AVM)      CURRENT AED MEDICATIONS:    1. Levetiracetam, 750 mg in the morning, 1500 mg at 4 p.m., and 1500 mg at 11 p.m.   2. Oxcarbazepine (300 mg tablet)  900 mg 10 am, 750 mg 4pm, and 900 mg pm    3. Gabapentin 900 mg AM, 1200 mg 4 pm, and 1200 mg PM  (started for RLS, patient has taken higher dose for some time, not sure of length)   4. Phenytoin ER  100-100  5. Diazepam PRN for CPS lasting greater than 5 minutes or more then 2 in one hour. (they use it 2 times per week)        MEDICATION NOTES/PRIOR ANTIEPILEPTIC DRUGS:    1.Lyrica (ineffective for seizure, max dose 1100mg per day)   2.Topamax: Two trials from 2008-2008 and the second trial on 2015.  Max dose was 200 mg per  day.  CP max was 3.5 mg/L.  There was no change in seizure frequency.  The patient was compliant.  During the second trial, they tried it for mood stabilization; 50 mg was after 2 months discontinued because side effects were speech and word-finding difficulties.   3.Gabatril (ineffective, caused fatigue at 48mg/ day). One trial from 01/2004-04/2006.  Max dose 48 mg per day.  It was ineffective and was discontinued.  There was no increase in seizures when it was stopped.  Side effects were fatigue.   4.Zonegran  One trial from 12/2003.  Max dose 400 mg per day.  Zonegran was used in combination with Keppra and Trileptal.  Side effects were memory impairment, aggressiveness, fatigue, behavioral changes, cognitive impairment, which improved after Zonegran was discontinued.   5.Dilantin: One trial from ? to 03/08/2002.  Max dose 350 mg per day.  CP max and free Dilantin level of 1.9 mg/L.  Efficacy:  Unknown.  The drug was optimized.  Side effects were tiredness on 350 mg a day.     Assessment:  It looks like seizure control was better when the patient was on Dilantin; therefore, it could be retried.   6.Vimpat: One trial 07/2009-09/2009.  Max dose 400 mg per day.  CP max 5.3 mg/L.  It was discontinued after 2 months because the patient experienced dizziness, diplopia and ataxia.  7.Depakote: One trial from 01/2010 to 05/09/2016.  So far, seizures have not increased because we increased the gabapentin.  Also, the headaches have not gotten worse, but the hand tremor and the head tremor have immensely improved since Depakote was discontinued.  Max dose was 2500 mg per day.  CP max was up to 116/15.6 mg/L.  In the past, it decreased seizure frequency, and it was used together with levetiracetam, oxcarbazepine and gabapentin, and it was used before with oxcarbazepine, levetiracetam and Lyrica.   Assessment:  Depakote could always be retried if we run into a problem.  8.lamotrigine: One trial from 07/06/2001.  Max dose 150  "mg per day.  Used in combination with Dilantin.  There was no change in seizure frequency.  Side effects were increase in confusion and therefore questionable efficacy.  9.carbamazepine (tired, but no past info on details)  10.Levetiracetam: started in 2001. No major side effects. One trial from 07/2001 to present.  Max dose 3750 mg per day.  CP max 42.4 mg/L. Levetiracetam above 4000 mg per day caused slurred speech, double vision, unstable gait, severe side effects.   11. Oxcarbazepine:  One trial from 03/2002 to present.  Max dose 2700 mg per day.  CP max 43.2 mg/L.  It decreases seizures, and it looks like it is one of the best drugs the patient has tried.  increased oxcarbazepine from 2,400mg -> 2700 mg on 4/2013.   12.  Gabapentin:  One trial from 07/2012 to present.  Max dose is 3600 mg per day.  This drug was used more for pain and restless legs, but when Depakote was tapered, we increased the gabapentin, and it looks like the only problem we have is some weight gain with the gabapentin.  Gabapentin  increased 1200 mg three times a day 5/2016 with no change in seizure, however, RLS symptoms decreased  13.  Klonopin:  One trial started 03/2013.  Is used for anxiety, not for seizures.  It was started at 0.5 mg and optimized so far to 3 mg per day.   stopped 12/2018 and seizure increased 1/2019 and then they stabilized. This drug is used by a psychiatrist.  If we one day want to try ONFI in this patient, maybe then the Klonopin could be decreased again.   14. Vagus nerve stimulator setting: Not able to tolerate higher duty cycle (35) with 1.1 signal off time and 30 sec on time, seizure worsened, not able to tolerate higher current setting due to discomfort.   15. Felbamate - Felbamate not tired because generic was $411 - $600 for 3 month supply and he can not afford    /76   Pulse 71   Temp 98.6  F (37  C) (Temporal)   Ht 5' 6\" (167.6 cm)   Wt 130 lb (59 kg)   BMI 20.98 kg/m    Wt Readings from Last " 4 Encounters:   12/06/22 130 lb (59 kg)   01/21/22 126 lb (57.2 kg)   08/20/20 123 lb 6.4 oz (56 kg)   02/26/20 132 lb (59.9 kg)     EXAM: Alert, orientated, speech is fluent, face symmetric, tongue midline, extra ocular movements in tact, no pronator drip, no focal weakness.     ASSESSMENT:    1. 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 Brain Cavernoma   3. Memory deficits secondary to seizures and antiepileptic drugs    4. Anxiety/Depression with suicidal ideations 12/2018   5. Restless legs syndrome.  6. History of Migraines  7. Asystole secondary to seizures. Status post pacemaker placement 12/2003.      Discussion: Refractory multifocal epilepsy.  Etiology multiple cavernomas.  He is status left temporal resection of AVM in 1985 (age 11), Right frontal hematoma 04/13/2006, treated with surgery.  Left-sided cavernous hemangioma 07/2002. Status post epilepsy surgery 05/29/2012.  Vagus nerve stimulator implanted on right side of chest and he has pacemaker on left side of chest. Electrographically, he has multifocal epileptiform discharges and seizures arising from left and right temporal regions.      Currently patient continues to have a high burden of seizures, however his seizures are at baseline at 8-10 seizures per week, all of his seizures are focal impaired seizures and no generalized tonic-clonic seizures.  He does follow seizure precautions to ensure physical safety.  He does have established care with a psychologist and psychiatrist.    In regards to antiseizure medications, we reviewed felbamate and he declined at this time. We also talked about Deep Brain Stimulation device  And Responsive Neurostimulation (Neuropace) and he declined.     We have limited options for antiepileptic drug: his insurance co pay for  felbamate  ($400-600) is cost prohibitive and  I suspect his copay for brand antiepileptic drug Banzel, Brivaracetam, fycompa,cenobamate, may also be cost prohibitive. Retrial with lamotrigine (tremors will worsen).     On this visit we reviewed the possibility of RNS device placement.  He will need an inpatient EMU admission and invasive evaluation for localization.  I suspect he has active left and right temporal lobe seizures in addition to other focal onset regions.  He would like to think further about this opportunity with RNS We did interrogate his vagus nerve stimulator.    He continues to have anxiety/depresison and was encouraged to continue working with psychiatrist and therapist.         PLAN:     Follow-up with psychiatrist and therapist     Consider Responsive Neurostimulation (Neuropace), will need hospital admission for epilepsy surgery evaluation (ordered placed), patient/family will let us know    [facility] nurse education on epilepsy surgery process for Responsive Neurostimulation (Neuropace), they have not decided on surgery, would like more information. Show them device and talk about downloading data, etc.     Continue other seizure medications the same  1. Levetiracetam, 750 mg in the morning, 1500 mg at 4 p.m., and 1500 mg at 11 p.m.   2. Oxcarbazepine (300 mg tablet)  900 mg 10 am, 750 mg 4pm, and 900 mg pm    3. Gabapentin 900 mg AM, 1200 mg 4 pm, and 1200 mg PM  (started for RLS, patient has taken higher dose for some time, not sure of length)   4. Phenytoin ER  100-100  5. Diazepam PRN for CPS lasting greater than 5 minutes or more then 2 in one hour. (they use it 2 times per week)    Follow up  4 months     Vagus nerve stimulator interrogated 2022. Battery life is ok     No MRI per cardiology due to pacemaker    Check antiepileptic drug levels for efficacy, toxicity, and side effects.          I spent 39 minutes in total today to provide comprehensive  medical care.   I spent 2 minutes writing the note and placing orders.   I spent 1  minutes  reviewing the chart.     The rest of the time was spent with the patient in face to face interview. During this time key medical decisions were made with review of medical chart prior to visit, visit with patient, counseling/education, and post visit work, including documentation of note on the day of visit. I addressed all questions the patient/caregiver raised in regards to epilepsy or related medical questions.           Rosenda Claros MD

## 2022-12-06 NOTE — PROGRESS NOTES
Vagal Nerve Stimulator was interrogated today. No device deficiencies found. No changes made today.      12/06/22 1300   Device Information   MD SIP   Implant Date 01/29/19   Model Number 1000   Serial Number [id number]   Patient ID IDA   Parameters   VNS Interrogation Incoming Parameters   Date 12/06/22   Output Current (mA) 2.0   Signal Frequency (Hz) 30   Pulse Width (Mu sec) 250   Signal Time On (sec) 30   Signal Time Off 3   Magnet Current (mA) 2.0   Magnet Time On (sec) 30   Magnet Pulse Width (Mu sec) 250   Number of Magnet Used 0   Other Parameters   AutoStim Output Current (mA) 0   AutoStim Pulse Width (usec) 0   AutoStim ON Time (sec) 0   Tachycardia Detection ON/OFF off   Diagnostics   Output Current (OK/Low) ok   Current Delivered (mA) 2.0   Impedance Value (Ohms) 2949   Battery Status Indicator (Green/Yellow/Red, %) 25-50% green   IFI (No/Yes) No   Other   Number of Magnet Swipes o   Average AutoStims per day off     Alexandria Khan RN  Epilepsy Nurse Coordinator

## 2022-12-06 NOTE — LETTER
2022     RE: Ammon Cronin  : 1975   MRN: [id number]      Dear Colleague,    Thank you for referring your patient, Ammon Cronin, to the [facility] EPILE[facility] CARE at [facility]. Please see a copy of my visit note below.    [facility]    Patient:  Ammon Cronin  :  1975   Age:  47 year old   Today's Office Visit:  2022      INTERVAL HISTORY:  He is accompanied with his dad. He went to ER 2022 and he had a seizure while running.  They think he averages 1-2 focal seizures with impairment in awareness per day. Some days he has more seizure depending on the day. He is working with therapist weekly and does have emotional health team. On current antiepileptic drug, patient denies experiencing dizziness, rare double vision more in the evening, no nausea, no vomiting, no abdominal pain, no mood changes, no ER visits, no hospitalizations, and had no significant fall with trauma.        Social: Works at [employer], lives with parents.     Seizure frequency: focal seizures with impairment in awareness with staring with lipsmacking  2022: 1-2 seizure per day focal seizures with impairment in awareness, he may clusters with up to 7 seizure per day.   2022 - 1-2 focal seizures with impairment in awareness per day    - 2-3 seizure per day    - vagus nerve stimulator placement    - 3-4 seizure per week   Age 16 - seizure started   Age 11 - left temporal lobe tumor identified and had resection (tumor was AVM)      CURRENT AED MEDICATIONS:    1. Levetiracetam, 750 mg in the morning, 1500 mg at 4 p.m., and 1500 mg at 11 p.m.   2. Oxcarbazepine (300 mg tablet)  900 mg 10 am, 750 mg 4pm, and 900 mg pm    3. Gabapentin 900 mg AM, 1200 mg 4 pm, and 1200 mg PM  (started for RLS, patient has taken higher dose for some time, not sure of length)   4. Phenytoin ER  100-100  5. Diazepam PRN for CPS lasting greater than 5 minutes  or more then 2 in one hour. (they use it 2 times per week)        MEDICATION NOTES/PRIOR ANTIEPILEPTIC DRUGS:    1.Lyrica (ineffective for seizure, max dose 1100mg per day)   2.Topamax: Two trials from 01/2008-05/2008 and the second trial on 05/19/2015.  Max dose was 200 mg per day.  CP max was 3.5 mg/L.  There was no change in seizure frequency.  The patient was compliant.  During the second trial, they tried it for mood stabilization; 50 mg was after 2 months discontinued because side effects were speech and word-finding difficulties.   3.Gabatril (ineffective, caused fatigue at 48mg/ day). One trial from 01/2004-04/2006.  Max dose 48 mg per day.  It was ineffective and was discontinued.  There was no increase in seizures when it was stopped.  Side effects were fatigue.   4.Zonegran  One trial from 12/2003.  Max dose 400 mg per day.  Zonegran was used in combination with Keppra and Trileptal.  Side effects were memory impairment, aggressiveness, fatigue, behavioral changes, cognitive impairment, which improved after Zonegran was discontinued.   5.Dilantin: One trial from ? to 03/08/2002.  Max dose 350 mg per day.  CP max and free Dilantin level of 1.9 mg/L.  Efficacy:  Unknown.  The drug was optimized.  Side effects were tiredness on 350 mg a day.     Assessment:  It looks like seizure control was better when the patient was on Dilantin; therefore, it could be retried.   6.Vimpat: One trial 07/2009-09/2009.  Max dose 400 mg per day.  CP max 5.3 mg/L.  It was discontinued after 2 months because the patient experienced dizziness, diplopia and ataxia.  7.Depakote: One trial from 01/2010 to 05/09/2016.  So far, seizures have not increased because we increased the gabapentin.  Also, the headaches have not gotten worse, but the hand tremor and the head tremor have immensely improved since Depakote was discontinued.  Max dose was 2500 mg per day.  CP max was up to 116/15.6 mg/L.  In the past, it decreased seizure  frequency, and it was used together with levetiracetam, oxcarbazepine and gabapentin, and it was used before with oxcarbazepine, levetiracetam and Lyrica.   Assessment:  Depakote could always be retried if we run into a problem.  8.lamotrigine: One trial from 07/06/2001.  Max dose 150 mg per day.  Used in combination with Dilantin.  There was no change in seizure frequency.  Side effects were increase in confusion and therefore questionable efficacy.  9.carbamazepine (tired, but no past info on details)  10.Levetiracetam: started in 2001. No major side effects. One trial from 07/2001 to present.  Max dose 3750 mg per day.  CP max 42.4 mg/L. Levetiracetam above 4000 mg per day caused slurred speech, double vision, unstable gait, severe side effects.   11. Oxcarbazepine:  One trial from 03/2002 to present.  Max dose 2700 mg per day.  CP max 43.2 mg/L.  It decreases seizures, and it looks like it is one of the best drugs the patient has tried.  increased oxcarbazepine from 2,400mg -> 2700 mg on 4/2013.   12.  Gabapentin:  One trial from 07/2012 to present.  Max dose is 3600 mg per day.  This drug was used more for pain and restless legs, but when Depakote was tapered, we increased the gabapentin, and it looks like the only problem we have is some weight gain with the gabapentin.  Gabapentin  increased 1200 mg three times a day 5/2016 with no change in seizure, however, RLS symptoms decreased  13.  Klonopin:  One trial started 03/2013.  Is used for anxiety, not for seizures.  It was started at 0.5 mg and optimized so far to 3 mg per day.   stopped 12/2018 and seizure increased 1/2019 and then they stabilized. This drug is used by a psychiatrist.  If we one day want to try ONFI in this patient, maybe then the Klonopin could be decreased again.   14. Vagus nerve stimulator setting: Not able to tolerate higher duty cycle (35) with 1.1 signal off time and 30 sec on time, seizure worsened, not able to tolerate higher current  "setting due to discomfort.   15. Felbamate - Felbamate not tired because generic was $411 - $600 for 3 month supply and he can not afford    /76   Pulse 71   Temp 98.6  F (37  C) (Temporal)   Ht 5' 6\" (167.6 cm)   Wt 130 lb (59 kg)   BMI 20.98 kg/m    Wt Readings from Last 4 Encounters:   12/06/22 130 lb (59 kg)   01/21/22 126 lb (57.2 kg)   08/20/20 123 lb 6.4 oz (56 kg)   02/26/20 132 lb (59.9 kg)     EXAM: Alert, orientated, speech is fluent, face symmetric, tongue midline, extra ocular movements in tact, no pronator drip, no focal weakness.     ASSESSMENT:    1. 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 Brain Cavernoma   3. Memory deficits secondary to seizures and antiepileptic drugs    4. Anxiety/Depression with suicidal ideations 12/2018   5. Restless legs syndrome.  6. History of Migraines  7. Asystole secondary to seizures. Status post pacemaker placement 12/2003.      Discussion: Refractory multifocal epilepsy.  Etiology multiple cavernomas.  He is status left temporal resection of AVM in 1985 (age 11), Right frontal hematoma 04/13/2006, treated with surgery.  Left-sided cavernous hemangioma 07/2002. Status post epilepsy surgery 05/29/2012.  Vagus nerve stimulator implanted on right side of chest and he has pacemaker on left side of chest. Electrographically, he has multifocal epileptiform discharges and seizures arising from left and right temporal regions.      Currently patient continues to have a high burden of seizures, however his seizures are at baseline at 8-10 seizures per week, all of his seizures are focal impaired seizures and no generalized tonic-clonic seizures.  He does follow seizure precautions to ensure physical safety.  He does have established care with a psychologist and psychiatrist.    In regards to antiseizure medications, we " reviewed felbamate and he declined at this time. We also talked about Deep Brain Stimulation device  And Responsive Neurostimulation (Neuropace) and he declined.     We have limited options for antiepileptic drug: his insurance co pay for  felbamate  ($400-600) is cost prohibitive and I suspect his copay for brand antiepileptic drug Banzel, Brivaracetam, fycompa,cenobamate, may also be cost prohibitive. Retrial with lamotrigine (tremors will worsen).     On this visit we reviewed the possibility of RNS device placement.  He will need an inpatient EMU admission and invasive evaluation for localization.  I suspect he has active left and right temporal lobe seizures in addition to other focal onset regions.  He would like to think further about this opportunity with RNS We did interrogate his vagus nerve stimulator.    He continues to have anxiety/depresison and was encouraged to continue working with psychiatrist and therapist.         PLAN:     Follow-up with psychiatrist and therapist     Consider Responsive Neurostimulation (Neuropace), will need hospital admission for epilepsy surgery evaluation (ordered placed), patient/family will let us know    [facility] nurse education on epilepsy surgery process for Responsive Neurostimulation (Neuropace), they have not decided on surgery, would like more information. Show them device and talk about downloading data, etc.     Continue other seizure medications the same  1. Levetiracetam, 750 mg in the morning, 1500 mg at 4 p.m., and 1500 mg at 11 p.m.   2. Oxcarbazepine (300 mg tablet)  900 mg 10 am, 750 mg 4pm, and 900 mg pm    3. Gabapentin 900 mg AM, 1200 mg 4 pm, and 1200 mg PM  (started for RLS, patient has taken higher dose for some time, not sure of length)   4. Phenytoin ER  100-100  5. Diazepam PRN for CPS lasting greater than 5 minutes or more then 2 in one hour. (they use it 2 times per week)    Follow up  4 months     Vagus nerve stimulator interrogated 2022. Battery  life is ok     No MRI per cardiology due to pacemaker    Check antiepileptic drug levels for efficacy, toxicity, and side effects.          I spent 39 minutes in total today to provide comprehensive  medical care.   I spent 2 minutes writing the note and placing orders.   I spent 1 minutes  reviewing the chart.     The rest of the time was spent with the patient in face to face interview. During this time key medical decisions were made with review of medical chart prior to visit, visit with patient, counseling/education, and post visit work, including documentation of note on the day of visit. I addressed all questions the patient/caregiver raised in regards to epilepsy or related medical questions.           Rosenda Claros MD               Vagal Nerve Stimulator was interrogated today. No device deficiencies found. No changes made today.      12/06/22 1300   Device Information   MD SIP   Implant Date 01/29/19   Model Number 1000   Serial Number [id number]   Patient ID IDA   Parameters   VNS Interrogation Incoming Parameters   Date 12/06/22   Output Current (mA) 2.0   Signal Frequency (Hz) 30   Pulse Width (Mu sec) 250   Signal Time On (sec) 30   Signal Time Off 3   Magnet Current (mA) 2.0   Magnet Time On (sec) 30   Magnet Pulse Width (Mu sec) 250   Number of Magnet Used 0   Other Parameters   AutoStim Output Current (mA) 0   AutoStim Pulse Width (usec) 0   AutoStim ON Time (sec) 0   Tachycardia Detection ON/OFF off   Diagnostics   Output Current (OK/Low) ok   Current Delivered (mA) 2.0   Impedance Value (Ohms) 2949   Battery Status Indicator (Green/Yellow/Red, %) 25-50% green   IFI (No/Yes) No   Other   Number of Magnet Swipes o   Average AutoStims per day off     Alexandria Khan RN  Epilepsy Nurse Coordinator    Again, thank you for allowing me to participate in the care of your patient.      Sincerely,    Rosenda Claros MD

## 2022-12-09 ENCOUNTER — HOSPITAL ENCOUNTER (OUTPATIENT)
Facility: CLINIC | Age: 47
End: 2022-12-09
Attending: PSYCHIATRY & NEUROLOGY | Admitting: PSYCHIATRY & NEUROLOGY
Payer: COMMERCIAL

## 2022-12-19 ENCOUNTER — TELEPHONE (OUTPATIENT)
Dept: NEUROLOGY | Facility: CLINIC | Age: 47
End: 2022-12-19

## 2022-12-19 NOTE — LETTER
12/19/2022       RE: Ammon Cronin  [address]  [place] [address]          To Whom It May Concern:      Ammon Cronin has been a patient under my care for almost 10 years, and a patient at this clinic for over 20 years. He has a lifelong medical condition.  's medical condition presents with episodes of loss of awareness and altered memory. 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      Yours Sincerely,          Rosenda Alexander M.D.

## 2022-12-19 NOTE — TELEPHONE ENCOUNTER
What is the concern that needs to be addressed by a nurse? Patient wants letter to excuse him from Jury Duty, can send to e-mail [email or web address]  Asked for call back to confirm it can be done or has been sent    May a detailed message be left on voicemail? Yes    Date of last office visit: 12/6/22    Message routed to: [facility] RN Pool

## 2022-12-19 NOTE — TELEPHONE ENCOUNTER
Letter prepared and printed for MD signature.    Call placed to patient. Message left requesting that he call back with a fax number or address to send the letter to,

## 2023-04-13 ENCOUNTER — TELEPHONE (OUTPATIENT)
Dept: NEUROLOGY | Facility: CLINIC | Age: 48
End: 2023-04-13

## 2023-04-17 ENCOUNTER — TELEPHONE (OUTPATIENT)
Dept: NEUROLOGY | Facility: CLINIC | Age: 48
End: 2023-04-17

## 2023-06-11 ENCOUNTER — HEALTH MAINTENANCE LETTER (OUTPATIENT)
Age: 48
End: 2023-06-11

## 2023-07-24 ENCOUNTER — TELEPHONE (OUTPATIENT)
Dept: NEUROLOGY | Facility: CLINIC | Age: 48
End: 2023-07-24

## 2023-07-24 NOTE — TELEPHONE ENCOUNTER
What is the concern that needs to be addressed by a nurse? Patient has a model 1000 generator that is under recall. Jacqueline wood needs conformation that provider knows generator is under recall     May a detailed message be left on voicemail? yes  Date of last office visit:     Message routed to: mincep

## 2023-08-02 NOTE — TELEPHONE ENCOUNTER
"Spoke with the company.    For Ammon, the \"recall\" on the device is related to the incorrect reporting of the lead impedance during diagnostics. The device may report the impedance is higher than it actually is.    No explantation is indicated.    If we get a high impedance reading during routine interrogation then we can contact the device  for further testing. Do not need to consider ordering a chest X-ray until after consulting with the technical support personal.    The patient is not informed of this recall by the company as there is no direct action needed      updated  "

## 2024-08-04 ENCOUNTER — HEALTH MAINTENANCE LETTER (OUTPATIENT)
Age: 49
End: 2024-08-04

## 2025-05-03 ENCOUNTER — HEALTH MAINTENANCE LETTER (OUTPATIENT)
Age: 50
End: 2025-05-03

## (undated) DEVICE — LINEN TOWEL PACK X6 WHITE 5487

## (undated) DEVICE — LINEN TOWEL PACK X5 5464

## (undated) DEVICE — PREP SKIN SCRUB TRAY 4461A

## (undated) DEVICE — PREP POVIDONE IODINE SOLUTION 10% 4OZ

## (undated) DEVICE — PREP POVIDONE IODINE SCRUB 7.5% 4OZ APL82212

## (undated) DEVICE — PAD CHUX UNDERPAD 23X24" 7136

## (undated) DEVICE — SU MONOCRYL 4-0 PS-2 27" UND Y426H

## (undated) DEVICE — SOL WATER IRRIG 1000ML BOTTLE 2F7114

## (undated) DEVICE — GLOVE PROTEXIS MICRO 8.0  2D73PM80

## (undated) DEVICE — COVER CAMERA VIDEO LASER 9X96" 04-CC227

## (undated) DEVICE — SU VICRYL 3-0 SH 8X18" UND J864D

## (undated) DEVICE — PACK NEURO MINOR UMMC SNE32MNMU4

## (undated) DEVICE — SOL ADH LIQUID BENZOIN SWAB 0.6ML C1544

## (undated) DEVICE — DRSG STERI STRIP 1/2X4" R1547

## (undated) DEVICE — SOL NACL 0.9% IRRIG 1000ML BOTTLE 2F7124

## (undated) DEVICE — DECANTER VIAL 2006S

## (undated) DEVICE — PREP CHLORAPREP CLEAR 3ML 260400

## (undated) RX ORDER — DEXAMETHASONE SODIUM PHOSPHATE 4 MG/ML
INJECTION, SOLUTION INTRA-ARTICULAR; INTRALESIONAL; INTRAMUSCULAR; INTRAVENOUS; SOFT TISSUE
Status: DISPENSED
Start: 2019-01-29

## (undated) RX ORDER — FENTANYL CITRATE 50 UG/ML
INJECTION, SOLUTION INTRAMUSCULAR; INTRAVENOUS
Status: DISPENSED
Start: 2019-01-29

## (undated) RX ORDER — GLYCOPYRROLATE 0.2 MG/ML
INJECTION, SOLUTION INTRAMUSCULAR; INTRAVENOUS
Status: DISPENSED
Start: 2019-01-29

## (undated) RX ORDER — ONDANSETRON 2 MG/ML
INJECTION INTRAMUSCULAR; INTRAVENOUS
Status: DISPENSED
Start: 2019-01-29

## (undated) RX ORDER — LIDOCAINE HYDROCHLORIDE 20 MG/ML
INJECTION, SOLUTION EPIDURAL; INFILTRATION; INTRACAUDAL; PERINEURAL
Status: DISPENSED
Start: 2019-01-29

## (undated) RX ORDER — PHENYLEPHRINE HCL IN 0.9% NACL 1 MG/10 ML
SYRINGE (ML) INTRAVENOUS
Status: DISPENSED
Start: 2019-01-29

## (undated) RX ORDER — PROPOFOL 10 MG/ML
INJECTION, EMULSION INTRAVENOUS
Status: DISPENSED
Start: 2019-01-29

## (undated) RX ORDER — EPHEDRINE SULFATE 50 MG/ML
INJECTION, SOLUTION INTRAMUSCULAR; INTRAVENOUS; SUBCUTANEOUS
Status: DISPENSED
Start: 2019-01-29

## (undated) RX ORDER — CEFAZOLIN SODIUM 2 G/100ML
INJECTION, SOLUTION INTRAVENOUS
Status: DISPENSED
Start: 2019-01-29

## (undated) RX ORDER — LIDOCAINE HYDROCHLORIDE AND EPINEPHRINE 10; 10 MG/ML; UG/ML
INJECTION, SOLUTION INFILTRATION; PERINEURAL
Status: DISPENSED
Start: 2019-01-29